# Patient Record
Sex: FEMALE | Race: ASIAN | Employment: OTHER | URBAN - METROPOLITAN AREA
[De-identification: names, ages, dates, MRNs, and addresses within clinical notes are randomized per-mention and may not be internally consistent; named-entity substitution may affect disease eponyms.]

---

## 2017-02-17 ENCOUNTER — OFFICE VISIT (OUTPATIENT)
Dept: CARDIOLOGY | Facility: CLINIC | Age: 82
End: 2017-02-17
Attending: INTERNAL MEDICINE
Payer: COMMERCIAL

## 2017-02-17 ENCOUNTER — HOSPITAL ENCOUNTER (OUTPATIENT)
Dept: CARDIOLOGY | Facility: CLINIC | Age: 82
Discharge: HOME | End: 2017-02-17
Attending: INTERNAL MEDICINE | Admitting: INTERNAL MEDICINE
Payer: COMMERCIAL

## 2017-02-17 VITALS
HEIGHT: 60 IN | HEART RATE: 60 BPM | BODY MASS INDEX: 22.19 KG/M2 | WEIGHT: 113 LBS | DIASTOLIC BLOOD PRESSURE: 74 MMHG | SYSTOLIC BLOOD PRESSURE: 128 MMHG

## 2017-02-17 DIAGNOSIS — I25.110 CORONARY ARTERY DISEASE INVOLVING NATIVE CORONARY ARTERY OF NATIVE HEART WITH UNSTABLE ANGINA PECTORIS (H): ICD-10-CM

## 2017-02-17 DIAGNOSIS — I49.9 CARDIAC ARRHYTHMIA, UNSPECIFIED CARDIAC ARRHYTHMIA TYPE: ICD-10-CM

## 2017-02-17 DIAGNOSIS — I34.0 MITRAL VALVE INSUFFICIENCY, UNSPECIFIED ETIOLOGY: ICD-10-CM

## 2017-02-17 DIAGNOSIS — I24.9 ACS (ACUTE CORONARY SYNDROME) (H): ICD-10-CM

## 2017-02-17 PROCEDURE — 99214 OFFICE O/P EST MOD 30 MIN: CPT | Mod: 25 | Performed by: INTERNAL MEDICINE

## 2017-02-17 PROCEDURE — 93306 TTE W/DOPPLER COMPLETE: CPT | Mod: 26 | Performed by: INTERNAL MEDICINE

## 2017-02-17 PROCEDURE — 93306 TTE W/DOPPLER COMPLETE: CPT

## 2017-02-17 RX ORDER — CALCIUM CARBONATE 500(1250)
500 TABLET ORAL DAILY
COMMUNITY
End: 2023-01-01

## 2017-02-17 NOTE — PROGRESS NOTES
HPI and Plan:   See dictation    Orders Placed This Encounter   Procedures     Follow-Up with Cardiologist       Orders Placed This Encounter   Medications     calcium carbonate (OS-CLAIRE 500 MG [place]. CA) 500 MG tablet     Sig: Take 500 mg by mouth 2 times daily       Encounter Diagnoses   Name Primary?     ACS (acute coronary syndrome) (H)      Mitral valve insufficiency, unspecified etiology      Coronary artery disease involving native coronary artery of native heart with unstable angina pectoris (H)      Cardiac arrhythmia, unspecified cardiac arrhythmia type        CURRENT MEDICATIONS:  Current Outpatient Prescriptions   Medication Sig Dispense Refill     calcium carbonate (OS-CLAIRE 500 MG [place]. CA) 500 MG tablet Take 500 mg by mouth 2 times daily       metoprolol (LOPRESSOR) 25 MG tablet Take 0.5 tablets (12.5 mg) by mouth 2 times daily 90 tablet 3     loratadine (CLARITIN) 10 MG tablet Take 1 tablet (10 mg) by mouth daily 30 tablet 0     omeprazole (PRILOSEC) 40 MG capsule Take 1 capsule (40 mg) by mouth daily 90 capsule 3     ticagrelor (BRILINTA) 90 MG tablet Take 1 tablet (90 mg) by mouth every 12 hours 180 tablet 3     aspirin EC 81 MG EC tablet Take 1 tablet (81 mg) by mouth daily 30 tablet 1     nitroglycerin (NITROSTAT) 0.4 MG SL tablet Place 1 tablet (0.4 mg) under the tongue every 5 minutes as needed for chest pain if you are still having symptoms after 3 doses (15 minutes) call [phone]. 25 tablet 1       ALLERGIES     Allergies   Allergen Reactions     Azithromycin      Kanamycin      Metronidazole Rash     Penicillins Rash     Streptomycin      rash     Atorvastatin Rash     Lisinopril Rash       PAST MEDICAL HISTORY:  Past Medical History   Diagnosis Date     Benign essential HTN      Coronary artery disease involving native coronary artery of native heart with unstable angina pectoris (H) 4/2016 4/2016 Cath - 80% hazy stenosis at site of ruptured plaque - HARLEEN placed     Cough      Eczema      Lung  nodule      Malignant neoplasm of bladder, part unspecified 1999 Dr Everett     Surgery done in [place]     Mitral regurgitation      4/2016 mod-mod severe MR with multiple jets per Echo     Mixed hyperlipidemia      NSTEMI (non-ST elevated myocardial infarction) (H)      Osteoporosis      Polyarticular arthritis      Rash      Sciatica        PAST SURGICAL HISTORY:  Past Surgical History   Procedure Laterality Date     Bladder tumor-associated       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     C appendectomy       1950     C removal gallbladder       due to stone 1990     Cataract iol, rt/lt  5-08     Heart cath stent cor w/wo ptca  4/2016 4/2016 Cath - 80% hazy stenosis at site of ruptured plaque - HARLEEN placed       FAMILY HISTORY:  Family History   Problem Relation Age of Onset     DIABETES Brother      Hypertension Son      CEREBROVASCULAR DISEASE Mother      76     Family History Negative Daughter      Family History Negative Brother      Family History Negative Brother        SOCIAL HISTORY:  Social History     Social History     Marital status: Single     Spouse name: N/A     Number of children: N/A     Years of education: N/A     Social History Main Topics     Smoking status: Never Smoker     Smokeless tobacco: None     Alcohol use No     Drug use: No     Sexual activity: Not Currently     Other Topics Concern     Caffeine Concern No     Sleep Concern No     Stress Concern No     Weight Concern No     Special Diet No     Exercise Yes     walking 20 min daily     Seat Belt Yes     Parent/Sibling W/ Cabg, Mi Or Angioplasty Before 65f 55m? No     Social History Narrative    Vamsi AZEVEDO    From [place]    In the  2001           Review of Systems:  Skin:  Positive for itching   Eyes:  Positive for glasses  ENT:  Negative    Respiratory:  Positive for dyspnea on exertion  Cardiovascular:    Positive for;palpitations;chest pain;lightheadedness  Gastroenterology: Positive for  heartburn;constipation  Genitourinary:  Negative    Musculoskeletal:  Negative    Neurologic:  Negative    Psychiatric:  Negative    Heme/Lymph/Imm:  Negative    Endocrine:  Negative      Physical Exam:  Vitals: /74  Pulse 60  Ht 1.524 m (5')  Wt 51.3 kg (113 lb)  BMI 22.07 kg/m2    Constitutional:           Skin:           Head:           Eyes:           ENT:           Neck:           Chest:           Cardiac:                    Abdomen:           Vascular:                                        Extremities and Back:           Neurological:             Recent Lab Results:  LIPID RESULTS:  Lab Results   Component Value Date    CHOL 170 04/10/2016    HDL 55 04/10/2016    LDL 88 04/10/2016    TRIG 133 04/10/2016    CHOLHDLRATIO 3.8 04/05/2010       LIVER ENZYME RESULTS:  Lab Results   Component Value Date    AST 30 04/09/2016    ALT 24 04/09/2016       CBC RESULTS:  Lab Results   Component Value Date    WBC 10.3 09/16/2016    RBC 3.79 (L) 09/16/2016    HGB 11.3 (L) 09/16/2016    HCT 34.9 (L) 09/16/2016    MCV 92 09/16/2016    MCH 29.8 09/16/2016    MCHC 32.4 09/16/2016    RDW 13.1 09/16/2016     09/16/2016       BMP RESULTS:  Lab Results   Component Value Date     09/15/2016    POTASSIUM 3.7 09/15/2016    CHLORIDE 103 09/15/2016    CO2 25 09/15/2016    ANIONGAP 7 09/15/2016     (H) 09/15/2016    BUN 17 09/15/2016    CR 0.78 09/15/2016    GFRESTIMATED 70 09/15/2016    GFRESTBLACK 85 09/15/2016    CLAIRE 9.3 09/15/2016        A1C RESULTS:  No results found for: A1C    INR RESULTS:  No results found for: INR        CC  Lopez Perrin MD   [facility]  [address]  [place] [id number]

## 2017-02-17 NOTE — MR AVS SNAPSHOT
After Visit Summary   2/17/2017    Mary Polanco    MRN: [id number]           Patient Information     Date Of Birth          4/24/1930        Visit Information        Provider Department      2/17/2017 3:00 PM Marychuy, Lopez Lang MD; MULTILINGUAL WORD [facility]        Today's Diagnoses     ACS (acute coronary syndrome) (H)        Mitral valve insufficiency, unspecified etiology        Coronary artery disease involving native coronary artery of native heart with unstable angina pectoris (H)           Follow-ups after your visit        Who to contact     If you have questions or need follow up information about today's clinic visit or your schedule please contact [facility] directly at [phone].  Normal or non-critical lab and imaging results will be communicated to you by MyChart, letter or phone within 4 business days after the clinic has received the results. If you do not hear from us within 7 days, please contact the clinic through MyChart or phone. If you have a critical or abnormal lab result, we will notify you by phone as soon as possible.  Submit refill requests through [employer] or call your pharmacy and they will forward the refill request to us. Please allow 3 business days for your refill to be completed.          Additional Information About Your Visit        Care EveryWhere ID     This is your Care EveryWhere ID. This could be used by other organizations to access your [place] medical records  [id number]        Your Vitals Were     Pulse Height BMI (Body Mass Index)             60 1.524 m (5') 22.07 kg/m2          Blood Pressure from Last 3 Encounters:   02/17/17 128/74   10/05/16 120/70   09/28/16 110/58    Weight from Last 3 Encounters:   02/17/17 51.3 kg (113 lb)   10/05/16 51.7 kg (114 lb)   09/28/16 51.7 kg (114 lb)              We Performed the Following     Follow-Up with Cardiologist         Primary Care Provider Office Phone # Fax #    Serge Combs [phone]       [facility] [address]  [facility] [place] [address]        Thank you!     Thank you for choosing [facility] [place]  for your care. Our goal is always to provide you with excellent care. Hearing back from our patients is one way we can continue to improve our services. Please take a few minutes to complete the written survey that you may receive in the mail after your visit with us. Thank you!             Your Updated Medication List - Protect others around you: Learn how to safely use, store and throw away your medicines at www.disposemymeds.org.          This list is accurate as of: 2/17/17  3:42 PM.  Always use your most recent med list.                   Brand Name Dispense Instructions for use    aspirin 81 MG EC tablet     30 tablet    Take 1 tablet (81 mg) by mouth daily       calcium carbonate 500 MG tablet    OS-CLAIRE 500 mg [place]. [place]     Take 500 mg by mouth 2 times daily       loratadine 10 MG tablet    CLARITIN    30 tablet    Take 1 tablet (10 mg) by mouth daily       metoprolol 25 MG tablet    LOPRESSOR    90 tablet    Take 0.5 tablets (12.5 mg) by mouth 2 times daily       nitroglycerin 0.4 MG sublingual tablet    NITROSTAT    25 tablet    Place 1 tablet (0.4 mg) under the tongue every 5 minutes as needed for chest pain if you are still having symptoms after 3 doses (15 minutes) call 911.       omeprazole 40 MG capsule    priLOSEC    90 capsule    Take 1 capsule (40 mg) by mouth daily       ticagrelor 90 MG tablet    BRILINTA    180 tablet    Take 1 tablet (90 mg) by mouth every 12 hours

## 2017-02-17 NOTE — LETTER
2/17/2017    Serge Combs MD  [facility]   [address]  [place] [address]    RE: Mary Collin       Dear Colleague,    I had the pleasure of seeing Mary Polanco in the [facility].    Ms. Polanco returns for followup of coronary artery disease.  She had a non-Q-wave myocardial infarction with preserved left ventricular systolic function in 04/2016.  A drug-eluting stent was placed into the circumflex artery with an excellent angiographic result.  She is still on dual antiplatelet therapy.  She is intolerant of statins.  She also has dyspepsia for which omeprazole has been prescribed.  If offers some relief.  She returns today telling me that she has occasional palpitations but no dizzy spells or syncopal episodes.  Previous CardioNet monitoring has not revealed any significant arrhythmias.       PHYSICAL EXAMINATION:  Her cardiac examination is unremarkable.  Echocardiography was done today for followup of her mild degenerative valvular heart disease.  The regurgitation remains mild.      Outpatient Encounter Prescriptions as of 2/17/2017   Medication Sig Dispense Refill     calcium carbonate (OS-CLAIRE 500 MG [place]. [place]) 500 MG tablet Take 500 mg by mouth 2 times daily       metoprolol (LOPRESSOR) 25 MG tablet Take 0.5 tablets (12.5 mg) by mouth 2 times daily 90 tablet 3     loratadine (CLARITIN) 10 MG tablet Take 1 tablet (10 mg) by mouth daily 30 tablet 0     omeprazole (PRILOSEC) 40 MG capsule Take 1 capsule (40 mg) by mouth daily 90 capsule 3     ticagrelor (BRILINTA) 90 MG tablet Take 1 tablet (90 mg) by mouth every 12 hours 180 tablet 3     aspirin EC 81 MG EC tablet Take 1 tablet (81 mg) by mouth daily 30 tablet 1     nitroglycerin (NITROSTAT) 0.4 MG SL tablet Place 1 tablet (0.4 mg) under the tongue every 5 minutes as needed for chest pain if you are still having symptoms after 3 doses (15 minutes) call 911. 25 tablet 1     No facility-administered encounter medications on  file as of 2/17/2017.      IMPRESSION:   1.  Stable coronary artery disease.   2.  Mild degenerative valvular heart disease.  Mild aortic regurgitation and mild mitral regurgitation and mild tricuspid regurgitation with mild pulmonary hypertension.  Essentially unchanged.   3.  Statin intolerance.      I will see her again in a year's time for further followup.  I asked her to stop the Brilinta and gave her written instructions in April of this year.  After which I think she can have her dental work done.     Again, thank you for allowing me to participate in the care of your patient.      Sincerely,    DR MAURA PÉREZ MD     [facility]

## 2017-02-18 NOTE — PROGRESS NOTES
HISTORY OF PRESENT ILLNESS:  Ms. Schafer returns for followup of coronary artery disease.  She had a non-Q-wave myocardial infarction with preserved left ventricular systolic function in 2016.  A drug-eluting stent was placed into the circumflex artery with an excellent angiographic result.  She is still on dual antiplatelet therapy.  She is intolerant of statins.  She also has dyspepsia for which omeprazole has been prescribed.  If offers some relief.  She returns today telling me that she has occasional palpitations but no dizzy spells or syncopal episodes.  Previous CardioNet monitoring has not revealed any significant arrhythmias.       PHYSICAL EXAMINATION:  Her cardiac examination is unremarkable.  Echocardiography was done today for followup of her mild degenerative valvular heart disease.  The regurgitation remains mild.      IMPRESSION:   1.  Stable coronary artery disease.   2.  Mild degenerative valvular heart disease.  Mild aortic regurgitation and mild mitral regurgitation and mild tricuspid regurgitation with mild pulmonary hypertension.  Essentially unchanged.   3.  Statin intolerance.      I will see her again in a year's time for further followup.  I asked her to stop the Brilinta and gave her written instructions in April of this year.  After which I think she can have her dental work done.         MAURA PÉREZ MD, [facility]             D: 2017 15:56   T: 2017 01:18   MT: DALIA      Name:     NERISSA SCHAFER   MRN:      [id number]        Account:      [id number]   :      1930           Service Date: 2017      Document: [id number]

## 2017-03-02 ENCOUNTER — TELEPHONE (OUTPATIENT)
Dept: CARDIOLOGY | Facility: CLINIC | Age: 82
End: 2017-03-02

## 2017-05-22 ENCOUNTER — TELEPHONE (OUTPATIENT)
Dept: CARDIOLOGY | Facility: CLINIC | Age: 82
End: 2017-05-22

## 2017-05-22 NOTE — TELEPHONE ENCOUNTER
Voice message received from Ada, patient interpretor. Message stated that patient is questioning if cardiac medications need to be continued.   Does not specify which medications patient is questioning.  Per last OV with Dr. Perrin on 2/2017, notes that patient was to stop the Birlinta in April 2017. Return OV with Dr. Perrin recommended in February 2018.   Voice message left with Ada with information that the Birlinta can be stopped. The Metoprolol is to be continued.   Call back phone number was left for Ada to call us back if there are further questions.

## 2017-07-13 LAB
ERYTHROCYTE [DISTWIDTH] IN BLOOD BY AUTOMATED COUNT: NORMAL %
HCT VFR BLD AUTO: NORMAL %
HEMOGLOBIN: 11.9 G/DL
MCH RBC QN AUTO: NORMAL PG
MCHC RBC AUTO-ENTMCNC: NORMAL G/DL
MCV RBC AUTO: NORMAL FL
PLATELET # BLD AUTO: 275 10^9/L
RBC # BLD AUTO: NORMAL 10^12/L
WBC # BLD AUTO: 7.5 10^9/L

## 2017-07-14 ENCOUNTER — TRANSFERRED RECORDS (OUTPATIENT)
Dept: HEALTH INFORMATION MANAGEMENT | Facility: CLINIC | Age: 82
End: 2017-07-14

## 2017-07-14 LAB
ALBUMIN SERPL-MCNC: 4 G/DL
ALP SERPL-CCNC: 70 U/L
ALT SERPL-CCNC: 14 U/L
ANION GAP SERPL CALCULATED.3IONS-SCNC: NORMAL MMOL/L
AST SERPL-CCNC: 17 U/L
BILIRUB SERPL-MCNC: 0.3 MG/DL
BUN SERPL-MCNC: 14 MG/DL
CALCIUM SERPL-MCNC: 9.4 MG/DL
CHLORIDE SERPLBLD-SCNC: 103 MMOL/L
CHOLEST SERPL-MCNC: 220 MG/DL
CO2 SERPL-SCNC: 23 MMOL/L
CREAT SERPL-MCNC: 0.71 MG/DL
GFR SERPL CREATININE-BSD FRML MDRD: 77 ML/MIN/1.73M2
GLUCOSE SERPL-MCNC: 106 MG/DL (ref 70–99)
HDLC SERPL-MCNC: 53 MG/DL
LDLC SERPL CALC-MCNC: 101 MG/DL
NONHDLC SERPL-MCNC: NORMAL MG/DL
POTASSIUM SERPL-SCNC: 4.8 MMOL/L
PROT SERPL-MCNC: 6.9 G/DL
SODIUM SERPL-SCNC: 141 MMOL/L
TRIGL SERPL-MCNC: 330 MG/DL
VLDL - CALC: 66 CALC

## 2017-08-18 DIAGNOSIS — I24.9 ACS (ACUTE CORONARY SYNDROME) (H): ICD-10-CM

## 2017-08-18 RX ORDER — OMEPRAZOLE 40 MG/1
40 CAPSULE, DELAYED RELEASE ORAL DAILY
Qty: 90 CAPSULE | Refills: 1 | Status: SHIPPED | OUTPATIENT
Start: 2017-08-18 | End: 2018-03-02

## 2017-09-03 ENCOUNTER — HEALTH MAINTENANCE LETTER (OUTPATIENT)
Age: 82
End: 2017-09-03

## 2017-10-05 ENCOUNTER — OFFICE VISIT (OUTPATIENT)
Dept: CARDIOLOGY | Facility: CLINIC | Age: 82
End: 2017-10-05
Payer: COMMERCIAL

## 2017-10-05 VITALS — HEIGHT: 60 IN | DIASTOLIC BLOOD PRESSURE: 62 MMHG | SYSTOLIC BLOOD PRESSURE: 128 MMHG | HEART RATE: 56 BPM

## 2017-10-05 DIAGNOSIS — I10 BENIGN ESSENTIAL HTN: ICD-10-CM

## 2017-10-05 DIAGNOSIS — R00.2 PALPITATIONS: Primary | ICD-10-CM

## 2017-10-05 DIAGNOSIS — E78.2 MIXED HYPERLIPIDEMIA: ICD-10-CM

## 2017-10-05 PROCEDURE — 99213 OFFICE O/P EST LOW 20 MIN: CPT | Performed by: INTERNAL MEDICINE

## 2017-10-05 PROCEDURE — T1013 SIGN LANG/ORAL INTERPRETER: HCPCS | Mod: U3 | Performed by: INTERNAL MEDICINE

## 2017-10-05 RX ORDER — DOCUSATE SODIUM 100 MG/1
100 CAPSULE, LIQUID FILLED ORAL DAILY
COMMUNITY
End: 2023-01-01

## 2017-10-05 NOTE — MR AVS SNAPSHOT
"              After Visit Summary   10/5/2017    Mary Polanco    MRN: [id number]           Patient Information     Date Of Birth          1930        Visit Information        Provider Department      10/5/2017 12:00 PM Nena Dan Brian Hin Chiu, MD [facility] HE[facility] AT [place]        Today's Diagnoses     Palpitations    -  1    Mixed hyperlipidemia        Benign essential HTN           Follow-ups after your visit        Who to contact     If you have questions or need follow up information about today's clinic visit or your schedule please contact [facility] [place] directly at [phone].  Normal or non-critical lab and imaging results will be communicated to you by MyChart, letter or phone within 4 business days after the clinic has received the results. If you do not hear from us within 7 days, please contact the clinic through [employer] or phone. If you have a critical or abnormal lab result, we will notify you by phone as soon as possible.  Submit refill requests through [employer] or call your pharmacy and they will forward the refill request to us. Please allow 3 business days for your refill to be completed.          Additional Information About Your Visit        MyChart Information     [employer] lets you send messages to your doctor, view your test results, renew your prescriptions, schedule appointments and more. To sign up, go to [email or web address] . Click on \"Log in\" on the left side of the screen, which will take you to the Welcome page. Then click on \"Sign up Now\" on the right side of the page.     You will be asked to enter the access code listed below, as well as some personal information. Please follow the directions to create your username and password.     Your access code is: [email or web address]  Expires: 1/3/2018 12:40 PM     Your access code will  in 90 days. If you need help or a new code, please call your [place] clinic or " [phone].        Care EveryWhere ID     This is your Care EveryWhere ID. This could be used by other organizations to access your [place] medical records  [id number]        Your Vitals Were     Pulse Height                56 1.524 m (5')           Blood Pressure from Last 3 Encounters:   10/05/17 128/62   02/17/17 128/74   10/05/16 120/70    Weight from Last 3 Encounters:   02/17/17 51.3 kg (113 lb)   10/05/16 51.7 kg (114 lb)   09/28/16 51.7 kg (114 lb)              Today, you had the following     No orders found for display       Primary Care Provider Office Phone # Fax #    Serge Combs [phone]       [facility] [address] [facility]   [place] [address]        Equal Access to Services     [facility] : Hadii aad ku hadasho Soomaali, waaxda luqadaha, qaybta kaalmada adeegyada, waxay heydiin hayaan jovani mujica . So [facility] [phone].    ATENCIÓN: Si habla español, tiene a tierney disposición servicios gratuitos de asistencia lingüística. Llame al [phone].    We comply with applicable federal civil rights laws and [place] laws. We do not discriminate on the basis of race, color, national origin, age, disability, sex, sexual orientation, or gender identity.            Thank you!     Thank you for choosing [facility] [place]  for your care. Our goal is always to provide you with excellent care. Hearing back from our patients is one way we can continue to improve our services. Please take a few minutes to complete the written survey that you may receive in the mail after your visit with us. Thank you!             Your Updated Medication List - Protect others around you: Learn how to safely use, store and throw away your medicines at www.disposemymeds.org.          This list is accurate as of: 10/5/17 12:40 PM.  Always use your most recent med list.                   Brand Name Dispense Instructions for use Diagnosis    aspirin 81  MG EC tablet     30 tablet    Take 1 tablet (81 mg) by mouth daily    ACS (acute coronary syndrome) (H), Coronary artery disease involving native coronary artery of native heart with unstable angina pectoris (H)       calcium carbonate 1250 MG tablet    OS-CLAIRE 500 mg [place]. [place]     Take 500 mg by mouth 2 times daily        cholecalciferol 400 UNIT Tabs tablet    vitamin D     Take 400 Units by mouth daily        docusate sodium 100 MG capsule    COLACE     Take by mouth 2 times daily        metoprolol 25 MG tablet    LOPRESSOR    90 tablet    Take 0.5 tablets (12.5 mg) by mouth 2 times daily    Cardiac arrhythmia, unspecified cardiac arrhythmia type       nitroGLYcerin 0.4 MG sublingual tablet    NITROSTAT    25 tablet    Place 1 tablet (0.4 mg) under the tongue every 5 minutes as needed for chest pain if you are still having symptoms after 3 doses (15 minutes) call 911.    Coronary artery disease involving native coronary artery of native heart with unstable angina pectoris (H)       omeprazole 40 MG capsule    priLOSEC    90 capsule    Take 1 capsule (40 mg) by mouth daily    ACS (acute coronary syndrome) (H)       ticagrelor 90 MG tablet    BRILINTA    180 tablet    Take 1 tablet (90 mg) by mouth every 12 hours    Coronary artery disease involving native coronary artery of native heart with unstable angina pectoris (H)

## 2017-10-05 NOTE — PROGRESS NOTES
HPI and Plan:   See dictation    No orders of the defined types were placed in this encounter.      Orders Placed This Encounter   Medications     docusate sodium (COLACE) 100 MG capsule     Sig: Take by mouth 2 times daily     cholecalciferol (VITAMIN D) 400 UNIT TABS tablet     Sig: Take 400 Units by mouth daily       Encounter Diagnoses   Name Primary?     Palpitations Yes     Mixed hyperlipidemia      Benign essential HTN        CURRENT MEDICATIONS:  Current Outpatient Prescriptions   Medication Sig Dispense Refill     docusate sodium (COLACE) 100 MG capsule Take by mouth 2 times daily       cholecalciferol (VITAMIN D) 400 UNIT TABS tablet Take 400 Units by mouth daily       omeprazole (PRILOSEC) 40 MG capsule Take 1 capsule (40 mg) by mouth daily 90 capsule 1     calcium carbonate (OS-CLAIRE 500 MG [place]. CA) 500 MG tablet Take 500 mg by mouth 2 times daily       metoprolol (LOPRESSOR) 25 MG tablet Take 0.5 tablets (12.5 mg) by mouth 2 times daily 90 tablet 3     ticagrelor (BRILINTA) 90 MG tablet Take 1 tablet (90 mg) by mouth every 12 hours 180 tablet 3     aspirin EC 81 MG EC tablet Take 1 tablet (81 mg) by mouth daily 30 tablet 1     nitroglycerin (NITROSTAT) 0.4 MG SL tablet Place 1 tablet (0.4 mg) under the tongue every 5 minutes as needed for chest pain if you are still having symptoms after 3 doses (15 minutes) call [phone]. 25 tablet 1       ALLERGIES     Allergies   Allergen Reactions     Azithromycin      Gabapentin Itching     Kanamycin      Metronidazole Rash     Penicillins Rash     Streptomycin      rash     Atorvastatin Rash     Lisinopril Rash       PAST MEDICAL HISTORY:  Past Medical History:   Diagnosis Date     Benign essential HTN      Coronary artery disease involving native coronary artery of native heart with unstable angina pectoris (H) 4/2016 4/2016 Cath - 80% hazy stenosis at site of ruptured plaque - HARLEEN placed     Cough      Eczema      Lung nodule      Malignant neoplasm of bladder,  part unspecified 1999 Dr Everett    [place] done in [place]     Mitral regurgitation     4/2016 mod-mod severe MR with multiple jets per Echo     Mixed hyperlipidemia      NSTEMI (non-ST elevated myocardial infarction) (H)      Osteoporosis      Polyarticular arthritis      Rash      Sciatica        PAST SURGICAL HISTORY:  Past Surgical History:   Procedure Laterality Date     BLADDER TUMOR-ASSOCIATED      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     C APPENDECTOMY      1950     CATARACT IOL, RT/LT  5-08     HC REMOVAL GALLBLADDER      due to stone 1990     HEART CATH STENT COR W/WO PTCA  4/2016 4/2016 Cath - 80% hazy stenosis at site of ruptured plaque - HARLEEN placed       FAMILY HISTORY:  Family History   Problem Relation Age of Onset     DIABETES Brother      Hypertension Son      CEREBROVASCULAR DISEASE Mother      76     Family History Negative Daughter      Family History Negative Brother      Family History Negative Brother        SOCIAL HISTORY:  Social History     Social History     Marital status: Single     Spouse name: N/A     Number of children: N/A     Years of education: N/A     Social History Main Topics     Smoking status: Never Smoker     Smokeless tobacco: Never Used     Alcohol use No     Drug use: No     Sexual activity: Not Currently     Other Topics Concern     Caffeine Concern No     Sleep Concern No     Stress Concern No     Weight Concern No     Special Diet No     Exercise Yes     walking 20 min daily     Seat Belt Yes     Parent/Sibling W/ Cabg, Mi Or Angioplasty Before 65f 55m? No     Social History Narrative    Vamsi AZEVEDO    From [place]    In the  2001           Review of Systems:  Skin:  Negative     Eyes:  Positive for glasses  ENT:  Negative    Respiratory:  Negative    Cardiovascular:    Positive for;palpitations;lightheadedness;fatigue  Gastroenterology: Positive for heartburn;constipation  Genitourinary:  not assessed    Musculoskeletal:  Positive for back  pain;arthritis  Neurologic:  Negative    Psychiatric:  Positive for sleep disturbances  Heme/Lymph/Imm:  Positive for allergies  Endocrine:  Negative      Physical Exam:  Vitals: /62  Pulse 56  Ht 1.524 m (5')    Constitutional:  cooperative;alert and oriented;in no acute distress thin      Skin:  warm and dry to the touch   resolution of rash on body. Dry scaly skin under hair at nape of neck, red excema type coloration on hands    Head:  normocephalic        Eyes:  pupils equal and round        ENT:  no pallor or cyanosis   missing some teeth    Neck:  JVP normal;no stiffness        Chest:  clear to auscultation        Cardiac: regular rhythm;normal S1 and S2       systolic murmur;grade 1;apical          Abdomen:  abdomen soft        Vascular: pulses full and equal                                      Extremities and Back:  no edema        Neurological:  affect appropriate, oriented to time, person and place          Recent Lab Results:  LIPID RESULTS:  Lab Results   Component Value Date    CHOL 170 04/10/2016    HDL 55 04/10/2016    LDL 88 04/10/2016    TRIG 133 04/10/2016    CHOLHDLRATIO 3.8 04/05/2010       LIVER ENZYME RESULTS:  Lab Results   Component Value Date    AST 30 04/09/2016    ALT 24 04/09/2016       CBC RESULTS:  Lab Results   Component Value Date    WBC 10.3 09/16/2016    RBC 3.79 (L) 09/16/2016    HGB 11.3 (L) 09/16/2016    HCT 34.9 (L) 09/16/2016    MCV 92 09/16/2016    MCH 29.8 09/16/2016    MCHC 32.4 09/16/2016    RDW 13.1 09/16/2016     09/16/2016       BMP RESULTS:  Lab Results   Component Value Date     09/15/2016    POTASSIUM 3.7 09/15/2016    CHLORIDE 103 09/15/2016    CO2 25 09/15/2016    ANIONGAP 7 09/15/2016     (H) 09/15/2016    BUN 17 09/15/2016    CR 0.78 09/15/2016    GFRESTIMATED 70 09/15/2016    GFRESTBLACK 85 09/15/2016    CLAIRE 9.3 09/15/2016        A1C RESULTS:  No results found for: A1C    INR RESULTS:  No results found for: INR        JOE GIRON  MD Lauryn  [facility]  [address] [place] [address]

## 2017-10-05 NOTE — LETTER
10/5/2017    Serge Combs MD  [facility]  [address] [facility] [address]  [place] [address]    RE: Mary Polanco       Dear Colleague,    I had the pleasure of seeing Mary Polanco in the [facility].    HISTORY OF PRESENT ILLNESS:  Ms. Polanco has coronary artery disease.  However, she is here because she had a chest x-ray done in August which was reported as showing cardiomegaly.  The Urgent Care physician asked her to follow up with me.      She had a chest x-ray in August because she had flu-like symptoms from which she had completely recovered.  At this time, she is not out of breath.  She has no chest pains.  I personally reviewed her chest x-ray.  The cardiomegaly at present is at most borderline.  There is certainly no evidence of congestive heart failure, which I am happy to see that the radiologist reporting on the film agrees on.      Physical examination is unchanged from previously without evidence of CHF.     Outpatient Encounter Prescriptions as of 10/5/2017   Medication Sig Dispense Refill     docusate sodium (COLACE) 100 MG capsule Take by mouth 2 times daily       cholecalciferol (VITAMIN D) 400 UNIT TABS tablet Take 400 Units by mouth daily       omeprazole (PRILOSEC) 40 MG capsule Take 1 capsule (40 mg) by mouth daily 90 capsule 1     calcium carbonate (OS-CLAIRE 500 MG [place]. CA) 500 MG tablet Take 500 mg by mouth 2 times daily       [DISCONTINUED] metoprolol (LOPRESSOR) 25 MG tablet Take 0.5 tablets (12.5 mg) by mouth 2 times daily 90 tablet 3     ticagrelor (BRILINTA) 90 MG tablet Take 1 tablet (90 mg) by mouth every 12 hours 180 tablet 3     aspirin EC 81 MG EC tablet Take 1 tablet (81 mg) by mouth daily 30 tablet 1     nitroglycerin (NITROSTAT) 0.4 MG SL tablet Place 1 tablet (0.4 mg) under the tongue every 5 minutes as needed for chest pain if you are still having symptoms after 3 doses (15 minutes) call 911. 25 tablet 1     [DISCONTINUED] loratadine (CLARITIN) 10 MG  tablet Take 1 tablet (10 mg) by mouth daily 30 tablet 0     No facility-administered encounter medications on file as of 10/5/2017.       ASSESSMENT:  I am happy to reassure this lady that there is no significant cardiomegaly.  She asked me about her dental procedure.  Her Brilinta has already been stopped.  It would certainly be fine for her in my opinion just to continue with aspirin for secondary prophylaxis.  I do not think this medication needs  to be stopped for her dental procedures.  I will see her as previously scheduled.     Again, thank you for allowing me to participate in the care of your patient.      Sincerely,    DR MAURA PÉREZ MD     [facility]

## 2017-10-05 NOTE — PROGRESS NOTES
HISTORY OF PRESENT ILLNESS:  MsTa Schafer has coronary artery disease.  However, she is here because she had a chest x-ray done in August which was reported as showing cardiomegaly.  The Urgent Care physician asked her to follow up with me.      She had a chest x-ray in August because she had flu-like symptoms from which she had completely recovered.  At this time, she is not out of breath.  She has no chest pains.  I personally reviewed her chest x-ray.  The cardiomegaly at present is at most borderline.  There is certainly no evidence of congestive heart failure, which I am happy to see that the radiologist reporting on the film agrees on.      Physical examination is unchanged from previously without evidence of CHF.      ASSESSMENT:  I am happy to reassure this lady that there is no significant cardiomegaly.  She asked me about her dental procedure.  Her Brilinta has already been stopped.  It would certainly be fine for her in my opinion just to continue with aspirin for secondary prophylaxis.  I do not think this medication needs  to be stopped for her dental procedures.  I will see her as previously scheduled.         MAURA PÉREZ MD, [facility]             D: 10/05/2017 12:50   T: 10/05/2017 13:55   MT: BRIANNA      Name:     NERISSA SCHAFER   MRN:      [id number]        Account:      [id number]   :      1930           Service Date: 10/05/2017      Document: [id number]

## 2017-11-13 DIAGNOSIS — I49.9 CARDIAC ARRHYTHMIA, UNSPECIFIED CARDIAC ARRHYTHMIA TYPE: ICD-10-CM

## 2017-11-13 RX ORDER — METOPROLOL TARTRATE 25 MG/1
12.5 TABLET, FILM COATED ORAL 2 TIMES DAILY
Qty: 90 TABLET | Refills: 3 | Status: SHIPPED | OUTPATIENT
Start: 2017-11-13 | End: 2018-11-06

## 2018-03-02 ENCOUNTER — OFFICE VISIT (OUTPATIENT)
Dept: CARDIOLOGY | Facility: CLINIC | Age: 83
End: 2018-03-02
Attending: INTERNAL MEDICINE
Payer: COMMERCIAL

## 2018-03-02 VITALS
WEIGHT: 119.8 LBS | BODY MASS INDEX: 23.52 KG/M2 | DIASTOLIC BLOOD PRESSURE: 61 MMHG | HEART RATE: 67 BPM | SYSTOLIC BLOOD PRESSURE: 118 MMHG | HEIGHT: 60 IN

## 2018-03-02 DIAGNOSIS — I34.0 MITRAL VALVE INSUFFICIENCY, UNSPECIFIED ETIOLOGY: ICD-10-CM

## 2018-03-02 DIAGNOSIS — I25.110 CORONARY ARTERY DISEASE INVOLVING NATIVE CORONARY ARTERY OF NATIVE HEART WITH UNSTABLE ANGINA PECTORIS (H): ICD-10-CM

## 2018-03-02 DIAGNOSIS — I24.9 ACS (ACUTE CORONARY SYNDROME) (H): ICD-10-CM

## 2018-03-02 DIAGNOSIS — I49.9 CARDIAC ARRHYTHMIA, UNSPECIFIED CARDIAC ARRHYTHMIA TYPE: ICD-10-CM

## 2018-03-02 PROCEDURE — 99213 OFFICE O/P EST LOW 20 MIN: CPT | Performed by: INTERNAL MEDICINE

## 2018-03-02 NOTE — PROGRESS NOTES
HPI and Plan:   See dictation    No orders of the defined types were placed in this encounter.      No orders of the defined types were placed in this encounter.      Encounter Diagnoses   Name Primary?     ACS (acute coronary syndrome) (H)      Mitral valve insufficiency, unspecified etiology      Coronary artery disease involving native coronary artery of native heart with unstable angina pectoris (H)      Cardiac arrhythmia, unspecified cardiac arrhythmia type        CURRENT MEDICATIONS:  Current Outpatient Prescriptions   Medication Sig Dispense Refill     metoprolol (LOPRESSOR) 25 MG tablet Take 0.5 tablets (12.5 mg) by mouth 2 times daily 90 tablet 3     docusate sodium (COLACE) 100 MG capsule Take by mouth 2 times daily       cholecalciferol (VITAMIN D) 400 UNIT TABS tablet Take 400 Units by mouth daily       calcium carbonate (OS-CLAIRE 500 MG [place]. CA) 500 MG tablet Take 500 mg by mouth 2 times daily       aspirin EC 81 MG EC tablet Take 1 tablet (81 mg) by mouth daily 30 tablet 1     nitroglycerin (NITROSTAT) 0.4 MG SL tablet Place 1 tablet (0.4 mg) under the tongue every 5 minutes as needed for chest pain if you are still having symptoms after 3 doses (15 minutes) call 911. 25 tablet 1       ALLERGIES     Allergies   Allergen Reactions     Azithromycin      Gabapentin Itching     Kanamycin      Metronidazole Rash     Penicillins Rash     Streptomycin      rash     Atorvastatin Rash     Lisinopril Rash       PAST MEDICAL HISTORY:  Past Medical History:   Diagnosis Date     Benign essential HTN      Coronary artery disease involving native coronary artery of native heart with unstable angina pectoris (H) 4/2016 4/2016 Cath - 80% hazy stenosis at site of ruptured plaque - HARLEEN placed     Cough      Eczema      Lung nodule      Malignant neoplasm of bladder, part unspecified 1999 Dr Everett    Surgery done in [place]     Mitral regurgitation     4/2016 mod-mod severe MR with multiple jets per Echo     Mixed  hyperlipidemia      NSTEMI (non-ST elevated myocardial infarction) (H)      Osteoporosis      Polyarticular arthritis      Rash      Sciatica        PAST SURGICAL HISTORY:  Past Surgical History:   Procedure Laterality Date     BLADDER TUMOR-ASSOCIATED      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     C APPENDECTOMY      1950     CATARACT IOL, RT/LT  5-08     HC REMOVAL GALLBLADDER      due to stone 1990     HEART CATH STENT COR W/WO PTCA  4/2016 4/2016 Cath - 80% hazy stenosis at site of ruptured plaque - HARLEEN placed       FAMILY HISTORY:  Family History   Problem Relation Age of Onset     DIABETES Brother      Hypertension Son      CEREBROVASCULAR DISEASE Mother      76     Family History Negative Daughter      Family History Negative Brother      Family History Negative Brother        SOCIAL HISTORY:  Social History     Social History     Marital status: Single     Spouse name: N/A     Number of children: N/A     Years of education: N/A     Social History Main Topics     Smoking status: Never Smoker     Smokeless tobacco: Never Used     Alcohol use No     Drug use: No     Sexual activity: Not Currently     Other Topics Concern     Caffeine Concern No     Sleep Concern No     Stress Concern No     Weight Concern No     Special Diet No     Exercise Yes     walking 20 min daily     Seat Belt Yes     Parent/Sibling W/ Cabg, Mi Or Angioplasty Before 65f 55m? No     Social History Narrative    Vamsi AZEVEDO    From [place]    In the  2001           Review of Systems:  Skin:  Negative     Eyes:  Positive for glasses  ENT:  Negative    Respiratory:  Negative    Cardiovascular:    Positive for;palpitations;edema  Gastroenterology: Positive for heartburn;constipation  Genitourinary:  not assessed    Musculoskeletal:  Positive for back pain;arthritis;joint pain  Neurologic:  Negative    Psychiatric:  Negative    Heme/Lymph/Imm:  Positive for allergies  Endocrine:  Negative      Physical Exam:  Vitals: /61  Pulse  67  Ht 1.524 m (5')  Wt 54.3 kg (119 lb 12.8 oz)  BMI 23.4 kg/m2    Constitutional:  cooperative, alert and oriented, well developed, well nourished, in no acute distress thin      Skin:  warm and dry to the touch     resolution of rash on body. Dry scaly skin under hair at nape of neck, red excema type coloration on hands    Head:  normocephalic        Eyes:  pupils equal and round        Lymph:No Cervical lymphadenopathy present     ENT:  no pallor or cyanosis   missing some teeth    Neck:  JVP normal;no stiffness        Respiratory:  normal breath sounds, clear to auscultation, normal A-P diameter, normal symmetry, normal respiratory excursion, no use of accessory muscles         Cardiac: regular rhythm;normal S1 and S2       systolic murmur;grade 1;apical        pulses full and equal                                        GI:  abdomen soft;abdomen soft, non-tender, BS normoactive, no mass, no HSM, no bruits        Extremities and Muscular Skeletal:  no edema              Neurological:  no gross motor deficits        Psych:  Alert and Oriented x 3        Recent Lab Results:  LIPID RESULTS:  Lab Results   Component Value Date    CHOL 170 04/10/2016    HDL 55 04/10/2016    LDL 88 04/10/2016    TRIG 133 04/10/2016    CHOLHDLRATIO 3.8 04/05/2010       LIVER ENZYME RESULTS:  Lab Results   Component Value Date    AST 30 04/09/2016    ALT 24 04/09/2016       CBC RESULTS:  Lab Results   Component Value Date    WBC 10.3 09/16/2016    RBC 3.79 (L) 09/16/2016    HGB 11.3 (L) 09/16/2016    HCT 34.9 (L) 09/16/2016    MCV 92 09/16/2016    MCH 29.8 09/16/2016    MCHC 32.4 09/16/2016    RDW 13.1 09/16/2016     09/16/2016       BMP RESULTS:  Lab Results   Component Value Date     09/15/2016    POTASSIUM 3.7 09/15/2016    CHLORIDE 103 09/15/2016    CO2 25 09/15/2016    ANIONGAP 7 09/15/2016     (H) 09/15/2016    BUN 17 09/15/2016    CR 0.78 09/15/2016    GFRESTIMATED 70 09/15/2016    GFRESTBLACK 85  09/15/2016    CLAIRE 9.3 09/15/2016        A1C RESULTS:  No results found for: A1C    INR RESULTS:  No results found for: INR        CC  Lopez Perrin MD  [address]  [place] [id number]

## 2018-03-02 NOTE — MR AVS SNAPSHOT
"              After Visit Summary   3/2/2018    Mary Polanco    MRN: [id number]           Patient Information     Date Of Birth          4/24/1930        Visit Information        Provider Department      3/2/2018 10:30 AM Marychuy, Lopez Lang MD; [employer] [facility]        Today's Diagnoses     ACS (acute coronary syndrome) (H)        Mitral valve insufficiency, unspecified etiology        Coronary artery disease involving native coronary artery of native heart with unstable angina pectoris (H)        Cardiac arrhythmia, unspecified cardiac arrhythmia type           Follow-ups after your visit        Who to contact     If you have questions or need follow up information about today's clinic visit or your schedule please contact [facility] directly at [phone].  Normal or non-critical lab and imaging results will be communicated to you by LeadCloudhart, letter or phone within 4 business days after the clinic has received the results. If you do not hear from us within 7 days, please contact the clinic through [employer] or phone. If you have a critical or abnormal lab result, we will notify you by phone as soon as possible.  Submit refill requests through [employer] or call your pharmacy and they will forward the refill request to us. Please allow 3 business days for your refill to be completed.          Additional Information About Your Visit        LeadCloudhart Information     P[employer] lets you send messages to your doctor, view your test results, renew your prescriptions, schedule appointments and more. To sign up, go to [email or web address] . Click on \"Log in\" on the left side of the screen, which will take you to the Welcome page. Then click on \"Sign up Now\" on the right side of the page.     You will be asked to enter the access code listed below, as well as some personal information. Please follow the directions to create your " username and password.     Your access code is: [email or web address]  Expires: 2018 11:14 AM     Your access code will  in 90 days. If you need help or a new code, please call your [facility] or [phone].        Care EveryWhere ID     This is your Care EveryWhere ID. This could be used by other organizations to access your [facility] medical records  [id number]        Your Vitals Were     Pulse Height BMI (Body Mass Index)             67 1.524 m (5') 23.4 kg/m2          Blood Pressure from Last 3 Encounters:   18 118/61   10/05/17 128/62   17 128/74    Weight from Last 3 Encounters:   18 54.3 kg (119 lb 12.8 oz)   17 51.3 kg (113 lb)   10/05/16 51.7 kg (114 lb)              We Performed the Following     Follow-Up with Cardiologist        Primary Care Provider Office Phone # Fax #    Serge Combs [phone]       [facility] [address] [facility] [address]  [facility] [address]        Equal Access to Services     [facility] : Hadii aad ku hadasho Soomaali, waaxda luqadaha, qaybta kaalmada adeegyada, waxay heydiin hayelsin jovani mujica . So [facility] [phone].    ATENCIÓN: Si habla español, tiene a tierney disposición servicios gratuitos de asistencia lingüística. Llame al [phone].    We comply with applicable federal civil rights laws and [place] laws. We do not discriminate on the basis of race, color, national origin, age, disability, sex, sexual orientation, or gender identity.            Thank you!     Thank you for choosing [facility]  for your care. Our goal is always to provide you with excellent care. Hearing back from our patients is one way we can continue to improve our services. Please take a few minutes to complete the written survey that you may receive in the mail after your visit with us. Thank you!             Your Updated Medication List - Protect others around you: Learn how to safely use, store  and throw away your medicines at www.disposemymeds.org.          This list is accurate as of 3/2/18 11:14 AM.  Always use your most recent med list.                   Brand Name Dispense Instructions for use Diagnosis    aspirin 81 MG EC tablet     30 tablet    Take 1 tablet (81 mg) by mouth daily    ACS (acute coronary syndrome) (H), Coronary artery disease involving native coronary artery of native heart with unstable angina pectoris (H)       calcium carbonate 1250 MG tablet    OS-CLAIRE 500 mg [place]. Ca     Take 500 mg by mouth 2 times daily        cholecalciferol 400 UNIT Tabs tablet    vitamin D3     Take 400 Units by mouth daily        docusate sodium 100 MG capsule    COLACE     Take by mouth 2 times daily        metoprolol tartrate 25 MG tablet    LOPRESSOR    90 tablet    Take 0.5 tablets (12.5 mg) by mouth 2 times daily    Cardiac arrhythmia, unspecified cardiac arrhythmia type       nitroGLYcerin 0.4 MG sublingual tablet    NITROSTAT    25 tablet    Place 1 tablet (0.4 mg) under the tongue every 5 minutes as needed for chest pain if you are still having symptoms after 3 doses (15 minutes) call 911.    Coronary artery disease involving native coronary artery of native heart with unstable angina pectoris (H)

## 2018-03-02 NOTE — PROGRESS NOTES
Service Date: 2018      HISTORY OF PRESENT ILLNESS:  It is a pleasant for me to see Mrs. Schafer who is here for followup of coronary artery disease.  She had a non-Q-wave myocardial infarction in 2016.  She has normal left ventricular systolic function.  The culprit artery was the circumflex and this was revascularized with a drug-eluting stent.  She completed a year or 2 of antiplatelet therapy.  Unfortunately, she is not tolerant of statins.  She also has palpitations, but rhythm monitoring has not revealed any significant arrhythmias.  She has mild degrees of aortic, mitral and tricuspid regurgitation which would not be unexpected with age.      She returns today telling me that she has been feeling well.  She denies chest pains, shortness of breath.  She has occasional palpitations described as a skipped beat.  I reassured her that this is benign and she should continue with beta blockers.      PHYSICAL EXAMINATION:  Cardiac examination reveals normal heart sounds, clear chest.  No evidence of congestive heart failure.      IMPRESSION:   1.  Stable coronary artery disease.   2.  Statin intolerance.   3.  Stable blood pressure.   4.  Mild degenerative valvular heart disease.  Not hemodynamically significant.      I do think she is stable.  I will see her again in a year's time for further followup.         MAURA PÉREZ MD, [facility]             D: 2018   T: 2018   MT: BRIANNA      Name:     NERISSA SCHAFER   MRN:      [id number]        Account:      [id number]   :      1930           Service Date: 2018      Document: [id number]

## 2018-03-02 NOTE — LETTER
3/2/2018      Serge Combs MD  [facility] [address]  [facility] [address]      RE: Mary Polanco       Dear Colleague,    I had the pleasure of seeing Mary Polanco in the [facility].    Service Date: 2018      HISTORY OF PRESENT ILLNESS:  It is a pleasant for me to see Mrs. Polanco who is here for followup of coronary artery disease.  She had a non-Q-wave myocardial infarction in 2016.  She has normal left ventricular systolic function.  The culprit artery was the circumflex and this was revascularized with a drug-eluting stent.  She completed a year or 2 of antiplatelet therapy.  Unfortunately, she is not tolerant of statins.  She also has palpitations, but rhythm monitoring has not revealed any significant arrhythmias.  She has mild degrees of aortic, mitral and tricuspid regurgitation which would not be unexpected with age.      She returns today telling me that she has been feeling well.  She denies chest pains, shortness of breath.  She has occasional palpitations described as a skipped beat.  I reassured her that this is benign and she should continue with beta blockers.      PHYSICAL EXAMINATION:  Cardiac examination reveals normal heart sounds, clear chest.  No evidence of congestive heart failure.      IMPRESSION:   1.  Stable coronary artery disease.   2.  Statin intolerance.   3.  Stable blood pressure.   4.  Mild degenerative valvular heart disease.  Not hemodynamically significant.      I do think she is stable.  I will see her again in a year's time for further followup.         MAURA PÉREZ MD, [facility]             D: 2018   T: 2018   MT: BRIANNA      Name:     MARY POLANCO   MRN:      [id number]        Account:      [id number]   :      1930           Service Date: 2018      Document: [id number]         Outpatient Encounter Prescriptions as of 3/2/2018   Medication Sig Dispense Refill     metoprolol (LOPRESSOR) 25 MG tablet Take  0.5 tablets (12.5 mg) by mouth 2 times daily 90 tablet 3     docusate sodium (COLACE) 100 MG capsule Take by mouth 2 times daily       cholecalciferol (VITAMIN D) 400 UNIT TABS tablet Take 400 Units by mouth daily       calcium carbonate (OS-CLAIRE 500 MG [place]. [place]) 500 MG tablet Take 500 mg by mouth 2 times daily       aspirin EC 81 MG EC tablet Take 1 tablet (81 mg) by mouth daily 30 tablet 1     nitroglycerin (NITROSTAT) 0.4 MG SL tablet Place 1 tablet (0.4 mg) under the tongue every 5 minutes as needed for chest pain if you are still having symptoms after 3 doses (15 minutes) call 911. 25 tablet 1     [DISCONTINUED] omeprazole (PRILOSEC) 40 MG capsule Take 1 capsule (40 mg) by mouth daily 90 capsule 1     [DISCONTINUED] ticagrelor (BRILINTA) 90 MG tablet Take 1 tablet (90 mg) by mouth every 12 hours 180 tablet 3     No facility-administered encounter medications on file as of 3/2/2018.        Again, thank you for allowing me to participate in the care of your patient.      Sincerely,    DR MAURA PÉREZ MD     [facility]

## 2018-03-02 NOTE — LETTER
3/2/2018    Serge Combs MD  [facility] [address]  [facility] [address]    RE: Mary Polanco       Dear Colleague,    I had the pleasure of seeing Mary Polanco in the [facility].    HPI and Plan:   See dictation    No orders of the defined types were placed in this encounter.      No orders of the defined types were placed in this encounter.      Encounter Diagnoses   Name Primary?     ACS (acute coronary syndrome) (H)      Mitral valve insufficiency, unspecified etiology      Coronary artery disease involving native coronary artery of native heart with unstable angina pectoris (H)      Cardiac arrhythmia, unspecified cardiac arrhythmia type        CURRENT MEDICATIONS:  Current Outpatient Prescriptions   Medication Sig Dispense Refill     metoprolol (LOPRESSOR) 25 MG tablet Take 0.5 tablets (12.5 mg) by mouth 2 times daily 90 tablet 3     docusate sodium (COLACE) 100 MG capsule Take by mouth 2 times daily       cholecalciferol (VITAMIN D) 400 UNIT TABS tablet Take 400 Units by mouth daily       calcium carbonate (OS-CLAIRE 500 MG [place]. [place]) 500 MG tablet Take 500 mg by mouth 2 times daily       aspirin EC 81 MG EC tablet Take 1 tablet (81 mg) by mouth daily 30 tablet 1     nitroglycerin (NITROSTAT) 0.4 MG SL tablet Place 1 tablet (0.4 mg) under the tongue every 5 minutes as needed for chest pain if you are still having symptoms after 3 doses (15 minutes) call 911. 25 tablet 1       ALLERGIES     Allergies   Allergen Reactions     Azithromycin      Gabapentin Itching     Kanamycin      Metronidazole Rash     Penicillins Rash     Streptomycin      rash     Atorvastatin Rash     Lisinopril Rash       PAST MEDICAL HISTORY:  Past Medical History:   Diagnosis Date     Benign essential HTN      Coronary artery disease involving native coronary artery of native heart with unstable angina pectoris (H) 4/2016 4/2016 Cath - 80% hazy stenosis at site of ruptured plaque - HARLEEN  placed     Cough      Eczema      Lung nodule      Malignant neoplasm of bladder, part unspecified 1999 Dr Everett    Surgery done in [place]     Mitral regurgitation     4/2016 mod-mod severe MR with multiple jets per Echo     Mixed hyperlipidemia      NSTEMI (non-ST elevated myocardial infarction) (H)      Osteoporosis      Polyarticular arthritis      Rash      Sciatica        PAST SURGICAL HISTORY:  Past Surgical History:   Procedure Laterality Date     BLADDER TUMOR-ASSOCIATED      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     C APPENDECTOMY      1950     CATARACT IOL, RT/LT  5-08     HC REMOVAL GALLBLADDER      due to stone 1990     HEART CATH STENT COR W/WO PTCA  4/2016 4/2016 Cath - 80% hazy stenosis at site of ruptured plaque - HARLEEN placed       FAMILY HISTORY:  Family History   Problem Relation Age of Onset     DIABETES Brother      Hypertension Son      CEREBROVASCULAR DISEASE Mother      76     Family History Negative Daughter      Family History Negative Brother      Family History Negative Brother        SOCIAL HISTORY:  Social History     Social History     Marital status: Single     Spouse name: N/A     Number of children: N/A     Years of education: N/A     Social History Main Topics     Smoking status: Never Smoker     Smokeless tobacco: Never Used     Alcohol use No     Drug use: No     Sexual activity: Not Currently     Other Topics Concern     Caffeine Concern No     Sleep Concern No     Stress Concern No     Weight Concern No     Special Diet No     Exercise Yes     walking 20 min daily     Seat Belt Yes     Parent/Sibling W/ Cabg, Mi Or Angioplasty Before 65f 55m? No     Social History Narrative    Vamsi AZEVEDO    From [place]    In the  2001           Review of Systems:  Skin:  Negative     Eyes:  Positive for glasses  ENT:  Negative    Respiratory:  Negative    Cardiovascular:    Positive for;palpitations;edema  Gastroenterology: Positive for heartburn;constipation  Genitourinary:  not  assessed    Musculoskeletal:  Positive for back pain;arthritis;joint pain  Neurologic:  Negative    Psychiatric:  Negative    Heme/Lymph/Imm:  Positive for allergies  Endocrine:  Negative      Physical Exam:  Vitals: /61  Pulse 67  Ht 1.524 m (5')  Wt 54.3 kg (119 lb 12.8 oz)  BMI 23.4 kg/m2    Constitutional:  cooperative, alert and oriented, well developed, well nourished, in no acute distress thin      Skin:  warm and dry to the touch     resolution of rash on body. Dry scaly skin under hair at nape of neck, red excema type coloration on hands    Head:  normocephalic        Eyes:  pupils equal and round        Lymph:No Cervical lymphadenopathy present     ENT:  no pallor or cyanosis   missing some teeth    Neck:  JVP normal;no stiffness        Respiratory:  normal breath sounds, clear to auscultation, normal A-P diameter, normal symmetry, normal respiratory excursion, no use of accessory muscles         Cardiac: regular rhythm;normal S1 and S2       systolic murmur;grade 1;apical        pulses full and equal                                        GI:  abdomen soft;abdomen soft, non-tender, BS normoactive, no mass, no HSM, no bruits        Extremities and Muscular Skeletal:  no edema              Neurological:  no gross motor deficits        Psych:  Alert and Oriented x 3        Recent Lab Results:  LIPID RESULTS:  Lab Results   Component Value Date    CHOL 170 04/10/2016    HDL 55 04/10/2016    LDL 88 04/10/2016    TRIG 133 04/10/2016    CHOLHDLRATIO 3.8 04/05/2010       LIVER ENZYME RESULTS:  Lab Results   Component Value Date    AST 30 04/09/2016    ALT 24 04/09/2016       CBC RESULTS:  Lab Results   Component Value Date    WBC 10.3 09/16/2016    RBC 3.79 (L) 09/16/2016    HGB 11.3 (L) 09/16/2016    HCT 34.9 (L) 09/16/2016    MCV 92 09/16/2016    MCH 29.8 09/16/2016    MCHC 32.4 09/16/2016    RDW 13.1 09/16/2016     09/16/2016       BMP RESULTS:  Lab Results   Component Value Date      09/15/2016    POTASSIUM 3.7 09/15/2016    CHLORIDE 103 09/15/2016    CO2 25 09/15/2016    ANIONGAP 7 09/15/2016     (H) 09/15/2016    BUN 17 09/15/2016    CR 0.78 09/15/2016    GFRESTIMATED 70 09/15/2016    GFRESTBLACK 85 09/15/2016    CLAIRE 9.3 09/15/2016        A1C RESULTS:  No results found for: A1C    INR RESULTS:  No results found for: INR        CC  Maura Perrin MD  [address]  [place] [address]                    Thank you for allowing me to participate in the care of your patient.      Sincerely,     DR MAURA PERRIN MD     [facility]    cc:   Maura Perrin MD  [address]  [place] [address]

## 2018-03-07 DIAGNOSIS — I34.0 MITRAL VALVE INSUFFICIENCY, UNSPECIFIED ETIOLOGY: Primary | ICD-10-CM

## 2018-03-25 ENCOUNTER — OFFICE VISIT (OUTPATIENT)
Dept: URGENT CARE | Facility: URGENT CARE | Age: 83
End: 2018-03-25
Payer: COMMERCIAL

## 2018-03-25 ENCOUNTER — RADIANT APPOINTMENT (OUTPATIENT)
Dept: GENERAL RADIOLOGY | Facility: CLINIC | Age: 83
End: 2018-03-25
Attending: FAMILY MEDICINE
Payer: COMMERCIAL

## 2018-03-25 VITALS
SYSTOLIC BLOOD PRESSURE: 106 MMHG | DIASTOLIC BLOOD PRESSURE: 70 MMHG | BODY MASS INDEX: 23.06 KG/M2 | WEIGHT: 118.1 LBS | RESPIRATION RATE: 20 BRPM | HEART RATE: 83 BPM | TEMPERATURE: 101.2 F | OXYGEN SATURATION: 97 %

## 2018-03-25 DIAGNOSIS — R05.9 COUGH: Primary | ICD-10-CM

## 2018-03-25 DIAGNOSIS — R05.9 COUGH: ICD-10-CM

## 2018-03-25 DIAGNOSIS — J18.9 PNEUMONIA OF RIGHT MIDDLE LOBE DUE TO INFECTIOUS ORGANISM: ICD-10-CM

## 2018-03-25 LAB
FLUAV+FLUBV AG SPEC QL: NEGATIVE
FLUAV+FLUBV AG SPEC QL: NEGATIVE
SPECIMEN SOURCE: NORMAL

## 2018-03-25 PROCEDURE — 99214 OFFICE O/P EST MOD 30 MIN: CPT | Performed by: FAMILY MEDICINE

## 2018-03-25 PROCEDURE — 71046 X-RAY EXAM CHEST 2 VIEWS: CPT | Mod: FY

## 2018-03-25 PROCEDURE — 87804 INFLUENZA ASSAY W/OPTIC: CPT | Mod: 59 | Performed by: FAMILY MEDICINE

## 2018-03-25 RX ORDER — LEVOFLOXACIN 500 MG/1
500 TABLET, FILM COATED ORAL DAILY
Qty: 7 TABLET | Refills: 0 | Status: ON HOLD | OUTPATIENT
Start: 2018-03-25 | End: 2019-06-06

## 2018-03-25 NOTE — MR AVS SNAPSHOT
After Visit Summary   3/25/2018    Mary Polanco    MRN: [id number]           Patient Information     Date Of Birth          4/24/1930        Visit Information        Provider Department      3/25/2018 5:55 PM Veronica Buckley MD [facility]        Today's Diagnoses     Cough    -  1    Pneumonia of right middle lobe due to infectious organism (H)          Care Instructions      Pneumonia (Adult)  Pneumonia is an infection deep within the lungs. It is in the small air sacs (alveoli). Pneumonia may be caused by a virus or bacteria. Pneumonia caused by bacteria is usually treated with an antibiotic. Severe cases may need to be treated in the hospital. Milder cases can be treated at home. Symptoms usually start to get better during the first 2 days of treatment.    Home care  Follow these guidelines when caring for yourself at home:    Rest at home for the first 2 to 3 days, or until you feel stronger. Don t let yourself get overly tired when you go back to your activities.    Stay away from cigarette smoke - yours or other people s.    You may use acetaminophen or ibuprofen to control fever or pain, unless another medicine was prescribed. If you have chronic liver or kidney disease, talk with your healthcare provider before using these medicines. Also talk with your provider if you ve had a stomach ulcer or gastrointestinal bleeding. Don t give aspirin to anyone younger than 18 years of age who is ill with a fever. It may cause severe liver damage.    Your appetite may be poor, so a light diet is fine.    Drink 6 to 8 glasses of fluids every day to make sure you are getting enough fluids. Beverages can include water, sport drinks, sodas without caffeine, juices, tea, or soup. Fluids will help loosen secretions in the lung. This will make it easier for you to cough up the phlegm (sputum). If you also have heart or kidney disease, check with your healthcare provider before  you drink extra fluids.    Take antibiotic medicine prescribed until it is all gone, even if you are feeling better after a few days.  Follow-up care  Follow up with your healthcare provider in the next 2 to 3 days, or as advised. This is to be sure the medicine is helping you get better.  If you are 65 or older, you should get a pneumococcal vaccine and a yearly flu (influenza) shot. You should also get these vaccines if you have chronic lung disease like asthma, emphysema, or COPD. Recently, a second type of pneumonia vaccine has become available for everyone over 65 years old. This is in addition to the previous vaccine. Ask your provider about this.  When to seek medical advice  Call your healthcare provider right away if any of these occur:    You don t get better within the first 48 hours of treatment    Shortness of breath gets worse    Rapid breathing (more than 25 breaths per minute)    Coughing up blood    Chest pain gets worse with breathing    Fever of 100.4 F (38 C) or higher that doesn t get better with fever medicine    Weakness, dizziness, or fainting that gets worse    Thirst or dry mouth that gets worse    Sinus pain, headache, or a stiff neck    Chest pain not caused by coughing  Date Last Reviewed: 1/1/2017 2000-2017 [employer]. [address] [place] [address]. All rights reserved. This information is not intended as a substitute for professional medical care. Always follow your healthcare professional's instructions.                Follow-ups after your visit        Who to contact     If you have questions or need follow up information about today's clinic visit or your schedule please contact [facility] directly at [phone].  Normal or non-critical lab and imaging results will be communicated to you by MyChart, letter or phone within 4 business days after the clinic has received the results. If you do not hear from us within 7 days,  "please contact the clinic through [email or web address] or phone. If you have a critical or abnormal lab result, we will notify you by phone as soon as possible.  Submit refill requests through [email or web address] or call your pharmacy and they will forward the refill request to us. Please allow 3 business days for your refill to be completed.          Additional Information About Your Visit        [employer] Information     [email or web address] lets you send messages to your doctor, view your test results, renew your prescriptions, schedule appointments and more. To sign up, go to [email or web address] . Click on \"Log in\" on the left side of the screen, which will take you to the Welcome page. Then click on \"Sign up Now\" on the right side of the page.     You will be asked to enter the access code listed below, as well as some personal information. Please follow the directions to create your username and password.     Your access code is: [email or web address]  Expires: 2018 12:14 PM     Your access code will  in 90 days. If you need help or a new code, please call your [place] clinic or [phone].        Care EveryWhere ID     This is your Care EveryWhere ID. This could be used by other organizations to access your [place] medical records  [id number]        Your Vitals Were     Pulse Temperature Respirations Pulse Oximetry BMI (Body Mass Index)       83 101.2  F (38.4  C) (Tympanic) 20 97% 23.06 kg/m2        Blood Pressure from Last 3 Encounters:   18 106/70   18 118/61   10/05/17 128/62    Weight from Last 3 Encounters:   18 118 lb 1.6 oz (53.6 kg)   18 119 lb 12.8 oz (54.3 kg)   17 113 lb (51.3 kg)              We Performed the Following     Influenza A/B antigen          Today's Medication Changes          These changes are accurate as of 3/25/18  8:15 PM.  If you have any questions, ask your nurse or doctor.               Start taking these medicines.        Dose/Directions    levofloxacin 500 MG tablet "   Commonly known as:  LEVAQUIN   Used for:  Pneumonia of right middle lobe due to infectious organism (H)   Started by:  Veronica Buckley MD        Dose:  500 mg   Take 1 tablet (500 mg) by mouth daily   Quantity:  7 tablet   Refills:  0            Where to get your medicines      These medications were sent to [id number] - [place], [place] - [address] AT [facility] [address], [facility] [address]     Phone:  [phone]     levofloxacin 500 MG tablet                Primary Care Provider Office Phone # Fax #    Serge Combs [phone]       [facility] [address] [facility] [address]  [facility] [address]        Equal Access to Services     MACEY BUCHANAN : Hadii jaron ku hadasho Soomaali, waaxda luqadaha, qaybta kaalmada adeegyada, waxay heydiin hayramona mujica . So [facility] [phone].    ATENCIÓN: Si habla español, tiene a tierney disposición servicios gratuitos de asistencia lingüística. Llame al [phone].    We comply with applicable federal civil rights laws and [place] laws. We do not discriminate on the basis of race, color, national origin, age, disability, sex, sexual orientation, or gender identity.            Thank you!     Thank you for choosing [facility]  for your care. Our goal is always to provide you with excellent care. Hearing back from our patients is one way we can continue to improve our services. Please take a few minutes to complete the written survey that you may receive in the mail after your visit with us. Thank you!             Your Updated Medication List - Protect others around you: Learn how to safely use, store and throw away your medicines at www.disposemymeds.org.          This list is accurate as of 3/25/18  8:15 PM.  Always use your most recent med list.                   Brand Name Dispense Instructions for use Diagnosis    aspirin 81 MG EC tablet     30 tablet    Take 1  tablet (81 mg) by mouth daily    ACS (acute coronary syndrome) (H), Coronary artery disease involving native coronary artery of native heart with unstable angina pectoris (H)       calcium carbonate 1250 MG tablet    OS-CLAIRE 500 mg [place]. [place]     Take 500 mg by mouth 2 times daily        cholecalciferol 400 UNIT Tabs tablet    vitamin D3     Take 400 Units by mouth daily        docusate sodium 100 MG capsule    COLACE     Take by mouth 2 times daily        levofloxacin 500 MG tablet    LEVAQUIN    7 tablet    Take 1 tablet (500 mg) by mouth daily    Pneumonia of right middle lobe due to infectious organism (H)       metoprolol tartrate 25 MG tablet    LOPRESSOR    90 tablet    Take 0.5 tablets (12.5 mg) by mouth 2 times daily    Cardiac arrhythmia, unspecified cardiac arrhythmia type       nitroGLYcerin 0.4 MG sublingual tablet    NITROSTAT    25 tablet    Place 1 tablet (0.4 mg) under the tongue every 5 minutes as needed for chest pain if you are still having symptoms after 3 doses (15 minutes) call 911.    Coronary artery disease involving native coronary artery of native heart with unstable angina pectoris (H)

## 2018-03-25 NOTE — NURSING NOTE
Chief Complaint   Patient presents with     URI     coughing and wheezing x 4 days       Initial /70 (BP Location: Left arm, Patient Position: Sitting, Cuff Size: Adult Regular)  Pulse 83  Temp 101.2  F (38.4  C) (Tympanic)  Resp 20  Wt 118 lb 1.6 oz (53.6 kg)  SpO2 97%  BMI 23.06 kg/m2 Estimated body mass index is 23.06 kg/(m^2) as calculated from the following:    Height as of 3/2/18: 5' (1.524 m).    Weight as of this encounter: 118 lb 1.6 oz (53.6 kg).  Medication Reconciliation: complete     Princess ENRIQUE Mack, CMA

## 2018-03-26 NOTE — PATIENT INSTRUCTIONS

## 2018-03-26 NOTE — PROGRESS NOTES
SUBJECTIVE:  Mary Polanco is a 87 year old female who presents to the clinic today with a chief complaint of cough , shortness of breath. and wheezing. for 4 day(s).  Her cough is described as persistent and productive clear.    The patient's symptoms are mild and not changing over the course of time.  Associated symptoms include congestion, fever, nasal congestion and rhinorrhea. The patient's symptoms are exacerbated by no particular triggers  Patient has been using nothing  to improve symptoms.    87 yr old female here for cough and shortness of breath. Cough has been ongoing for a couple of days and does not seem to be improving. Patient reports some congestion. Has had fevers on and off. Has a history of mitral valve insufficiency and this is under goo control Was seen recently by cardiology.     Past Medical History:   Diagnosis Date     Benign essential HTN      Coronary artery disease involving native coronary artery of native heart with unstable angina pectoris (H) 4/2016 4/2016 Cath - 80% hazy stenosis at site of ruptured plaque - HARLEEN placed     Cough      Eczema      Lung nodule      Malignant neoplasm of bladder, part unspecified 1999 Dr Everett    Surgery done in [place]     Mitral regurgitation     4/2016 mod-mod severe MR with multiple jets per Echo     Mixed hyperlipidemia      NSTEMI (non-ST elevated myocardial infarction) (H)      Osteoporosis      Polyarticular arthritis      Rash      Sciatica        Current Outpatient Prescriptions   Medication Sig Dispense Refill     levofloxacin (LEVAQUIN) 500 MG tablet Take 1 tablet (500 mg) by mouth daily 7 tablet 0     metoprolol (LOPRESSOR) 25 MG tablet Take 0.5 tablets (12.5 mg) by mouth 2 times daily 90 tablet 3     docusate sodium (COLACE) 100 MG capsule Take by mouth 2 times daily       cholecalciferol (VITAMIN D) 400 UNIT TABS tablet Take 400 Units by mouth daily       calcium carbonate (OS-CLAIRE 500 MG [place]. [place]) 500 MG tablet Take 500 mg by mouth 2  times daily       aspirin EC 81 MG EC tablet Take 1 tablet (81 mg) by mouth daily 30 tablet 1     nitroglycerin (NITROSTAT) 0.4 MG SL tablet Place 1 tablet (0.4 mg) under the tongue every 5 minutes as needed for chest pain if you are still having symptoms after 3 doses (15 minutes) call 911. 55 tablet 1       Social History   Substance Use Topics     Smoking status: Never Smoker     Smokeless tobacco: Never Used     Alcohol use No       ROS  CONSTITUTIONAL:POSITIVE  for chills, fatigue and fever   INTEGUMENTARY/SKIN: NEGATIVE for worrisome rashes, moles or lesions  EYES: NEGATIVE for vision changes or irritation  ENT/MOUTH: NEGATIVE for ear, mouth and throat problems  RESP:POSITIVE for cough-productive  CV: NEGATIVE for chest pain, palpitations or peripheral edema  GI: NEGATIVE for nausea, abdominal pain, heartburn, or change in bowel habits  MUSCULOSKELETAL: NEGATIVE for significant arthralgias or myalgia  NEURO: NEGATIVE for weakness, dizziness or paresthesias    OBJECTIVE:  /70 (BP Location: Left arm, Patient Position: Sitting, Cuff Size: Adult Regular)  Pulse 83  Temp 101.2  F (38.4  C) (Tympanic)  Resp 20  Wt 118 lb 1.6 oz (53.6 kg)  SpO2 97%  BMI 23.06 kg/m2  GENERAL APPEARANCE: healthy, alert and no distress  EYES: EOMI,  PERRL, conjunctiva clear  HENT: ear canals and TM's normal.  Nose and mouth without ulcers, erythema or lesions  NECK: supple, nontender, no lymphadenopathy  RESP: rales R lower anterior and R lower posterior  CV: regular rates and rhythm, normal S1 S2, no murmur noted  ABDOMEN:  soft, nontender, no HSM or masses and bowel sounds normal  NEURO: Normal strength and tone, sensory exam grossly normal,  normal speech and mentation  SKIN: no suspicious lesions or rashes    ASSESSMENT:  Pneumonia    PLAN:  See orders in Epic  Symptomatic measures encouraged, humidified air, plenty of fluids.  Patient and son reassured, antibiotics faxed . Reviewed the process of pneumonia with patient.  Used language line for interpretation.

## 2018-05-17 ENCOUNTER — APPOINTMENT (OUTPATIENT)
Dept: GENERAL RADIOLOGY | Facility: CLINIC | Age: 83
End: 2018-05-17
Attending: NURSE PRACTITIONER
Payer: COMMERCIAL

## 2018-05-17 ENCOUNTER — HOSPITAL ENCOUNTER (EMERGENCY)
Facility: CLINIC | Age: 83
Discharge: HOME | End: 2018-05-17
Attending: NURSE PRACTITIONER | Admitting: NURSE PRACTITIONER
Payer: COMMERCIAL

## 2018-05-17 VITALS
RESPIRATION RATE: 16 BRPM | WEIGHT: 116 LBS | HEART RATE: 60 BPM | DIASTOLIC BLOOD PRESSURE: 77 MMHG | OXYGEN SATURATION: 97 % | TEMPERATURE: 98.5 F | HEIGHT: 60 IN | SYSTOLIC BLOOD PRESSURE: 155 MMHG | BODY MASS INDEX: 22.78 KG/M2

## 2018-05-17 DIAGNOSIS — R07.0 THROAT PAIN: ICD-10-CM

## 2018-05-17 PROCEDURE — 99283 EMERGENCY DEPT VISIT LOW MDM: CPT | Mod: 25

## 2018-05-17 PROCEDURE — 31575 DIAGNOSTIC LARYNGOSCOPY: CPT

## 2018-05-17 PROCEDURE — 71046 X-RAY EXAM CHEST 2 VIEWS: CPT

## 2018-05-17 ASSESSMENT — ENCOUNTER SYMPTOMS
COUGH: 1
ABDOMINAL PAIN: 0
SORE THROAT: 1

## 2018-05-17 NOTE — ED PROVIDER NOTES
Emergency Department Attending Supervision Note  5/17/2018  12:00 PM    I evaluated this patient in conjunction with Kajal Kumar NP.  Briefly, the patient presented with throat pain. The patient had eaten pineapple earlier this morning and felt a potential retained foreign body in her throat.    On my exam,   General: Resting comfortably on the gurney  Head:  The scalp, face, and head appear normal  Eyes:  The pupils are normal    Conjunctivae and sclera appear normal  ENT:    The nose is normal    Ears/pinnae are normal    Mcous membranes moist. No abrasions on posterior orophaynx. No intraoral lesions.  Neck:  Normal range of motion  MS:  No extremity deformities.  Skin:  No rash or lesions noted.  Neuro: Speech is normal and fluent  Psych: Awake. Alert.  Normal affect.      Appropriate interactions    PROCEDURE NOTE:  Nasolaryngoscopy  Physician: Kevon Styles MD  Anesthesia: 4% lidocaine nebulizer and Afrin nasal spray, 2 sprays each nostril   Indication: Throat pain  Description of procedure:  Informed verbal consent obtained. The nares were both prepped in the usual manner with anesthesia as noted above.  The flexible fiberoptic nasal endoscopy scope was introduced into the right nare and advanced past the nasal cavity. No abnormality was noted in the nasal cavity or upper nasopharynx. The larynx and aryepiglottic folds and vocal cords were visualized in their entirety.  No foreign body was identified. No edema or lacerations were noted. The airway was patent. Procedure was terminated as the scope was withdrawn. The patient tolerated the procedure well, no complications, no bleeding.     Results:   Chest XR,  PA & LAT   Final Result   IMPRESSION: Cardiac silhouette and pulmonary vasculature are within   normal limits. No focal airspace disease, pleural effusion or   pneumothorax.      YOLANDA SCHWARTZ MD     ED course:  Nursing notes and vitals reviewed.  I performed an exam of the patient as documented above.      I performed Nasolaryngoscopy per procedure note above.    Findings and plan explained to the patient. Patient discharged home with instructions regarding supportive care, medications, and reasons to return. The importance of close follow-up was reviewed.     My impression is:    Patient is a 88-year-old female who presents with throat pain after ingesting an early this morning.  Patient is tolerating secretions well.  She has no respiratory distress.  Nasopharyngoscopy shows normal-appearing upper airway structures.  I most likely suspect an abrasion to the esophagus from eating pineapple earlier today but very low concern for retained foreign body and/or impacted food bolus in the esophagus.  Patient has normal vital signs here very low concern for sinister pathologies.  I agree with the plan and decision making by Kajal Kumar CNP.  Patient will be discharged home with plan to return for any worsening symptoms, difficult breathing, difficult swallowing or handling secretions.  All questions answered prior to discharge.  Discharged home.    Diagnosis    ICD-10-CM    1. Throat pain R07.0        I, Verona De La Torre, am serving as a scribe at 1150 on May 17, 2018  to document services personally performed by Dr. Styles, based on my observations and the provider's statements to me.             Kevon Styles MD  05/17/18 4868

## 2018-05-17 NOTE — ED PROVIDER NOTES
History     Chief Complaint:  throat pain     History limited due to language barrier and subsequently provided by .  BRAYAN Polanco is a 88 year old female with a history of CAD and cough who presents to the emergency department today for evaluation of throat pain. The patient reports she was eating pineapple about an hour ago when a piece got stuck in her throat. She coughed for a while and the coughing resolved. She is not sure if she coughed up the food or if is passed to her stomach. During this episode she denies breathing difficulty. After this, she began to have throat pain, prompting her to present to the emergency department. She has been able to tolerate liquids after this episode and is no longer coughing. Here in the emergency department, she denies having throat pain.  She has not eaten after this episode. The patient denies abdominal pain or changes to her voice.     Allergies:  Azithromycin  Gabapentin  Kanamycin  Metronidazole  Penicillins  Streptomycin  Atorvastatin  Lisinopril    Medications:    aspirin EC 81 MG EC tablet  calcium carbonate (OS-CLAIRE 500 MG [place]. [place]) 500 MG tablet  docusate sodium (COLACE) 100 MG capsule  levofloxacin (LEVAQUIN) 500 MG tablet  metoprolol (LOPRESSOR) 25 MG tablet  nitroglycerin (NITROSTAT) 0.4 MG SL tablet     Past Medical History:    Benign essential HTN   Coronary artery disease involving native coronary artery of native heart with unstable angina pectoris    Cough   Eczema   Lung nodule   Malignant neoplasm of bladder, part unspecified   Mitral regurgitation   Mixed hyperlipidemia   NSTEMI (non-ST elevated myocardial infarction)    Osteoporosis   Polyarticular arthritis   Rash   Sciatica    Past Surgical History:    No pertinent past Surgical History    Family History:    History reviewed. No pertinent family history.     Social History:  The patient was accompanied to the ED by her son.  Smoking Status: never  Smokeless Tobacco: never  Alcohol Use:  no  Marital Status:  Single      Review of Systems   HENT: Positive for sore throat.    Respiratory: Positive for cough.    Gastrointestinal: Negative for abdominal pain.   All other systems reviewed and are negative.    Physical Exam   First Vitals: /62  Pulse 65  Temp 98.5  F (36.9  C) (Oral)  Resp 16  Ht 1.524 m (5')  Wt 52.6 kg (116 lb)  SpO2 97%  BMI 22.65 kg/m2    Physical Exam  Nursing notes reviewed. Vitals reviewed.  General: Alert. Well kept.  Eyes:  Conjunctiva non-injected, non-icteric.  Ears:TM s normal.  Neck/Throat: Moist mucous membranes, oropharynx clear without erythema or exudate. No intraoral laceration or foreign body. No cervical lymphadenopathy.  Normal voice.  Cardiac: Regular rhythm. Normal heart sounds with no murmur/rubs/click.   Pulmonary: Clear and equal breath sounds bilaterally. No crackles/rales. No wheezing. No cough.  Musculoskeletal: Normal gross range of motion of all 4 extremities.    Neurological: Alert and oriented x4.   Skin: Warm and dry without rashes or petechiae. Normal appearance of visualized exposed skin.  Psych: Affect normal. Good eye contact.    Emergency Department Course     Imaging:  Radiology findings were communicated with the patient who voiced understanding of the findings.    Chest XR,  PA & LAT   Final Result   IMPRESSION: Cardiac silhouette and pulmonary vasculature are within   normal limits. No focal airspace disease, pleural effusion or   pneumothorax.      YOLANDA SCHWARTZ MD     Emergency Department Course:  PROCEDURE NOTE:  Nasolaryngoscopy  Physician: Kevon Styles MD,   Assisted: Kajal Kumar CNP  Anesthesia: 4% lidocaine nebulizer and Afrin nasal spray, 2 sprays each nostril   Indication: Throat pain  Description of procedure:  Informed verbal consent obtained. The nares were both prepped in the usual manner with anesthesia as noted above.  The flexible fiberoptic nasal endoscopy scope was introduced into the right nare and advanced past  the nasal cavity. No abnormality was noted in the nasal cavity or upper nasopharynx. The larynx and aryepiglottic folds and vocal cords were visualized in their entirety.  No foreign body was identified. No edema or lacerations were noted. The airway was patent. Procedure was terminated as the scope was withdrawn. The patient tolerated the procedure well, no complications, no bleeding.    1208 Patient rechecked and updated. Passed oral challenge without any difficulty    I personally reviewed the imaging results with the Patient and answered all related questions prior to  Discharge.  Findings and plan explained to the Patient. Patient discharged home with instructions regarding supportive care, medications, and reasons to return. The importance of close follow-up was reviewed.     Impression & Plan      Medical Decision Making:  Mary Polanco is a 88 year old female with a history of CAD and cough who presents to the emergency department today for evaluation of throat pain and possible choking episode. She noes she was able to swallow the foreign body which felt like it was stuck in her throat prior to arrival. Currently is asymptomatic but is concerned for retained foreign body. This choking on food has happened numerous times to her in the past with complete resolution. Chest XR was negative for acute findings and she is speaking with full clear sentences. After discussing the risks and benefits with the patient, we did perform a nasolaryngoscopy which showed normal larynx, aryepiglottic folds and vocal cords without foreign body or abrasions. She is able to tolerate fluids and food in the emergency department without any difficulty and is appropriate for out-patient follow up. She was instructed to maintain a soft diet for the next 2-3 days and follow up with PCP or ENT as needed.     Diagnosis:      1. Throat pain     Disposition:  discharged to home    Scribe Disclosure:  Steven BUSCH, am serving as a  scribe at 10:51 AM on 5/17/2018 to document services personally performed by Kajal Kumar CNP based on my observations and the provider's statements to me.     5/17/2018    EMERGENCY DEPARTMENT       Kajal Kumar CNP  05/17/18 5650

## 2018-05-17 NOTE — ED AVS SNAPSHOT
Emergency Department    [address] [place] [address]    Phone:  [phone]    Fax:  [phone]                                       Mary Polanco   MRN: [id number]    Department:   Emergency Department   Date of Visit:  5/17/2018           After Visit Summary Signature Page     I have received my discharge instructions, and my questions have been answered. I have discussed any challenges I see with this plan with the nurse or doctor.    ..........................................................................................................................................  Patient/Patient Representative Signature      ..........................................................................................................................................  Patient Representative Print Name and Relationship to Patient    ..................................................               ................................................  Date                                            Time    ..........................................................................................................................................  Reviewed by Signature/Title    ...................................................              ..............................................  Date                                                            Time

## 2018-05-17 NOTE — ED AVS SNAPSHOT
Emergency Department    [address] [facility] [address]    Phone:  [phone]    Fax:  [phone]                                       aMry Polanco   MRN: [id number]    Department:   Emergency Department   Date of Visit:  5/17/2018           Patient Information     Date Of Birth          4/24/1930        Your diagnoses for this visit were:     Throat pain        You were seen by Kajal Kumar, CNP.      Follow-up Information     Follow up with Clinic, [facility] In 2 days.    Why:  with ongoing symptoms or return with any worsening    Contact information:    [address]  [facility] [id number] [phone]          Follow up with  Emergency Department.    Specialty:  EMERGENCY MEDICINE    Why:  As needed, If symptoms worsen    Contact information:    [address] [facility] [address] [phone]      Discharge References/Attachments     CHOKING SPELL (ADULT) (ENGLISH)      24 Hour Appointment Hotline       To make an appointment at any [facility], call [phone] ([phone]). If you don't have a family doctor or clinic, we will help you find one. [facility] are conveniently located to serve the needs of you and your family.             Review of your medicines      Our records show that you are taking the medicines listed below. If these are incorrect, please call your family doctor or clinic.        Dose / Directions Last dose taken    aspirin 81 MG EC tablet   Dose:  81 mg   Quantity:  30 tablet        Take 1 tablet (81 mg) by mouth daily   Refills:  1        calcium carbonate 500 MG tablet   Commonly known as:  OS-CLAIRE 500 mg [place]. Ca   Dose:  500 mg        Take 500 mg by mouth 2 times daily   Refills:  0        cholecalciferol 400 UNIT Tabs tablet   Commonly known as:  vitamin D3   Dose:  400 Units        Take 400 Units by mouth daily   Refills:  0        docusate sodium 100 MG capsule   Commonly known as:  COLACE         Take by mouth 2 times daily   Refills:  0        levofloxacin 500 MG tablet   Commonly known as:  LEVAQUIN   Dose:  500 mg   Quantity:  7 tablet        Take 1 tablet (500 mg) by mouth daily   Refills:  0        metoprolol tartrate 25 MG tablet   Commonly known as:  LOPRESSOR   Dose:  12.5 mg   Quantity:  90 tablet        Take 0.5 tablets (12.5 mg) by mouth 2 times daily   Refills:  3        nitroGLYcerin 0.4 MG sublingual tablet   Commonly known as:  NITROSTAT   Dose:  0.4 mg   Quantity:  25 tablet        Place 1 tablet (0.4 mg) under the tongue every 5 minutes as needed for chest pain if you are still having symptoms after 3 doses (15 minutes) call 911.   Refills:  1                Procedures and tests performed during your visit     Chest XR,  PA & LAT      Orders Needing Specimen Collection     None      Pending Results     No orders found from 5/15/2018 to 5/18/2018.            Pending Culture Results     No orders found from 5/15/2018 to 5/18/2018.            Pending Results Instructions     If you had any lab results that were not finalized at the time of your Discharge, you can call the ED Lab Result RN at [phone]. You will be contacted by this team for any positive Lab results or changes in treatment. The nurses are available 7 days a week from 10A to 6:30P.  You can leave a message 24 hours per day and they will return your call.        Test Results From Your Hospital Stay        5/17/2018 11:27 AM      Narrative     XR CHEST 2 VW 5/17/2018 11:23 AM    COMPARISON: 3/25/2018    HISTORY: Choked on pineapple morning.        Impression     IMPRESSION: Cardiac silhouette and pulmonary vasculature are within  normal limits. No focal airspace disease, pleural effusion or  pneumothorax.    YOLANDA SCHWARTZ MD                Clinical Quality Measure: Blood Pressure Screening     Your blood pressure was checked while you were in the emergency department today. The last reading we obtained was  BP: 155/77 . Please  "read the guidelines below about what these numbers mean and what you should do about them.  If your systolic blood pressure (the top number) is less than 120 and your diastolic blood pressure (the bottom number) is less than 80, then your blood pressure is normal. There is nothing more that you need to do about it.  If your systolic blood pressure (the top number) is 120-139 or your diastolic blood pressure (the bottom number) is 80-89, your blood pressure may be higher than it should be. You should have your blood pressure rechecked within a year by a primary care provider.  If your systolic blood pressure (the top number) is 140 or greater or your diastolic blood pressure (the bottom number) is 90 or greater, you may have high blood pressure. High blood pressure is treatable, but if left untreated over time it can put you at risk for heart attack, stroke, or kidney failure. You should have your blood pressure rechecked by a primary care provider within the next 4 weeks.  If your provider in the emergency department today gave you specific instructions to follow-up with your doctor or provider even sooner than that, you should follow that instruction and not wait for up to 4 weeks for your follow-up visit.        Thank you for choosing [facility]       Thank you for choosing [facility] for your care. Our goal is always to provide you with excellent care. Hearing back from our patients is one way we can continue to improve our services. Please take a few minutes to complete the written survey that you may receive in the mail after you visit with us. Thank you!        [facility] Information     [facility] lets you send messages to your doctor, view your test results, renew your prescriptions, schedule appointments and more. To sign up, go to [email or web address] . Click on \"Log in\" on the left side of the screen, which will take you to the Welcome page. Then click on \"Sign up Now\" on the right side of the page.     You " will be asked to enter the access code listed below, as well as some personal information. Please follow the directions to create your username and password.     Your access code is: [email or web address]  Expires: 2018 12:14 PM     Your access code will  in 90 days. If you need help or a new code, please call your [facility] clinic or [phone].        Care EveryWhere ID     This is your Care EveryWhere ID. This could be used by other organizations to access your [facility] medical records  [id number]        Equal Access to Services     [facility]: [place]milton Luque, wamiguelangel ramos, stacy juárezallevi denton, elvie mujica . So [facility] [phone].    ATENCIÓN: Si habla español, tiene a tierney disposición servicios gratuitos de asistencia lingüística. Michelle al [phone].    We comply with applicable federal civil rights laws and [place] laws. We do not discriminate on the basis of race, color, national origin, age, disability, sex, sexual orientation, or gender identity.            After Visit Summary       This is your record. Keep this with you and show to your community pharmacist(s) and doctor(s) at your next visit.

## 2018-11-06 DIAGNOSIS — I49.9 CARDIAC ARRHYTHMIA, UNSPECIFIED CARDIAC ARRHYTHMIA TYPE: ICD-10-CM

## 2018-11-06 RX ORDER — METOPROLOL TARTRATE 25 MG/1
12.5 TABLET, FILM COATED ORAL 2 TIMES DAILY
Qty: 90 TABLET | Refills: 3 | Status: SHIPPED | OUTPATIENT
Start: 2018-11-06 | End: 2019-03-01

## 2019-03-01 ENCOUNTER — OFFICE VISIT (OUTPATIENT)
Dept: CARDIOLOGY | Facility: CLINIC | Age: 84
End: 2019-03-01
Payer: COMMERCIAL

## 2019-03-01 VITALS
DIASTOLIC BLOOD PRESSURE: 75 MMHG | WEIGHT: 123 LBS | BODY MASS INDEX: 24.15 KG/M2 | HEART RATE: 66 BPM | OXYGEN SATURATION: 96 % | HEIGHT: 60 IN | SYSTOLIC BLOOD PRESSURE: 135 MMHG

## 2019-03-01 DIAGNOSIS — I49.9 CARDIAC ARRHYTHMIA, UNSPECIFIED CARDIAC ARRHYTHMIA TYPE: ICD-10-CM

## 2019-03-01 DIAGNOSIS — I25.110 CORONARY ARTERY DISEASE INVOLVING NATIVE CORONARY ARTERY OF NATIVE HEART WITH UNSTABLE ANGINA PECTORIS (H): ICD-10-CM

## 2019-03-01 PROCEDURE — 99214 OFFICE O/P EST MOD 30 MIN: CPT | Performed by: NURSE PRACTITIONER

## 2019-03-01 RX ORDER — METOPROLOL TARTRATE 25 MG/1
12.5 TABLET, FILM COATED ORAL 2 TIMES DAILY
Qty: 90 TABLET | Refills: 3 | Status: SHIPPED | OUTPATIENT
Start: 2019-03-01 | End: 2019-03-08

## 2019-03-01 RX ORDER — NITROGLYCERIN 0.4 MG/1
0.4 TABLET SUBLINGUAL EVERY 5 MIN PRN
Qty: 25 TABLET | Refills: 3 | Status: ON HOLD | OUTPATIENT
Start: 2019-03-01 | End: 2020-03-05

## 2019-03-01 ASSESSMENT — MIFFLIN-ST. JEOR: SCORE: 909.42

## 2019-03-01 NOTE — PROGRESS NOTES
HPI and Plan:   I had the pleasure of seeing Mary Polanco today in cardiology clinic follow up for refills, she is here with an . She is a pleasant 88 year old patient of Dr. Perrin.    Ms. He has a history of coronary artery disease with a non-Q wave MI in April 2016.  She has normal LV systolic function.  She had stenting of the circumflex.  She is intolerant to statins.    She is here today for annual follow-up, actually she really just needs her medications refilled.  She does have some concerns but she would like to discuss them with Dr. Perrin.  first while she has had some tightness across her chest, on Monday she was just sitting and she had a episode of tightness that resolved after she used nitro.  She would like to ride the exercise bike in her basement but she gets afraid because it provokes chest tightness and so she does not use it.  Her second issue is lower extremity edema.  She says is been present for years but I do not see mention of a prior.  It does look like she has 2+ lower extremity edema, this gets worse at the end of the day and is better in the morning.  She has some venous stasis changes in her skin.  She denies any shortness of breath and she is not very active.  In the last month she is only had to use one nitro.    Physical Exam  Please see Below     Assessment and Plan  1.  She has circumflex stenting in 2016.  Coronary artery disease.  She is intolerant to statins.  She continues on aspirin and beta-blocker.  She is having some anginal symptoms, this sounds stable.  I did offer her a stress nuclear test that she has had one in the past but she declined, she would like to discuss it further with Dr. Perrin, and I think that is reasonable.  2.  Lower extremity edema.  Her weight is up 5 pounds today and she has which she says is chronic lower extremity edema that does not bother her too much and is better in the morning but worse in the afternoon.  This may represent venous insufficiency  and again at this time she would just prefer conservative measures.    Thank you for allowing me to care for Mary Polanco today.  I refilled her medications and was able to get her an appointment in a couple of weeks with Dr. Perrin.    NEYDA Lebron, CNP  Cardiology    Voice recognition software was used for this note, I have reviewed this note, but errors may have been missed.    No orders of the defined types were placed in this encounter.    Orders Placed This Encounter   Medications     metoprolol tartrate (LOPRESSOR) 25 MG tablet     Sig: Take 0.5 tablets (12.5 mg) by mouth 2 times daily     Dispense:  90 tablet     Refill:  3     nitroGLYcerin (NITROSTAT) 0.4 MG sublingual tablet     Sig: Place 1 tablet (0.4 mg) under the tongue every 5 minutes as needed for chest pain if you are still having symptoms after 3 doses (15 minutes) call 911.     Dispense:  25 tablet     Refill:  3     Medications Discontinued During This Encounter   Medication Reason     metoprolol tartrate (LOPRESSOR) 25 MG tablet Reorder     nitroglycerin (NITROSTAT) 0.4 MG SL tablet Reorder         CURRENT MEDICATIONS:  Current Outpatient Medications   Medication Sig Dispense Refill     aspirin EC 81 MG EC tablet Take 1 tablet (81 mg) by mouth daily 30 tablet 1     calcium carbonate (OS-CLAIRE 500 MG [place]. [place]) 500 MG tablet Take 500 mg by mouth 2 times daily       cholecalciferol (VITAMIN D) 400 UNIT TABS tablet Take 400 Units by mouth daily       docusate sodium (COLACE) 100 MG capsule Take by mouth 2 times daily       metoprolol tartrate (LOPRESSOR) 25 MG tablet Take 0.5 tablets (12.5 mg) by mouth 2 times daily 90 tablet 3     nitroGLYcerin (NITROSTAT) 0.4 MG sublingual tablet Place 1 tablet (0.4 mg) under the tongue every 5 minutes as needed for chest pain if you are still having symptoms after 3 doses (15 minutes) call 911. 25 tablet 3     levofloxacin (LEVAQUIN) 500 MG tablet Take 1 tablet (500 mg) by mouth daily (Patient not taking:  Reported on 3/1/2019) 7 tablet 0       ALLERGIES     Allergies   Allergen Reactions     Azithromycin      Gabapentin Itching     Kanamycin      Metronidazole Rash     Penicillins Rash     Streptomycin      rash     Atorvastatin Rash     Lisinopril Rash       PAST MEDICAL HISTORY:  Past Medical History:   Diagnosis Date     Benign essential HTN      Coronary artery disease involving native coronary artery of native heart with unstable angina pectoris (H) 4/2016 4/2016 Cath - 80% hazy stenosis at site of ruptured plaque - HARLEEN placed     Cough      Eczema      Lung nodule      Malignant neoplasm of bladder, part unspecified 1999 Dr Everett    Surgery done in [place]     Mitral regurgitation     4/2016 mod-mod severe MR with multiple jets per Echo     Mixed hyperlipidemia      NSTEMI (non-ST elevated myocardial infarction) (H)      Osteoporosis      Polyarticular arthritis      Rash      Sciatica        PAST SURGICAL HISTORY:  Past Surgical History:   Procedure Laterality Date     BLADDER TUMOR-ASSOCIATED      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     C APPENDECTOMY      1950     CATARACT IOL, RT/LT  5-08     HC REMOVAL GALLBLADDER      due to stone 1990     HEART CATH STENT COR W/WO PTCA  4/2016 4/2016 Cath - 80% hazy stenosis at site of ruptured plaque - HARLEEN placed       FAMILY HISTORY:  Family History   Problem Relation Age of Onset     Diabetes Brother      Hypertension Son      Cerebrovascular Disease Mother         76     Family History Negative Daughter      Family History Negative Brother      Family History Negative Brother        SOCIAL HISTORY:  Social History     Socioeconomic History     Marital status: Single     Spouse name: None     Number of children: None     Years of education: None     Highest education level: None   Occupational History     None   Social Needs     Financial resource strain: None     Food insecurity:     Worry: None     Inability: None     Transportation needs:     Medical:  None     Non-medical: None   Tobacco Use     Smoking status: Never Smoker     Smokeless tobacco: Never Used   Substance and Sexual Activity     Alcohol use: No     Drug use: No     Sexual activity: Not Currently   Lifestyle     Physical activity:     Days per week: None     Minutes per session: None     Stress: None   Relationships     Social connections:     Talks on phone: None     Gets together: None     Attends Restorationism service: None     Active member of club or organization: None     Attends meetings of clubs or organizations: None     Relationship status: None     Intimate partner violence:     Fear of current or ex partner: None     Emotionally abused: None     Physically abused: None     Forced sexual activity: None   Other Topics Concern      Service Not Asked     Blood Transfusions Not Asked     Caffeine Concern No     Occupational Exposure Not Asked     Hobby Hazards Not Asked     Sleep Concern No     Stress Concern No     Weight Concern No     Special Diet No     Back Care Not Asked     Exercise Yes     Comment: walking 20 min daily     Bike Helmet Not Asked     Seat Belt Yes     Self-Exams Not Asked     Parent/sibling w/ CABG, MI or angioplasty before 65F 55M? No   Social History Narrative    Vamsi AZEVEDO    From [place]    In the  2001           Review of Systems:  Skin:  Negative itching     Eyes:  Positive for glasses    ENT:  Negative      Respiratory:  Positive for cough     Cardiovascular:  Negative;chest pain;palpitations;syncope or near-syncope;cyanosis;lightheadedness;dizziness;fatigue Positive for;palpitations;edema occ.  Gastroenterology: Positive for heartburn;constipation    Genitourinary:  not assessed      Musculoskeletal:  Positive for back pain;arthritis;joint pain left shoulder  Neurologic:  Negative      Psychiatric:  Negative sleep disturbances    Heme/Lymph/Imm:  Positive for allergies    Endocrine:  Negative        Physical Exam:  Vitals: /75   Pulse 66   Ht  1.524 m (5')   Wt 55.8 kg (123 lb)   SpO2 96%   BMI 24.02 kg/m      Constitutional:  cooperative        Skin:  warm and dry to the touch          Head:  normocephalic        Eyes:           Lymph:      ENT:    poor dentition missing some teeth    Neck:  JVP normal        Respiratory:  clear to auscultation;normal symmetry         Cardiac: regular rhythm;normal S1 and S2                                                         GI:  abdomen soft        Extremities and Muscular Skeletal:      bilateral LE edema;2+;pitting          Neurological:  no gross motor deficits        Psych:  affect appropriate, oriented to time, person and place    Encounter Diagnoses   Name Primary?     Cardiac arrhythmia, unspecified cardiac arrhythmia type      Coronary artery disease involving native coronary artery of native heart with unstable angina pectoris (H)        Recent Lab Results:  LIPID RESULTS:  Lab Results   Component Value Date    CHOL 220 07/14/2017    HDL 53 07/14/2017     07/14/2017    TRIG 330 07/14/2017    CHOLHDLRATIO 3.8 04/05/2010       LIVER ENZYME RESULTS:  Lab Results   Component Value Date    AST 17 07/14/2017    ALT 14 07/14/2017       CBC RESULTS:  Lab Results   Component Value Date    WBC 7.5 07/13/2017    RBC 3.79 (L) 09/16/2016    HGB 11.9 07/13/2017    HCT 34.9 (L) 09/16/2016    MCV 92 09/16/2016    MCH 29.8 09/16/2016    MCHC 32.4 09/16/2016    RDW 13.1 09/16/2016     07/13/2017       BMP RESULTS:  Lab Results   Component Value Date     07/14/2017    POTASSIUM 4.8 07/14/2017    CHLORIDE 103 07/14/2017    CO2 23 07/14/2017    ANIONGAP 7 09/15/2016     07/14/2017    BUN 14 07/14/2017    CR 0.71 07/14/2017    GFRESTIMATED 77 07/14/2017    GFRESTBLACK 89 07/14/2017    CLAIRE 9.4 07/14/2017        A1C RESULTS:  No results found for: A1C    INR RESULTS:  No results found for: INR        CC  No referring provider defined for this encounter.

## 2019-03-01 NOTE — LETTER
3/1/2019    [facility]  [address]  [facility] [address]    RE: Anjelicajuan Collin       Dear Colleague,    I had the pleasure of seeing Mary Polanco in the [facility].    HPI and Plan:   I had the pleasure of seeing Mary Polanco today in cardiology clinic follow up for refills, she is here with an . She is a pleasant 88 year old patient of Dr. Perrin.    Ms. He has a history of coronary artery disease with a non-Q wave MI in April 2016.  She has normal LV systolic function.  She had stenting of the circumflex.  She is intolerant to statins.    She is here today for annual follow-up, actually she really just needs her medications refilled.  She does have some concerns but she would like to discuss them with Dr. Perrin.  first while she has had some tightness across her chest, on Monday she was just sitting and she had a episode of tightness that resolved after she used nitro.  She would like to ride the exercise bike in her basement but she gets afraid because it provokes chest tightness and so she does not use it.  Her second issue is lower extremity edema.  She says is been present for years but I do not see mention of a prior.  It does look like she has 2+ lower extremity edema, this gets worse at the end of the day and is better in the morning.  She has some venous stasis changes in her skin.  She denies any shortness of breath and she is not very active.  In the last month she is only had to use one nitro.    Physical Exam  Please see Below     Assessment and Plan  1.  She has circumflex stenting in 2016.  Coronary artery disease.  She is intolerant to statins.  She continues on aspirin and beta-blocker.  She is having some anginal symptoms, this sounds stable.  I did offer her a stress nuclear test that she has had one in the past but she declined, she would like to discuss it further with Dr. Perrin, and I think that is reasonable.  2.  Lower extremity edema.   Her weight is up 5 pounds today and she has which she says is chronic lower extremity edema that does not bother her too much and is better in the morning but worse in the afternoon.  This may represent venous insufficiency and again at this time she would just prefer conservative measures.    Thank you for allowing me to care for Mary Polanco today.  I refilled her medications and was able to get her an appointment in a couple of weeks with Dr. Perrin.    NEYDA Lebron, CNP  Cardiology    Voice recognition software was used for this note, I have reviewed this note, but errors may have been missed.    No orders of the defined types were placed in this encounter.    Orders Placed This Encounter   Medications     metoprolol tartrate (LOPRESSOR) 25 MG tablet     Sig: Take 0.5 tablets (12.5 mg) by mouth 2 times daily     Dispense:  90 tablet     Refill:  3     nitroGLYcerin (NITROSTAT) 0.4 MG sublingual tablet     Sig: Place 1 tablet (0.4 mg) under the tongue every 5 minutes as needed for chest pain if you are still having symptoms after 3 doses (15 minutes) call 911.     Dispense:  25 tablet     Refill:  3     Medications Discontinued During This Encounter   Medication Reason     metoprolol tartrate (LOPRESSOR) 25 MG tablet Reorder     nitroglycerin (NITROSTAT) 0.4 MG SL tablet Reorder         CURRENT MEDICATIONS:  Current Outpatient Medications   Medication Sig Dispense Refill     aspirin EC 81 MG EC tablet Take 1 tablet (81 mg) by mouth daily 30 tablet 1     calcium carbonate (OS-CLAIRE 500 MG [place]. [place]) 500 MG tablet Take 500 mg by mouth 2 times daily       cholecalciferol (VITAMIN D) 400 UNIT TABS tablet Take 400 Units by mouth daily       docusate sodium (COLACE) 100 MG capsule Take by mouth 2 times daily       metoprolol tartrate (LOPRESSOR) 25 MG tablet Take 0.5 tablets (12.5 mg) by mouth 2 times daily 90 tablet 3     nitroGLYcerin (NITROSTAT) 0.4 MG sublingual tablet Place 1 tablet (0.4 mg) under the tongue every  5 minutes as needed for chest pain if you are still having symptoms after 3 doses (15 minutes) call 911. 25 tablet 3     levofloxacin (LEVAQUIN) 500 MG tablet Take 1 tablet (500 mg) by mouth daily (Patient not taking: Reported on 3/1/2019) 7 tablet 0       ALLERGIES     Allergies   Allergen Reactions     Azithromycin      Gabapentin Itching     Kanamycin      Metronidazole Rash     Penicillins Rash     Streptomycin      rash     Atorvastatin Rash     Lisinopril Rash       PAST MEDICAL HISTORY:  Past Medical History:   Diagnosis Date     Benign essential HTN      Coronary artery disease involving native coronary artery of native heart with unstable angina pectoris (H) 4/2016 4/2016 Cath - 80% hazy stenosis at site of ruptured plaque - HARLEEN placed     Cough      Eczema      Lung nodule      Malignant neoplasm of bladder, part unspecified 1999 Dr Everett    [place]gery done in [place]     Mitral regurgitation     4/2016 mod-mod severe MR with multiple jets per Echo     Mixed hyperlipidemia      NSTEMI (non-ST elevated myocardial infarction) (H)      Osteoporosis      Polyarticular arthritis      Rash      Sciatica        PAST SURGICAL HISTORY:  Past Surgical History:   Procedure Laterality Date     BLADDER TUMOR-ASSOCIATED      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     C APPENDECTOMY      1950     CATARACT IOL, RT/LT  5-08     HC REMOVAL GALLBLADDER      due to stone 1990     HEART CATH STENT COR W/WO PTCA  4/2016 4/2016 Cath - 80% hazy stenosis at site of ruptured plaque - HARLEEN placed       FAMILY HISTORY:  Family History   Problem Relation Age of Onset     Diabetes Brother      Hypertension Son      Cerebrovascular Disease Mother         76     Family History Negative Daughter      Family History Negative Brother      Family History Negative Brother        SOCIAL HISTORY:  Social History     Socioeconomic History     Marital status: Single     Spouse name: None     Number of children: None     Years of education:  None     Highest education level: None   Occupational History     None   Social Needs     Financial resource strain: None     Food insecurity:     Worry: None     Inability: None     Transportation needs:     Medical: None     Non-medical: None   Tobacco Use     Smoking status: Never Smoker     Smokeless tobacco: Never Used   Substance and Sexual Activity     Alcohol use: No     Drug use: No     Sexual activity: Not Currently   Lifestyle     Physical activity:     Days per week: None     Minutes per session: None     Stress: None   Relationships     Social connections:     Talks on phone: None     Gets together: None     Attends Restorationist service: None     Active member of club or organization: None     Attends meetings of clubs or organizations: None     Relationship status: None     Intimate partner violence:     Fear of current or ex partner: None     Emotionally abused: None     Physically abused: None     Forced sexual activity: None   Other Topics Concern      Service Not Asked     Blood Transfusions Not Asked     Caffeine Concern No     Occupational Exposure Not Asked     Hobby Hazards Not Asked     Sleep Concern No     Stress Concern No     Weight Concern No     Special Diet No     Back Care Not Asked     Exercise Yes     Comment: walking 20 min daily     Bike Helmet Not Asked     Seat Belt Yes     Self-Exams Not Asked     Parent/sibling w/ CABG, MI or angioplasty before 65F 55M? No   Social History Narrative    Vamsi AZEVEDO    From [place]    In the  2001           Review of Systems:  Skin:  Negative itching     Eyes:  Positive for glasses    ENT:  Negative      Respiratory:  Positive for cough     Cardiovascular:  Negative;chest pain;palpitations;syncope or near-syncope;cyanosis;lightheadedness;dizziness;fatigue Positive for;palpitations;edema occ.  Gastroenterology: Positive for heartburn;constipation    Genitourinary:  not assessed      Musculoskeletal:  Positive for back pain;arthritis;joint  pain left shoulder  Neurologic:  Negative      Psychiatric:  Negative sleep disturbances    Heme/Lymph/Imm:  Positive for allergies    Endocrine:  Negative        Physical Exam:  Vitals: /75   Pulse 66   Ht 1.524 m (5')   Wt 55.8 kg (123 lb)   SpO2 96%   BMI 24.02 kg/m       Constitutional:  cooperative        Skin:  warm and dry to the touch          Head:  normocephalic        Eyes:           Lymph:      ENT:    poor dentition missing some teeth    Neck:  JVP normal        Respiratory:  clear to auscultation;normal symmetry         Cardiac: regular rhythm;normal S1 and S2                                                         GI:  abdomen soft        Extremities and Muscular Skeletal:      bilateral LE edema;2+;pitting          Neurological:  no gross motor deficits        Psych:  affect appropriate, oriented to time, person and place    Encounter Diagnoses   Name Primary?     Cardiac arrhythmia, unspecified cardiac arrhythmia type      Coronary artery disease involving native coronary artery of native heart with unstable angina pectoris (H)        Recent Lab Results:  LIPID RESULTS:  Lab Results   Component Value Date    CHOL 220 07/14/2017    HDL 53 07/14/2017     07/14/2017    TRIG 330 07/14/2017    CHOLHDLRATIO 3.8 04/05/2010       LIVER ENZYME RESULTS:  Lab Results   Component Value Date    AST 17 07/14/2017    ALT 14 07/14/2017       CBC RESULTS:  Lab Results   Component Value Date    WBC 7.5 07/13/2017    RBC 3.79 (L) 09/16/2016    HGB 11.9 07/13/2017    HCT 34.9 (L) 09/16/2016    MCV 92 09/16/2016    MCH 29.8 09/16/2016    MCHC 32.4 09/16/2016    RDW 13.1 09/16/2016     07/13/2017       BMP RESULTS:  Lab Results   Component Value Date     07/14/2017    POTASSIUM 4.8 07/14/2017    CHLORIDE 103 07/14/2017    CO2 23 07/14/2017    ANIONGAP 7 09/15/2016     07/14/2017    BUN 14 07/14/2017    CR 0.71 07/14/2017    GFRESTIMATED 77 07/14/2017    GFRESTBLACK 89 07/14/2017     CLAIRE 9.4 07/14/2017        A1C RESULTS:  No results found for: A1C    INR RESULTS:  No results found for: INR        CC  No referring provider defined for this encounter.                    Thank you for allowing me to participate in the care of your patient.      Sincerely,     NEYDA Nair [facility]    cc:   No referring provider defined for this encounter.

## 2019-03-01 NOTE — LETTER
3/1/2019    [facility]  [address] [place]  [facility] [address]    RE: Anjelicajuan Collin       Dear Colleague,    I had the pleasure of seeing Mary Polanco in the [facility].    HPI and Plan:   I had the pleasure of seeing Mary Polanco today in cardiology clinic follow up for refills, she is here with an . She is a pleasant 88 year old patient of Dr. Perrin.    Ms. He has a history of coronary artery disease with a non-Q wave MI in April 2016.  She has normal LV systolic function.  She had stenting of the circumflex.  She is intolerant to statins.    She is here today for annual follow-up, actually she really just needs her medications refilled.  She does have some concerns but she would like to discuss them with Dr. Perrin.  first while she has had some tightness across her chest, on Monday she was just sitting and she had a episode of tightness that resolved after she used nitro.  She would like to ride the exercise bike in her basement but she gets afraid because it provokes chest tightness and so she does not use it.  Her second issue is lower extremity edema.  She says is been present for years but I do not see mention of a prior.  It does look like she has 2+ lower extremity edema, this gets worse at the end of the day and is better in the morning.  She has some venous stasis changes in her skin.  She denies any shortness of breath and she is not very active.  In the last month she is only had to use one nitro.    Physical Exam  Please see Below     Assessment and Plan  1.  She has circumflex stenting in 2016.  Coronary artery disease.  She is intolerant to statins.  She continues on aspirin and beta-blocker.  She is having some anginal symptoms, this sounds stable.  I did offer her a stress nuclear test that she has had one in the past but she declined, she would like to discuss it further with Dr. Perrin, and I think that is reasonable.  2.  Lower extremity edema.   Her weight is up 5 pounds today and she has which she says is chronic lower extremity edema that does not bother her too much and is better in the morning but worse in the afternoon.  This may represent venous insufficiency and again at this time she would just prefer conservative measures.    Thank you for allowing me to care for Mary Polanco today.  I refilled her medications and was able to get her an appointment in a couple of weeks with Dr. Perrin.    NEYDA Lebron, CNP  Cardiology    Voice recognition software was used for this note, I have reviewed this note, but errors may have been missed.    No orders of the defined types were placed in this encounter.    Orders Placed This Encounter   Medications     metoprolol tartrate (LOPRESSOR) 25 MG tablet     Sig: Take 0.5 tablets (12.5 mg) by mouth 2 times daily     Dispense:  90 tablet     Refill:  3     nitroGLYcerin (NITROSTAT) 0.4 MG sublingual tablet     Sig: Place 1 tablet (0.4 mg) under the tongue every 5 minutes as needed for chest pain if you are still having symptoms after 3 doses (15 minutes) call 911.     Dispense:  25 tablet     Refill:  3     Medications Discontinued During This Encounter   Medication Reason     metoprolol tartrate (LOPRESSOR) 25 MG tablet Reorder     nitroglycerin (NITROSTAT) 0.4 MG SL tablet Reorder         CURRENT MEDICATIONS:  Current Outpatient Medications   Medication Sig Dispense Refill     aspirin EC 81 MG EC tablet Take 1 tablet (81 mg) by mouth daily 30 tablet 1     calcium carbonate (OS-CLAIRE 500 MG [place]. [place]) 500 MG tablet Take 500 mg by mouth 2 times daily       cholecalciferol (VITAMIN D) 400 UNIT TABS tablet Take 400 Units by mouth daily       docusate sodium (COLACE) 100 MG capsule Take by mouth 2 times daily       metoprolol tartrate (LOPRESSOR) 25 MG tablet Take 0.5 tablets (12.5 mg) by mouth 2 times daily 90 tablet 3     nitroGLYcerin (NITROSTAT) 0.4 MG sublingual tablet Place 1 tablet (0.4 mg) under the tongue every  5 minutes as needed for chest pain if you are still having symptoms after 3 doses (15 minutes) call 911. 25 tablet 3     levofloxacin (LEVAQUIN) 500 MG tablet Take 1 tablet (500 mg) by mouth daily (Patient not taking: Reported on 3/1/2019) 7 tablet 0       ALLERGIES     Allergies   Allergen Reactions     Azithromycin      Gabapentin Itching     Kanamycin      Metronidazole Rash     Penicillins Rash     Streptomycin      rash     Atorvastatin Rash     Lisinopril Rash       PAST MEDICAL HISTORY:  Past Medical History:   Diagnosis Date     Benign essential HTN      Coronary artery disease involving native coronary artery of native heart with unstable angina pectoris (H) 4/2016 4/2016 Cath - 80% hazy stenosis at site of ruptured plaque - HARLEEN placed     Cough      Eczema      Lung nodule      Malignant neoplasm of bladder, part unspecified 1999 Dr Everett    [place] done in [place]     Mitral regurgitation     4/2016 mod-mod severe MR with multiple jets per Echo     Mixed hyperlipidemia      NSTEMI (non-ST elevated myocardial infarction) (H)      Osteoporosis      Polyarticular arthritis      Rash      Sciatica        PAST SURGICAL HISTORY:  Past Surgical History:   Procedure Laterality Date     BLADDER TUMOR-ASSOCIATED      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     C APPENDECTOMY      1950     CATARACT IOL, RT/LT  5-08     HC REMOVAL GALLBLADDER      due to stone 1990     HEART CATH STENT COR W/WO PTCA  4/2016 4/2016 Cath - 80% hazy stenosis at site of ruptured plaque - HARLEEN placed       FAMILY HISTORY:  Family History   Problem Relation Age of Onset     Diabetes Brother      Hypertension Son      Cerebrovascular Disease Mother         76     Family History Negative Daughter      Family History Negative Brother      Family History Negative Brother        SOCIAL HISTORY:  Social History     Socioeconomic History     Marital status: Single     Spouse name: None     Number of children: None     Years of education:  None     Highest education level: None   Occupational History     None   Social Needs     Financial resource strain: None     Food insecurity:     Worry: None     Inability: None     Transportation needs:     Medical: None     Non-medical: None   Tobacco Use     Smoking status: Never Smoker     Smokeless tobacco: Never Used   Substance and Sexual Activity     Alcohol use: No     Drug use: No     Sexual activity: Not Currently   Lifestyle     Physical activity:     Days per week: None     Minutes per session: None     Stress: None   Relationships     Social connections:     Talks on phone: None     Gets together: None     Attends Jain service: None     Active member of club or organization: None     Attends meetings of clubs or organizations: None     Relationship status: None     Intimate partner violence:     Fear of current or ex partner: None     Emotionally abused: None     Physically abused: None     Forced sexual activity: None   Other Topics Concern      Service Not Asked     Blood Transfusions Not Asked     Caffeine Concern No     Occupational Exposure Not Asked     Hobby Hazards Not Asked     Sleep Concern No     Stress Concern No     Weight Concern No     Special Diet No     Back Care Not Asked     Exercise Yes     Comment: walking 20 min daily     Bike Helmet Not Asked     Seat Belt Yes     Self-Exams Not Asked     Parent/sibling w/ CABG, MI or angioplasty before 65F 55M? No   Social History Narrative    Vamsi AZEVEDO    From [place]    In the  2001           Review of Systems:  Skin:  Negative itching     Eyes:  Positive for glasses    ENT:  Negative      Respiratory:  Positive for cough     Cardiovascular:  Negative;chest pain;palpitations;syncope or near-syncope;cyanosis;lightheadedness;dizziness;fatigue Positive for;palpitations;edema occ.  Gastroenterology: Positive for heartburn;constipation    Genitourinary:  not assessed      Musculoskeletal:  Positive for back pain;arthritis;joint  pain left shoulder  Neurologic:  Negative      Psychiatric:  Negative sleep disturbances    Heme/Lymph/Imm:  Positive for allergies    Endocrine:  Negative        Physical Exam:  Vitals: /75   Pulse 66   Ht 1.524 m (5')   Wt 55.8 kg (123 lb)   SpO2 96%   BMI 24.02 kg/m       Constitutional:  cooperative        Skin:  warm and dry to the touch          Head:  normocephalic        Eyes:           Lymph:      ENT:    poor dentition missing some teeth    Neck:  JVP normal        Respiratory:  clear to auscultation;normal symmetry         Cardiac: regular rhythm;normal S1 and S2                                                         GI:  abdomen soft        Extremities and Muscular Skeletal:      bilateral LE edema;2+;pitting          Neurological:  no gross motor deficits        Psych:  affect appropriate, oriented to time, person and place    Encounter Diagnoses   Name Primary?     Cardiac arrhythmia, unspecified cardiac arrhythmia type      Coronary artery disease involving native coronary artery of native heart with unstable angina pectoris (H)        Recent Lab Results:  LIPID RESULTS:  Lab Results   Component Value Date    CHOL 220 07/14/2017    HDL 53 07/14/2017     07/14/2017    TRIG 330 07/14/2017    CHOLHDLRATIO 3.8 04/05/2010       LIVER ENZYME RESULTS:  Lab Results   Component Value Date    AST 17 07/14/2017    ALT 14 07/14/2017       CBC RESULTS:  Lab Results   Component Value Date    WBC 7.5 07/13/2017    RBC 3.79 (L) 09/16/2016    HGB 11.9 07/13/2017    HCT 34.9 (L) 09/16/2016    MCV 92 09/16/2016    MCH 29.8 09/16/2016    MCHC 32.4 09/16/2016    RDW 13.1 09/16/2016     07/13/2017       BMP RESULTS:  Lab Results   Component Value Date     07/14/2017    POTASSIUM 4.8 07/14/2017    CHLORIDE 103 07/14/2017    CO2 23 07/14/2017    ANIONGAP 7 09/15/2016     07/14/2017    BUN 14 07/14/2017    CR 0.71 07/14/2017    GFRESTIMATED 77 07/14/2017    GFRESTBLACK 89 07/14/2017     CLAIRE 9.4 07/14/2017        A1C RESULTS:  No results found for: A1C    INR RESULTS:  No results found for: INR          Thank you for allowing me to participate in the care of your patient.    Sincerely,     NEYDA Nair [facility]

## 2019-03-08 DIAGNOSIS — I49.9 CARDIAC ARRHYTHMIA, UNSPECIFIED CARDIAC ARRHYTHMIA TYPE: ICD-10-CM

## 2019-03-08 RX ORDER — METOPROLOL TARTRATE 25 MG/1
12.5 TABLET, FILM COATED ORAL 2 TIMES DAILY
Qty: 90 TABLET | Refills: 3 | Status: SHIPPED | OUTPATIENT
Start: 2019-03-08 | End: 2020-01-10

## 2019-03-12 ENCOUNTER — OFFICE VISIT (OUTPATIENT)
Dept: CARDIOLOGY | Facility: CLINIC | Age: 84
End: 2019-03-12
Payer: COMMERCIAL

## 2019-03-12 VITALS
HEIGHT: 60 IN | DIASTOLIC BLOOD PRESSURE: 66 MMHG | SYSTOLIC BLOOD PRESSURE: 138 MMHG | WEIGHT: 121.7 LBS | HEART RATE: 68 BPM | BODY MASS INDEX: 23.89 KG/M2

## 2019-03-12 DIAGNOSIS — R07.89 ATYPICAL CHEST PAIN: ICD-10-CM

## 2019-03-12 DIAGNOSIS — I10 BENIGN ESSENTIAL HTN: ICD-10-CM

## 2019-03-12 DIAGNOSIS — I25.110 CORONARY ARTERY DISEASE INVOLVING NATIVE CORONARY ARTERY OF NATIVE HEART WITH UNSTABLE ANGINA PECTORIS (H): Primary | ICD-10-CM

## 2019-03-12 DIAGNOSIS — E78.2 MIXED HYPERLIPIDEMIA: ICD-10-CM

## 2019-03-12 PROCEDURE — 99214 OFFICE O/P EST MOD 30 MIN: CPT | Performed by: INTERNAL MEDICINE

## 2019-03-12 ASSESSMENT — MIFFLIN-ST. JEOR: SCORE: 903.53

## 2019-03-12 NOTE — PROGRESS NOTES
HPI and Plan:   See dictation    Orders Placed This Encounter   Procedures     NM Lexiscan stress test (nuc card)     Follow-Up with Cardiologist       No orders of the defined types were placed in this encounter.      Encounter Diagnoses   Name Primary?     Coronary artery disease involving native coronary artery of native heart with unstable angina pectoris (H) Yes     Mixed hyperlipidemia      Benign essential HTN      Atypical chest pain        CURRENT MEDICATIONS:  Current Outpatient Medications   Medication Sig Dispense Refill     aspirin EC 81 MG EC tablet Take 1 tablet (81 mg) by mouth daily 30 tablet 1     calcium carbonate (OS-CLAIRE 500 MG [place]. [place]) 500 MG tablet Take 500 mg by mouth 2 times daily       cholecalciferol (VITAMIN D) 400 UNIT TABS tablet Take 400 Units by mouth daily       docusate sodium (COLACE) 100 MG capsule Take by mouth 2 times daily       metoprolol tartrate (LOPRESSOR) 25 MG tablet Take 0.5 tablets (12.5 mg) by mouth 2 times daily 90 tablet 3     nitroGLYcerin (NITROSTAT) 0.4 MG sublingual tablet Place 1 tablet (0.4 mg) under the tongue every 5 minutes as needed for chest pain if you are still having symptoms after 3 doses (15 minutes) call 911. 25 tablet 3     levofloxacin (LEVAQUIN) 500 MG tablet Take 1 tablet (500 mg) by mouth daily (Patient not taking: Reported on 3/1/2019) 7 tablet 0       ALLERGIES     Allergies   Allergen Reactions     Azithromycin      Gabapentin Itching     Kanamycin      Metronidazole Rash     Penicillins Rash     Streptomycin      rash     Atorvastatin Rash     Lisinopril Rash       PAST MEDICAL HISTORY:  Past Medical History:   Diagnosis Date     Benign essential HTN      Coronary artery disease involving native coronary artery of native heart with unstable angina pectoris (H) 4/2016 4/2016 Cath - 80% hazy stenosis at site of ruptured plaque - HARLEEN placed     Cough      Eczema      Lung nodule      Malignant neoplasm of bladder, part unspecified 1999   [place]    Surgery done in [place]     Mitral regurgitation     4/2016 mod-mod severe MR with multiple jets per Echo     Mixed hyperlipidemia      NSTEMI (non-ST elevated myocardial infarction) (H)      Osteoporosis      Polyarticular arthritis      Rash      Sciatica        PAST SURGICAL HISTORY:  Past Surgical History:   Procedure Laterality Date     BLADDER TUMOR-ASSOCIATED      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     C APPENDECTOMY      1950     CATARACT IOL, RT/LT  5-08     HC REMOVAL GALLBLADDER      due to stone 1990     HEART CATH STENT COR W/WO PTCA  4/2016 4/2016 Cath - 80% hazy stenosis at site of ruptured plaque - HARLEEN placed       FAMILY HISTORY:  Family History   Problem Relation Age of Onset     Diabetes Brother      Hypertension Son      Cerebrovascular Disease Mother         76     Family History Negative Daughter      Family History Negative Brother      Family History Negative Brother        SOCIAL HISTORY:  Social History     Socioeconomic History     Marital status: Single     Spouse name: None     Number of children: None     Years of education: None     Highest education level: None   Occupational History     None   Social Needs     Financial resource strain: None     Food insecurity:     Worry: None     Inability: None     Transportation needs:     Medical: None     Non-medical: None   Tobacco Use     Smoking status: Never Smoker     Smokeless tobacco: Never Used   Substance and Sexual Activity     Alcohol use: No     Drug use: No     Sexual activity: Not Currently   Lifestyle     Physical activity:     Days per week: None     Minutes per session: None     Stress: None   Relationships     Social connections:     Talks on phone: None     Gets together: None     Attends Spiritism service: None     Active member of club or organization: None     Attends meetings of clubs or organizations: None     Relationship status: None     Intimate partner violence:     Fear of current or ex partner:  None     Emotionally abused: None     Physically abused: None     Forced sexual activity: None   Other Topics Concern      Service Not Asked     Blood Transfusions Not Asked     Caffeine Concern No     Occupational Exposure Not Asked     Hobby Hazards Not Asked     Sleep Concern No     Stress Concern No     Weight Concern No     Special Diet No     Back Care Not Asked     Exercise Yes     Comment: walking 20 min daily     Bike Helmet Not Asked     Seat Belt Yes     Self-Exams Not Asked     Parent/sibling w/ CABG, MI or angioplasty before 65F 55M? No   Social History Narrative    Vamsi AZEVEDO    From [place]    In the  2001           Review of Systems:  Skin:  Negative     Eyes:  Positive for glasses  ENT:  Negative    Respiratory:  Negative    Cardiovascular:    chest pain;Positive for;edema;fatigue  Gastroenterology: Positive for heartburn;constipation  Genitourinary:  Negative    Musculoskeletal:  Positive for arthritis  Neurologic:  Negative    Psychiatric:  Negative    Heme/Lymph/Imm:  Negative    Endocrine:  Negative      Physical Exam:  Vitals: /66   Pulse 68   Ht 1.524 m (5')   Wt 55.2 kg (121 lb 11.2 oz)   BMI 23.77 kg/m      Constitutional:  cooperative thin      Skin:  warm and dry to the touch surgical scars well-healed   resolution of rash on body. Dry scaly skin under hair at nape of neck, red excema type coloration on hands    Head:  normocephalic        Eyes:  pupils equal and round        Lymph:No Cervical lymphadenopathy present     ENT:  no pallor or cyanosis poor dentition missing some teeth    Neck:  carotid pulses are full and equal bilaterally, JVP normal, no carotid bruit        Respiratory:  normal breath sounds, clear to auscultation, normal A-P diameter, normal symmetry, normal respiratory excursion, no use of accessory muscles         Cardiac: regular rhythm, normal S1/S2, no S3 or S4, apical impulse not displaced, no murmurs, gallops or rubs                pulses full  and equal                                        GI:  abdomen soft, non-tender, BS normoactive, no mass, no HSM, no bruits        Extremities and Muscular Skeletal:  no edema   bilateral LE edema;2+;pitting          Neurological:  no gross motor deficits        Psych:  affect appropriate, oriented to time, person and place        Recent Lab Results:  LIPID RESULTS:  Lab Results   Component Value Date    CHOL 220 07/14/2017    HDL 53 07/14/2017     07/14/2017    TRIG 330 07/14/2017    CHOLHDLRATIO 3.8 04/05/2010       LIVER ENZYME RESULTS:  Lab Results   Component Value Date    AST 17 07/14/2017    ALT 14 07/14/2017       CBC RESULTS:  Lab Results   Component Value Date    WBC 7.5 07/13/2017    RBC 3.79 (L) 09/16/2016    HGB 11.9 07/13/2017    HCT 34.9 (L) 09/16/2016    MCV 92 09/16/2016    MCH 29.8 09/16/2016    MCHC 32.4 09/16/2016    RDW 13.1 09/16/2016     07/13/2017       BMP RESULTS:  Lab Results   Component Value Date     07/14/2017    POTASSIUM 4.8 07/14/2017    CHLORIDE 103 07/14/2017    CO2 23 07/14/2017    ANIONGAP 7 09/15/2016     07/14/2017    BUN 14 07/14/2017    CR 0.71 07/14/2017    GFRESTIMATED 77 07/14/2017    GFRESTBLACK 89 07/14/2017    CLAIRE 9.4 07/14/2017        A1C RESULTS:  No results found for: A1C    INR RESULTS:  No results found for: INR        CC  [facility]  [address] [place] [address]

## 2019-03-12 NOTE — LETTER
3/12/2019      Kevon Sawyer MD  [facility] [address] [place] [address]      RE: Mary Polanco       Dear Colleague,    I had the pleasure of seeing Mary Polanco in the [facility].    Service Date: 03/12/2019      HISTORY OF PRESENT ILLNESS:  It is a pleasure for me to follow up with Ms. Polanco, who has coronary artery disease.  She had a non-Q-wave myocardial infarction in 04/2016.  Left ventricular systolic function was preserved.  The culprit artery was the circumflex, and this was percutaneously revascularized with a drug-eluting stent.  She did complete a full year of dual antiplatelet therapy.  She is not tolerant of statins and is not interested in taking any lipid-lowering medications.  She also gave a history of palpitations, but rhythm monitoring did not show any significant arrhythmias.  She has mild degrees of aortic, mitral and tricuspid regurgitation likely due to degenerative valvular heart disease.      She was seen by my colleague Lou Walter recently.  She had an episode of chest discomfort.  On further questioning, she tells me that she has had this kind of chest discomfort for several years.  She may get 4 episodes per year.  She does not get it on exertion.  She gets it when she wakes up in the morning.  It is localized substernally.  It usually lasts for about 20 minutes.  She occasionally takes 1 sublingual nitroglycerin, which resolves the pain.  She has no exertional chest discomfort or unusual shortness of breath.  She also complains of bilateral ankle swelling.  This has been developing over the past year or so.  It resolves overnight and progressively gets worse during the day.  She does have varicose veins bilaterally.  She has no PND or orthopnea.  She has not had any significant weight gain.      Cardiac examination is essentially unremarkable.      IMPRESSION:   1.  Atypical chest discomfort, possibly due to gastroesophageal reflux  disease.   2.  Postural edema with varicose veins.   3.  Probably stable coronary artery disease.   4.  Statin intolerance.      I explained what her ankle swelling could be due to.  I recommended elastic stockings and regular exercise.  She understands.  As for the chest discomfort, I think it is more than likely due to gastroesophageal reflux disease.  However, I will request a stress nuclear study in view of her underlying history of coronary artery disease.  If this turns out unremarkable, I will see her again in a year's time for further followup.         MAURA PÉREZ MD, [facility]             D: 2019   T: 2019   MT: gt      Name:     NERISSA SCHAFER   MRN:      [id number]        Account:      [id number]   :      1930           Service Date: 2019      Document: [id number]         Outpatient Encounter Medications as of 3/12/2019   Medication Sig Dispense Refill     aspirin EC 81 MG EC tablet Take 1 tablet (81 mg) by mouth daily 30 tablet 1     calcium carbonate (OS-CLAIRE 500 MG [place]. [place]) 500 MG tablet Take 500 mg by mouth 2 times daily       cholecalciferol (VITAMIN D) 400 UNIT TABS tablet Take 400 Units by mouth daily       docusate sodium (COLACE) 100 MG capsule Take by mouth 2 times daily       metoprolol tartrate (LOPRESSOR) 25 MG tablet Take 0.5 tablets (12.5 mg) by mouth 2 times daily 90 tablet 3     nitroGLYcerin (NITROSTAT) 0.4 MG sublingual tablet Place 1 tablet (0.4 mg) under the tongue every 5 minutes as needed for chest pain if you are still having symptoms after 3 doses (15 minutes) call 911. 25 tablet 3     levofloxacin (LEVAQUIN) 500 MG tablet Take 1 tablet (500 mg) by mouth daily (Patient not taking: Reported on 3/1/2019) 7 tablet 0     No facility-administered encounter medications on file as of 3/12/2019.            Again, thank you for allowing me to participate in the care of your patient.      Sincerely,    DR MAURA PÉREZ MD     [facility]

## 2019-03-12 NOTE — LETTER
3/12/2019    Kevon Sawyer MD  [facility] [address] [facility] [address]    RE: Mary Polanco       Dear Colleague,    I had the pleasure of seeing Mary Polanco in the [facility].    HPI and Plan:   See dictation    Orders Placed This Encounter   Procedures     NM Lexiscan stress test (nuc card)     Follow-Up with Cardiologist       No orders of the defined types were placed in this encounter.      Encounter Diagnoses   Name Primary?     Coronary artery disease involving native coronary artery of native heart with unstable angina pectoris (H) Yes     Mixed hyperlipidemia      Benign essential HTN      Atypical chest pain        CURRENT MEDICATIONS:  Current Outpatient Medications   Medication Sig Dispense Refill     aspirin EC 81 MG EC tablet Take 1 tablet (81 mg) by mouth daily 30 tablet 1     calcium carbonate (OS-CLAIRE 500 MG [place]. CA) 500 MG tablet Take 500 mg by mouth 2 times daily       cholecalciferol (VITAMIN D) 400 UNIT TABS tablet Take 400 Units by mouth daily       docusate sodium (COLACE) 100 MG capsule Take by mouth 2 times daily       metoprolol tartrate (LOPRESSOR) 25 MG tablet Take 0.5 tablets (12.5 mg) by mouth 2 times daily 90 tablet 3     nitroGLYcerin (NITROSTAT) 0.4 MG sublingual tablet Place 1 tablet (0.4 mg) under the tongue every 5 minutes as needed for chest pain if you are still having symptoms after 3 doses (15 minutes) call 911. 25 tablet 3     levofloxacin (LEVAQUIN) 500 MG tablet Take 1 tablet (500 mg) by mouth daily (Patient not taking: Reported on 3/1/2019) 7 tablet 0       ALLERGIES     Allergies   Allergen Reactions     Azithromycin      Gabapentin Itching     Kanamycin      Metronidazole Rash     Penicillins Rash     Streptomycin      rash     Atorvastatin Rash     Lisinopril Rash       PAST MEDICAL HISTORY:  Past Medical History:   Diagnosis Date     Benign essential HTN      Coronary artery disease involving native coronary artery of  native heart with unstable angina pectoris (H) 4/2016 4/2016 Cath - 80% hazy stenosis at site of ruptured plaque - HARLEEN placed     Cough      Eczema      Lung nodule      Malignant neoplasm of bladder, part unspecified 1999 Dr Everett    Surgery done in [place]     Mitral regurgitation     4/2016 mod-mod severe MR with multiple jets per Echo     Mixed hyperlipidemia      NSTEMI (non-ST elevated myocardial infarction) (H)      Osteoporosis      Polyarticular arthritis      Rash      Sciatica        PAST SURGICAL HISTORY:  Past Surgical History:   Procedure Laterality Date     BLADDER TUMOR-ASSOCIATED      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     C APPENDECTOMY      1950     CATARACT IOL, RT/LT  5-08     HC REMOVAL GALLBLADDER      due to stone 1990     HEART CATH STENT COR W/WO PTCA  4/2016 4/2016 Cath - 80% hazy stenosis at site of ruptured plaque - HARLEEN placed       FAMILY HISTORY:  Family History   Problem Relation Age of Onset     Diabetes Brother      Hypertension Son      Cerebrovascular Disease Mother         76     Family History Negative Daughter      Family History Negative Brother      Family History Negative Brother        SOCIAL HISTORY:  Social History     Socioeconomic History     Marital status: Single     Spouse name: None     Number of children: None     Years of education: None     Highest education level: None   Occupational History     None   Social Needs     Financial resource strain: None     Food insecurity:     Worry: None     Inability: None     Transportation needs:     Medical: None     Non-medical: None   Tobacco Use     Smoking status: Never Smoker     Smokeless tobacco: Never Used   Substance and Sexual Activity     Alcohol use: No     Drug use: No     Sexual activity: Not Currently   Lifestyle     Physical activity:     Days per week: None     Minutes per session: None     Stress: None   Relationships     Social connections:     Talks on phone: None     Gets together: None      Attends Rastafari service: None     Active member of club or organization: None     Attends meetings of clubs or organizations: None     Relationship status: None     Intimate partner violence:     Fear of current or ex partner: None     Emotionally abused: None     Physically abused: None     Forced sexual activity: None   Other Topics Concern      Service Not Asked     Blood Transfusions Not Asked     Caffeine Concern No     Occupational Exposure Not Asked     Hobby Hazards Not Asked     Sleep Concern No     Stress Concern No     Weight Concern No     Special Diet No     Back Care Not Asked     Exercise Yes     Comment: walking 20 min daily     Bike Helmet Not Asked     Seat Belt Yes     Self-Exams Not Asked     Parent/sibling w/ CABG, MI or angioplasty before 65F 55M? No   Social History Narrative    Vamsi AZEVEDO    From [place]    In the  2001           Review of Systems:  Skin:  Negative     Eyes:  Positive for glasses  ENT:  Negative    Respiratory:  Negative    Cardiovascular:    chest pain;Positive for;edema;fatigue  Gastroenterology: Positive for heartburn;constipation  Genitourinary:  Negative    Musculoskeletal:  Positive for arthritis  Neurologic:  Negative    Psychiatric:  Negative    Heme/Lymph/Imm:  Negative    Endocrine:  Negative      Physical Exam:  Vitals: /66   Pulse 68   Ht 1.524 m (5')   Wt 55.2 kg (121 lb 11.2 oz)   BMI 23.77 kg/m       Constitutional:  cooperative thin      Skin:  warm and dry to the touch surgical scars well-healed   resolution of rash on body. Dry scaly skin under hair at nape of neck, red excema type coloration on hands    Head:  normocephalic        Eyes:  pupils equal and round        Lymph:No Cervical lymphadenopathy present     ENT:  no pallor or cyanosis poor dentition missing some teeth    Neck:  carotid pulses are full and equal bilaterally, JVP normal, no carotid bruit        Respiratory:  normal breath sounds, clear to auscultation, normal A-P  diameter, normal symmetry, normal respiratory excursion, no use of accessory muscles         Cardiac: regular rhythm, normal S1/S2, no S3 or S4, apical impulse not displaced, no murmurs, gallops or rubs                pulses full and equal                                        GI:  abdomen soft, non-tender, BS normoactive, no mass, no HSM, no bruits        Extremities and Muscular Skeletal:  no edema   bilateral LE edema;2+;pitting          Neurological:  no gross motor deficits        Psych:  affect appropriate, oriented to time, person and place        Recent Lab Results:  LIPID RESULTS:  Lab Results   Component Value Date    CHOL 220 07/14/2017    HDL 53 07/14/2017     07/14/2017    TRIG 330 07/14/2017    CHOLHDLRATIO 3.8 04/05/2010       LIVER ENZYME RESULTS:  Lab Results   Component Value Date    AST 17 07/14/2017    ALT 14 07/14/2017       CBC RESULTS:  Lab Results   Component Value Date    WBC 7.5 07/13/2017    RBC 3.79 (L) 09/16/2016    HGB 11.9 07/13/2017    HCT 34.9 (L) 09/16/2016    MCV 92 09/16/2016    MCH 29.8 09/16/2016    MCHC 32.4 09/16/2016    RDW 13.1 09/16/2016     07/13/2017       BMP RESULTS:  Lab Results   Component Value Date     07/14/2017    POTASSIUM 4.8 07/14/2017    CHLORIDE 103 07/14/2017    CO2 23 07/14/2017    ANIONGAP 7 09/15/2016     07/14/2017    BUN 14 07/14/2017    CR 0.71 07/14/2017    GFRESTIMATED 77 07/14/2017    GFRESTBLACK 89 07/14/2017    CLAIRE 9.4 07/14/2017        A1C RESULTS:  No results found for: A1C    INR RESULTS:  No results found for: INR        CC  [facility]  [address] [place] [address]                  Thank you for allowing me to participate in the care of your patient.      Sincerely,     DR MAURA PÉREZ MD     [facility]    cc:   [facility]  [address]  [place] [address]

## 2019-03-12 NOTE — PROGRESS NOTES
Service Date: 03/12/2019      HISTORY OF PRESENT ILLNESS:  It is a pleasure for me to follow up with Ms. Polanco, who has coronary artery disease.  She had a non-Q-wave myocardial infarction in 04/2016.  Left ventricular systolic function was preserved.  The culprit artery was the circumflex, and this was percutaneously revascularized with a drug-eluting stent.  She did complete a full year of dual antiplatelet therapy.  She is not tolerant of statins and is not interested in taking any lipid-lowering medications.  She also gave a history of palpitations, but rhythm monitoring did not show any significant arrhythmias.  She has mild degrees of aortic, mitral and tricuspid regurgitation likely due to degenerative valvular heart disease.      She was seen by my colleague Lou Walter recently.  She had an episode of chest discomfort.  On further questioning, she tells me that she has had this kind of chest discomfort for several years.  She may get 4 episodes per year.  She does not get it on exertion.  She gets it when she wakes up in the morning.  It is localized substernally.  It usually lasts for about 20 minutes.  She occasionally takes 1 sublingual nitroglycerin, which resolves the pain.  She has no exertional chest discomfort or unusual shortness of breath.  She also complains of bilateral ankle swelling.  This has been developing over the past year or so.  It resolves overnight and progressively gets worse during the day.  She does have varicose veins bilaterally.  She has no PND or orthopnea.  She has not had any significant weight gain.      Cardiac examination is essentially unremarkable.      IMPRESSION:   1.  Atypical chest discomfort, possibly due to gastroesophageal reflux disease.   2.  Postural edema with varicose veins.   3.  Probably stable coronary artery disease.   4.  Statin intolerance.      I explained what her ankle swelling could be due to.  I recommended elastic stockings and regular  exercise.  She understands.  As for the chest discomfort, I think it is more than likely due to gastroesophageal reflux disease.  However, I will request a stress nuclear study in view of her underlying history of coronary artery disease.  If this turns out unremarkable, I will see her again in a year's time for further followup.         MAURA PÉREZ MD, [facility]             D: 2019   T: 2019   MT: gt      Name:     NERISSA SCHAFER   MRN:      [id number]        Account:      [id number]   :      1930           Service Date: 2019      Document: [id number]

## 2019-03-19 ENCOUNTER — HOSPITAL ENCOUNTER (OUTPATIENT)
Dept: CARDIOLOGY | Facility: CLINIC | Age: 84
Discharge: HOME | End: 2019-03-19
Attending: INTERNAL MEDICINE | Admitting: INTERNAL MEDICINE
Payer: COMMERCIAL

## 2019-03-19 VITALS — SYSTOLIC BLOOD PRESSURE: 110 MMHG | DIASTOLIC BLOOD PRESSURE: 70 MMHG | HEART RATE: 76 BPM

## 2019-03-19 DIAGNOSIS — I25.110 CORONARY ARTERY DISEASE INVOLVING NATIVE CORONARY ARTERY OF NATIVE HEART WITH UNSTABLE ANGINA PECTORIS (H): ICD-10-CM

## 2019-03-19 DIAGNOSIS — I10 BENIGN ESSENTIAL HTN: ICD-10-CM

## 2019-03-19 DIAGNOSIS — R07.89 ATYPICAL CHEST PAIN: ICD-10-CM

## 2019-03-19 DIAGNOSIS — E78.2 MIXED HYPERLIPIDEMIA: ICD-10-CM

## 2019-03-19 PROCEDURE — A9502 TC99M TETROFOSMIN: HCPCS | Performed by: INTERNAL MEDICINE

## 2019-03-19 PROCEDURE — 93016 CV STRESS TEST SUPVJ ONLY: CPT | Performed by: INTERNAL MEDICINE

## 2019-03-19 PROCEDURE — 78452 HT MUSCLE IMAGE SPECT MULT: CPT | Mod: 26 | Performed by: INTERNAL MEDICINE

## 2019-03-19 PROCEDURE — 93017 CV STRESS TEST TRACING ONLY: CPT

## 2019-03-19 PROCEDURE — 25000128 H RX IP 250 OP 636: Performed by: INTERNAL MEDICINE

## 2019-03-19 PROCEDURE — 34300033 ZZH RX 343: Performed by: INTERNAL MEDICINE

## 2019-03-19 PROCEDURE — 93018 CV STRESS TEST I&R ONLY: CPT | Performed by: INTERNAL MEDICINE

## 2019-03-19 RX ORDER — ACYCLOVIR 200 MG/1
0-1 CAPSULE ORAL
Status: DISCONTINUED | OUTPATIENT
Start: 2019-03-19 | End: 2019-03-20 | Stop reason: HOSPADM

## 2019-03-19 RX ORDER — CAFFEINE 200 MG
200 TABLET ORAL
Status: DISCONTINUED | OUTPATIENT
Start: 2019-03-19 | End: 2019-03-20 | Stop reason: HOSPADM

## 2019-03-19 RX ORDER — AMINOPHYLLINE 25 MG/ML
50-100 INJECTION, SOLUTION INTRAVENOUS
Status: DISCONTINUED | OUTPATIENT
Start: 2019-03-19 | End: 2019-03-20 | Stop reason: HOSPADM

## 2019-03-19 RX ORDER — ALBUTEROL SULFATE 90 UG/1
2 AEROSOL, METERED RESPIRATORY (INHALATION) EVERY 5 MIN PRN
Status: DISCONTINUED | OUTPATIENT
Start: 2019-03-19 | End: 2019-03-20 | Stop reason: HOSPADM

## 2019-03-19 RX ORDER — CAFFEINE CITRATE 20 MG/ML
60 SOLUTION INTRAVENOUS
Status: DISCONTINUED | OUTPATIENT
Start: 2019-03-19 | End: 2019-03-20 | Stop reason: HOSPADM

## 2019-03-19 RX ORDER — REGADENOSON 0.08 MG/ML
0.4 INJECTION, SOLUTION INTRAVENOUS ONCE
Status: COMPLETED | OUTPATIENT
Start: 2019-03-19 | End: 2019-03-19

## 2019-03-19 RX ADMIN — TETROFOSMIN 3.25 MCI.: 1.38 INJECTION, POWDER, LYOPHILIZED, FOR SOLUTION INTRAVENOUS at 09:38

## 2019-03-19 RX ADMIN — TETROFOSMIN 10.5 MCI.: 1.38 INJECTION, POWDER, LYOPHILIZED, FOR SOLUTION INTRAVENOUS at 11:36

## 2019-03-19 RX ADMIN — REGADENOSON 0.4 MG: 0.08 INJECTION, SOLUTION INTRAVENOUS at 11:35

## 2019-06-05 ENCOUNTER — HOSPITAL ENCOUNTER (OUTPATIENT)
Facility: CLINIC | Age: 84
Setting detail: OBSERVATION
Discharge: NURSING, REHAB OR LONG-TERM CARE | End: 2019-06-07
Attending: EMERGENCY MEDICINE | Admitting: INTERNAL MEDICINE
Payer: COMMERCIAL

## 2019-06-05 DIAGNOSIS — M10.00 ACUTE IDIOPATHIC GOUT, UNSPECIFIED SITE: Primary | ICD-10-CM

## 2019-06-05 DIAGNOSIS — L03.116 CELLULITIS OF LEFT FOOT: ICD-10-CM

## 2019-06-05 PROCEDURE — 99285 EMERGENCY DEPT VISIT HI MDM: CPT | Mod: 25

## 2019-06-05 NOTE — ED AVS SNAPSHOT
Emergency Department  [address] [place] [address]  Phone:  [phone]  Fax:  [phone]                                    Mary Polanco   MRN: [id number]    Department:   Emergency Department   Date of Visit:  6/5/2019           After Visit Summary Signature Page    I have received my discharge instructions, and my questions have been answered. I have discussed any challenges I see with this plan with the nurse or doctor.    ..........................................................................................................................................  Patient/Patient Representative Signature      ..........................................................................................................................................  Patient Representative Print Name and Relationship to Patient    ..................................................               ................................................  Date                                   Time    ..........................................................................................................................................  Reviewed by Signature/Title    ...................................................              ..............................................  Date                                               Time          22EPIC Rev 08/18

## 2019-06-06 ENCOUNTER — APPOINTMENT (OUTPATIENT)
Dept: ULTRASOUND IMAGING | Facility: CLINIC | Age: 84
End: 2019-06-06
Attending: EMERGENCY MEDICINE
Payer: COMMERCIAL

## 2019-06-06 ENCOUNTER — APPOINTMENT (OUTPATIENT)
Dept: PHYSICAL THERAPY | Facility: CLINIC | Age: 84
End: 2019-06-06
Attending: INTERNAL MEDICINE
Payer: COMMERCIAL

## 2019-06-06 ENCOUNTER — APPOINTMENT (OUTPATIENT)
Dept: GENERAL RADIOLOGY | Facility: CLINIC | Age: 84
End: 2019-06-06
Attending: EMERGENCY MEDICINE
Payer: COMMERCIAL

## 2019-06-06 PROBLEM — L03.90 CELLULITIS: Status: ACTIVE | Noted: 2019-06-06

## 2019-06-06 PROBLEM — M79.672 FOOT PAIN, LEFT: Status: ACTIVE | Noted: 2019-06-06

## 2019-06-06 PROBLEM — M79.672 LEFT FOOT PAIN: Status: ACTIVE | Noted: 2019-06-06

## 2019-06-06 PROBLEM — L03.90 CELLULITIS: Status: RESOLVED | Noted: 2019-06-06 | Resolved: 2019-06-06

## 2019-06-06 LAB
ALBUMIN SERPL-MCNC: 4 G/DL (ref 3.4–5)
ALP SERPL-CCNC: 91 U/L (ref 40–150)
ALT SERPL W P-5'-P-CCNC: 21 U/L (ref 0–50)
ANION GAP SERPL CALCULATED.3IONS-SCNC: 4 MMOL/L (ref 3–14)
AST SERPL W P-5'-P-CCNC: 35 U/L (ref 0–45)
BASOPHILS # BLD AUTO: 0.1 10E9/L (ref 0–0.2)
BASOPHILS NFR BLD AUTO: 0.8 %
BILIRUB SERPL-MCNC: 0.4 MG/DL (ref 0.2–1.3)
BUN SERPL-MCNC: 18 MG/DL (ref 7–30)
CA-I SERPL ISE-MCNC: 4.8 MG/DL (ref 4.4–5.2)
CALCIUM SERPL-MCNC: 10.4 MG/DL (ref 8.5–10.1)
CHLORIDE SERPL-SCNC: 107 MMOL/L (ref 94–109)
CO2 SERPL-SCNC: 27 MMOL/L (ref 20–32)
CREAT SERPL-MCNC: 0.77 MG/DL (ref 0.52–1.04)
DIFFERENTIAL METHOD BLD: NORMAL
EOSINOPHIL # BLD AUTO: 0.3 10E9/L (ref 0–0.7)
EOSINOPHIL NFR BLD AUTO: 3.1 %
ERYTHROCYTE [DISTWIDTH] IN BLOOD BY AUTOMATED COUNT: 12.2 % (ref 10–15)
GFR SERPL CREATININE-BSD FRML MDRD: 69 ML/MIN/{1.73_M2}
GLUCOSE SERPL-MCNC: 117 MG/DL (ref 70–99)
HCT VFR BLD AUTO: 41.9 % (ref 35–47)
HGB BLD-MCNC: 13.5 G/DL (ref 11.7–15.7)
IMM GRANULOCYTES # BLD: 0 10E9/L (ref 0–0.4)
IMM GRANULOCYTES NFR BLD: 0.2 %
LYMPHOCYTES # BLD AUTO: 3.2 10E9/L (ref 0.8–5.3)
LYMPHOCYTES NFR BLD AUTO: 30.3 %
MCH RBC QN AUTO: 29.5 PG (ref 26.5–33)
MCHC RBC AUTO-ENTMCNC: 32.2 G/DL (ref 31.5–36.5)
MCV RBC AUTO: 92 FL (ref 78–100)
MONOCYTES # BLD AUTO: 1 10E9/L (ref 0–1.3)
MONOCYTES NFR BLD AUTO: 9.5 %
NEUTROPHILS # BLD AUTO: 6 10E9/L (ref 1.6–8.3)
NEUTROPHILS NFR BLD AUTO: 56.1 %
PLATELET # BLD AUTO: 259 10E9/L (ref 150–450)
POTASSIUM SERPL-SCNC: 4.4 MMOL/L (ref 3.4–5.3)
PROT SERPL-MCNC: 8.9 G/DL (ref 6.8–8.8)
PTH-INTACT SERPL-MCNC: 52 PG/ML (ref 12–64)
RBC # BLD AUTO: 4.58 10E12/L (ref 3.8–5.2)
SODIUM SERPL-SCNC: 138 MMOL/L (ref 133–144)
URATE SERPL-MCNC: 7.7 MG/DL (ref 2.6–6)
WBC # BLD AUTO: 10.6 10E9/L (ref 4–11)

## 2019-06-06 PROCEDURE — 99207 ZZC CDG-CODE CATEGORY CHANGED: CPT | Performed by: INTERNAL MEDICINE

## 2019-06-06 PROCEDURE — 25000132 ZZH RX MED GY IP 250 OP 250 PS 637: Performed by: EMERGENCY MEDICINE

## 2019-06-06 PROCEDURE — 97530 THERAPEUTIC ACTIVITIES: CPT | Mod: GP | Performed by: PHYSICAL THERAPIST

## 2019-06-06 PROCEDURE — 99207 ZZC APP CREDIT; MD BILLING SHARED VISIT: CPT | Performed by: INTERNAL MEDICINE

## 2019-06-06 PROCEDURE — 97116 GAIT TRAINING THERAPY: CPT | Mod: GP | Performed by: PHYSICAL THERAPIST

## 2019-06-06 PROCEDURE — 25000132 ZZH RX MED GY IP 250 OP 250 PS 637: Performed by: INTERNAL MEDICINE

## 2019-06-06 PROCEDURE — 84550 ASSAY OF BLOOD/URIC ACID: CPT | Performed by: EMERGENCY MEDICINE

## 2019-06-06 PROCEDURE — 80053 COMPREHEN METABOLIC PANEL: CPT | Performed by: EMERGENCY MEDICINE

## 2019-06-06 PROCEDURE — 93971 EXTREMITY STUDY: CPT | Mod: LT

## 2019-06-06 PROCEDURE — 85025 COMPLETE CBC W/AUTO DIFF WBC: CPT | Performed by: EMERGENCY MEDICINE

## 2019-06-06 PROCEDURE — 25000128 H RX IP 250 OP 636: Performed by: INTERNAL MEDICINE

## 2019-06-06 PROCEDURE — 73630 X-RAY EXAM OF FOOT: CPT | Mod: LT

## 2019-06-06 PROCEDURE — 97161 PT EVAL LOW COMPLEX 20 MIN: CPT | Mod: GP | Performed by: PHYSICAL THERAPIST

## 2019-06-06 PROCEDURE — G0378 HOSPITAL OBSERVATION PER HR: HCPCS

## 2019-06-06 PROCEDURE — 96365 THER/PROPH/DIAG IV INF INIT: CPT

## 2019-06-06 PROCEDURE — 82330 ASSAY OF CALCIUM: CPT | Performed by: INTERNAL MEDICINE

## 2019-06-06 PROCEDURE — 99220 ZZC INITIAL OBSERVATION CARE,LEVL III: CPT | Performed by: INTERNAL MEDICINE

## 2019-06-06 PROCEDURE — 25000128 H RX IP 250 OP 636: Performed by: EMERGENCY MEDICINE

## 2019-06-06 PROCEDURE — 25800030 ZZH RX IP 258 OP 636: Performed by: INTERNAL MEDICINE

## 2019-06-06 PROCEDURE — 83970 ASSAY OF PARATHORMONE: CPT | Performed by: EMERGENCY MEDICINE

## 2019-06-06 PROCEDURE — 36415 COLL VENOUS BLD VENIPUNCTURE: CPT | Performed by: INTERNAL MEDICINE

## 2019-06-06 PROCEDURE — 99207 ZZC CDG-MDM COMPONENT: MEETS MODERATE - UP CODED: CPT | Performed by: INTERNAL MEDICINE

## 2019-06-06 RX ORDER — ONDANSETRON 2 MG/ML
4 INJECTION INTRAMUSCULAR; INTRAVENOUS EVERY 6 HOURS PRN
Status: DISCONTINUED | OUTPATIENT
Start: 2019-06-06 | End: 2019-06-07 | Stop reason: HOSPADM

## 2019-06-06 RX ORDER — CEPHALEXIN 500 MG/1
500 CAPSULE ORAL 2 TIMES DAILY
Status: ON HOLD | COMMUNITY
Start: 2019-06-06 | End: 2019-06-07

## 2019-06-06 RX ORDER — CALCIUM CARBONATE 500(1250)
500 TABLET ORAL 2 TIMES DAILY
Status: DISCONTINUED | OUTPATIENT
Start: 2019-06-06 | End: 2019-06-07 | Stop reason: HOSPADM

## 2019-06-06 RX ORDER — COLCHICINE 0.6 MG/1
0.6 TABLET ORAL 2 TIMES DAILY
Status: DISCONTINUED | OUTPATIENT
Start: 2019-06-06 | End: 2019-06-07

## 2019-06-06 RX ORDER — ACETAMINOPHEN 325 MG/1
650 TABLET ORAL ONCE
Status: COMPLETED | OUTPATIENT
Start: 2019-06-06 | End: 2019-06-06

## 2019-06-06 RX ORDER — SODIUM CHLORIDE 9 MG/ML
INJECTION, SOLUTION INTRAVENOUS CONTINUOUS
Status: DISCONTINUED | OUTPATIENT
Start: 2019-06-06 | End: 2019-06-06

## 2019-06-06 RX ORDER — DOCUSATE SODIUM 100 MG/1
100 CAPSULE, LIQUID FILLED ORAL 2 TIMES DAILY
Status: DISCONTINUED | OUTPATIENT
Start: 2019-06-06 | End: 2019-06-07 | Stop reason: HOSPADM

## 2019-06-06 RX ORDER — TRAMADOL HYDROCHLORIDE 50 MG/1
50 TABLET ORAL EVERY 6 HOURS PRN
Status: ON HOLD | COMMUNITY
End: 2019-06-07

## 2019-06-06 RX ORDER — NITROGLYCERIN 0.4 MG/1
0.4 TABLET SUBLINGUAL EVERY 5 MIN PRN
Status: DISCONTINUED | OUTPATIENT
Start: 2019-06-06 | End: 2019-06-07 | Stop reason: HOSPADM

## 2019-06-06 RX ORDER — ASPIRIN 81 MG/1
81 TABLET ORAL DAILY
Status: DISCONTINUED | OUTPATIENT
Start: 2019-06-06 | End: 2019-06-07 | Stop reason: HOSPADM

## 2019-06-06 RX ORDER — TRAMADOL HYDROCHLORIDE 50 MG/1
50 TABLET ORAL EVERY 6 HOURS PRN
Qty: 10 TABLET | Refills: 0 | Status: SHIPPED | OUTPATIENT
Start: 2019-06-06 | End: 2019-06-07

## 2019-06-06 RX ORDER — CEFAZOLIN SODIUM 1 G/3ML
1 INJECTION, POWDER, FOR SOLUTION INTRAMUSCULAR; INTRAVENOUS ONCE
Status: COMPLETED | OUTPATIENT
Start: 2019-06-06 | End: 2019-06-06

## 2019-06-06 RX ORDER — ACETAMINOPHEN 325 MG/1
650 TABLET ORAL EVERY 4 HOURS PRN
Status: DISCONTINUED | OUTPATIENT
Start: 2019-06-06 | End: 2019-06-07 | Stop reason: HOSPADM

## 2019-06-06 RX ORDER — CEFAZOLIN SODIUM 1 G/3ML
1 INJECTION, POWDER, FOR SOLUTION INTRAMUSCULAR; INTRAVENOUS EVERY 8 HOURS
Status: DISCONTINUED | OUTPATIENT
Start: 2019-06-06 | End: 2019-06-06

## 2019-06-06 RX ORDER — ONDANSETRON 4 MG/1
4 TABLET, ORALLY DISINTEGRATING ORAL EVERY 6 HOURS PRN
Status: DISCONTINUED | OUTPATIENT
Start: 2019-06-06 | End: 2019-06-07 | Stop reason: HOSPADM

## 2019-06-06 RX ORDER — NALOXONE HYDROCHLORIDE 0.4 MG/ML
.1-.4 INJECTION, SOLUTION INTRAMUSCULAR; INTRAVENOUS; SUBCUTANEOUS
Status: DISCONTINUED | OUTPATIENT
Start: 2019-06-06 | End: 2019-06-07 | Stop reason: HOSPADM

## 2019-06-06 RX ORDER — CEPHALEXIN 500 MG/1
500 CAPSULE ORAL 3 TIMES DAILY
Qty: 28 CAPSULE | Refills: 0 | Status: SHIPPED | OUTPATIENT
Start: 2019-06-06 | End: 2019-06-07

## 2019-06-06 RX ADMIN — DOCUSATE SODIUM 100 MG: 100 CAPSULE, LIQUID FILLED ORAL at 20:55

## 2019-06-06 RX ADMIN — COLCHICINE 0.6 MG: 0.6 TABLET, FILM COATED ORAL at 20:55

## 2019-06-06 RX ADMIN — CEFAZOLIN 1 G: 1 INJECTION, POWDER, FOR SOLUTION INTRAMUSCULAR; INTRAVENOUS at 10:32

## 2019-06-06 RX ADMIN — DOCUSATE SODIUM 100 MG: 100 CAPSULE, LIQUID FILLED ORAL at 08:40

## 2019-06-06 RX ADMIN — SODIUM CHLORIDE: 9 INJECTION, SOLUTION INTRAVENOUS at 07:22

## 2019-06-06 RX ADMIN — ACETAMINOPHEN 650 MG: 325 TABLET, FILM COATED ORAL at 03:51

## 2019-06-06 RX ADMIN — CALCIUM 500 MG: 500 TABLET ORAL at 20:56

## 2019-06-06 RX ADMIN — CALCIUM 500 MG: 500 TABLET ORAL at 10:32

## 2019-06-06 RX ADMIN — COLCHICINE 0.6 MG: 0.6 TABLET, FILM COATED ORAL at 10:32

## 2019-06-06 RX ADMIN — CHOLECALCIFEROL TAB 10 MCG (400 UNIT) 400 UNITS: 10 TAB at 10:31

## 2019-06-06 RX ADMIN — CEFAZOLIN 1 G: 1 INJECTION, POWDER, FOR SOLUTION INTRAMUSCULAR; INTRAVENOUS at 00:52

## 2019-06-06 RX ADMIN — METOPROLOL TARTRATE 12.5 MG: 25 TABLET, FILM COATED ORAL at 20:57

## 2019-06-06 RX ADMIN — ASPIRIN 81 MG: 81 TABLET, COATED ORAL at 08:40

## 2019-06-06 RX ADMIN — METOPROLOL TARTRATE 12.5 MG: 25 TABLET, FILM COATED ORAL at 08:40

## 2019-06-06 ASSESSMENT — ACTIVITIES OF DAILY LIVING (ADL)
TOILETING: 0-->INDEPENDENT
DRESS: 0-->INDEPENDENT
COGNITION: 0 - NO COGNITION ISSUES REPORTED
FALL_HISTORY_WITHIN_LAST_SIX_MONTHS: NO
SWALLOWING: 0-->SWALLOWS FOODS/LIQUIDS WITHOUT DIFFICULTY
RETIRED_COMMUNICATION: 0-->UNDERSTANDS/COMMUNICATES WITHOUT DIFFICULTY
TRANSFERRING: 0-->INDEPENDENT
ADLS_ACUITY_SCORE: 11
AMBULATION: 0-->INDEPENDENT
RETIRED_EATING: 0-->INDEPENDENT
WHICH_OF_THE_ABOVE_FUNCTIONAL_RISKS_HAD_A_RECENT_ONSET_OR_CHANGE?: AMBULATION;TRANSFERRING
BATHING: 0-->INDEPENDENT
ADLS_ACUITY_SCORE: 13

## 2019-06-06 ASSESSMENT — ENCOUNTER SYMPTOMS: COLOR CHANGE: 1

## 2019-06-06 NOTE — PHARMACY-ADMISSION MEDICATION HISTORY
Admission medication history interview status for the 6/5/2019  admission is complete. See EPIC admission navigator for prior to admission medications     Medication history source reliability:Good    Actions taken by pharmacist (provider contacted, etc):None     Additional medication history information not noted on PTA med list :pt family translates for her    Medication reconciliation/reorder completed by provider prior to medication history? Yes    Time spent in this activity: 15 minutes    Prior to Admission medications    Medication Sig Last Dose Taking? Auth Provider   aspirin EC 81 MG EC tablet Take 1 tablet (81 mg) by mouth daily 6/5/2019 at Unknown time Yes Edis Robledo MD   calcium carbonate (OS-CLAIRE 500 MG [place]. CA) 500 MG tablet Take 500 mg by mouth 2 times daily 6/5/2019 at Unknown time Yes Reported, Patient   cephALEXin (KEFLEX) 500 MG capsule Take 1 capsule (500 mg) by mouth 3 times daily for 7 days  Yes Denver Yu MD   cephALEXin (KEFLEX) 500 MG capsule Take 500 mg by mouth 2 times daily 6/5/2019 at Unknown time Yes Unknown, Entered By History   cholecalciferol (VITAMIN D) 400 UNIT TABS tablet Take 400 Units by mouth daily 6/5/2019 at Unknown time Yes Reported, Patient   docusate sodium (COLACE) 100 MG capsule Take by mouth 2 times daily 6/5/2019 at Unknown time Yes Reported, Patient   metoprolol tartrate (LOPRESSOR) 25 MG tablet Take 0.5 tablets (12.5 mg) by mouth 2 times daily 6/5/2019 at Unknown time Yes Marychuy, Lopez Lang MD   traMADol (ULTRAM) 50 MG tablet Take 1 tablet (50 mg) by mouth every 6 hours as needed for severe pain  Yes Denver Yu MD   traMADol (ULTRAM) 50 MG tablet Take 50 mg by mouth every 6 hours as needed for severe pain Past Week at Unknown time Yes Unknown, Entered By History   nitroGLYcerin (NITROSTAT) 0.4 MG sublingual tablet Place 1 tablet (0.4 mg) under the tongue every 5 minutes as needed for chest pain if you are still having symptoms after 3 doses  (15 minutes) call 911. More than a month at Unknown time  Lou Walter APRN CNP

## 2019-06-06 NOTE — PROGRESS NOTES
"   06/06/19 4888   Quick Adds   Type of Visit Initial PT Evaluation   Living Environment   Lives With alone   Living Arrangements apartment   Home Accessibility no concerns  (Elevator access)   Transportation Anticipated family or friend will provide   Living Environment Comment Patient reports she has a PCA but unable to determine how many hours. Reported just when she needs help for meals. \"I can't cook\". She does her own dressing and bathing. Amb without AD. She goes to  3 days a week and bus picks her up.    Self-Care   Usual Activity Tolerance moderate   Current Activity Tolerance poor   Activity/Exercise/Self-Care Comment Reports she doesn't go out of the apartment alone but does go out with family.    Functional Level Prior   Ambulation 0-->independent   Transferring 0-->independent   Toileting 0-->independent   Bathing 0-->independent   Communication 0-->understands/communicates without difficulty   Swallowing 0-->swallows foods/liquids without difficulty   Cognition 0 - no cognition issues reported   Fall history within last six months no   General Information   Onset of Illness/Injury or Date of Surgery - Date 06/05/19   Referring Physician Zelalem Allen   Patient/Family Goals Statement To go home.   Pertinent History of Current Problem (include personal factors and/or comorbidities that impact the POC)  He presented to the [facility] lastnight due to pain in her left foot and progressing erythema. This was sudden onset without any trauma or injury.    Precautions/Limitations fall precautions   Weight-Bearing Status - LUE full weight-bearing   Weight-Bearing Status - RUE full weight-bearing   Weight-Bearing Status - LLE weight-bearing as tolerated   Weight-Bearing Status - RLE full weight-bearing   Cognitive Status Examination   Orientation orientation to person, place and time   Level of Consciousness alert   Follows Commands and Answers Questions 100% of the time   Personal Safety and Judgment " "impaired   Cognitive Comment Needed cues to use the walker correctly and to keep it with her.    Pain Assessment   Patient Currently in Pain Yes, see Vital Sign flowsheet  (7/10)   Integumentary/Edema   Integumentary/Edema Comments Area of redness and swelling marked at base of left great toe.    Posture    Posture Comments Needed cues for posture.    Range of Motion (ROM)   ROM Comment No deficits noted except left great toe limited by pain.    Strength   Strength Comments Strength functional for amb   Bed Mobility   Bed Mobility Comments Sup to sit independently but with a lot of difficulty and needed extra time.    Transfer Skills   Transfer Comments Sit to stand with CGA. Very hesitant to put much weight on left LE.    Gait   Gait Comments Gait with ww 5 feet in room.    Balance   Balance Comments Good sitting balance. Able to maintain standing balance without support of ww but puts most of her weight through right LE. During amb, needs support of ww for balance due to pain in left LE.    General Therapy Interventions   Planned Therapy Interventions gait training;transfer training   Clinical Impression   Criteria for Skilled Therapeutic Intervention yes, treatment indicated   PT Diagnosis Imparied functional independence especially with gait.    Influenced by the following impairments pain   Functional limitations due to impairments Needs asisst of walker for amb. Difficulty with sup to sit.    Clinical Presentation Stable/Uncomplicated   Clinical Decision Making (Complexity) Low complexity   Therapy Frequency` daily   Predicted Duration of Therapy Intervention (days/wks) 3 days   Anticipated Equipment Needs at Discharge front wheeled walker   Anticipated Discharge Disposition Home with Home Therapy   Risk & Benefits of therapy have been explained Yes   Patient, Family & other staff in agreement with plan of care Yes   [facility] AM-PAC TM \"6 Clicks\"   2016, Trustees of [facility], under license to " "[employer].  All rights reserved.   6 Clicks Short Forms Basic Mobility Inpatient Short Form   [facility] AM-PAC  \"6 Clicks\" V.2 Basic Mobility Inpatient Short Form   1. Turning from your back to your side while in a flat bed without using bedrails? 4 - None   2. Moving from lying on your back to sitting on the side of a flat bed without using bedrails? 3 - A Little   3. Moving to and from a bed to a chair (including a wheelchair)? 3 - A Little   4. Standing up from a chair using your arms (e.g., wheelchair, or bedside chair)? 3 - A Little   5. To walk in hospital room? 3 - A Little   6. Climbing 3-5 steps with a railing? 3 - A Little   Basic Mobility Raw Score (Score out of 24.Lower scores equate to lower levels of function) 19   Total Evaluation Time   Total Evaluation Time (Minutes) 12     "

## 2019-06-06 NOTE — PROGRESS NOTES
RECEIVING UNIT ED HANDOFF REVIEW    ED Nurse Handoff Report was reviewed by: Harry Roper on June 6, 2019 at 5:53 AM

## 2019-06-06 NOTE — PLAN OF CARE
Discharge Planner PT   Patient plan for discharge: Home-reports she doesn't need any assist.   Current status: Patient seen for initial eval and treatment provided. Patient lives alone in an apartment. She goes to a day program 3 days a week and has assist about 6 hours a week from a PCA for cooking and cleaning. Family provides transportation but she reports she doesn't go out of the house much. Currently she is able to transfer sup to sit without assist but with some difficulty. Sit to stand with CGA. Needed support of a ww for amb due to pain in left great toe. Tolerated amb 50 feet total and distance limited by pain. Needed cues for posture and walker proximity. Returned to supine with supervision. Patient is independent with dressing and showering at home. Anticipate these, especially showering will be more difficulty now with walker and pain so recommend OT consult. Discussed with nurse.   Barriers to return to prior living situation: Lives alone, now needing assistive device  Recommendations for discharge: Home with HHPT  Rationale for recommendations: Patient currently below baseline with independence. Would benefit from HHPT to progress independence away from AD once pain decreases. Will need AD to leave home.        Entered by: Kristy Wu 06/06/2019 6:25 PM

## 2019-06-06 NOTE — PROGRESS NOTES
[facility]    Hospitalist Progress Note    Assessment & Plan   Mary Polanco is a 89 year old female who was admitted on 6/5/2019 with 1 day of left foot pain over 1st MTP joint, admitted for possible cellulitis:    Impression:   Principal Problem:    Left foot pain -- tender over 1st MTP joint, clinically consistent with gout   -- uric acid 7.7   -- will start Colchicine 0.6 mg bid, and stop Ancef    -- if doing well start Allopurinol in 3-5 days (could have Kevon Sawyer start on follow-up)    Active Problems:    CAD -- S/P Stent 2016      Benign essential HTN   -- on Metoprolol 12.5 mg bid (unfortunately her son also gave her a dose this AM after the nurse gave the morning dose -- BP stable at 135/66 2 hours after the extra dose)      Plan:  Discussed with patient and son, possible discharge tomorrow if better.  Discussed with RYLAND Nam with ortho (no plans to aspirate joint at this time).      DVT Prophylaxis: Pneumatic Compression Devices  Code Status: Full Code    Disposition: Expected discharge in 1 day.     Wes Pacheco MD  Pager [phone]  Cell Phone [phone]  Text Page (7am to 6pm)    Interval History   Sudden onset left foot pain yesterday, no injury, no hx of gout.   Her son has gout.      Physical Exam   Temp: 98.4  F (36.9  C) Temp src: Oral BP: 116/56 Pulse: 61 Heart Rate: 63 Resp: 16 SpO2: 98 % O2 Device: None (Room air)    Vitals:    06/06/19 0618   Weight: 52.9 kg (116 lb 10 oz)     Vital Signs with Ranges  Temp:  [98.2  F (36.8  C)-98.4  F (36.9  C)] 98.4  F (36.9  C)  Pulse:  [61] 61  Heart Rate:  [63-76] 63  Resp:  [16-20] 16  BP: (115-169)/(54-68) 116/56  SpO2:  [95 %-98 %] 98 %  I/O last 3 completed shifts:  In: -   Out: 250 [Urine:250]    # Pain Assessment:  Current Pain Score 6/6/2019   Patient currently in pain? yes   Pain score (0-10) 5   Pain location -   - Mary is experiencing pain due to gout. Pain management was discussed and the plan was  created in a collaborative fashion.  Mary's response to the current recommendations:   - see orders      Constitutional: Awake, alert, cooperative, no apparent distress (seen with )  Respiratory: Clear to auscultation bilaterally, no crackles or wheezing  Cardiovascular: Regular rate and rhythm, normal S1 and S2, and no murmur noted  GI: Normal bowel sounds, soft, non-distended, non-tender  Extrem: No calf tenderness, no ankle edema  Neuro: Ox3, no focal motor or sensory deficits    Medications     sodium chloride 100 mL/hr at 06/06/19 0722       aspirin  81 mg Oral Daily     calcium carbonate  500 mg Oral BID     cholecalciferol  400 Units Oral Daily     colchicine  0.6 mg Oral BID     docusate sodium  100 mg Oral BID     metoprolol tartrate  12.5 mg Oral BID       Data   Recent Labs   Lab 06/06/19  0019   WBC 10.6   HGB 13.5   MCV 92         POTASSIUM 4.4   CHLORIDE 107   CO2 27   BUN 18   CR 0.77   ANIONGAP 4   CLAIRE 10.4*   *   ALBUMIN 4.0   PROTTOTAL 8.9*   BILITOTAL 0.4   ALKPHOS 91   ALT 21   AST 35       Imaging:   Recent Results (from the past 24 hour(s))   Foot XR, G/E 3 views, left    Narrative    XR FOOT LT G/E 3 VW  6/6/2019 1:34 AM     INDICATION: Pain, swelling at first MTP.    COMPARISON: None.      Impression    IMPRESSION: Mild bunion deformity with hallux valgus and slight  degenerative changes at the first MTP joint. No fracture or  destructive bone lesion. Small posterior calcaneal spur.    PATRICK DISLA MD   US Lower Extremity Venous Duplex Left    Narrative    US LOWER EXTREMITY VENOUS DUPLEX LEFT  6/6/2019 1:49 AM     HISTORY: Left calf and foot pain and swelling.    TECHNIQUE: Venous Doppler ultrasound of the lower extremity. Color  flow and spectral Doppler with waveform analysis performed.    COMPARISON: None.    FINDINGS: Ultrasound of the left leg demonstrates no deep vein  thrombus from the common femoral through popliteal veins or in the  visualized  segments of posterior tibial or peroneal veins in the calf.      Impression    IMPRESSION: No DVT identified left leg.    PATRICK DISLA MD

## 2019-06-06 NOTE — CONSULTS
[facility]    Orthopedic Consultation    Mary Polanco MRN# [id number]   Age: 89 year old YOB: 1930     Date of Admission:  6/5/2019    Reason for consult: Possible gout       Requesting physician: Dr. Zelalem Allen       Level of consult: Consult, follow and place orders           Assessment and Plan:   Assessment:   Likely 1st MTP gout flare  Hallux Valgus (Bilateral) with mild DJD      Plan:   Will treat for gout with colchicine  Consider discontinuing the Ancef  Ortho will continue to monitor and observe  WBAT           Chief Complaint:   Left 1st MTP gout flare         History of Present Illness:   This patient is a 89 year old female who presents with the following condition requiring a hospital admission:    Mrs. He presented to the [facility] ED lastnight due to pain in her left foot and progressing erythema. This was sudden onset without any trauma or injury. She denies any cuts, scrapes, or insect bites around the area. She has never had any cellulitis or gout in the past. She denies pain elsewhere. No recent procedures/dental work. She denies any fevers, chills, malaise, night sweats, nausea, or vomiting. She is not immunocompromised. She lives independently at home with her son.          Past Medical History:     Past Medical History:   Diagnosis Date     Benign essential HTN      Coronary artery disease involving native coronary artery of native heart with unstable angina pectoris (H) 4/2016 4/2016 Cath - 80% hazy stenosis at site of ruptured plaque - HARLEEN placed     Cough      Eczema      Lung nodule      Malignant neoplasm of bladder, part unspecified 1999 Dr Everett    Surgery done in [place]     Mitral regurgitation     4/2016 mod-mod severe MR with multiple jets per Echo     Mixed hyperlipidemia      NSTEMI (non-ST elevated myocardial infarction) (H)      Osteoporosis      Polyarticular arthritis      Rash      Sciatica              Past Surgical History:     Past Surgical  History:   Procedure Laterality Date     BLADDER TUMOR-ASSOCIATED      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     C APPENDECTOMY      1950     CATARACT IOL, RT/LT  5-08     HC REMOVAL GALLBLADDER      due to stone 1990     HEART CATH STENT COR W/WO PTCA  4/2016 4/2016 Cath - 80% hazy stenosis at site of ruptured plaque - HARLEEN placed             Social History:     Social History     Tobacco Use     Smoking status: Never Smoker     Smokeless tobacco: Never Used   Substance Use Topics     Alcohol use: No             Family History:     Family History   Problem Relation Age of Onset     Diabetes Brother      Hypertension Son      Cerebrovascular Disease Mother         76     Family History Negative Daughter      Family History Negative Brother      Family History Negative Brother              Immunizations:     VACCINE/DOSE   Diptheria   DPT   DTAP   HBIG   Hepatitis A   Hepatitis B   HIB   Influenza   Measles   Meningococcal   MMR   Mumps   Pneumococcal   Polio   Rubella   Small Pox   TDAP   Varicella   Zoster             Allergies:     Allergies   Allergen Reactions     Azithromycin      Gabapentin Itching     Kanamycin      Metronidazole Rash     Penicillins Rash     Streptomycin      rash     Atorvastatin Rash     Lisinopril Rash             Medications:     Current Facility-Administered Medications   Medication     acetaminophen (TYLENOL) tablet 650 mg     aspirin EC tablet 81 mg     calcium carbonate 500 mg (elemental) (OSCAL;OYSTER SHELL CALCIUM) tablet 500 mg     cholecalciferol (VITAMIN D3) 400 unit (10 mcg) tablet 400 Units     colchicine (COLCYRS) tablet 0.6 mg     docusate sodium (COLACE) capsule 100 mg     melatonin tablet 1 mg     metoprolol tartrate (LOPRESSOR) half-tab 12.5 mg     naloxone (NARCAN) injection 0.1-0.4 mg     nitroGLYcerin (NITROSTAT) sublingual tablet 0.4 mg     ondansetron (ZOFRAN-ODT) ODT tab 4 mg    Or     ondansetron (ZOFRAN) injection 4 mg             Review of Systems:   CV:  NEGATIVE for chest pain, palpitations or peripheral edema  C: NEGATIVE for fever, chills, change in weight  E/M: NEGATIVE for ear, mouth and throat problems  R: NEGATIVE for significant cough or SOB          Physical Exam:   All vitals have been reviewed  Patient Vitals for the past 24 hrs:   BP Temp Temp src Pulse Heart Rate Resp SpO2 Weight   06/06/19 1153 135/66 98.5  F (36.9  C) Oral -- 65 16 96 % --   06/06/19 0742 116/56 98.4  F (36.9  C) -- -- 63 16 98 % --   06/06/19 0618 123/68 98.2  F (36.8  C) Oral -- 76 16 95 % 52.9 kg (116 lb 10 oz)   06/06/19 0601 115/54 -- -- 61 -- 16 98 % --   06/05/19 2248 169/64 98.4  F (36.9  C) Oral -- 65 20 97 % --       Intake/Output Summary (Last 24 hours) at 6/6/2019 1350  Last data filed at 6/6/2019 0640  Gross per 24 hour   Intake --   Output 250 ml   Net -250 ml     Patient laying comfortably in bed  Erythema over 1st MTP extending distally and to DIP, within marker boarders  Active ROM intact but painful  Brisk cap refill  +DP pulse  No wounds or abrasions noted bilateral feet  Right 1st MTP with small callus          Data:   All laboratory data reviewed  Results for orders placed or performed during the hospital encounter of 06/05/19   Foot XR, G/E 3 views, left    Narrative    XR FOOT LT G/E 3 VW  6/6/2019 1:34 AM     INDICATION: Pain, swelling at first MTP.    COMPARISON: None.      Impression    IMPRESSION: Mild bunion deformity with hallux valgus and slight  degenerative changes at the first MTP joint. No fracture or  destructive bone lesion. Small posterior calcaneal spur.    PATRICK DISLA MD   US Lower Extremity Venous Duplex Left    Narrative    US LOWER EXTREMITY VENOUS DUPLEX LEFT  6/6/2019 1:49 AM     HISTORY: Left calf and foot pain and swelling.    TECHNIQUE: Venous Doppler ultrasound of the lower extremity. Color  flow and spectral Doppler with waveform analysis performed.    COMPARISON: None.    FINDINGS: Ultrasound of the left leg demonstrates no deep  vein  thrombus from the common femoral through popliteal veins or in the  visualized segments of posterior tibial or peroneal veins in the calf.      Impression    IMPRESSION: No DVT identified left leg.    PATRICK DISLA MD   CBC with platelets + differential   Result Value Ref Range    WBC 10.6 4.0 - 11.0 10e9/L    RBC Count 4.58 3.8 - 5.2 10e12/L    Hemoglobin 13.5 11.7 - 15.7 g/dL    Hematocrit 41.9 35.0 - 47.0 %    MCV 92 78 - 100 fl    MCH 29.5 26.5 - 33.0 pg    MCHC 32.2 31.5 - 36.5 g/dL    RDW 12.2 10.0 - 15.0 %    Platelet Count 259 150 - 450 10e9/L    Diff Method Automated Method     % Neutrophils 56.1 %    % Lymphocytes 30.3 %    % Monocytes 9.5 %    % Eosinophils 3.1 %    % Basophils 0.8 %    % Immature Granulocytes 0.2 %    Absolute Neutrophil 6.0 1.6 - 8.3 10e9/L    Absolute Lymphocytes 3.2 0.8 - 5.3 10e9/L    Absolute Monocytes 1.0 0.0 - 1.3 10e9/L    Absolute Eosinophils 0.3 0.0 - 0.7 10e9/L    Absolute Basophils 0.1 0.0 - 0.2 10e9/L    Abs Immature Granulocytes 0.0 0 - 0.4 10e9/L   Comprehensive metabolic panel   Result Value Ref Range    Sodium 138 133 - 144 mmol/L    Potassium 4.4 3.4 - 5.3 mmol/L    Chloride 107 94 - 109 mmol/L    Carbon Dioxide 27 20 - 32 mmol/L    Anion Gap 4 3 - 14 mmol/L    Glucose 117 (H) 70 - 99 mg/dL    Urea Nitrogen 18 7 - 30 mg/dL    Creatinine 0.77 0.52 - 1.04 mg/dL    GFR Estimate 69 >60 mL/min/[1.73_m2]    GFR Estimate If Black 80 >60 mL/min/[1.73_m2]    Calcium 10.4 (H) 8.5 - 10.1 mg/dL    Bilirubin Total 0.4 0.2 - 1.3 mg/dL    Albumin 4.0 3.4 - 5.0 g/dL    Protein Total 8.9 (H) 6.8 - 8.8 g/dL    Alkaline Phosphatase 91 40 - 150 U/L    ALT 21 0 - 50 U/L    AST 35 0 - 45 U/L   Uric acid   Result Value Ref Range    Uric Acid 7.7 (H) 2.6 - 6.0 mg/dL   Parathyroid Hormone Intact   Result Value Ref Range    Parathyroid Hormone Intact 52 12 - 64 pg/mL   Calcium ionized   Result Value Ref Range    Calcium Ionized 4.8 4.4 - 5.2 mg/dL          Attestation:  I have reviewed  today's vital signs, notes, medications, labs and imaging with Dr. Oneil.  Amount of time performed on this consult: 20 minutes.    Stacie Streeter PA-C

## 2019-06-06 NOTE — H&P
Admitted:     06/05/2019      PRIMARY CARE PHYSICIAN:  Kevon Sawyer MD      CHIEF COMPLAINT:  Left foot redness, swelling, pain.      HISTORY OF PRESENT ILLNESS:  Mary Polanco is an 89-year-old Mandarin speaking female, who was brought in by her son and daughter to the Emergency Department due to the above complaints.  The patient woke up in the morning and noticed an area of redness on the medial side of her left foot proximal to the great toe.  The area became progressively swollen and red and more painful as the day wore on.  Started spreading up into the left calf and so the patient was brought in by her son to [facility] for further assessment.      In the emergency room, the patient was seen by Dr. Denver Yu.  The patient was afebrile, not tachycardic, normal oxygen saturations, stable blood pressure.  Blood work revealed normal electrolytes, normal kidney function.  Calcium was elevated at 10.4.  LFTs were normal, but her total protein is elevated at 8.9 and calcium is elevated at 10.4.  Glucose 117.  CBC is normal with white count 10.6, hemoglobin 13.5, platelets 259.  X-ray of the foot, 2 views, showed mild bunion deformity with hallux valgus and slight degenerative changes at the first MTP joint.  No fracture or destructive lesion noted.  The patient had ultrasound of her left lower extremity which showed no evidence of DVT.  The patient was given Ancef and is being admitted for further treatment.      PAST MEDICAL HISTORY:   1.  Sciatica.   2.  Polyarticular arthritis.   3.  NSTEMI.   4.  Osteoporosis.   5.  Hyperlipidemia.   6.  Mitral regurgitation.   7.  Neoplasm of the bladder.    8.  Eczema.   9.  Hypertension.      PAST SURGICAL HISTORY:     1.  Left heart catheterization with stent.   2.  Cholecystectomy.   3.  Appendectomy,.   4.  Bladder tumor resection.      FAMILY HISTORY:  Positive for diabetes, hypertension and stroke.      SOCIAL HISTORY:  No tobacco or alcohol.  Lives with  her son.  She is FULL CODE.      ALLERGIES:  NUMERSOUS INCLUDING LISINOPRIL, LIPITOR, STREPTOMYCIN, PENICILLIN, METRONIDAZOLE, KANAMYCIN, GABAPENTIN AND AZITHROMYCIN.      CURRENT MEDICATION LIST:   1.  Aspirin 81 mg once a day.   2.  Calcium 500 mg b.i.d.   3.  Vitamin D 400 units once a day.   4.  Colace 100 mg b.i.d.   5.  Metoprolol 12.5 mg b.i.d.    6.  Sublingual nitroglycerin as needed.      REVIEW OF SYSTEMS:  A 10-point review of systems reviewed.  Denies any fevers, chills or sweats.  Positive for left foot pain, redness.  Denies any other symptoms.      PHYSICAL EXAMINATION:   VITAL SIGNS:  Temperature 98.2, heart rate 61, respirations 16, blood pressure 123/68, sats 95% room air.   GENERAL:  The patient is a very pleasant 89-year-old Chinese female who is nonseptic-appearing, oriented x 3, in no distress.   HEENT:  Pupils equal.  Sclerae are anicteric.  Oropharynx without lesions.  Mucous membranes are moist.   NECK:  No cervical lymphadenopathy.   PULMONARY:  Lungs are clear to auscultation.   CARDIOVASCULAR:  S1, S2, regular rate and rhythm.   ABDOMEN:  Soft, nontender.  Normoactive bowel sounds.   MUSCULOSKELETAL:  The patient has deformities of both large toes.  She appears to have a possible tophi on the right MTP joint.  The left foot along the first MTP joint is swollen and erythematous.  There is an area of redness that spreads to the medial part of the foot.  It is exquisitely tender and painful.  The area around the MTP joint is swollen.  She has trouble bearing weight on her foot.      LABORATORY AND IMAGING STUDIES:  As dictated in history of present illness.      ASSESSMENT:  Mary Polanco is 89 with a 24-hour episode of left foot pain and redness around the first metatarsophalangeal joint with a normal white count, elevated calcium and total protein levels, being admitted for cellulitis, possible gout involvement as inpatient.      PLAN:   1.  Right foot cellulitis, rule out gout.  The patient  will be treated with IV Ancef.  We will obtain Orthopedic consultation for possible gout involvement.  We will check a uric acid level.   2.  Hypocalcemia.  We will give the patient IV fluids.  We will check ionized calcium level.  We will check PTH levels.  I note the patient is on calcium and vitamin D.   3.  Atherosclerotic cardiovascular disease.  The patient currently denies any chest pain.  We will continue the patient on metoprolol and nitroglycerin as needed.   4.  Deep venous thrombosis prophylaxis.  The patient will have compression boots.      CODE STATUS:  FULL.         JOEY HALEY MD             D: 2019   T: 2019   MT: GUSTAVO      Name:     NERISSA SCHAFER   MRN:      [id number]        Account:      [id number]   :      1930        Admitted:     2019                   Document: [id number]       cc: Kevon Sawyer MD

## 2019-06-06 NOTE — PLAN OF CARE
Alert and oriented x4. VSS. Pain with ambulation but ambulated to bathroom with one. CMS intact. Redness has not expanded beyond boarders. See previous note on home medications. IVF running. Plan to continue to monitor.

## 2019-06-06 NOTE — ED PROVIDER NOTES
History     Chief Complaint:  Leg swelling    HPI   Below history is obtained with assistance of patient's son who acts as a .     Mary Polanco is a 89 year old female who presents with her son and daughter to the emergency department with leg swelling. The patient's son reports that earlier this morning when the patient woke up she noticed an area of redness to the medial side of her left foot just proximal to the great toe. As the morning progressed, an area of swelling of the foot appeared around this painful red area. As the evening progressed, her swelling spread up the lower section of her left calf. With persistence of this symptom she presents here for evaluation. She states nothing like this has happened before. She has not attempted to manage her pains with any analgesics as of yet. They deny any known history of gout.       Allergies:  Lisinopril  Lipitor  Streptomycin  Penicillins  Metronidazole  Kanamycin  Gabapentin  Azithromycin     Medications:    Aspirin  Nitroglycerine  Metoprolol  Colace  Vitamin      Past Medical History:    Sciatica  Polyarticular arthritis  NSTEMI  Osteoporosis  HLD  Mitral regurgitatoin  Malignant neoplasm of bladder  Eczema  Cough  HTN  CAD    Past Surgical History:   Heart cath stent  Cholecystectomy  Appendectomy  Bladder tumor     Family History:    DM  HTN  CVD    Social History:  The patient denies tobacco or alcohol use.    Review of Systems   Cardiovascular: Positive for leg swelling.   Skin: Positive for color change.   All other systems reviewed and are negative.      Physical Exam     Patient Vitals for the past 24 hrs:   BP Temp Temp src Heart Rate Resp SpO2   06/05/19 2248 169/64 98.4  F (36.9  C) Oral 65 20 97 %         Physical Exam    Constitutional:  Appears well-developed and well-nourished. Cooperative. Looks mildly uncomfortable  HENT:   Head:    Atraumatic.   Mouth/Throat:   Oropharynx is without erythema or exudate and mucous     membranes are  moist.   Eyes:    Conjunctivae normal and EOM are normal.      Pupils are equal, round, and reactive to light.   Neck:    Normal range of motion. Neck supple.   Cardiovascular:  Normal rate, regular rhythm, normal heart sounds and radial and    dorsalis pedis pulses are 2+ and symmetric.  Cap refill is brisk. DP and posterior tibial pulses are 2+ and symmetric.   Pulmonary/Chest:  Effort normal and breath sounds normal.   Abdominal:   Soft. Bowel sounds are normal.      No splenomegaly or hepatomegaly. No tenderness. No rebound.   Musculoskeletal:  Normal range of motion. No edema and no tenderness. here is pain with active and passive ROM of the midfoot.  Neurological:  Alert. Normal strength. No cranial nerve deficit. GCS 15.  Sensation to light touch is intact.  Skin:    Skin is warm and dry. She has erythema extending from her mid shin down to toes on her left foot. There is mildly raised blister over her MTP joint, the foot is diffusely tender but most significantly over the first MTP.  Psychiatric:   Normal mood and affect.     Emergency Department Course     Imaging:  Radiographic findings were communicated with the patient and family who voiced understanding of the findings.    US Lower Extremity Venous Duplex, left:  No DVT identified left leg. as per radiology.     XR Foot 3 views, left:   Mild bunion deformity with hallux valgus and slight  degenerative changes at the first MTP joint. No fracture or  destructive bone lesion. Small posterior calcaneal spur. as per radiology.       Laboratory:  CBC: WBC: 10.6, HGB: 13.5, PLT: 259  CMP: Glucose 117, Calcium: 10.4, Protein total: 8.9, o/w WNL (Creatinine: 0.77)    Interventions:    0052 Ancef 1 g IV  0351 Tylenol 650 mg Oral      Emergency Department Course:  Nursing notes and vitals reviewed. (0031) I performed an exam of the patient as documented above.     IV inserted. Medicine administered as documented above. Blood drawn. This was sent to the lab for  further testing, results above.    The patient was sent for a Foot XR, LE US,  while in the emergency department, findings above.     (0310) I rechecked the patient and discussed the results of her workup thus far.     (4112)  I consulted with Dr. monge of the hospitalist services. They are in agreement to accept the patient for admission.    Findings and plan explained to the Patient and son who consents to admission. Discussed the patient with Dr. Monge, who will admit the patient to a medical bed for further monitoring, evaluation, and treatment.      Impression & Plan      Medical Decision Making:  Mary Polanco is a 89 year old female who presents for evaluation of skin redness.  The history, physical exam and supporting data are consistent with cellulitis.  There do not appear at this time to be any complication of cellulitis including necrotizing fascitis, osteomyelitis, sepsis, or shock.  The patient is not immunosuppressed.  There is no left leg DVT, and no bony abnormality seen on foot x-ray.  I also considered gout in the differential, but the erythema and edema spreading proximally up the leg argue against this.  I do not believe there is an abscess amenable to incision and drainage.  Based on patient's inability to walk secondary to pain in her foot, would admit to medicine for further cares and IV antibiotics.  Initially she declined pain meds, but later agreed to Tylenol.  Stable for admission.       Diagnosis:    ICD-10-CM    1. Cellulitis of left foot L03.116        Disposition:  Admitted to medical bed under care of Dr. Monge.     Scribe Disclosure:  Ambrosio BUSCH, am serving as a scribe on 6/6/2019 at 12:30 AM to personally document services performed by Denver Yu MD based on my observations and the provider's statements to me.     Ambrosio Hameed  6/5/2019    EMERGENCY DEPARTMENT       Denver Yu MD  06/06/19 8882

## 2019-06-06 NOTE — PLAN OF CARE
Writer was walking into room, pt son in room and was giving pt medication, (writer had already given morning medication). Pt had taken from son aspirin 81 mg and metoprolol 12.5 mg. MD notified, stated to watch blood pressure. Will recheck VS at 1100. Son informed to bring home medications home, and pt was informed to only take medication from nursing staff.

## 2019-06-06 NOTE — ED NOTES
"[facility]  ED Nurse Handoff Report    ED Chief complaint: Leg Swelling      ED Diagnosis:   Final diagnoses:   Cellulitis of left foot       Code Status: Comfort Care    Allergies:   Allergies   Allergen Reactions     Azithromycin      Gabapentin Itching     Kanamycin      Metronidazole Rash     Penicillins Rash     Streptomycin      rash     Atorvastatin Rash     Lisinopril Rash       Activity level - Baseline/Home:  Independent  Activity Level - Current:   Independent    Patient's Preferred language: Mandarin   Needed?: Yes    Isolation: No  Infection: Not Applicable  Bariatric?: No    Vital Signs:   Vitals:    06/05/19 2248   BP: 169/64   Resp: 20   Temp: 98.4  F (36.9  C)   TempSrc: Oral   SpO2: 97%       Cardiac Rhythm: ,        Pain level: 0-10 Pain Scale: 3    Is this patient confused?: No   Does this patient have a guardian?  No         If yes, is there guardianship documents in the Epic \"Code/ACP\" activity?  N/A         Guardian Notified?  N/A  [place] - Suicide Severity Rating Scale Completed?  Yes  If yes, what color did the patient score?  White    Patient Report: Initial Complaint: 89 year old female who presents with her son and daughter to the emergency department with leg swelling. The patient's son reports that earlier this morning when the patient woke up she noticed an area of redness to the medial side of her left foot just proximal to the great toe. As the morning progressed, an area of swelling of the foot appeared around this painful red area. As the evening progressed, her swelling spread up the lower section of her left calf. With persistence of this symptom she presents here for evaluation. She states nothing like this has happened before.       Focused Assessment: see above  Tests Performed: foot xray, US lower extremity, labs  Abnormal Results:   Abnormal Labs Reviewed   COMPREHENSIVE METABOLIC PANEL - Abnormal; Notable for the following components:       " Result Value    Glucose 117 (*)     Calcium 10.4 (*)     Protein Total 8.9 (*)     All other components within normal limits     Treatments provided: Tylenol 650 mg, cefazolin 1 g    Family Comments: son and daughter at bedside    OBS brochure/video discussed/provided to patient/family: No              Name of person given brochure if not patient: na              Relationship to patient: na    ED Medications:   Medications   ceFAZolin (ANCEF) 1 g vial to attach to  ml bag for ADULT or 50 ml bag for PEDS (0 g Intravenous Stopped 6/6/19 0122)   acetaminophen (TYLENOL) tablet 650 mg (650 mg Oral Given 6/6/19 0351)       Drips infusing?:  No    For the majority of the shift this patient was Green.   Interventions performed were none.    Severe Sepsis OR Septic Shock Diagnosis Present: No    To be done/followed up on inpatient unit:  nothing noted at this time    ED NURSE PHONE NUMBER: [phone]

## 2019-06-07 ENCOUNTER — APPOINTMENT (OUTPATIENT)
Dept: PHYSICAL THERAPY | Facility: CLINIC | Age: 84
End: 2019-06-07
Payer: COMMERCIAL

## 2019-06-07 VITALS
BODY MASS INDEX: 22.82 KG/M2 | WEIGHT: 116.84 LBS | OXYGEN SATURATION: 95 % | TEMPERATURE: 98.9 F | DIASTOLIC BLOOD PRESSURE: 65 MMHG | HEART RATE: 68 BPM | RESPIRATION RATE: 16 BRPM | SYSTOLIC BLOOD PRESSURE: 124 MMHG

## 2019-06-07 PROBLEM — M10.9 ACUTE GOUTY ARTHRITIS: Status: ACTIVE | Noted: 2019-06-07

## 2019-06-07 LAB
ANION GAP SERPL CALCULATED.3IONS-SCNC: 6 MMOL/L (ref 3–14)
BUN SERPL-MCNC: 12 MG/DL (ref 7–30)
CALCIUM SERPL-MCNC: 9.1 MG/DL (ref 8.5–10.1)
CHLORIDE SERPL-SCNC: 109 MMOL/L (ref 94–109)
CO2 SERPL-SCNC: 27 MMOL/L (ref 20–32)
CREAT SERPL-MCNC: 0.72 MG/DL (ref 0.52–1.04)
ERYTHROCYTE [DISTWIDTH] IN BLOOD BY AUTOMATED COUNT: 12.1 % (ref 10–15)
GFR SERPL CREATININE-BSD FRML MDRD: 74 ML/MIN/{1.73_M2}
GLUCOSE SERPL-MCNC: 109 MG/DL (ref 70–99)
HCT VFR BLD AUTO: 34.2 % (ref 35–47)
HGB BLD-MCNC: 11.5 G/DL (ref 11.7–15.7)
MCH RBC QN AUTO: 30.3 PG (ref 26.5–33)
MCHC RBC AUTO-ENTMCNC: 33.6 G/DL (ref 31.5–36.5)
MCV RBC AUTO: 90 FL (ref 78–100)
PLATELET # BLD AUTO: 215 10E9/L (ref 150–450)
POTASSIUM SERPL-SCNC: 3.9 MMOL/L (ref 3.4–5.3)
RBC # BLD AUTO: 3.8 10E12/L (ref 3.8–5.2)
SODIUM SERPL-SCNC: 142 MMOL/L (ref 133–144)
WBC # BLD AUTO: 11 10E9/L (ref 4–11)

## 2019-06-07 PROCEDURE — 25000132 ZZH RX MED GY IP 250 OP 250 PS 637: Performed by: INTERNAL MEDICINE

## 2019-06-07 PROCEDURE — 97116 GAIT TRAINING THERAPY: CPT | Mod: GP | Performed by: PHYSICAL THERAPIST

## 2019-06-07 PROCEDURE — 25000128 H RX IP 250 OP 636: Performed by: INTERNAL MEDICINE

## 2019-06-07 PROCEDURE — G0378 HOSPITAL OBSERVATION PER HR: HCPCS

## 2019-06-07 PROCEDURE — 96375 TX/PRO/DX INJ NEW DRUG ADDON: CPT

## 2019-06-07 PROCEDURE — 97530 THERAPEUTIC ACTIVITIES: CPT | Mod: GP | Performed by: PHYSICAL THERAPIST

## 2019-06-07 PROCEDURE — 97110 THERAPEUTIC EXERCISES: CPT | Mod: GP | Performed by: PHYSICAL THERAPIST

## 2019-06-07 PROCEDURE — 99217 ZZC OBSERVATION CARE DISCHARGE: CPT | Performed by: INTERNAL MEDICINE

## 2019-06-07 PROCEDURE — 85027 COMPLETE CBC AUTOMATED: CPT | Performed by: INTERNAL MEDICINE

## 2019-06-07 PROCEDURE — 36415 COLL VENOUS BLD VENIPUNCTURE: CPT | Performed by: INTERNAL MEDICINE

## 2019-06-07 PROCEDURE — 80048 BASIC METABOLIC PNL TOTAL CA: CPT | Performed by: INTERNAL MEDICINE

## 2019-06-07 RX ORDER — PREDNISONE 10 MG/1
TABLET ORAL
Qty: 34 TABLET | Refills: 0 | Status: SHIPPED | OUTPATIENT
Start: 2019-06-07 | End: 2019-06-18

## 2019-06-07 RX ORDER — ACETAMINOPHEN 325 MG/1
650 TABLET ORAL EVERY 6 HOURS PRN
Qty: 50 TABLET | Refills: 0 | Status: SHIPPED | OUTPATIENT
Start: 2019-06-07

## 2019-06-07 RX ORDER — METHYLPREDNISOLONE SODIUM SUCCINATE 40 MG/ML
40 INJECTION, POWDER, LYOPHILIZED, FOR SOLUTION INTRAMUSCULAR; INTRAVENOUS ONCE
Status: COMPLETED | OUTPATIENT
Start: 2019-06-07 | End: 2019-06-07

## 2019-06-07 RX ADMIN — CHOLECALCIFEROL TAB 10 MCG (400 UNIT) 400 UNITS: 10 TAB at 08:57

## 2019-06-07 RX ADMIN — CALCIUM 500 MG: 500 TABLET ORAL at 08:56

## 2019-06-07 RX ADMIN — ASPIRIN 81 MG: 81 TABLET, COATED ORAL at 08:57

## 2019-06-07 RX ADMIN — COLCHICINE 0.6 MG: 0.6 TABLET, FILM COATED ORAL at 08:57

## 2019-06-07 RX ADMIN — METOPROLOL TARTRATE 12.5 MG: 25 TABLET, FILM COATED ORAL at 08:56

## 2019-06-07 RX ADMIN — DOCUSATE SODIUM 100 MG: 100 CAPSULE, LIQUID FILLED ORAL at 08:57

## 2019-06-07 RX ADMIN — METHYLPREDNISOLONE SODIUM SUCCINATE 40 MG: 40 INJECTION, POWDER, FOR SOLUTION INTRAMUSCULAR; INTRAVENOUS at 09:47

## 2019-06-07 NOTE — PROGRESS NOTES
Patient examined - daughter present for interpretor need    Left MTP is swollen and erythematous but improved from yesterday  ROM is improved   Pain is about 50% improved per patient with weightbearing    Continue treatment for gout  Ok to discharge once medically cleared and has bed placement    Stacie MELENDEZ

## 2019-06-07 NOTE — PLAN OF CARE
Pt has met her goals, up with minimum assistance of 1.  Pt had  here in the am.  Pt's daughter helpful with cares and interpreting.  Pt will be going to [place] this afternoon with family.

## 2019-06-07 NOTE — PROGRESS NOTES
D: SW following for discharge planning.   I: Pt has been accepted at [facility] and will discharge today via transport aide at 1500. It is a private room at $36 per day. Family can move pt to a semi-private tomorrow if they do not want to pay the private room fee. SW explained this to daughter, Rivka. Family in agreement. Orders faxed and facility updated. Nursing aware.   P: Discharge today.     Verona Izaguirre, MSW, LGSW  [id number]    PAS-RR    D: Per DHS regulation, SW completed and submitted PAS-RR to [place] [employer] on [employer]ging Direct Connect via the Senior LinkAge Line.  PAS-RR confirmation # is : [id number]    I: SW spoke with dtr and they are aware a PAS-RR has been submitted.  SW reviewed with dtr that they may be contacted for a follow up appointment within 10 days of hospital discharge if their SNF stay is < 30 days.  Contact information for Senior LinkAge Line was also provided.    A: Dtr verbalized understanding.    P: Further questions may be directed to [facility] LinkAge Line at #[phone], option #4 for PAS-RR staff.

## 2019-06-07 NOTE — PROGRESS NOTES
Care Transition Initial Assessment -      Met with: Patient, son and dtr    Principal Problem:    Left foot pain  Active Problems:    CAD -- S/P Stent 2016    Benign essential HTN    Foot pain, left       DATA  Lives With: alone   Living Arrangements: apartment  Quality of Family Relationships: involved, supportive  Description of Support System: Involved, Supportive  Who is your support system?: Children  Support Assessment: Adequate family and caregiver support.   Identified issues/concerns regarding health management: PT worked with pt again today and changed recommendation to TCU. Pt and family in agreement. Pt reports she lives alone and does not feel strong enough to return home. Pt reports she is forgetful and sometimes forgets if she has taken her medications or what she has done around the house. Discussed TCU to help pt get strong and determine what services she may need at home. Pt[facility]family [facility]. Sent via [facility].      Quality of Family Relationships: involved, supportive  Transportation Anticipated: family or friend will provide    ASSESSMENT  Cognitive Status: Appears alert and oriented. Answered questions appropriately via the interpretor. Family appears involved and supportive.  Concerns to be addressed: On-going discharge planning.     PLAN  Financial costs for the patient includes: None.  Patient given options and choices for discharge: Yes.  Patient/family is agreeable to the plan? YES  Patient Goals and Preferences: TCU.  Patient anticipates discharging to: TCU.    DENIZ Mccormick, LGSW  [id number]

## 2019-06-07 NOTE — PROGRESS NOTES
[facility]  PRIMARY CARE PROVIDER AND CLINIC:  Kevon Sawyer MD, [facility] [address] / [place]*  Chief Complaint   Patient presents with     Hospital F/U     [place] Medical Record Number:  [id number]  Place of Service where encounter took place:  [facility] [[id number]]    Mary Polanco  is a 89 year old  (4/24/1930), admitted to the above facility from  [facility]. Hospital stay 6/5/19 through 6/7/19..  Admitted to this facility for  rehab, medical management and nursing care.    HPI:    HPI information obtained from: facility chart records, facility staff, patient report, [facility] chart review and family/first contact daughter report.   Brief Summary of Hospital Course: recent hospital stay due to acute pain in left foot at 1st MTP with surrounding swelling and redness. PMH includes CAD and s/p MI in 2016 and stent, arthritis, osteoporosis, hypertension. Bladder cancer. She was brought to ED by son. She was initially started on IV ancef for presumed cellulitis. Ortho was consulted. Uric acid level came back at 7.7. She was treated with colchicine then changed to prednisone. Antibiotics were stopped. She was unable to walk on left foot and since she lives alone in apartment decision made to transfer to TCU for ongoing care.   Updates on Status Since Skilled nursing Admission: today daughter is here and interprets. Patient speaks mandarin. She is hard of hearing.   She continues to have some left foot pain but it is less. She can take a few steps with walker. She does not want tramadol, only acetaminophen for pain.   She denies shortness of breath, chest pain. No abdominal pain. She lives alone in apartment and has one hour of PCA daily. She does go out to day care three days per week.     CODE STATUS/ADVANCE DIRECTIVES DISCUSSION:   CPR/Full code - refused to discuss POLST.   Patient's living condition: lives  alone  ALLERGIES: Azithromycin; Gabapentin; Kanamycin; Metronidazole; Penicillins; Streptomycin; Atorvastatin; and Lisinopril  PAST MEDICAL HISTORY:  has a past medical history of Benign essential HTN, Coronary artery disease involving native coronary artery of native heart with unstable angina pectoris (H) (4/2016), Cough, Eczema, Lung nodule, Malignant neoplasm of bladder, part unspecified (1999 Dr Everett), Mitral regurgitation, Mixed hyperlipidemia, NSTEMI (non-ST elevated myocardial infarction) (H), Osteoporosis, Polyarticular arthritis, Rash, and Sciatica.  PAST SURGICAL HISTORY:   has a past surgical history that includes bladder tumor-associated; APPENDECTOMY; REMOVAL GALLBLADDER; cataract iol, rt/lt (5-08); and Heart Cath Stent cor w/wo ptca (4/2016).  FAMILY HISTORY: family history includes Cerebrovascular Disease in her mother; Diabetes in her brother; Family History Negative in her brother, brother, and daughter; Hypertension in her son.  SOCIAL HISTORY:   reports that she has never smoked. She has never used smokeless tobacco. She reports that she does not drink alcohol or use drugs.    Post Discharge Medication Reconciliation Status: discharge medications reconciled, continue medications without change    Current Outpatient Medications   Medication Sig Dispense Refill     acetaminophen (TYLENOL) 325 MG tablet Take 2 tablets (650 mg) by mouth every 6 hours as needed for mild pain 50 tablet 0     aspirin EC 81 MG EC tablet Take 1 tablet (81 mg) by mouth daily 30 tablet 1     calcium carbonate (OS-CLAIRE 500 MG [place]. CA) 500 MG tablet Take 500 mg by mouth 2 times daily       cholecalciferol (VITAMIN D) 400 UNIT TABS tablet Take 400 Units by mouth daily       docusate sodium (COLACE) 100 MG capsule Take by mouth 2 times daily       metoprolol tartrate (LOPRESSOR) 25 MG tablet Take 0.5 tablets (12.5 mg) by mouth 2 times daily 90 tablet 3     nitroGLYcerin (NITROSTAT) 0.4 MG sublingual tablet Place 1 tablet  (0.4 mg) under the tongue every 5 minutes as needed for chest pain if you are still having symptoms after 3 doses (15 minutes) call 911. 25 tablet 3     predniSONE (DELTASONE) 10 MG tablet Take 4 tablets (40mg) for 4 days then 3 tablets (30mg) for 3 days, then 2 tablets (20mg) for 3 days then one tablet (10mg) for 3 days 34 tablet 0       ROS:  10 point ROS of systems including Constitutional, Eyes, Respiratory, Cardiovascular, Gastroenterology, Genitourinary, Integumentary, Musculoskeletal, Psychiatric were all negative except for pertinent positives noted in my HPI.    Vitals:  /74   Pulse 69   Temp 97.8  F (36.6  C)   Resp 16   Wt 54.7 kg (120 lb 9.6 oz)   SpO2 96%   BMI 23.55 kg/m    Exam:  GENERAL APPEARANCE:  Alert, in no distress  ENT:  Mouth and posterior oropharynx normal, moist mucous membranes, hearing acuity [place]  EYES:  EOM, conjunctivae, lids, pupils and irises normal  NECK:  No adenopathy,masses or thyromegaly  RESP:  respiratory effort and palpation of chest normal, no respiratory distress, Lung sounds clear  CV:  Palpation and auscultation of heart done , rate and rhythm reg, no murmur, no rub or gallop, Edema left leg slightly swollen  ABDOMEN:  normal bowel sounds, soft, nontender, no hepatosplenomegaly or other masses  M/S:   Gait and station can take a few steps left foot is quite tender, Digits and nails normal   SKIN:  Inspection/Palpation of skin and subcutaneous tissue fading erythema on top of left foot.   NEURO: 2-12 in normal limits and at patient's baseline  PSYCH:  insight and judgement, memory intact , affect and mood normal    Lab/Diagnostic data:  [facility]. Please refer to them using Memorandom/Care Everywhere. and Recent labs in EPIC reviewed by me today.     ASSESSMENT/PLAN:  (M10.9) Acute gouty arthritis  (primary encounter diagnosis)  (M79.672) Foot pain, left  Comment: recent hospitalization due to acute pain and redness with swelling of left  foot. She was treated for acute gouty arthritis, first with colchicine then with prednisone taper. Pain is less but not gone. She can only take a few steps  Plan: complete prednisone taper  Monitor redness and swelling in left foot  Start allopurinol once pred completed.     (I25.110) CAD -- S/P Stent 2016  (I10) Benign essential HTN  Comment: followed by Dr Perrin. Had MI prior to stent in 2016. Has been stable. Does not tolerate statins. Last three BPs here: 131/74, 145/68, 126/62  Plan: continue current meds    (R53.81) Physical deconditioning  Comment: lives alone in apartment. Goes to adult day care three days a week. She tells daughter she is often tired. She cannot live alone at this point due to in ability to bear wt on left foot.   Plan: physical therapy and OCCUPATIONAL THERAPY           Electronically signed by:  NEYDA Calderon CNP

## 2019-06-07 NOTE — DISCHARGE SUMMARY
[facility]    Discharge Summary  Hospitalist    Date of Admission:  6/5/2019  Date of Discharge:  6/7/2019  Discharging Provider: Jeremiah Roman MD    Discharge Diagnoses   Acute Right first MTP gout      History of Present Illness   Mary Polanco is an 89-year-old non english speaking female, who was brought in by her son and daughter to the Emergency Department due to the above complaints.  The patient woke up in the morning and noticed an area of redness on the medial side of her left foot proximal to the great toe.  The area became progressively swollen and red and more painful as the day wore on.  Started spreading up into the left calf and so the patient was brought in by her son to [facility] for further assessment.      In the emergency room, the patient was seen by Dr. Denver Yu.  The patient was afebrile, not tachycardic, normal oxygen saturations, stable blood pressure.  Blood work revealed normal electrolytes, normal kidney function.  Calcium was elevated at 10.4.  LFTs were normal, but her total protein is elevated at 8.9 and calcium is elevated at 10.4.  Glucose 117.  CBC is normal with white count 10.6, hemoglobin 13.5, platelets 259.  X-ray of the foot, 2 views, showed mild bunion deformity with hallux valgus and slight degenerative changes at the first MTP joint.  No fracture or destructive lesion noted.  The patient had ultrasound of her left lower extremity which showed no evidence of DVT.  The patient was given Ancef and is being admitted for further treatment.         Hospital Course   Mary Polanco was admitted on 6/5/2019.  The following problems were addressed during her hospitalization:    Principal Problem:    Acute gouty arthritis  Active Problems:    CAD -- S/P Stent 2016    Benign essential HTN    Left foot pain    Foot pain, left      Mary Polanco is a 89 year old female who was admitted on 6/5/2019 with 1 day of left foot pain over 1st MTP joint, admitted  for possible cellulitis:     Impression:   Principal Problem:    Left foot pain -- tender over 1st MTP joint, clinically consistent with gout              -- uric acid 7.7, XRay of foot without fracture or other acute lesion/abnormality              -- started Colchicine 0.6 mg bid, and stopped emperic Ancef started on admission with initial concern for cellulitis   -see by ortho who agreed with clinical diagnosis of acute gout. No acute aspiration or steroid injection into 1st MTP joint indicated at this time  - pt clinically improved with 50% decrease in pain by 6/7 but per PT not safe to discharge home to apartment (lives alone) given difficulty with ambulating given acute gout.   - no hx of gout  - while colchicine was tolerated given age and risk for diarrhea with this medication pt changed to prednisone taper.  Solumedrol 40mg IV X 1 given on 6/7.    - pt will discharge to TCU with [facility] follow up to be scheduled  - see PCP ~1 week after discharge as she will need to be initiated on Allopurinol with goal uric acid level of <6.  Cannot start allopurinol at this time during acute flair as may precipitate worsening flair.                Active Problems:    CAD -- S/P Stent 2016       Benign essential HTN              -- on Metoprolol 12.5 mg bid. Good control of htn. Continue outpatient      Plan:  Discussed with patient and her dtr, care coordinator and ortho and agree with plan.    DVT Prophylaxis: Pneumatic Compression Devices  Code Status: Full Code     Disposition: discharge to TCU per evaluation by PT      Jeremiah Roman MD    Significant Results and Procedures   See hospital course    Pending Results none  Unresulted Labs Ordered in the Past 30 Days of this Admission     No orders found from 4/6/2019 to 6/6/2019.          Code Status   Full Code       Primary Care Physician   Kevon Sawyer    Physical Exam   Temp: 98.9  F (37.2  C) Temp src: Oral BP: 124/65 Pulse: 68 Heart Rate: 67 Resp: 16  SpO2: 95 % O2 Device: None (Room air)    Vitals:    06/06/19 0618 06/07/19 0931   Weight: 52.9 kg (116 lb 10 oz) 53 kg (116 lb 13.5 oz)     Vital Signs with Ranges  Temp:  [98.6  F (37  C)-100  F (37.8  C)] 98.9  F (37.2  C)  Pulse:  [68-70] 68  Heart Rate:  [63-67] 67  Resp:  [16-18] 16  BP: (122-136)/(58-65) 124/65  SpO2:  [94 %-95 %] 95 %  I/O last 3 completed shifts:  In: 290 [P.O.:290]  Out: 200 [Urine:200]    Constitutional: nad, nontoxic  Eyes: nl sclera  Respiratory: CTAB  Cardiovascular: RRR no r/g/m  GI:  Soft, nt, nd  Skin: redness over right 1st MTP  Musculoskeletal: redness, swelling over 1st MTP. Acute pain with passive rom of 1st mtp. No other MSK abnormalities. Swelling and redness improved from 6/6 per pt and ortho team  Neurologic: nl speech and mentation, grossly nonfocal  Neuropsychiatric: nl affect    Discharge Disposition   Discharged to short-term care facility  Condition at discharge: Good    Consultations This Hospital Stay   PHYSICAL THERAPY ADULT IP CONSULT  ORTHOPEDIC SURGERY IP CONSULT  PHYSICAL THERAPY ADULT IP CONSULT  OCCUPATIONAL THERAPY ADULT IP CONSULT    Time Spent on this Encounter   IJeremiah, personally saw the patient today and spent greater than 30 minutes discharging this patient.    Discharge Orders      General info for SNF    Length of Stay Estimate: Short Term Care: Estimated # of Days <30  Condition at Discharge: Improving  Level of care:skilled   Rehabilitation Potential: Good  Admission H&P remains valid and up-to-date: Yes  Recent Chemotherapy: N/A  Use Nursing Home Standing Orders: Yes     Mantoux instructions    Give two-step Mantoux (PPD) Per Facility Policy Yes     Activity - Up with nursing assistance     Weight bearing status    WBAT     Follow Up and recommended labs and tests    Follow up with specialist, Foot and Ankle ([facility]), in 7-10 days if not improving for possibility of cortisone injection.  No follow up labs or test are  needed.  [phone]  Follow up with PCP 1 week after discharge for follow up of gout to start Allopurinol     Reason for your hospital stay    Left 1st MTP gout flare-ordered prednisone taper     Intake and output    Every shift     Activity - Up with nursing assistance     Physical Therapy Adult Consult    Evaluate and treat as clinically indicated.    Reason:  Right foot 1st MTP acute gout with difficulty ambulating. Assess gait     Occupational Therapy Adult Consult    Evaluate and treat as clinically indicated.    Reason:  R foot acute gout, assess ability to return home, safety evaluation     Fall precautions     Advance Diet as Tolerated    Follow this diet upon discharge: Orders Placed This Encounter      Combination Diet Regular Diet Adult     Discharge Medications   Current Discharge Medication List      START taking these medications    Details   acetaminophen (TYLENOL) 325 MG tablet Take 2 tablets (650 mg) by mouth every 6 hours as needed for mild pain  Qty: 50 tablet, Refills: 0    Comments: Future refills by PCP Dr. Kevon Sawyer with phone number [phone].  Associated Diagnoses: Acute idiopathic gout, unspecified site      predniSONE (DELTASONE) 10 MG tablet Take 4 tablets (40mg) for 4 days then 3 tablets (30mg) for 3 days, then 2 tablets (20mg) for 3 days then one tablet (10mg) for 3 days  Qty: 34 tablet, Refills: 0    Comments: Future refills by PCP Dr. Kevon Sawyer with phone number [phone].  Associated Diagnoses: Acute idiopathic gout, unspecified site         CONTINUE these medications which have NOT CHANGED    Details   aspirin EC 81 MG EC tablet Take 1 tablet (81 mg) by mouth daily  Qty: 30 tablet, Refills: 1    Associated Diagnoses: ACS (acute coronary syndrome) (H); Coronary artery disease involving native coronary artery of native heart with unstable angina pectoris (H)      calcium carbonate (OS-CLAIRE 500 MG [place]. CA) 500 MG tablet Take 500 mg by mouth 2 times daily       cholecalciferol (VITAMIN D) 400 UNIT TABS tablet Take 400 Units by mouth daily      docusate sodium (COLACE) 100 MG capsule Take by mouth 2 times daily      metoprolol tartrate (LOPRESSOR) 25 MG tablet Take 0.5 tablets (12.5 mg) by mouth 2 times daily  Qty: 90 tablet, Refills: 3    Associated Diagnoses: Cardiac arrhythmia, unspecified cardiac arrhythmia type      nitroGLYcerin (NITROSTAT) 0.4 MG sublingual tablet Place 1 tablet (0.4 mg) under the tongue every 5 minutes as needed for chest pain if you are still having symptoms after 3 doses (15 minutes) call 911.  Qty: 25 tablet, Refills: 3    Associated Diagnoses: Coronary artery disease involving native coronary artery of native heart with unstable angina pectoris (H)         STOP taking these medications       cephALEXin (KEFLEX) 500 MG capsule Comments:   Reason for Stopping:         traMADol (ULTRAM) 50 MG tablet Comments:   Reason for Stopping:             Allergies   Allergies   Allergen Reactions     Azithromycin      Gabapentin Itching     Kanamycin      Metronidazole Rash     Penicillins Rash     Streptomycin      rash     Atorvastatin Rash     Lisinopril Rash     Data   Most Recent 3 CBC's:  Recent Labs   Lab Test 06/07/19  0621 06/06/19  0019 07/13/17 09/16/16  0555   WBC 11.0 10.6 7.5 10.3   HGB 11.5* 13.5 11.9 11.3*   MCV 90 92  --  92    259 275 205      Most Recent 3 BMP's:  Recent Labs   Lab Test 06/07/19  0621 06/06/19  0019 07/14/17 09/15/16  0025    138 141 135   POTASSIUM 3.9 4.4 4.8 3.7   CHLORIDE 109 107 103 103   CO2 27 27 23 25   BUN 12 18 14 17   CR 0.72 0.77 0.71 0.78   ANIONGAP 6 4  --  7   CLAIRE 9.1 10.4* 9.4 9.3   * 117* 106 202*     Most Recent 2 LFT's:  Recent Labs   Lab Test 06/06/19  0019 07/14/17   AST 35 17   ALT 21 14   ALKPHOS 91 70   BILITOTAL 0.4 0.3     Most Recent INR's and Anticoagulation Dosing History:  Anticoagulation Dose History     There is no flowsheet data to display.        Most Recent 3  Troponin's:  Recent Labs   Lab Test 09/15/16  1206 09/15/16  0847 09/15/16  0440   TROPI <0.015  The 99th percentile for upper reference range is 0.045 ug/L.  Troponin values in   the range of 0.045 - 0.120 ug/L may be associated with risks of adverse   clinical events.   <0.015  The 99th percentile for upper reference range is 0.045 ug/L.  Troponin values in   the range of 0.045 - 0.120 ug/L may be associated with risks of adverse   clinical events.   <0.015  The 99th percentile for upper reference range is 0.045 ug/L.  Troponin values in   the range of 0.045 - 0.120 ug/L may be associated with risks of adverse   clinical events.       Most Recent Cholesterol Panel:  Recent Labs   Lab Test 07/14/17   CHOL 220      HDL 53   TRIG 330     Most Recent 6 Bacteria Isolates From Any Culture (See EPIC Reports for Culture Details):  Recent Labs   Lab Test 09/15/16  1436   CULT Canceled, Test credited >10 Squamous epithelial cells/low power field indicates   oral contamination. Please recollect.  *     Most Recent TSH, T4 and A1c Labs:  Recent Labs   Lab Test 09/15/16  0025   TSH 0.82     Results for orders placed or performed during the hospital encounter of 06/05/19   Foot XR, G/E 3 views, left    Narrative    XR FOOT LT G/E 3 VW  6/6/2019 1:34 AM     INDICATION: Pain, swelling at first MTP.    COMPARISON: None.      Impression    IMPRESSION: Mild bunion deformity with hallux valgus and slight  degenerative changes at the first MTP joint. No fracture or  destructive bone lesion. Small posterior calcaneal spur.    PATRICK DISLA MD   US Lower Extremity Venous Duplex Left    Narrative    US LOWER EXTREMITY VENOUS DUPLEX LEFT  6/6/2019 1:49 AM     HISTORY: Left calf and foot pain and swelling.    TECHNIQUE: Venous Doppler ultrasound of the lower extremity. Color  flow and spectral Doppler with waveform analysis performed.    COMPARISON: None.    FINDINGS: Ultrasound of the left leg demonstrates no deep  vein  thrombus from the common femoral through popliteal veins or in the  visualized segments of posterior tibial or peroneal veins in the calf.      Impression    IMPRESSION: No DVT identified left leg.    PATRICK DISLA MD

## 2019-06-07 NOTE — PLAN OF CARE
Patient is A&O x4. Elevated temp overnight, otherwise VSS on RA. CMS intact. C/o of 5/10 left foot pain, controlled with ice packs, pt declines any medication. Tolerating regular diet. Up with SBA. Plan on continuing to monitor and discharge to home today.

## 2019-06-08 ENCOUNTER — NURSING HOME VISIT (OUTPATIENT)
Dept: GERIATRICS | Facility: CLINIC | Age: 84
End: 2019-06-08
Payer: COMMERCIAL

## 2019-06-08 VITALS
RESPIRATION RATE: 16 BRPM | OXYGEN SATURATION: 96 % | WEIGHT: 120.6 LBS | BODY MASS INDEX: 23.55 KG/M2 | DIASTOLIC BLOOD PRESSURE: 74 MMHG | SYSTOLIC BLOOD PRESSURE: 131 MMHG | HEART RATE: 69 BPM | TEMPERATURE: 97.8 F

## 2019-06-08 DIAGNOSIS — M79.672 FOOT PAIN, LEFT: ICD-10-CM

## 2019-06-08 DIAGNOSIS — R53.81 PHYSICAL DECONDITIONING: ICD-10-CM

## 2019-06-08 DIAGNOSIS — M10.9 ACUTE GOUTY ARTHRITIS: Primary | ICD-10-CM

## 2019-06-08 DIAGNOSIS — I25.110 CORONARY ARTERY DISEASE INVOLVING NATIVE CORONARY ARTERY OF NATIVE HEART WITH UNSTABLE ANGINA PECTORIS (H): ICD-10-CM

## 2019-06-08 DIAGNOSIS — I10 BENIGN ESSENTIAL HTN: ICD-10-CM

## 2019-06-08 PROCEDURE — 99309 SBSQ NF CARE MODERATE MDM 30: CPT | Performed by: NURSE PRACTITIONER

## 2019-06-08 NOTE — LETTER
6/8/2019        RE: Mary Polanco  [address]  [facility] [id number]        [facility]  PRIMARY CARE PROVIDER AND CLINIC:  Kevon Sawyer MD, [facility] [address] / [facility]*  Chief Complaint   Patient presents with     Hospital F/U     [place] [facility] Record Number:  [id number]  Place of Service where encounter took place:  [facility] ([facility]) [[id number]]    Mary Polanco  is a 89 year old  (4/24/1930), admitted to the above facility from  [facility]. Hospital stay 6/5/19 through 6/7/19..  Admitted to this facility for  rehab, medical management and nursing care.    HPI:    HPI information obtained from: facility chart records, facility staff, patient report, [facility] chart review and family/first contact daughter report.   Brief Summary of Hospital Course: recent hospital stay due to acute pain in left foot at 1st MTP with surrounding swelling and redness. PMH includes CAD and s/p MI in 2016 and stent, arthritis, osteoporosis, hypertension. Bladder cancer. She was brought to ED by son. She was initially started on IV ancef for presumed cellulitis. Ortho was consulted. Uric acid level came back at 7.7. She was treated with colchicine then changed to prednisone. Antibiotics were stopped. She was unable to walk on left foot and since she lives alone in apartment decision made to transfer to TCU for ongoing care.   Updates on Status Since Skilled nursing Admission: today daughter is here and interprets. Patient speaks mandarin. She is hard of hearing.   She continues to have some left foot pain but it is less. She can take a few steps with walker. She does not want tramadol, only acetaminophen for pain.   She denies shortness of breath, chest pain. No abdominal pain. She lives alone in apartment and has one hour of PCA daily. She does go out to day care three days per week.     CODE STATUS/ADVANCE DIRECTIVES DISCUSSION:    CPR/Full code - refused to discuss POLST.   Patient's living condition: lives alone  ALLERGIES: Azithromycin; Gabapentin; Kanamycin; Metronidazole; Penicillins; Streptomycin; Atorvastatin; and Lisinopril  PAST MEDICAL HISTORY:  has a past medical history of Benign essential HTN, Coronary artery disease involving native coronary artery of native heart with unstable angina pectoris (H) (4/2016), Cough, Eczema, Lung nodule, Malignant neoplasm of bladder, part unspecified (1999 Dr [address]), Mitral regurgitation, Mixed hyperlipidemia, NSTEMI (non-ST elevated myocardial infarction) (H), Osteoporosis, Polyarticular arthritis, Rash, and Sciatica.  PAST SURGICAL HISTORY:   has a past surgical history that includes bladder tumor-associated; APPENDECTOMY; REMOVAL GALLBLADDER; cataract iol, rt/lt (5-08); and Heart Cath Stent cor w/wo ptca (4/2016).  FAMILY HISTORY: family history includes Cerebrovascular Disease in her mother; Diabetes in her brother; Family History Negative in her brother, brother, and daughter; Hypertension in her son.  SOCIAL HISTORY:   reports that she has never smoked. She has never used smokeless tobacco. She reports that she does not drink alcohol or use drugs.    Post Discharge Medication Reconciliation Status: discharge medications reconciled, continue medications without change    Current Outpatient Medications   Medication Sig Dispense Refill     acetaminophen (TYLENOL) 325 MG tablet Take 2 tablets (650 mg) by mouth every 6 hours as needed for mild pain 50 tablet 0     aspirin EC 81 MG EC tablet Take 1 tablet (81 mg) by mouth daily 30 tablet 1     calcium carbonate (OS-CLAIRE 500 MG [place]. [place]) 500 MG tablet Take 500 mg by mouth 2 times daily       cholecalciferol (VITAMIN D) 400 UNIT TABS tablet Take 400 Units by mouth daily       docusate sodium (COLACE) 100 MG capsule Take by mouth 2 times daily       metoprolol tartrate (LOPRESSOR) 25 MG tablet Take 0.5 tablets (12.5 mg) by mouth 2 times daily 90  tablet 3     nitroGLYcerin (NITROSTAT) 0.4 MG sublingual tablet Place 1 tablet (0.4 mg) under the tongue every 5 minutes as needed for chest pain if you are still having symptoms after 3 doses (15 minutes) call 911. 25 tablet 3     predniSONE (DELTASONE) 10 MG tablet Take 4 tablets (40mg) for 4 days then 3 tablets (30mg) for 3 days, then 2 tablets (20mg) for 3 days then one tablet (10mg) for 3 days 34 tablet 0       ROS:  10 point ROS of systems including Constitutional, Eyes, Respiratory, Cardiovascular, Gastroenterology, Genitourinary, Integumentary, Musculoskeletal, Psychiatric were all negative except for pertinent positives noted in my HPI.    Vitals:  /74   Pulse 69   Temp 97.8  F (36.6  C)   Resp 16   Wt 54.7 kg (120 lb 9.6 oz)   SpO2 96%   BMI 23.55 kg/m     Exam:  GENERAL APPEARANCE:  Alert, in no distress  ENT:  Mouth and posterior oropharynx normal, moist mucous membranes, hearing acuity [place]  EYES:  EOM, conjunctivae, lids, pupils and irises normal  NECK:  No adenopathy,masses or thyromegaly  RESP:  respiratory effort and palpation of chest normal, no respiratory distress, Lung sounds clear  CV:  Palpation and auscultation of heart done , rate and rhythm reg, no murmur, no rub or gallop, Edema left leg slightly swollen  ABDOMEN:  normal bowel sounds, soft, nontender, no hepatosplenomegaly or other masses  M/S:   Gait and station can take a few steps left foot is quite tender, Digits and nails normal   SKIN:  Inspection/Palpation of skin and subcutaneous tissue fading erythema on top of left foot.   NEURO: 2-12 in normal limits and at patient's baseline  PSYCH:  insight and judgement, memory intact , affect and mood normal    Lab/Diagnostic data:  Labs done in SNF are in [facility]. Please refer to them using [employer]/Care Everywhere. and Recent labs in EPIC reviewed by me today.     ASSESSMENT/PLAN:  (M10.9) Acute gouty arthritis  (primary encounter diagnosis)  (M88.552) Foot pain,  left  Comment: recent hospitalization due to acute pain and redness with swelling of left foot. She was treated for acute gouty arthritis, first with colchicine then with prednisone taper. Pain is less but not gone. She can only take a few steps  Plan: complete prednisone taper  Monitor redness and swelling in left foot  Start allopurinol once pred completed.     (I25.110) CAD -- S/P Stent 2016  (I10) Benign essential HTN  Comment: followed by Dr Perrin. Had MI prior to stent in 2016. Has been stable. Does not tolerate statins. Last three BPs here: 131/74, 145/68, 126/62  Plan: continue current meds    (R53.81) Physical deconditioning  Comment: lives alone in apartment. Goes to adult day care three days a week. She tells daughter she is often tired. She cannot live alone at this point due to in ability to bear wt on left foot.   Plan: physical therapy and OCCUPATIONAL THERAPY           Electronically signed by:  NEYDA Calderon CNP                         Sincerely,        NEYDA Calderon CNP

## 2019-06-14 ENCOUNTER — NURSING HOME VISIT (OUTPATIENT)
Dept: GERIATRICS | Facility: CLINIC | Age: 84
End: 2019-06-14
Payer: COMMERCIAL

## 2019-06-14 VITALS
SYSTOLIC BLOOD PRESSURE: 135 MMHG | DIASTOLIC BLOOD PRESSURE: 71 MMHG | BODY MASS INDEX: 23.29 KG/M2 | WEIGHT: 118.6 LBS | RESPIRATION RATE: 17 BRPM | HEART RATE: 70 BPM | TEMPERATURE: 97.7 F | HEIGHT: 60 IN | OXYGEN SATURATION: 100 %

## 2019-06-14 DIAGNOSIS — M79.672 FOOT PAIN, LEFT: ICD-10-CM

## 2019-06-14 DIAGNOSIS — I10 ESSENTIAL HYPERTENSION: ICD-10-CM

## 2019-06-14 DIAGNOSIS — M10.9 ACUTE GOUTY ARTHRITIS: Primary | ICD-10-CM

## 2019-06-14 DIAGNOSIS — R53.81 DEBILITY: ICD-10-CM

## 2019-06-14 PROCEDURE — 99309 SBSQ NF CARE MODERATE MDM 30: CPT | Performed by: NURSE PRACTITIONER

## 2019-06-14 ASSESSMENT — MIFFLIN-ST. JEOR: SCORE: 884.47

## 2019-06-14 NOTE — PROGRESS NOTES
[facility]  [place] [facility] Record Number:  [id number]  Place of Service where encounter took place:  [facility] ([facility] [[id number]]  Chief Complaint   Patient presents with     RECHECK       HPI:    Mary Polanco  is a 89 year old (4/24/1930), who is being seen today for an episodic care visit.  HPI information obtained from: facility chart records, facility staff, patient report and [facility] chart review.     Met with Mrs. Polanco today via phone  for follow up.  Mrs. Polanco continues to report Left foot pain in the hallux area, minimal at rest, more with walking.  Hard to quantify but she reports much less than last week.  Has been able to ambulate.  She reports she still cannot wear shoes, but has been wearing soft slippers or sandles.  Besides the mild pain, she has no other complaints.       Past Medical and Surgical History reviewed in Epic today.    MEDICATIONS:  Current Outpatient Medications   Medication Sig Dispense Refill     acetaminophen (TYLENOL) 325 MG tablet Take 2 tablets (650 mg) by mouth every 6 hours as needed for mild pain 50 tablet 0     aspirin EC 81 MG EC tablet Take 1 tablet (81 mg) by mouth daily 30 tablet 1     calcium carbonate (OS-CLAIRE 500 MG [place]. CA) 500 MG tablet Take 500 mg by mouth 2 times daily       cholecalciferol (VITAMIN D) 400 UNIT TABS tablet Take 400 Units by mouth daily       docusate sodium (COLACE) 100 MG capsule Take by mouth 2 times daily       metoprolol tartrate (LOPRESSOR) 25 MG tablet Take 0.5 tablets (12.5 mg) by mouth 2 times daily 90 tablet 3     nitroGLYcerin (NITROSTAT) 0.4 MG sublingual tablet Place 1 tablet (0.4 mg) under the tongue every 5 minutes as needed for chest pain if you are still having symptoms after 3 doses (15 minutes) call 911. 25 tablet 3     predniSONE (DELTASONE) 10 MG tablet Take 4 tablets (40mg) for 4 days then 3 tablets (30mg) for 3 days, then 2 tablets (20mg) for 3 days then one tablet (10mg) for 3  days 34 tablet 0     REVIEW OF SYSTEMS:  7 point ROS done including, light headedness/dizziness, fever/chills, pain, Resp, CV, GI, and  and is negative other than noted in HPI.      Objective:  /71   Pulse 70   Temp 97.7  F (36.5  C)   Resp 17   Ht 1.524 m (5')   Wt 53.8 kg (118 lb 9.6 oz)   SpO2 100%   BMI 23.16 kg/m       Exam:  GENERAL APPEARANCE:  Elderly thin female resting in bed, NAD, non-toxic.  ENT:  Mouth and oropharynx normal, moist mucous membranes.   RESP:  Lungs CTA.  Regular relaxed breathing effort.  No cough.   CV: 2/6 soft systolic murmur heard loudest 2 ICS RSB / S2.   Regular rhythm and rate.  No generalized edema.   ABDOMEN:   Flat, soft, non-tender with active bowel sounds.  No guarding, rigidity, or rebound tenderness.  EXTREMITIES:  No lower extremity edema, no calf tenderness.   PSYCH: Alert and orientated, pleasant and cooperative.   WOUND: Left hallex area has a bunyon or raised area, roughly 1 cm round, soft but tender, feels like fluid under but hard to tell.  Right side has Hallex area with dry scarred area present.  Left is tender but no warmth, redness, nor swelling.     Labs:   None recent.     ASSESSMENT/PLAN:  Acute gouty arthritis  Foot pain, left  This is my first time seeing her foot, thus can not compare, but she reports it's much improved.  She still endorses pain, worse with weight bearing, but there is no redness, swelling, warmth present.    -Continues on Prednisone taper, finished 30 mg's q daily today, starts 20 mg's q daily tomorrow.   --Considering Allopurinol once Prednisone is done.   --Consider Ortho f/u if does not continue to improve.     Essential hypertension  Review of VS's show VSS and within acceptable range.   No current s/s of clinical CHF.   -No changes, continue to clinically monitor.     Debility  -Continues to work with PT/OT.     Orders written by provider at facility  -Repeat BMP/Uric acid lab on 17 June.     Electronically signed  by:  NEYDA York CNP

## 2019-06-14 NOTE — LETTER
6/14/2019        RE: Mary Polanco  [address]  [facility] [address]        [facility]  [place] [facility] Record Number:  [id number]  Place of Service where encounter took place:  [facility] ([facility] [[id number]]  Chief Complaint   Patient presents with     RECHECK       HPI:    Mary Polanco  is a 89 year old (4/24/1930), who is being seen today for an episodic care visit.  HPI information obtained from: facility chart records, facility staff, patient report and [facility] chart review.     Met with Mrs. Polanco today via phone  for follow up.  Mrs. Polanco continues to report Left foot pain in the hallux area, minimal at rest, more with walking.  Hard to quantify but she reports much less than last week.  Has been able to ambulate.  She reports she still cannot wear shoes, but has been wearing soft slippers or sandles.  Besides the mild pain, she has no other complaints.       Past Medical and Surgical History reviewed in Epic today.    MEDICATIONS:  Current Outpatient Medications   Medication Sig Dispense Refill     acetaminophen (TYLENOL) 325 MG tablet Take 2 tablets (650 mg) by mouth every 6 hours as needed for mild pain 50 tablet 0     aspirin EC 81 MG EC tablet Take 1 tablet (81 mg) by mouth daily 30 tablet 1     calcium carbonate (OS-CLAIRE 500 MG [place]. CA) 500 MG tablet Take 500 mg by mouth 2 times daily       cholecalciferol (VITAMIN D) 400 UNIT TABS tablet Take 400 Units by mouth daily       docusate sodium (COLACE) 100 MG capsule Take by mouth 2 times daily       metoprolol tartrate (LOPRESSOR) 25 MG tablet Take 0.5 tablets (12.5 mg) by mouth 2 times daily 90 tablet 3     nitroGLYcerin (NITROSTAT) 0.4 MG sublingual tablet Place 1 tablet (0.4 mg) under the tongue every 5 minutes as needed for chest pain if you are still having symptoms after 3 doses (15 minutes) call 911. 25 tablet 3     predniSONE (DELTASONE) 10 MG tablet Take 4 tablets (40mg) for 4 days then  3 tablets (30mg) for 3 days, then 2 tablets (20mg) for 3 days then one tablet (10mg) for 3 days 34 tablet 0     REVIEW OF SYSTEMS:  7 point ROS done including, light headedness/dizziness, fever/chills, pain, Resp, CV, GI, and  and is negative other than noted in HPI.      Objective:  /71   Pulse 70   Temp 97.7  F (36.5  C)   Resp 17   Ht 1.524 m (5')   Wt 53.8 kg (118 lb 9.6 oz)   SpO2 100%   BMI 23.16 kg/m        Exam:  GENERAL APPEARANCE:  Elderly thin female resting in bed, NAD, non-toxic.  ENT:  Mouth and oropharynx normal, moist mucous membranes.   RESP:  Lungs CTA.  Regular relaxed breathing effort.  No cough.   CV: 2/6 soft systolic murmur heard loudest 2 ICS RSB / S2.   Regular rhythm and rate.  No generalized edema.   ABDOMEN:   Flat, soft, non-tender with active bowel sounds.  No guarding, rigidity, or rebound tenderness.  EXTREMITIES:  No lower extremity edema, no calf tenderness.   PSYCH: Alert and orientated, pleasant and cooperative.   WOUND: Left hallex area has a bunyon or raised area, roughly 1 cm round, soft but tender, feels like fluid under but hard to tell.  Right side has Hallex area with dry scarred area present.  Left is tender but no warmth, redness, nor swelling.     Labs:   None recent.     ASSESSMENT/PLAN:  Acute gouty arthritis  Foot pain, left  This is my first time seeing her foot, thus can not compare, but she reports it's much improved.  She still endorses pain, worse with weight bearing, but there is no redness, swelling, warmth present.    -Continues on Prednisone taper, finished 30 mg's q daily today, starts 20 mg's q daily tomorrow.   --Considering Allopurinol once Prednisone is done.   --Consider Ortho f/u if does not continue to improve.     Essential hypertension  Review of VS's show VSS and within acceptable range.   No current s/s of clinical CHF.   -No changes, continue to clinically monitor.     Debility  -Continues to work with PT/OT.     Orders written by  provider at facility  -Repeat BMP/Uric acid lab on 17 June.     Electronically signed by:  NEYDA York CNP           Sincerely,        NEYDA York CNP

## 2019-06-17 ENCOUNTER — HOSPITAL LABORATORY (OUTPATIENT)
Dept: OTHER | Facility: CLINIC | Age: 84
End: 2019-06-17

## 2019-06-17 ENCOUNTER — NURSING HOME VISIT (OUTPATIENT)
Dept: GERIATRICS | Facility: CLINIC | Age: 84
End: 2019-06-17
Payer: COMMERCIAL

## 2019-06-17 VITALS
TEMPERATURE: 97.5 F | WEIGHT: 118.8 LBS | SYSTOLIC BLOOD PRESSURE: 132 MMHG | BODY MASS INDEX: 23.32 KG/M2 | HEART RATE: 73 BPM | RESPIRATION RATE: 16 BRPM | HEIGHT: 60 IN | DIASTOLIC BLOOD PRESSURE: 70 MMHG | OXYGEN SATURATION: 97 %

## 2019-06-17 DIAGNOSIS — K59.01 SLOW TRANSIT CONSTIPATION: ICD-10-CM

## 2019-06-17 DIAGNOSIS — R53.81 PHYSICAL DECONDITIONING: ICD-10-CM

## 2019-06-17 DIAGNOSIS — M10.9 ACUTE GOUT OF LEFT FOOT, UNSPECIFIED CAUSE: Primary | ICD-10-CM

## 2019-06-17 DIAGNOSIS — I10 ESSENTIAL HYPERTENSION: ICD-10-CM

## 2019-06-17 DIAGNOSIS — I25.10 CORONARY ARTERY DISEASE INVOLVING NATIVE CORONARY ARTERY OF NATIVE HEART WITHOUT ANGINA PECTORIS: ICD-10-CM

## 2019-06-17 LAB
ANION GAP SERPL CALCULATED.3IONS-SCNC: 9 MMOL/L (ref 3–14)
BUN SERPL-MCNC: 27 MG/DL (ref 7–30)
CALCIUM SERPL-MCNC: 9.9 MG/DL (ref 8.5–10.1)
CHLORIDE SERPL-SCNC: 105 MMOL/L (ref 94–109)
CO2 SERPL-SCNC: 26 MMOL/L (ref 20–32)
CREAT SERPL-MCNC: 0.81 MG/DL (ref 0.52–1.04)
GFR SERPL CREATININE-BSD FRML MDRD: 64 ML/MIN/{1.73_M2}
GLUCOSE SERPL-MCNC: 140 MG/DL (ref 70–99)
POTASSIUM SERPL-SCNC: 4 MMOL/L (ref 3.4–5.3)
SODIUM SERPL-SCNC: 140 MMOL/L (ref 133–144)
URATE SERPL-MCNC: 7.1 MG/DL (ref 2.6–6)

## 2019-06-17 PROCEDURE — 99305 1ST NF CARE MODERATE MDM 35: CPT | Performed by: INTERNAL MEDICINE

## 2019-06-17 ASSESSMENT — MIFFLIN-ST. JEOR: SCORE: 885.37

## 2019-06-17 NOTE — LETTER
6/17/2019        RE: Mary Polanco  [address]  [facility] [address]        [facility]  INITIAL VISIT NOTE  June 17, 2019    PRIMARY CARE PROVIDER AND CLINIC:  Kevon Sawyer [facility] [address] / [place]*    Chief Complaint   Patient presents with     Hospital F/U       HPI:    Mary Polanco is a 89 year old  (4/24/1930) female who [facility] for an initial visit. Medical history is notable for CAD, HTN and bladder cancer. She was hospitalized at [facility] from 6/5/19 to 6/7/19 where she presented with left foot pain and erythema. Initial concern for cellulitis; however, uric acid level was elevated. Ortho consulted and felt symptoms secondary to acute gout of L first MTP. She was treated with colchicine and then a prednisone burst. She was admitted to this facility for medical management and rehab.     Today, Ms. Polanco is seen in her room. She speaks Mandarin and a phone  was utilized. Left foot pain is much improved. She is able to wear her slippers and ambulate. Redness has resolved. No pain when I palpated her foot and she had full active ROM of her toes. No chest pain or dyspnea. No abdominal pain. No diarrhea or constipation. Working with therapies. Discharge is slated for Thursday.     CODE STATUS:   CPR/Full code     ALLERGIES:     Allergies   Allergen Reactions     Azithromycin      Gabapentin Itching     Kanamycin      Metronidazole Rash     Penicillins Rash     Streptomycin      rash     Atorvastatin Rash     Lisinopril Rash       PAST MEDICAL HISTORY:   Past Medical History:   Diagnosis Date     Benign essential HTN      Coronary artery disease involving native coronary artery of native heart with unstable angina pectoris (H) 4/2016 4/2016 Cath - 80% hazy stenosis at site of ruptured plaque - HARLEEN placed     Cough      Eczema      Lung nodule      Malignant neoplasm of bladder, part  unspecified 1999 Dr Everett    Surgery done in [place]     Mitral regurgitation     4/2016 mod-mod severe MR with multiple jets per Echo     Mixed hyperlipidemia      NSTEMI (non-ST elevated myocardial infarction) (H)      Osteoporosis      Polyarticular arthritis      Rash      Sciatica        PAST SURGICAL HISTORY:   Past Surgical History:   Procedure Laterality Date     BLADDER TUMOR-ASSOCIATED      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     C APPENDECTOMY      1950     CATARACT IOL, RT/LT  5-08     HC REMOVAL GALLBLADDER      due to stone 1990     HEART CATH STENT COR W/WO PTCA  4/2016 4/2016 Cath - 80% hazy stenosis at site of ruptured plaque - HARLEEN placed       FAMILY HISTORY:   Family History   Problem Relation Age of Onset     Diabetes Brother      Hypertension Son      Cerebrovascular Disease Mother         76     Family History Negative Daughter      Family History Negative Brother      Family History Negative Brother        SOCIAL HISTORY:   Lives alone    MEDICATIONS:  Current Outpatient Medications   Medication Sig Dispense Refill     acetaminophen (TYLENOL) 325 MG tablet Take 2 tablets (650 mg) by mouth every 6 hours as needed for mild pain 50 tablet 0     aspirin EC 81 MG EC tablet Take 1 tablet (81 mg) by mouth daily 30 tablet 1     calcium carbonate (OS-CLAIRE 500 MG [place]. CA) 500 MG tablet Take 500 mg by mouth 2 times daily       cholecalciferol (VITAMIN D) 400 UNIT TABS tablet Take 400 Units by mouth daily       docusate sodium (COLACE) 100 MG capsule Take by mouth 2 times daily       metoprolol tartrate (LOPRESSOR) 25 MG tablet Take 0.5 tablets (12.5 mg) by mouth 2 times daily 90 tablet 3     nitroGLYcerin (NITROSTAT) 0.4 MG sublingual tablet Place 1 tablet (0.4 mg) under the tongue every 5 minutes as needed for chest pain if you are still having symptoms after 3 doses (15 minutes) call 911. 25 tablet 3     predniSONE (DELTASONE) 10 MG tablet Take 4 tablets (40mg) for 4 days then 3 tablets  (30mg) for 3 days, then 2 tablets (20mg) for 3 days then one tablet (10mg) for 3 days 34 tablet 0         ROS:  10 point ROS neg other than the symptoms noted above in the HPI.    PHYSICAL EXAM:  /70   Pulse 73   Temp 97.5  F (36.4  C)   Resp 16   Ht 1.524 m (5')   Wt 53.9 kg (118 lb 12.8 oz)   SpO2 97%   BMI 23.20 kg/m      Gen: sitting on the edge of the bed, alert, cooperative and in no acute distress  HEENT: normocephalic; oropharynx clear  Resp: breathing non labored  MSK: normal muscle tone, no LE edema; no tenderness to palpation of left foot, no swelling, full active ROM of left toes  Neuro: CX II-XII grossly in tact; ROM in all four extremities grossly in tact  Psych: alert and oriented x3; normal affect  Skin: no erythema or warmth over left foot     LABORATORY/IMAGING DATA:  Reviewed as per Epic    ASSESSMENT/PLAN:    Acute Left 1st MTP Gout  This is improving. No erythema or warmth. Pain is much improved, now able to ambulate and put her slippers on. She has been on a prednisone taper. Uric acid 7.1 today, from 7.7 on 6/6; however, reports much improved pain and clinical exam without erythema, warmth or swelling.   -- continues on prednisone 20 mg daily, then 10 mg starting tomorrow x3 days then discontinue  -- defer to PCP re: starting allopurinol     CAD / HTN  History of PCI in 2016. Follows with [facility] - Dr. Perrin. SBPs 120s-140s, most 120s-130s.   -- continues on ASA 81 mg daily and metoprolol 12.5 mg BID  -- follow BPs    Slow Transit Constipation  -- continues on docusate 100 mg BID  -- adjust bowel regimen as needed    Physical Deconditioning  In setting of hospitalization and underlying medical conditions  -- ongoing PT/OT        Electronically signed by:  Tish Irvin CNA                        Sincerely,        Annie Marina MD

## 2019-06-18 ENCOUNTER — DISCHARGE SUMMARY NURSING HOME (OUTPATIENT)
Dept: GERIATRICS | Facility: CLINIC | Age: 84
End: 2019-06-18
Payer: COMMERCIAL

## 2019-06-18 VITALS
RESPIRATION RATE: 16 BRPM | BODY MASS INDEX: 23.32 KG/M2 | OXYGEN SATURATION: 95 % | DIASTOLIC BLOOD PRESSURE: 63 MMHG | TEMPERATURE: 97.8 F | HEART RATE: 73 BPM | HEIGHT: 60 IN | WEIGHT: 118.8 LBS | SYSTOLIC BLOOD PRESSURE: 118 MMHG

## 2019-06-18 DIAGNOSIS — G89.29 CHRONIC PAIN OF LEFT LOWER EXTREMITY: ICD-10-CM

## 2019-06-18 DIAGNOSIS — I25.10 CORONARY ARTERY DISEASE INVOLVING NATIVE CORONARY ARTERY OF NATIVE HEART WITHOUT ANGINA PECTORIS: ICD-10-CM

## 2019-06-18 DIAGNOSIS — I10 ESSENTIAL HYPERTENSION: ICD-10-CM

## 2019-06-18 DIAGNOSIS — R53.81 DEBILITY: ICD-10-CM

## 2019-06-18 DIAGNOSIS — M79.605 CHRONIC PAIN OF LEFT LOWER EXTREMITY: ICD-10-CM

## 2019-06-18 DIAGNOSIS — M79.672 FOOT PAIN, LEFT: ICD-10-CM

## 2019-06-18 DIAGNOSIS — M10.9 ACUTE GOUT OF LEFT FOOT, UNSPECIFIED CAUSE: Primary | ICD-10-CM

## 2019-06-18 DIAGNOSIS — K21.00 GASTROESOPHAGEAL REFLUX DISEASE WITH ESOPHAGITIS: ICD-10-CM

## 2019-06-18 DIAGNOSIS — M10.00 ACUTE IDIOPATHIC GOUT, UNSPECIFIED SITE: ICD-10-CM

## 2019-06-18 PROCEDURE — 99316 NF DSCHRG MGMT 30 MIN+: CPT | Performed by: NURSE PRACTITIONER

## 2019-06-18 RX ORDER — PREDNISONE 10 MG/1
TABLET ORAL
Qty: 34 TABLET | Refills: 0 | Status: ON HOLD
Start: 2019-06-18 | End: 2020-03-04

## 2019-06-18 ASSESSMENT — MIFFLIN-ST. JEOR: SCORE: 885.37

## 2019-06-18 NOTE — PROGRESS NOTES
[facility] DISCHARGE SUMMARY  PATIENT'S NAME: Mary Polanco  YOB: 1930  MEDICAL RECORD NUMBER:  [id number]  Place of Service where encounter took place:  [facility] ([facility]) [[id number]]    PRIMARY CARE PROVIDER AND CLINIC RESPONSIBLE AFTER TRANSFER:   Kev[facility] Sawyer [facility], [facility] [address] / [place]*         Transferring providers: NEYDA York CNP, Annie Marina MD  Recent Hospitalization/ED:  [facility] stay 06/05/2019 to 06/07/2019.  Date of SNF Admission: June / 07 / 2019  Date of SNF (anticipated) Discharge: June / 07 / 2019  Discharged to: previous independent home  Cognitive Scores: SLUMS: 21/30  Physical Function: Moderate fall risk  DME: [facility]    CODE STATUS/ADVANCE DIRECTIVES DISCUSSION:  Full Code     ALLERGIES: Azithromycin; Gabapentin; Kanamycin; Metronidazole; Penicillins; Streptomycin; Atorvastatin; and Lisinopril    DISCHARGE DIAGNOSIS/NURSING FACILITY COURSE:   Mrs. Polanco is a Mandarin Chinese speaking female whom presented to ER by family due to Left foot pain and redness.  Workup showed increased Uric acid and Xray showed mild bunion deformity with hallux valgus and DJD 1st MTP.  Diagnosis was gout and she was started on a prolonged Prednisone taper.  Later was transitioned to the TCU for ongoing cares and PT/OT.     At the TCU Mrs. Polanco endorsed chronic LLE pain but acute Left foot pain.  Pain in Left foot slowly improved with the Prednisone, but at time of discharge, it has not fully resolved.  There is much less pain, and she has been able to ambulate better.  Continues with a few more days of Prednisone.  Due to improvement, she will transition back to her prior living with home care.      In meeting Mrs. Polanco today (via phone ). She reports overall she is doing well, endorses pain in Left foot better, but not resolved.  Endorses her chronic LLE pain but tolerable.  She is  reporting new GERD / reflux symptoms.  No N/V or abdominal pain, reports WNL bowel movements.  She thinks GERD is from too many pills, but the only new medication is the Prednisone.  We query if it's the food, but she does not think so, thus etiology is unclear.  Otherwise she reports she feels ready for home.     Acute gout of left foot, unspecified cause  Foot pain, left  Chronic pain of left lower extremity  Redness and swelling in Left foot have resolved, pain has improved but not fully resolved.  Last check of Uric acid was 7.1 and trending down.   Was started on Colchicine in hospital but stopped due to risk of diarrhea, switched to Prednisone.   -Finishes Prednisone on 20 June.   -PRN Acetaminophen for pain, not using much.   --Consider Allopurinol once Prednisone is completed.  --Consider return to Ortho if does not fully resolve/worsens.     Coronary artery disease involving native coronary artery of native heart without angina pectoris, with h/o stent  Essential hypertension  Review of VS's show VSS and within acceptable range.   No current s/s of clinical CHF.   -No changes, continue to clinically monitor.     Gastroesophageal reflux disease with esophagitis  Endorsing new reflux burning, reports as newer, thinks it's from pills, but Prednisone is the only new pill.  Denies any N/V or bowel issues.  Etiology unclear.  Already taking Calcium Carbonate BID.   -Started empiric Omeprazole 20 mg's q daily x 30 days.   --Further workup left to PCP discretion.     Debility  -Will DC with home care.   -See  home care order for F2F.     Past Medical History:  has a past medical history of Benign essential HTN, Coronary artery disease involving native coronary artery of native heart with unstable angina pectoris (H) (4/2016), Cough, Eczema, Lung nodule, Malignant neoplasm of bladder, part unspecified (1999 Dr Everett), Mitral regurgitation, Mixed hyperlipidemia, NSTEMI (non-ST elevated myocardial infarction)  (H), Osteoporosis, Polyarticular arthritis, Rash, and Sciatica.    Discharge Medications:  Current Outpatient Medications   Medication Sig Dispense Refill     acetaminophen (TYLENOL) 325 MG tablet Take 2 tablets (650 mg) by mouth every 6 hours as needed for mild pain 50 tablet 0     aspirin EC 81 MG EC tablet Take 1 tablet (81 mg) by mouth daily 30 tablet 1     calcium carbonate (OS-CLAIRE 500 MG [place]. CA) 500 MG tablet Take 500 mg by mouth 2 times daily       cholecalciferol (VITAMIN D) 400 UNIT TABS tablet Take 400 Units by mouth daily       docusate sodium (COLACE) 100 MG capsule Take by mouth 2 times daily       metoprolol tartrate (LOPRESSOR) 25 MG tablet Take 0.5 tablets (12.5 mg) by mouth 2 times daily 90 tablet 3     nitroGLYcerin (NITROSTAT) 0.4 MG sublingual tablet Place 1 tablet (0.4 mg) under the tongue every 5 minutes as needed for chest pain if you are still having symptoms after 3 doses (15 minutes) call 911. 25 tablet 3     omeprazole (PRILOSEC) 20 MG DR capsule Take 1 capsule (20 mg) by mouth daily       predniSONE (DELTASONE) 10 MG tablet Take 4 tablets (40mg) for 4 days then 3 tablets (30mg) for 3 days, then 2 tablets (20mg) for 3 days then one tablet (10mg) for 3 days, last dose on 20 June. 34 tablet 0     Medication Changes/Rationale:   -As noted above.     Controlled medications sent with patient:   not applicable/none     ROS:   7 point ROS done including, light headedness/dizziness, fever/chills, pain, Resp, CV, GI, and  and is negative other than noted in HPI.      Physical Exam:   Vitals: /63   Pulse 73   Temp 97.8  F (36.6  C)   Resp 16   Ht 1.524 m (5')   Wt 53.9 kg (118 lb 12.8 oz)   SpO2 95%   BMI 23.20 kg/m    BMI= Body mass index is 23.2 kg/m .     Exam:  GENERAL APPEARANCE:  Elderly thin female sitting in chair, NAD, non-toxic.  ENT:  Mouth and oropharynx normal, moist mucous membranes.   RESP:  Lungs CTA.  Regular relaxed breathing effort.  No cough.   CV: 2/6 soft  systolic murmur heard loudest 2 ICS RSB / S2.   Regular rhythm and rate.  No generalized edema.   ABDOMEN:   Flat, soft, non-tender with active bowel sounds.  No guarding, rigidity, or rebound tenderness.  EXTREMITIES:  No lower extremity edema, no calf tenderness.   PSYCH: Alert and orientated, pleasant and cooperative.   WOUND: Left hallex area has a bunyon or raised area, roughly 1 cm round, soft but tender, feels like fluid under but hard to tell.  Left is tender but no warmth, redness, nor swelling.  Right side has Hallex area with dry scarred area present.    SNF labs: Labs done in SNF are in [facility]. Please refer to them using [facility] Everywhere.    DISCHARGE PLAN:    Follow up labs: No labs orders/due, could consider trending Uric acid.     Medical Follow Up:      Follow up with primary care provider in 1-2 weeks      MTM referral needed and placed by this provider: No      Discharge Services: Home Care:  Physical Therapy, Registered Nurse and Home Health Aide    Discharge Instructions Verbalized to Patient at Discharge:     Instructed patient to make PCP follow up appointment in 1-2 weeks.       TOTAL DISCHARGE TIME:   Greater than 30 minutes  Electronically signed by:  NEYDA York [facility]     Home care Face to Face documentation done in [facility] attached to Home care orders for [facility].

## 2019-06-18 NOTE — LETTER
6/18/2019        RE: Mary Polanco  [address]  [facility] [address]        [place] [facility] DISCHARGE SUMMARY  PATIENT'S NAME: Mary Polanco  YOB: 1930  MEDICAL RECORD NUMBER:  [id number]  Place of Service where encounter took place:  [facility] - [facility] ([facility]) [[id number]]    PRIMARY CARE PROVIDER AND CLINIC RESPONSIBLE AFTER TRANSFER:   Kevon Sawyer MD, [facility] [address] / [facility]*         Transferring providers: NEYDA York CNP, Annie Marina MD  Recent Hospitalization/ED:  [facility] Hospital stay 06/05/2019 to 06/07/2019.  Date of SNF Admission: June / 07 / 2019  Date of SNF (anticipated) Discharge: June / 07 / 2019  Discharged to: previous independent home  Cognitive Scores: SLUMS: 21/30  Physical Function: Moderate fall risk  DME: Walker    CODE STATUS/ADVANCE DIRECTIVES DISCUSSION:  Full Code     ALLERGIES: Azithromycin; Gabapentin; Kanamycin; Metronidazole; Penicillins; Streptomycin; Atorvastatin; and Lisinopril    DISCHARGE DIAGNOSIS/NURSING FACILITY COURSE:   Mrs. Polanco is a Mandarin Chinese speaking female whom presented to ER by family due to Left foot pain and redness.  Workup showed increased Uric acid and Xray showed mild bunion deformity with hallux valgus and DJD 1st MTP.  Diagnosis was gout and she was started on a prolonged Prednisone taper.  Later was transitioned to the TCU for ongoing cares and PT/OT.     At the TCU Mrs. Polanco endorsed chronic LLE pain but acute Left foot pain.  Pain in Left foot slowly improved with the Prednisone, but at time of discharge, it has not fully resolved.  There is much less pain, and she has been able to ambulate better.  Continues with a few more days of Prednisone.  Due to improvement, she will transition back to her prior living with home care.      In meeting Mrs. Polanco today (via phone ). She reports overall she is doing well, endorses pain in  Left foot better, but not resolved.  Endorses her chronic LLE pain but tolerable.  She is reporting new GERD / reflux symptoms.  No N/V or abdominal pain, reports WNL bowel movements.  She thinks GERD is from too many pills, but the only new medication is the Prednisone.  We query if it's the food, but she does not think so, thus etiology is unclear.  Otherwise she reports she feels ready for home.     Acute gout of left foot, unspecified cause  Foot pain, left  Chronic pain of left lower extremity  Redness and swelling in Left foot have resolved, pain has improved but not fully resolved.  Last check of Uric acid was 7.1 and trending down.   Was started on Colchicine in hospital but stopped due to risk of diarrhea, switched to Prednisone.   -Finishes Prednisone on 20 June.   -PRN Acetaminophen for pain, not using much.   --Consider Allopurinol once Prednisone is completed.  --Consider return to Ortho if does not fully resolve/worsens.     Coronary artery disease involving native coronary artery of native heart without angina pectoris, with h/o stent  Essential hypertension  Review of VS's show VSS and within acceptable range.   No current s/s of clinical CHF.   -No changes, continue to clinically monitor.     Gastroesophageal reflux disease with esophagitis  Endorsing new reflux burning, reports as newer, thinks it's from pills, but Prednisone is the only new pill.  Denies any N/V or bowel issues.  Etiology unclear.  Already taking Calcium Carbonate BID.   -Started empiric Omeprazole 20 mg's q daily x 30 days.   --Further workup left to PCP discretion.     Debility  -Will DC with home care.   -See  home care order for F2F.     Past Medical History:  has a past medical history of Benign essential HTN, Coronary artery disease involving native coronary artery of native heart with unstable angina pectoris (H) (4/2016), Cough, Eczema, Lung nodule, Malignant neoplasm of bladder, part unspecified (1999 Dr Everett),  Mitral regurgitation, Mixed hyperlipidemia, NSTEMI (non-ST elevated myocardial infarction) (H), Osteoporosis, Polyarticular arthritis, Rash, and Sciatica.    Discharge Medications:  Current Outpatient Medications   Medication Sig Dispense Refill     acetaminophen (TYLENOL) 325 MG tablet Take 2 tablets (650 mg) by mouth every 6 hours as needed for mild pain 50 tablet 0     aspirin EC 81 MG EC tablet Take 1 tablet (81 mg) by mouth daily 30 tablet 1     calcium carbonate (OS-CLAIRE 500 MG [place]. [place]) 500 MG tablet Take 500 mg by mouth 2 times daily       cholecalciferol (VITAMIN D) 400 UNIT TABS tablet Take 400 Units by mouth daily       docusate sodium (COLACE) 100 MG capsule Take by mouth 2 times daily       metoprolol tartrate (LOPRESSOR) 25 MG tablet Take 0.5 tablets (12.5 mg) by mouth 2 times daily 90 tablet 3     nitroGLYcerin (NITROSTAT) 0.4 MG sublingual tablet Place 1 tablet (0.4 mg) under the tongue every 5 minutes as needed for chest pain if you are still having symptoms after 3 doses (15 minutes) call 911. 25 tablet 3     omeprazole (PRILOSEC) 20 MG DR capsule Take 1 capsule (20 mg) by mouth daily       predniSONE (DELTASONE) 10 MG tablet Take 4 tablets (40mg) for 4 days then 3 tablets (30mg) for 3 days, then 2 tablets (20mg) for 3 days then one tablet (10mg) for 3 days, last dose on 20 June. 34 tablet 0     Medication Changes/Rationale:   -As noted above.     Controlled medications sent with patient:   not applicable/none     ROS:   7 point ROS done including, light headedness/dizziness, fever/chills, pain, Resp, CV, GI, and  and is negative other than noted in HPI.      Physical Exam:   Vitals: /63   Pulse 73   Temp 97.8  F (36.6  C)   Resp 16   Ht 1.524 m (5')   Wt 53.9 kg (118 lb 12.8 oz)   SpO2 95%   BMI 23.20 kg/m     BMI= Body mass index is 23.2 kg/m .     Exam:  GENERAL APPEARANCE:  Elderly thin female sitting in chair, NAD, non-toxic.  ENT:  Mouth and oropharynx normal, moist mucous  membranes.   RESP:  Lungs CTA.  Regular relaxed breathing effort.  No cough.   CV: 2/6 soft systolic murmur heard loudest 2 ICS RSB / S2.   Regular rhythm and rate.  No generalized edema.   ABDOMEN:   Flat, soft, non-tender with active bowel sounds.  No guarding, rigidity, or rebound tenderness.  EXTREMITIES:  No lower extremity edema, no calf tenderness.   PSYCH: Alert and orientated, pleasant and cooperative.   WOUND: Left hallex area has a bunyon or raised area, roughly 1 cm round, soft but tender, feels like fluid under but hard to tell.  Left is tender but no warmth, redness, nor swelling.  Right side has Hallex area with dry scarred area present.    SNF labs: Labs done in SNF are in [facility]. Please refer to them using [employer]/Care Everywhere.    DISCHARGE PLAN:    Follow up labs: No labs orders/due, could consider trending Uric acid.     Medical Follow Up:      Follow up with primary care provider in 1-2 weeks      MTM referral needed and placed by this provider: No      Discharge Services: Home Care:  Physical Therapy, Registered Nurse and Home Health Aide    Discharge Instructions Verbalized to Patient at Discharge:     Instructed patient to make PCP follow up appointment in 1-2 weeks.       TOTAL DISCHARGE TIME:   Greater than 30 minutes  Electronically signed by:  NEYDA York CNP     Home care Face to Face documentation done in [facility] attached to Home care orders for [facility].                     Sincerely,        NEYDA York CNP

## 2020-01-10 DIAGNOSIS — I49.9 CARDIAC ARRHYTHMIA, UNSPECIFIED CARDIAC ARRHYTHMIA TYPE: ICD-10-CM

## 2020-01-10 RX ORDER — METOPROLOL TARTRATE 25 MG/1
12.5 TABLET, FILM COATED ORAL 2 TIMES DAILY
Qty: 90 TABLET | Refills: 1 | Status: ON HOLD | OUTPATIENT
Start: 2020-01-10 | End: 2020-03-05

## 2020-03-04 ENCOUNTER — APPOINTMENT (OUTPATIENT)
Dept: GENERAL RADIOLOGY | Facility: CLINIC | Age: 85
End: 2020-03-04
Attending: EMERGENCY MEDICINE
Payer: COMMERCIAL

## 2020-03-04 ENCOUNTER — HOSPITAL ENCOUNTER (OUTPATIENT)
Facility: CLINIC | Age: 85
Setting detail: OBSERVATION
Discharge: HOME | End: 2020-03-05
Attending: EMERGENCY MEDICINE | Admitting: INTERNAL MEDICINE
Payer: COMMERCIAL

## 2020-03-04 ENCOUNTER — APPOINTMENT (OUTPATIENT)
Dept: CARDIOLOGY | Facility: CLINIC | Age: 85
End: 2020-03-04
Attending: INTERNAL MEDICINE
Payer: COMMERCIAL

## 2020-03-04 DIAGNOSIS — I25.110 CORONARY ARTERY DISEASE INVOLVING NATIVE CORONARY ARTERY OF NATIVE HEART WITH UNSTABLE ANGINA PECTORIS (H): ICD-10-CM

## 2020-03-04 DIAGNOSIS — I21.4 NSTEMI (NON-ST ELEVATED MYOCARDIAL INFARCTION) (H): Primary | ICD-10-CM

## 2020-03-04 DIAGNOSIS — R00.2 PALPITATIONS: ICD-10-CM

## 2020-03-04 DIAGNOSIS — R07.9 CHEST PAIN, UNSPECIFIED TYPE: ICD-10-CM

## 2020-03-04 LAB
ALBUMIN SERPL-MCNC: 3.4 G/DL (ref 3.4–5)
ALP SERPL-CCNC: 69 U/L (ref 40–150)
ALT SERPL W P-5'-P-CCNC: 18 U/L (ref 0–50)
ANION GAP SERPL CALCULATED.3IONS-SCNC: 5 MMOL/L (ref 3–14)
AST SERPL W P-5'-P-CCNC: 43 U/L (ref 0–45)
BASOPHILS # BLD AUTO: 0.1 10E9/L (ref 0–0.2)
BASOPHILS NFR BLD AUTO: 1.4 %
BILIRUB SERPL-MCNC: 0.5 MG/DL (ref 0.2–1.3)
BUN SERPL-MCNC: 21 MG/DL (ref 7–30)
CALCIUM SERPL-MCNC: 10 MG/DL (ref 8.5–10.1)
CHLORIDE SERPL-SCNC: 109 MMOL/L (ref 94–109)
CO2 SERPL-SCNC: 25 MMOL/L (ref 20–32)
CREAT SERPL-MCNC: 0.6 MG/DL (ref 0.52–1.04)
DIFFERENTIAL METHOD BLD: NORMAL
EOSINOPHIL # BLD AUTO: 0.5 10E9/L (ref 0–0.7)
EOSINOPHIL NFR BLD AUTO: 6 %
ERYTHROCYTE [DISTWIDTH] IN BLOOD BY AUTOMATED COUNT: 12.3 % (ref 10–15)
ERYTHROCYTE [DISTWIDTH] IN BLOOD BY AUTOMATED COUNT: 12.3 % (ref 10–15)
GFR SERPL CREATININE-BSD FRML MDRD: 80 ML/MIN/{1.73_M2}
GLUCOSE SERPL-MCNC: 105 MG/DL (ref 70–99)
HCT VFR BLD AUTO: 37.8 % (ref 35–47)
HCT VFR BLD AUTO: 39.6 % (ref 35–47)
HGB BLD-MCNC: 12.5 G/DL (ref 11.7–15.7)
HGB BLD-MCNC: 12.9 G/DL (ref 11.7–15.7)
IMM GRANULOCYTES # BLD: 0 10E9/L (ref 0–0.4)
IMM GRANULOCYTES NFR BLD: 0.3 %
INTERPRETATION ECG - MUSE: NORMAL
LIPASE SERPL-CCNC: 215 U/L (ref 73–393)
LMWH PPP CHRO-ACNC: 0.68 IU/ML
LYMPHOCYTES # BLD AUTO: 3.4 10E9/L (ref 0.8–5.3)
LYMPHOCYTES NFR BLD AUTO: 42.4 %
MCH RBC QN AUTO: 29.7 PG (ref 26.5–33)
MCH RBC QN AUTO: 30.3 PG (ref 26.5–33)
MCHC RBC AUTO-ENTMCNC: 32.6 G/DL (ref 31.5–36.5)
MCHC RBC AUTO-ENTMCNC: 33.1 G/DL (ref 31.5–36.5)
MCV RBC AUTO: 91 FL (ref 78–100)
MCV RBC AUTO: 92 FL (ref 78–100)
MONOCYTES # BLD AUTO: 0.7 10E9/L (ref 0–1.3)
MONOCYTES NFR BLD AUTO: 8.4 %
NEUTROPHILS # BLD AUTO: 3.3 10E9/L (ref 1.6–8.3)
NEUTROPHILS NFR BLD AUTO: 41.5 %
NRBC # BLD AUTO: 0 10*3/UL
NRBC BLD AUTO-RTO: 0 /100
PLATELET # BLD AUTO: 256 10E9/L (ref 150–450)
PLATELET # BLD AUTO: 259 10E9/L (ref 150–450)
POTASSIUM SERPL-SCNC: 4.4 MMOL/L (ref 3.4–5.3)
PROT SERPL-MCNC: 7.6 G/DL (ref 6.8–8.8)
RBC # BLD AUTO: 4.13 10E12/L (ref 3.8–5.2)
RBC # BLD AUTO: 4.34 10E12/L (ref 3.8–5.2)
SODIUM SERPL-SCNC: 139 MMOL/L (ref 133–144)
TROPONIN I SERPL-MCNC: 0.02 UG/L (ref 0–0.04)
TROPONIN I SERPL-MCNC: 0.04 UG/L (ref 0–0.04)
TROPONIN I SERPL-MCNC: 0.09 UG/L (ref 0–0.04)
WBC # BLD AUTO: 7.9 10E9/L (ref 4–11)
WBC # BLD AUTO: 7.9 10E9/L (ref 4–11)

## 2020-03-04 PROCEDURE — 99285 EMERGENCY DEPT VISIT HI MDM: CPT | Mod: 25

## 2020-03-04 PROCEDURE — 25000132 ZZH RX MED GY IP 250 OP 250 PS 637: Performed by: HOSPITALIST

## 2020-03-04 PROCEDURE — 25000132 ZZH RX MED GY IP 250 OP 250 PS 637: Performed by: EMERGENCY MEDICINE

## 2020-03-04 PROCEDURE — 84484 ASSAY OF TROPONIN QUANT: CPT | Performed by: EMERGENCY MEDICINE

## 2020-03-04 PROCEDURE — 93306 TTE W/DOPPLER COMPLETE: CPT | Mod: 26 | Performed by: INTERNAL MEDICINE

## 2020-03-04 PROCEDURE — 40000264 ECHOCARDIOGRAM COMPLETE

## 2020-03-04 PROCEDURE — 85520 HEPARIN ASSAY: CPT | Performed by: INTERNAL MEDICINE

## 2020-03-04 PROCEDURE — G0378 HOSPITAL OBSERVATION PER HR: HCPCS

## 2020-03-04 PROCEDURE — 85025 COMPLETE CBC W/AUTO DIFF WBC: CPT | Performed by: EMERGENCY MEDICINE

## 2020-03-04 PROCEDURE — 93005 ELECTROCARDIOGRAM TRACING: CPT

## 2020-03-04 PROCEDURE — 25500064 ZZH RX 255 OP 636: Performed by: INTERNAL MEDICINE

## 2020-03-04 PROCEDURE — 96376 TX/PRO/DX INJ SAME DRUG ADON: CPT

## 2020-03-04 PROCEDURE — 71046 X-RAY EXAM CHEST 2 VIEWS: CPT

## 2020-03-04 PROCEDURE — 96374 THER/PROPH/DIAG INJ IV PUSH: CPT

## 2020-03-04 PROCEDURE — 80053 COMPREHEN METABOLIC PANEL: CPT | Performed by: EMERGENCY MEDICINE

## 2020-03-04 PROCEDURE — 85027 COMPLETE CBC AUTOMATED: CPT | Performed by: INTERNAL MEDICINE

## 2020-03-04 PROCEDURE — 99214 OFFICE O/P EST MOD 30 MIN: CPT | Mod: 25 | Performed by: INTERNAL MEDICINE

## 2020-03-04 PROCEDURE — 99220 ZZC INITIAL OBSERVATION CARE,LEVL III: CPT | Performed by: INTERNAL MEDICINE

## 2020-03-04 PROCEDURE — 84484 ASSAY OF TROPONIN QUANT: CPT | Performed by: INTERNAL MEDICINE

## 2020-03-04 PROCEDURE — 25000128 H RX IP 250 OP 636: Performed by: HOSPITALIST

## 2020-03-04 PROCEDURE — 25000132 ZZH RX MED GY IP 250 OP 250 PS 637: Performed by: INTERNAL MEDICINE

## 2020-03-04 PROCEDURE — 36415 COLL VENOUS BLD VENIPUNCTURE: CPT | Performed by: INTERNAL MEDICINE

## 2020-03-04 PROCEDURE — 83690 ASSAY OF LIPASE: CPT | Performed by: EMERGENCY MEDICINE

## 2020-03-04 RX ORDER — ASPIRIN 81 MG/1
81 TABLET ORAL DAILY
Status: DISCONTINUED | OUTPATIENT
Start: 2020-03-05 | End: 2020-03-05 | Stop reason: HOSPADM

## 2020-03-04 RX ORDER — HEPARIN SODIUM 10000 [USP'U]/100ML
450 INJECTION, SOLUTION INTRAVENOUS CONTINUOUS
Status: DISCONTINUED | OUTPATIENT
Start: 2020-03-04 | End: 2020-03-05

## 2020-03-04 RX ORDER — ACETAMINOPHEN 325 MG/1
650 TABLET ORAL EVERY 4 HOURS PRN
Status: DISCONTINUED | OUTPATIENT
Start: 2020-03-04 | End: 2020-03-05 | Stop reason: HOSPADM

## 2020-03-04 RX ORDER — ASPIRIN 81 MG/1
162 TABLET, CHEWABLE ORAL ONCE
Status: DISCONTINUED | OUTPATIENT
Start: 2020-03-04 | End: 2020-03-05 | Stop reason: HOSPADM

## 2020-03-04 RX ORDER — AMOXICILLIN 250 MG
1 CAPSULE ORAL AT BEDTIME
Status: DISCONTINUED | OUTPATIENT
Start: 2020-03-04 | End: 2020-03-04

## 2020-03-04 RX ORDER — NALOXONE HYDROCHLORIDE 0.4 MG/ML
.1-.4 INJECTION, SOLUTION INTRAMUSCULAR; INTRAVENOUS; SUBCUTANEOUS
Status: DISCONTINUED | OUTPATIENT
Start: 2020-03-04 | End: 2020-03-05 | Stop reason: HOSPADM

## 2020-03-04 RX ORDER — ACETAMINOPHEN 325 MG/1
650 TABLET ORAL EVERY 6 HOURS PRN
Status: DISCONTINUED | OUTPATIENT
Start: 2020-03-04 | End: 2020-03-04

## 2020-03-04 RX ORDER — NITROGLYCERIN 0.4 MG/1
0.4 TABLET SUBLINGUAL EVERY 5 MIN PRN
Status: DISCONTINUED | OUTPATIENT
Start: 2020-03-04 | End: 2020-03-05 | Stop reason: HOSPADM

## 2020-03-04 RX ORDER — ALUMINA, MAGNESIA, AND SIMETHICONE 2400; 2400; 240 MG/30ML; MG/30ML; MG/30ML
30 SUSPENSION ORAL EVERY 4 HOURS PRN
Status: DISCONTINUED | OUTPATIENT
Start: 2020-03-04 | End: 2020-03-05 | Stop reason: HOSPADM

## 2020-03-04 RX ORDER — AMLODIPINE BESYLATE 2.5 MG/1
2.5 TABLET ORAL DAILY
Status: DISCONTINUED | OUTPATIENT
Start: 2020-03-05 | End: 2020-03-05 | Stop reason: HOSPADM

## 2020-03-04 RX ORDER — CARVEDILOL 6.25 MG/1
6.25 TABLET ORAL 2 TIMES DAILY WITH MEALS
Status: DISCONTINUED | OUTPATIENT
Start: 2020-03-04 | End: 2020-03-05 | Stop reason: HOSPADM

## 2020-03-04 RX ORDER — LIDOCAINE 40 MG/G
CREAM TOPICAL
Status: DISCONTINUED | OUTPATIENT
Start: 2020-03-04 | End: 2020-03-05 | Stop reason: HOSPADM

## 2020-03-04 RX ORDER — ACETAMINOPHEN 650 MG/1
650 SUPPOSITORY RECTAL EVERY 4 HOURS PRN
Status: DISCONTINUED | OUTPATIENT
Start: 2020-03-04 | End: 2020-03-05 | Stop reason: HOSPADM

## 2020-03-04 RX ORDER — ASPIRIN 81 MG/1
81 TABLET ORAL DAILY
Status: DISCONTINUED | OUTPATIENT
Start: 2020-03-04 | End: 2020-03-04

## 2020-03-04 RX ORDER — AMOXICILLIN 250 MG
1 CAPSULE ORAL DAILY
Status: DISCONTINUED | OUTPATIENT
Start: 2020-03-04 | End: 2020-03-05 | Stop reason: HOSPADM

## 2020-03-04 RX ORDER — ASPIRIN 81 MG/1
324 TABLET, CHEWABLE ORAL ONCE
Status: COMPLETED | OUTPATIENT
Start: 2020-03-04 | End: 2020-03-04

## 2020-03-04 RX ADMIN — HEPARIN SODIUM 650 UNITS/HR: 10000 INJECTION, SOLUTION INTRAVENOUS at 10:54

## 2020-03-04 RX ADMIN — CARVEDILOL 6.25 MG: 6.25 TABLET, FILM COATED ORAL at 13:32

## 2020-03-04 RX ADMIN — SENNOSIDES AND DOCUSATE SODIUM 1 TABLET: 8.6; 5 TABLET ORAL at 13:32

## 2020-03-04 RX ADMIN — Medication 3200 UNITS: at 10:56

## 2020-03-04 RX ADMIN — ASPIRIN 81 MG 324 MG: 81 TABLET ORAL at 02:59

## 2020-03-04 RX ADMIN — HUMAN ALBUMIN MICROSPHERES AND PERFLUTREN 9 ML: 10; .22 INJECTION, SOLUTION INTRAVENOUS at 16:34

## 2020-03-04 RX ADMIN — CARVEDILOL 6.25 MG: 6.25 TABLET, FILM COATED ORAL at 18:33

## 2020-03-04 ASSESSMENT — ENCOUNTER SYMPTOMS
CHILLS: 1
PALPITATIONS: 1

## 2020-03-04 NOTE — PLAN OF CARE
AOx4, Heart Health diet, SBA, speaks mandarin, came to ER this AM with chest pain with hx of NSTEMI and stent slightly elevated troponin. Has been NPO most of shift for echo, Cardiologist put in diet orders as troponin is trending down and patient is chest pain free. Pt was started on heparin drip. Heparin Xa scheduled for 1700.   Marie Hill RN

## 2020-03-04 NOTE — CONSULTS
Cardiology Consult Note-- PLEASE SEE ASSOCIATED DICTATION    Mary Polanco MRN#: [id number]   YOB: 1930 Age: 89 year old     Date of Admission: 3/4/2020  Consult indication: elevated troponin, chest pain         HPI and Assessment and Recommendations/Plan:      Please refer to the associated dictation.     - used a licensed Mandarin  through iPad  - discussed clinical course, recommended coronary angiography +/- PCI, Pt and son declined this  - recommended repeat stress test, though this would be suboptimal since there is a chance of a false negative given the negative stress test 3/2019, they declined this  - will proceed with conservative management with a TTE, heparin gtt  - started metoprolol tartrate, holding ACE-inh and statin due to h/o rash/allergic reactions  - trend troponin  - keep NPO pending results of TTE and troponin trend    Thank you for allowing our team to participate in the care of Mary Polanco.  Please do not hesitate to page me with any questions or concerns.     Sea Ramos MD  [facility]  Cardiology  Pager:  [phone]  Text Page   March 4, 2020         Past Medical History:   I have reviewed this patient's past medical history  Past Medical History:   Diagnosis Date     Benign essential HTN      Coronary artery disease involving native coronary artery of native heart with unstable angina pectoris (H) 4/2016 4/2016 Cath - 80% hazy stenosis at site of ruptured plaque - HARLEEN placed     Cough      Eczema      Lung nodule      Malignant neoplasm of bladder, part unspecified 1999 Dr Everett    Surgery done in [place]     Mitral regurgitation     4/2016 mod-mod severe MR with multiple jets per Echo     Mixed hyperlipidemia      NSTEMI (non-ST elevated myocardial infarction) (H)      Osteoporosis      Polyarticular arthritis      Rash      Sciatica              Past Surgical History:   I have reviewed this patient's past surgical history   Past Surgical History:    Procedure Laterality Date     BLADDER TUMOR-ASSOCIATED      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     C APPENDECTOMY      1950     CATARACT IOL, RT/LT  5-08     HC REMOVAL GALLBLADDER      due to stone 1990     HEART CATH STENT COR W/WO PTCA  4/2016 4/2016 Cath - 80% hazy stenosis at site of ruptured plaque - HARLEEN placed             Social History:   I have reviewed this patient's social history  Social History     Tobacco Use     Smoking status: Never Smoker     Smokeless tobacco: Never Used   Substance Use Topics     Alcohol use: No             Family History:   I have reviewed this patient's family history  Family History   Problem Relation Age of Onset     Diabetes Brother      Hypertension Son      Cerebrovascular Disease Mother         76     Family History Negative Daughter      Family History Negative Brother      Family History Negative Brother              Allergies:   I have reviewed this patient's allergy history  Allergies   Allergen Reactions     Azithromycin      Gabapentin Itching     Kanamycin      Metronidazole Rash     Penicillins Rash     Streptomycin      rash     Atorvastatin Rash     Lisinopril Rash             Medications reviewed:   Prior to admission medications:  Prior to Admission medications    Medication Sig Start Date End Date Taking? Authorizing Provider   acetaminophen (TYLENOL) 325 MG tablet Take 2 tablets (650 mg) by mouth every 6 hours as needed for mild pain 6/7/19   Jeremiah Roman MD   aspirin EC 81 MG EC tablet Take 1 tablet (81 mg) by mouth daily 4/12/16   Edis Robledo MD   calcium carbonate (OS-CLAIRE 500 MG [place]. [place]) 500 MG tablet Take 500 mg by mouth 2 times daily    Reported, Patient   cholecalciferol (VITAMIN D) 400 UNIT TABS tablet Take 400 Units by mouth daily    Reported, Patient   docusate sodium (COLACE) 100 MG capsule Take by mouth 2 times daily    Reported, Patient   metoprolol tartrate (LOPRESSOR) 25 MG tablet Take 0.5 tablets (12.5 mg) by mouth 2  times daily 1/10/20   Ip, Lopez Lang MD   nitroGLYcerin (NITROSTAT) 0.4 MG sublingual tablet Place 1 tablet (0.4 mg) under the tongue every 5 minutes as needed for chest pain if you are still having symptoms after 3 doses (15 minutes) call 911. 3/1/19   Lou Walter APRN CNP   omeprazole (PRILOSEC) 20 MG DR capsule Take 1 capsule (20 mg) by mouth daily 6/18/19   Georges Hsieh APRN CNP   predniSONE (DELTASONE) 10 MG tablet Take 4 tablets (40mg) for 4 days then 3 tablets (30mg) for 3 days, then 2 tablets (20mg) for 3 days then one tablet (10mg) for 3 days, last dose on 20 June. 6/18/19   Georges Hsieh APRN CNP      Current medications:  Current Facility-Administered Medications Ordered in Epic   Medication Dose Route Frequency Last Rate Last Dose     acetaminophen (TYLENOL) Suppository 650 mg  650 mg Rectal Q4H PRN         acetaminophen (TYLENOL) tablet 650 mg  650 mg Oral Q4H PRN         alum & mag hydroxide-simethicone (MAALOX  ES) suspension 30 mL  30 mL Oral Q4H PRN         aspirin (ASA) chewable tablet 162 mg  162 mg Oral Once         [START ON 3/5/2020] aspirin EC tablet 81 mg  81 mg Oral Daily         lidocaine (LMX4) cream   Topical Q1H PRN         lidocaine 1 % 0.1-1 mL  0.1-1 mL Other Q1H PRN         metoprolol tartrate (LOPRESSOR) half-tab 12.5 mg  12.5 mg Oral BID         naloxone (NARCAN) injection 0.1-0.4 mg  0.1-0.4 mg Intravenous Q2 Min PRN         nitroGLYcerin (NITROSTAT) sublingual tablet 0.4 mg  0.4 mg Sublingual Q5 Min PRN         omeprazole (priLOSEC) CR capsule 20 mg  20 mg Oral QAM AC         sodium chloride (PF) 0.9% PF flush 3 mL  3 mL Intracatheter q1 min prn         sodium chloride (PF) 0.9% PF flush 3 mL  3 mL Intracatheter Q8H         No current [facility]-ordered outpatient medications on file.             Review of Systems:   A complete review of systems was performed and was negative except as mentioned in the HPI.          Physical Exam:   Vital signs were  personally reviewed:  Temperatures:  Current - Temp: 96.5  F (35.8  C); Max - Temp  Av.8  F (36  C)  Min: 96  F (35.6  C)  Max: 97.8  F (36.6  C)  Respiration range: Resp  Av.7  Min: 16  Max: 18  Pulse range: Pulse  Av  Min: 73  Max: 73  Blood pressure range: Systolic (24hrs), Av , Min:145 , Max:170   ; Diastolic (24hrs), Av, Min:53, Max:75    Pulse oximetry range: SpO2  Av.7 %  Min: 96 %  Max: 99 %  No intake or output data in the 24 hours ending 20 0940  117 lbs 15.14 oz  Body mass index is 23.03 kg/m .   Body surface area is 1.5 meters squared.    Constitutional: appears stated age, in no apparent distress, appears to be well nourished  Eyes: sclera anicteric, conjunctiva normal, no lesions on eyelids or lashes  ENT: normocephalic, without obvious abnormality, atraumatic, external ears without lesions   Pulmonary: clear to auscultation bilaterally, no wheezes, no rales, no increased work of breathing  Cardiovascular: JVP normal, regular rate, regular rhythm, normal S1 and S2, no S3, S4, no murmur appreciated, no lower extremity edema  Gastrointestinal: abdominal exam benign, non-tender, no rigidity, no guarding  Neurologic: awake, alert, face symmetrical, moves all extremities  Skin: no abnormal rashes or lesions on limited exam, nails normal without discoloration or clubbing, no jaundice  Psychiatric: affect is normal, answers questions appropriately, oriented to self and place         Laboratory tests:   Laboratory tests personally reviewed:   CMP  Recent Labs   Lab 20  0239      POTASSIUM 4.4   CHLORIDE 109   CO2 25   ANIONGAP 5   *   BUN 21   CR 0.60   GFRESTIMATED 80   GFRESTBLACK >90   CLAIRE 10.0   PROTTOTAL 7.6   ALBUMIN 3.4   BILITOTAL 0.5   ALKPHOS 69   AST 43   ALT 18     CBC  Recent Labs   Lab 20  0239   WBC 7.9   RBC 4.13   HGB 12.5   HCT 37.8   MCV 92   MCH 30.3   MCHC 33.1   RDW 12.3        INRNo lab results found in last 7 days.  Lab  Results   Component Value Date    TROPI 0.085 (H) 03/04/2020    TROPI 0.016 03/04/2020    TROPI  09/15/2016     <0.015  The 99th percentile for upper reference range is 0.045 ug/L.  Troponin values in   the range of 0.045 - 0.120 ug/L may be associated with risks of adverse   clinical events.       Recent Labs   Lab Test 07/14/17 04/10/16  0535   CHOL 220 170   HDL 53 55    88   TRIG 330 133     No results found for: A1C  TSH   Date Value Ref Range Status   09/15/2016 0.82 0.40 - 4.00 mU/L Final            Imaging and Additional Data:     Recent Results (from the past 24 hour(s))   XR Chest 2 Views    Narrative    EXAM: XR CHEST 2 VW  LOCATION: [facility]  DATE/TIME: 3/4/2020 2:47 AM    INDICATION: Chest pain.    COMPARISON: 5/17/2018.    FINDINGS: The heart size is normal. The thoracic aorta is calcified and mildly tortuous. The lungs are clear. No pneumothorax or pleural effusion. Degenerative disease in the spine.      Impression    IMPRESSION: No acute abnormality.         Lexiscan 3/2019  Impression  1.  Myocardial perfusion imaging using single isotope technique  demonstrated normal myocardial perfusion.   2. Gated images demonstrated normal wall motion.  The left ventricular  systolic function is normal with a calculated ejection fraction of  72%.  3. Compared to the prior study from 2016, no significant changes are  noted .

## 2020-03-04 NOTE — CONSULTS
Consult Date:  03/04/2020      CONSULT INDICATION:  Elevated troponin, chest pain.      HISTORY OF PRESENT ILLNESS:      I had the opportunity to see patient, Mary Polanco, today in hospital for Cardiology consultation.  This visit was aided with the use of a licensed Mandarin .  As you know, she is an 89-year-old female with a past medical history significant for prior NSTEMI 04/2016, status post HRALEEN to the left circumflex, prior statin intolerance, prior atypical chest pain, who presents for further evaluation and management of chest pain.  She follows with my colleague Dr. Perrin in cardiology clinic.     The patient states that she was in her usual state of health, living independently without any chest discomfort or chest pain until this morning when she woke up at around 0100 with palpitations, chills, chest tightness.  This chest pain was different from her prior chest pain episodes which were more similar to acid reflux type symptoms.  In the Emergency Department, she was found to be significantly hypertensive at 168/75, heart rate 73.  Initial ECG showed normal sinus rhythm with first-degree AV block, no acute ischemic changes.  Initial labs are notable for a potassium 4.4, creatinine 0.6, troponin normal at 0.016, CBC unremarkable.  Chest x-ray was done which did not show any acute abnormalities.  Subsequent troponin at 0500 was elevated at 0.085.  The patient states that she has not had any recurrence of her chest pain since admission.  She was previously evaluated for atypical chest pain last on 03/19/2019.  At that time the nuclear stress test (Lexiscan) was negative.      ASSESSMENT AND PLAN:     1.  Elevated troponin, chest pain. The patient presented with atypical chest pain, troponin increased to 0.085.  Ddx includes NSTEMI/ACS, may also represent a type 2 MI from hypertension, though it is not entirely clear.  She does have known history of coronary artery disease, status post PCI to the left  circumflex for NSTEMI in 2016.  Last ischemic evaluation was 2019 with a Lexiscan stress test that was negative.   2.  Hypertension.   3.  Hyperlipidemia, statin intolerance.   4.  Moderate to severe mitral regurgitation.      With the use of a licensed , I discussed with the patient and her son at length her clinical course and the significance of this elevated troponin.  I explained that I am not entirely sure if this is representative of an acute coronary syndrome or possibly demand from hypertension.  Due to her coronary history and her negative stress test a year ago, I recommended proceeding with a coronary angiogram, as a repeat stress test, if normal, may represent a false negative.  They declined coronary angiography, and they also declined a repeat stress test at this time.      As such, we will proceed with conservative management and obtain an echocardiogram.  They are amenable to treatment for ACS, and so I recommend a heparin drip in addition to aspirin.  I also started her on metoprolol tartrate.  She has a known allergy to ACE-inhibitors and also statins, so these were held.  I recommend trending the troponin.  Please keep her n.p.o. pending the results of the TTE and repeat troponin.      Thank you for allowing our team to participate in the care of patient Nerissa Schafer.  Please do not hesitate to page or call me any time with questions or concerns.      Sea Ramos MD  Cardiology  Pager:  [phone]  Text Page   2020        D: 2020   T: 2020   MT: ISMAEL      Name:     NERISSA SCHAFER   MRN:      [id number]        Account:       [id number]   :      1930           Consult Date:  2020      Document: [id number]

## 2020-03-04 NOTE — H&P
[facility]  History and Physical  Hospitalist       Date of Admission:  3/4/2020    Assessment & Plan   Mary Polanco is a very pleasant Chinese speaking 89 year old female with hypertension and coronary artery disease who was admitted on 3/4/2020 with central chest pain and palpitations and a detectable troponin.    Chest Pain  CAD s/p stent placement 2016  Hypertension  Awoke from sleep with central chest pain and tightness, palpitations at about 1:30 AM on 3/4/2020.  Endorsed shortness of breath as well.  Twelve-lead EKG with sinus rhythm and first-degree AV block.  Had a myocardial perfusion scan from March 2019 which shows normal wall motion and left ventricular systolic function EF 72%.  She does have history of GERD and her pain happened while she was reclined in bed so there may be an element as heartburn contributing to her pain as well.  Medications prior to mission include metoprolol, Lasix, aspirin.  -Admit to observation to rule out myocardial infarction  -Serial troponins    Addendum 7:30 AM: Second troponin 0 0.085  -Cardiology consultation requested    GERD  -Continue PTA PPI    DVT prophylaxis: Pneumatic Compression Devices and Ambulate every shift  Code Status: Full    Disposition: Expected discharge in 1-2 days.    Keesha Styles MD  Text Page    ~~~~~~~~~~~~~~~~~~~~~~~~~~~~~~~~~~~~~~~~~~~~~~~~~~~~~  Primary Care Physician   Kevon Sawyer    Chief Complaint   Chest pain, palpitations    History is obtained from the patient and medical records.    History of Present Illness   Mary Polanco is a very pleasant, Chinese-speaking only, 89 year old female with coronary artery disease who presents with family after she was awakened at around 1:30 AM with central chest pain.  She also described palpitations and cannot recall how long that lasted.  History is somewhat difficult to obtain given the need for iPad  Mandarin Chinese as well as it being 4:30 in the morning with a very  tired patient.  She has mild central chest pain at present.  No pain with deep breaths no shortness of breath.  Denies diaphoresis, headache, nausea, vomiting, limb pains, abdominal pain.  No recent travel or ill contacts.  Of note, to the emergency department physician she mentions having chills and difficulty breathing but did not report this to me.    Case reviewed with Dr. Suresh from the Emergency Department.  Labs, imaging, EKG reviewed.  Initial troponin 0 0.016.    Past Medical History    I have reviewed this patient's medical history and updated it with pertinent information if needed.   Past Medical History:   Diagnosis Date     Benign essential HTN      Coronary artery disease involving native coronary artery of native heart with unstable angina pectoris (H) 4/2016 4/2016 Cath - 80% hazy stenosis at site of ruptured plaque - HARLEEN placed     Cough      Eczema      Lung nodule      Malignant neoplasm of bladder, part unspecified 1999 Dr Everett    Surgery done in [place]     Mitral regurgitation     4/2016 mod-mod severe MR with multiple jets per Echo     Mixed hyperlipidemia      NSTEMI (non-ST elevated myocardial infarction) (H)      Osteoporosis      Polyarticular arthritis      Rash      Sciatica        Past Surgical History   I have reviewed this patient's surgical history and updated it with pertinent information if needed.  Past Surgical History:   Procedure Laterality Date     BLADDER TUMOR-ASSOCIATED      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     C APPENDECTOMY      1950     CATARACT IOL, RT/LT  5-08     HC REMOVAL GALLBLADDER      due to stone 1990     HEART CATH STENT COR W/WO PTCA  4/2016 4/2016 Cath - 80% hazy stenosis at site of ruptured plaque - HARLEEN placed       Prior to Admission Medications   Prior to Admission Medications   Prescriptions Last Dose Informant Patient Reported? Taking?   acetaminophen (TYLENOL) 325 MG tablet   No No   Sig: Take 2 tablets (650 mg) by mouth every 6  hours as needed for mild pain   aspirin EC 81 MG EC tablet   No No   Sig: Take 1 tablet (81 mg) by mouth daily   calcium carbonate (OS-CLAIRE 500 MG [place]. [place]) 500 MG tablet   Yes No   Sig: Take 500 mg by mouth 2 times daily   cholecalciferol (VITAMIN D) 400 UNIT TABS tablet   Yes No   Sig: Take 400 Units by mouth daily   docusate sodium (COLACE) 100 MG capsule   Yes No   Sig: Take by mouth 2 times daily   metoprolol tartrate (LOPRESSOR) 25 MG tablet   No No   Sig: Take 0.5 tablets (12.5 mg) by mouth 2 times daily   nitroGLYcerin (NITROSTAT) 0.4 MG sublingual tablet   No No   Sig: Place 1 tablet (0.4 mg) under the tongue every 5 minutes as needed for chest pain if you are still having symptoms after 3 doses (15 minutes) call 911.   omeprazole (PRILOSEC) 20 MG DR capsule   No No   Sig: Take 1 capsule (20 mg) by mouth daily   predniSONE (DELTASONE) 10 MG tablet   No No   Sig: Take 4 tablets (40mg) for 4 days then 3 tablets (30mg) for 3 days, then 2 tablets (20mg) for 3 days then one tablet (10mg) for 3 days, last dose on 20 June.      Facility-Administered Medications: None     Allergies   Allergies   Allergen Reactions     Azithromycin      Gabapentin Itching     Kanamycin      Metronidazole Rash     Penicillins Rash     Streptomycin      rash     Atorvastatin Rash     Lisinopril Rash       Social History   I have reviewed this patient's social history and updated it with pertinent information if needed. Mary He  reports that she has never smoked. She has never used smokeless tobacco. She reports that she does not drink alcohol or use drugs.    Family History   I have reviewed this patient's family history and updated it with pertinent information if needed.   Family History   Problem Relation Age of Onset     Diabetes Brother      Hypertension Son      Cerebrovascular Disease Mother         76     Family History Negative Daughter      Family History Negative Brother      Family History Negative Brother        Review  of Systems   The 10 point Review of Systems is negative other than noted in the HPI or here.     Physical Exam   Temp: 97.8  F (36.6  C) Temp src: Oral BP: (!) 168/75 Pulse: 73   Resp: 18 SpO2: 96 % O2 Device: None (Room air)    Vital Signs with Ranges  Temp:  [97.8  F (36.6  C)] 97.8  F (36.6  C)  Pulse:  [73] 73  Resp:  [18] 18  BP: (168)/(75) 168/75  SpO2:  [96 %] 96 %  0 lbs 0 oz    Constitutional: Alert, NAD, pleasant and interactive  Eyes: PERRL, sclerae anicteric  HEENT: mmm, atraumatic  Respiratory: Lungs CTAB, no wheezes or crackles  Cardiovascular: RRR, systolic murmur present  No chest wall tenderness on palpation  no LE edema  GI: soft, non-tender, nondistended  Skin/Integument: warm, dry, no acute rashes  Musc: SHEPARD, normal limb strength x 4  Neuro: AOx3, no focal deficits, no tremors  Psych: not anxious, not confused      Data   Data reviewed today:  I personally reviewed the EKG tracing showing Sinus rhythm with variable block.  Recent Labs   Lab 03/04/20  0239   WBC 7.9   HGB 12.5   MCV 92         POTASSIUM 4.4   CHLORIDE 109   CO2 25   BUN 21   CR 0.60   ANIONGAP 5   CLAIRE 10.0   *   ALBUMIN 3.4   PROTTOTAL 7.6   BILITOTAL 0.5   ALKPHOS 69   ALT 18   AST 43   LIPASE 215   TROPI 0.016       Imaging:  Recent Results (from the past 24 hour(s))   XR Chest 2 Views    Narrative    EXAM: XR CHEST 2 VW  LOCATION: [facility]  DATE/TIME: 3/4/2020 2:47 AM    INDICATION: Chest pain.    COMPARISON: 5/17/2018.    FINDINGS: The heart size is normal. The thoracic aorta is calcified and mildly tortuous. The lungs are clear. No pneumothorax or pleural effusion. Degenerative disease in the spine.      Impression    IMPRESSION: No acute abnormality.

## 2020-03-04 NOTE — PHARMACY-ADMISSION MEDICATION HISTORY
Pharmacy Medication History  Admission medication history interview status for the 3/4/2020  admission is complete. See EPIC admission navigator for prior to admission medications     Medication history sources: Patient and Surescripts. Interviewed patient with the help of an .   Medication history source reliability: Good  Adherence assessment: Good    Significant changes made to the medication list:  1) Patient states she no longer takes omeprazole 20mg daily  2) Patient shows that she had filled furosemide 20mg daily in January but states she only took for one week and then discontinued as the swelling in her legs had improved.         Medication reconciliation completed by provider prior to medication history? No    Time spent in this activity: 20 minutes      Prior to Admission medications    Medication Sig Last Dose Taking? Auth Provider   acetaminophen (TYLENOL) 325 MG tablet Take 2 tablets (650 mg) by mouth every 6 hours as needed for mild pain prn med Yes Jeremiah Roman MD   aspirin EC 81 MG EC tablet Take 1 tablet (81 mg) by mouth daily 3/3/2020 at am Yes Edis Robledo MD   calcium carbonate (OS-CLAIRE 500 MG [place]. [place]) 500 MG tablet Take 500 mg by mouth daily  3/3/2020 at Unknown time Yes Reported, Patient   cholecalciferol (VITAMIN D) 400 UNIT TABS tablet Take 400 Units by mouth daily 3/3/2020 at Unknown time Yes Reported, Patient   docusate sodium (COLACE) 100 MG capsule Take 100 mg by mouth daily  3/3/2020 at Unknown time Yes Reported, Patient   metoprolol tartrate (LOPRESSOR) 25 MG tablet Take 0.5 tablets (12.5 mg) by mouth 2 times daily 3/3/2020 at Unknown time Yes Ip, Lopez Lang MD   nitroGLYcerin (NITROSTAT) 0.4 MG sublingual tablet Place 1 tablet (0.4 mg) under the tongue every 5 minutes as needed for chest pain if you are still having symptoms after 3 doses (15 minutes) call 911. prn med Yes Lou Walter APRN CNP

## 2020-03-04 NOTE — ED PROVIDER NOTES
History     Chief Complaint:  Chest Pain       HPI   Mary Polanco is a 89 year old female with a history of hyperlipidemia , NSTEMI, coronary artery disease, hypertension, and cardiac stents who presents with chest pain. The patient woke up at 0140 today with palpitations, chills, difficulty breathing, and chest tightness. She states that the palpitations feels like her heart is beating fast. She states that the chest tightness woke her up from sleep and lasted an hour before improving. She denies any recent illness or travel.      Allergies:  Azithromycin  Gabapentin  Kanamycin  Metronidazole  Penicillins  Streptomycin  Atorvastatin  Lisinopril     Medications:    aspirin EC 81 MG EC tablet  metoprolol tartrate  omeprazole  Lasix     Past Medical History:    Benign essential HTN   Coronary artery disease involving native coronary artery of native heart with unstable angina pectoris (H)   Eczema   Lung nodule   Malignant neoplasm of bladder, part unspecified   Mitral regurgitation   Mixed hyperlipidemia   NSTEMI (non-ST elevated myocardial infarction) (H)   Osteoporosis   Polyarticular arthritis   Sciatica     Past Surgical History:    Transitional cell cancer removal   Appendectomy   Cholecystectomy   Heart cath stent     Family History:    Diabetes   Hypertension   Cerebrovascular disease     Social History:  The patient was accompanied to the ED by son.  Smoking Status: Never Smoker  Smokeless Tobacco: Never Used  Alcohol Use: No  Drug Use: No  PCP: Kevon Sawyer       Review of Systems   Constitutional: Positive for chills.   Respiratory:        Difficulty breathing   Cardiovascular: Positive for chest pain (tightness) and palpitations.   All other systems reviewed and are negative.      Physical Exam     Patient Vitals for the past 24 hrs:   BP Temp Temp src Pulse Resp SpO2   03/04/20 0207 (!) 168/75 97.8  F (36.6  C) Oral 73 18 96 %        Physical Exam  General: Patient is alert and normal appearing.  HEENT:  Head atraumatic    Eyes: pupils equal and reactive. Conjunctiva clear   Nares: patent   Oropharynx: no lesions, uvula midline, no palatal draping, normal voice, no trismus  Neck: Supple without lymphadenopathy, no meningismus  Chest: Heart regular rate and rhythm.   Lungs: Equal clear to auscultation with no wheeze or rales  Abdomen: Soft, non tender, nondistended, normal bowel sounds  Back: No costovertebral angle tenderness, no midline C, T or L spine tenderness  Neuro: Grossly nonfocal, normal speech, strength equal bilaterally, CN 2-12 intact  Extremities: No deformities, equal radial and DP pulses. No clubbing, cyanosis.  No edema  Skin: Warm and dry with no rash.       Emergency Department Course   ECG:  ECG taken at 0221, ECG read at 0236  Sinus rhythm with 1st degree A-V block  Nonspecific ST and T wave abnormality  Abnormal ECG  Vent. rate 68 BPM  UT interval 216 ms  QRS duration 92 ms  QT/QTc 432/459 ms  P-R-T axes 49 12 59  No significant change when compared to EKG dated 9/15/16.     Imaging:  Radiology findings were communicated with the patient and family who voiced understanding of the findings.    XR Chest 2 Views  Final Result  IMPRESSION: No acute abnormality.  Reading per radiology     Laboratory:  Laboratory findings were communicated with the patient and family who voiced understanding of the findings.    CBC:  WBC 7.9, HGB 12.5, , o/w WNL     Troponin(0239):  <0.015      CMP: Glucose 105 (H), o/w WNL (Creatinine: 0.60)     Lipase: 215      Interventions:  0259  mg oral     Emergency Department Course:  Past medical records, nursing notes, and vitals reviewed.    0226 I performed an exam of the patient as documented above.    IV was inserted and blood was drawn for laboratory testing, results above.     The patient was sent for a chest xray while in the emergency department, results above.       0400 I spoke with Dr. Styles of the Hospitalist service from [facility] regarding  patient's presentation, findings, and plan of care.      0405 Patient rechecked and updated.       Findings and plan explained to the Patient and son who consents to admission. Discussed the patient with Dr. Styles, who will admit the patient to a observation unit bed for further monitoring, evaluation, and treatment.      Impression & Plan     Medical Decision Making:    Mary Polanco is a 89 year old female who presents for evaluation of chest pain, palpitations, shortness of breath.    A broad differential was of course considered.  Certainly ischemia is in differential. I doubt pulmonary embolism, aortic dissection, pneumonia or pneumothorax.  Other etiologies of chest pain considered in this patient included chest wall source, esophageal spasm or GI source, pleuritis, referred pain, etc.      My suspicion of unstable angina at this point is very low and given risk/benefit ratio would not start lovenox or heparin. Given the nature and timing of the patient's symptoms, I will admit the patient  to the hospital for further workup and treatment  The patient agrees to be admitted and all questions were answered.      She is pain free at this time.  Patient received aspirin here in the emergency department.  She did not require nitro as she was pain-free by the time I saw the patient.      Discharge Diagnosis:    ICD-10-CM    1. Chest pain, unspecified type R07.9    2. Palpitations R00.2        Disposition:  Admitted to Dr. Styles.    Scribe Disclosure:  I, Lester Wilson, am serving as a scribe at 2:26 AM on 3/4/2020 to document services personally performed by Shazia Suresh MD based on my observations and the provider's statements to me.      3/4/2020   Shazia Suresh MD Neuner, Maria Bea, MD  03/04/20 0429

## 2020-03-04 NOTE — PROGRESS NOTES
Non-billing note    Patient was admitted and seen by my colleague Dr. Keesha Styles earlier this morning.    Mary Polanco is a very pleasant Chinese speaking 89 year old female with hypertension and coronary artery disease who was admitted on 3/4/2020 with central chest pain and palpitations and a detectable troponin.    When seen by me, her chest pain was much improved and she described it more as a chest discomfort.  She was laying in bed, stable vitals, no acute distress.   services were used during the whole encounter.    Cardiology had already seen the patient this morning.  Troponin is trending down.  Echocardiogram still awaited.  Patient and family had declined further evaluation with cardiac cath or stress testing.  Plan is to continue conservative management at this time with medications.    Continue treatment for likely ACS with heparin drip, aspirin.  Appears that metoprolol has been switched to Coreg by cardiology.  She is allergic to ACE and statin so these have been held.

## 2020-03-04 NOTE — PROVIDER NOTIFICATION
MD Notification    Person notified:hosp    Person Name:Dr. Styles    Date/Time:3/4/20 0543  Additional page at 0600    Interaction:page    Purpose of Notification:Patient has not diet order, please advise. Thank you!  0600 page: FYI serial trop result in, result 0.085.    Orders Received:Regular diet ordered.   Cardiology consult placed by MD    Comments:

## 2020-03-04 NOTE — PROGRESS NOTES
RECEIVING UNIT ED HANDOFF REVIEW    ED Nurse Handoff Report was reviewed by: Leanne Guadarrama RN on March 4, 2020 at 4:42 AM

## 2020-03-04 NOTE — ED NOTES
[facility]  ED Nurse Handoff Report    ED Chief complaint: Chest Pain      ED Diagnosis:   Final diagnoses:   Chest pain, unspecified type   Palpitations       Code Status: see admitting MD    Allergies:   Allergies   Allergen Reactions     Azithromycin      Gabapentin Itching     Kanamycin      Metronidazole Rash     Penicillins Rash     Streptomycin      rash     Atorvastatin Rash     Lisinopril Rash       Patient Story: 89 year old female presents to the ED for chest pain. Pt has a cardiac hx of chest pain and stent placement.  Focused Assessment:  See above    Treatments and/or interventions provided: 324 asa, labs, chest xray and EKG  Patient's response to treatments and/or interventions: good    To be done/followed up on inpatient unit:  nothing noted at this time    Does this patient have any cognitive concerns?: none    Activity level - Baseline/Home:  Independent  Activity Level - Current:   Independent    Patient's Preferred language: Mandarin   Needed?: Yes    Isolation: None  Infection: Not Applicable  Bariatric?: No    Vital Signs:   Vitals:    03/04/20 0207   BP: (!) 168/75   Pulse: 73   Resp: 18   Temp: 97.8  F (36.6  C)   TempSrc: Oral   SpO2: 96%       Cardiac Rhythm:     Was the PSS-3 completed:   Yes  What interventions are required if any?               Family Comments:  at bedside  OBS brochure/video discussed/provided to patient/family: No              Name of person given brochure if not patient: na              Relationship to patient: na    For the majority of the shift this patient's behavior was Green.   Behavioral interventions performed were none.    ED NURSE PHONE NUMBER: [id number]

## 2020-03-04 NOTE — PROGRESS NOTES
Observation goals PRIOR TO DISCHARGE     Comments: List all goals to be met before discharge home:   - Serial troponins and stress test complete. -not met.  - Seen and cleared by consultant if applicable -not met.  - Adequate pain control on oral analgesia -met, no pain at this time.  - Vital signs normal or at patient baseline -not met, elevated BP.  - Safe disposition plan has been identified -not met.  - Nurse to notify provider when observation goals have been met and patient is ready for discharge.        Patient arrived on unit around 0500. Alert and oriented, Mandarin speaking, son at bed side speaks small amount of english.  ordered. VSS on RA except for elevated BP. Denies chest pain now. LS clear. Up SBA. Voided in BR. Plan for cardiology consult today. Discharge plan pending.

## 2020-03-04 NOTE — ED TRIAGE NOTES
Patient comes to the ED today stating that she woke at 1:40 with chest pain, shortness of breath, a feeling that her heart was racing, and chills. Patient denied nausea and vomiting, says she feels a little dizzy and feels like heart is beating fast. Patient does not have a specific location for her chest pain, she says it is her whole chest and it does not radiate anywhere.

## 2020-03-05 VITALS
BODY MASS INDEX: 23.03 KG/M2 | RESPIRATION RATE: 16 BRPM | OXYGEN SATURATION: 95 % | HEART RATE: 68 BPM | SYSTOLIC BLOOD PRESSURE: 131 MMHG | DIASTOLIC BLOOD PRESSURE: 54 MMHG | TEMPERATURE: 97.8 F | WEIGHT: 117.95 LBS

## 2020-03-05 LAB — LMWH PPP CHRO-ACNC: 0.27 IU/ML

## 2020-03-05 PROCEDURE — 85520 HEPARIN ASSAY: CPT | Performed by: INTERNAL MEDICINE

## 2020-03-05 PROCEDURE — G0378 HOSPITAL OBSERVATION PER HR: HCPCS

## 2020-03-05 PROCEDURE — 25000132 ZZH RX MED GY IP 250 OP 250 PS 637: Performed by: INTERNAL MEDICINE

## 2020-03-05 PROCEDURE — 36415 COLL VENOUS BLD VENIPUNCTURE: CPT | Performed by: INTERNAL MEDICINE

## 2020-03-05 PROCEDURE — 99214 OFFICE O/P EST MOD 30 MIN: CPT | Performed by: INTERNAL MEDICINE

## 2020-03-05 PROCEDURE — 99207 ZZC CDG-CODE CATEGORY CHANGED: CPT | Performed by: HOSPITALIST

## 2020-03-05 PROCEDURE — 99217 ZZC OBSERVATION CARE DISCHARGE: CPT | Performed by: HOSPITALIST

## 2020-03-05 RX ORDER — HEPARIN SODIUM 10000 [USP'U]/100ML
450 INJECTION, SOLUTION INTRAVENOUS CONTINUOUS
Status: DISCONTINUED | OUTPATIENT
Start: 2020-03-05 | End: 2020-03-05

## 2020-03-05 RX ORDER — CARVEDILOL 6.25 MG/1
6.25 TABLET ORAL 2 TIMES DAILY WITH MEALS
Qty: 60 TABLET | Refills: 0 | Status: SHIPPED | OUTPATIENT
Start: 2020-03-05 | End: 2021-05-18

## 2020-03-05 RX ORDER — CLOPIDOGREL BISULFATE 75 MG/1
150 TABLET ORAL ONCE
Status: COMPLETED | OUTPATIENT
Start: 2020-03-05 | End: 2020-03-05

## 2020-03-05 RX ORDER — CLOPIDOGREL BISULFATE 75 MG/1
75 TABLET ORAL DAILY
Status: DISCONTINUED | OUTPATIENT
Start: 2020-03-06 | End: 2020-03-05 | Stop reason: HOSPADM

## 2020-03-05 RX ORDER — NITROGLYCERIN 0.4 MG/1
0.4 TABLET SUBLINGUAL EVERY 5 MIN PRN
Qty: 25 TABLET | Refills: 3 | Status: SHIPPED | OUTPATIENT
Start: 2020-03-05

## 2020-03-05 RX ORDER — AMLODIPINE BESYLATE 2.5 MG/1
2.5 TABLET ORAL DAILY
Qty: 30 TABLET | Refills: 0 | Status: ON HOLD | OUTPATIENT
Start: 2020-03-06 | End: 2020-03-13

## 2020-03-05 RX ORDER — CLOPIDOGREL BISULFATE 75 MG/1
75 TABLET ORAL DAILY
Qty: 30 TABLET | Refills: 0 | Status: SHIPPED | OUTPATIENT
Start: 2020-03-06 | End: 2021-05-18

## 2020-03-05 RX ADMIN — OMEPRAZOLE 20 MG: 20 CAPSULE, DELAYED RELEASE ORAL at 06:44

## 2020-03-05 RX ADMIN — ASPIRIN 81 MG: 81 TABLET, DELAYED RELEASE ORAL at 09:13

## 2020-03-05 RX ADMIN — CARVEDILOL 6.25 MG: 6.25 TABLET, FILM COATED ORAL at 09:13

## 2020-03-05 RX ADMIN — AMLODIPINE BESYLATE 2.5 MG: 2.5 TABLET ORAL at 09:13

## 2020-03-05 RX ADMIN — CLOPIDOGREL BISULFATE 150 MG: 75 TABLET, FILM COATED ORAL at 09:13

## 2020-03-05 NOTE — PROGRESS NOTES
[facility]  Cardiology Progress Note  Date of Service: 03/05/2020  Primary Cardiologist: Dr. Perrin    Assessment & Plan   Mary He is a 89 year old female with past medical history significant for coronary artery disease s/p PCI to the circumflex, hypertension, hyperlipidemia with prior statin intolerance, and prior atypical chest pain who was admitted on 3/4/2020 for further evaluation of chest pain and elevated troponin.     Assessment:   1. NSTEMI    Mild troponin elevation, peaked at 0.085     EKG unremarkable     Echo this admission shows normal LV function with EF of 60-65%, no RWMA, and normal RV size and systolic function.     NM lexiscan [03/2019] was negative for ischemia. No significant changes from previous study in 2016.     Patient and family declined repeat stress test and coronary angiogram this admission.     Conservative management with aspirin 81 mg, amlodipine 2.5 mg, carvedilol 6.25 mg BID [PTA aspirin and metoprolol tartrate 12.5 mg BID]    Heparin gtt     2. Hypertension    's-130's today     Amlodipine 2.5 mg and carvedilol 6.25 mg BID [metoprolol tartrate 12.5 mg BID]    3. Hyperlipidemia    Intolerant to statin therapy      [2017]      Plan:  1. Discontinue heparin gtt.  2. Plavix load today with 150 mg then 75 mg daily starting tomorrow, 3/5.  3. Okay to discharge home from a cardiology standpoint, will schedule follow-up with Sharda FAYE CNP in 7-10 days and primary cardiologist in 3 months.   4. Please discharge home on DAPT, BB, CCB, and sublingual nitroglycerin.    Imani Fox, MARY GRACE  Pager:  [phone]  (7am - 5pm, M-F)    Interval History    Remains symptom free. Hemodynamically stable. Denies any chest pain or pressure, shortness of breath, dizziness, PND or orthopnea. Son and  at bedside.       Physical Exam   Temp: 97.8  F (36.6  C) Temp src: Oral BP: 131/54 Pulse: 68 Heart Rate: 64 Resp: 16 SpO2: 95 % O2 Device: None (Room  air)    Vitals:    03/04/20 0921   Weight: 53.5 kg (117 lb 15.1 oz)       Constitutional:   NAD   Skin:   Warm and dry   Head:   Nontraumatic   Neck:   Supple, symmetrical, trachea midline, no adenopathy, thyroid symmetric, not enlarged and no tenderness, skin normal   Lungs:   normal   Cardiovascular:   Normal apical impulse, regular rate and rhythm, normal S1 and S2, no S3 or S4, and no murmur noted   Abdomen:   Benign   Extremities and Back:   Symmetric, no curvature, spinous processes are non-tender on palpation, paraspinous muscles are non-tender on palpation, no costal vertebral tenderness   Neurological:   Grossly nonfocal       Medications     HEParin         amLODIPine  2.5 mg Oral Daily     aspirin  162 mg Oral Once     aspirin  81 mg Oral Daily     carvedilol  6.25 mg Oral BID w/meals     [START ON 3/6/2020] clopidogrel  75 mg Oral Daily     omeprazole  20 mg Oral QAM AC     senna-docusate  1 tablet Oral Daily     sodium chloride (PF)  3 mL Intracatheter Q8H       Data     Most Recent 3 CBC's:  Recent Labs   Lab Test 03/04/20  0951 03/04/20  0239 06/07/19  0621   WBC 7.9 7.9 11.0   HGB 12.9 12.5 11.5*   MCV 91 92 90    256 215     Most Recent 3 BMP's:  Recent Labs   Lab Test 03/04/20  0239 06/17/19  0900 06/07/19  0621    140 142   POTASSIUM 4.4 4.0 3.9   CHLORIDE 109 105 109   CO2 25 26 27   BUN 21 27 12   CR 0.60 0.81 0.72   ANIONGAP 5 9 6   CLAIRE 10.0 9.9 9.1   * 140* 109*     Most Recent 3 Troponin's:  Recent Labs   Lab Test 03/04/20  0951 03/04/20  0523 03/04/20  0239   TROPI 0.041 0.085* 0.016     Most Recent 3 BNP's:No lab results found.  Most Recent Cholesterol Panel:  Recent Labs   Lab Test 07/14/17   CHOL 220      HDL 53   TRIG 330

## 2020-03-05 NOTE — PLAN OF CARE
Shift Note: A&O, Mandarin speaking, Jabber at bedside, up with SBA, hep gtt decreased to 450units/hr, ECHO completed today, LS clear, tele NSR, no c/o chest pain, NPO after midnight

## 2020-03-05 NOTE — PROGRESS NOTES
Patient discharged at 11:30 am to home.  IV was discontinued. Pain at time of discharge was 0. Belongings returned to patient.  Discharge instructions and medications reviewed with patient and daughter via . Patient verbalized understanding and all questions were answered.  Prescriptions sent to home pharmacy.  At time of discharge, patient condition was stable and left the unit via wheelchair.

## 2020-03-05 NOTE — PLAN OF CARE
Patient is A&Ox4, Mandarin speaking and Jabber at bedside. VSS. Denies pain and chest pain. Up independently. Heparin infusing at 4.5 ml/hr. Telemetry read NSR. NPO at midnight. Calls appropriately.

## 2020-03-05 NOTE — PLAN OF CARE
A/ox4. Mandarin speaking. [email or web address] ipad in use. Up with SBA. Low fat/low Na diet. Denies pain. ECHO completed. Hep gtt infusing at 6.5 ml/hr, pharmacist notified of hep10a level. VSS. Plan for cardiology to see tomorrow. Discharge pending workup.

## 2020-03-06 ENCOUNTER — TELEPHONE (OUTPATIENT)
Dept: CARDIOLOGY | Facility: CLINIC | Age: 85
End: 2020-03-06

## 2020-03-06 NOTE — TELEPHONE ENCOUNTER
Patient was evaluated by cardiology while inpatient for chest pain and palpitations. Called patient to discuss any post hospital d/c questions she may have, review medication changes, and confirm f/u appts. VIA [employer]  Patient denied any questions regarding new medications or changes to PTA medications. Patient denied any SOB, chest pain, or light headedness. RN confirmed with patient that she has an apt scheduled on 3- with NHAN Sharda. Patient advised to call clinic with any cardiac related questions or concerns prior to this nhan't. Patient verbalized understanding and agreed with plan. Message will be relayed by Mandarin  Mobile cell #

## 2020-03-12 ENCOUNTER — HOSPITAL ENCOUNTER (OUTPATIENT)
Facility: CLINIC | Age: 85
Setting detail: OBSERVATION
Discharge: HOME | End: 2020-03-13
Attending: EMERGENCY MEDICINE | Admitting: INTERNAL MEDICINE
Payer: COMMERCIAL

## 2020-03-12 ENCOUNTER — APPOINTMENT (OUTPATIENT)
Dept: CT IMAGING | Facility: CLINIC | Age: 85
End: 2020-03-12
Attending: EMERGENCY MEDICINE
Payer: COMMERCIAL

## 2020-03-12 DIAGNOSIS — R07.9 CHEST PAIN, UNSPECIFIED TYPE: ICD-10-CM

## 2020-03-12 DIAGNOSIS — R06.00 DYSPNEA, UNSPECIFIED TYPE: ICD-10-CM

## 2020-03-12 LAB
ALBUMIN SERPL-MCNC: 3.5 G/DL (ref 3.4–5)
ALP SERPL-CCNC: 83 U/L (ref 40–150)
ALT SERPL W P-5'-P-CCNC: 21 U/L (ref 0–50)
ANION GAP SERPL CALCULATED.3IONS-SCNC: 6 MMOL/L (ref 3–14)
AST SERPL W P-5'-P-CCNC: 31 U/L (ref 0–45)
BASOPHILS # BLD AUTO: 0.1 10E9/L (ref 0–0.2)
BASOPHILS NFR BLD AUTO: 1.3 %
BILIRUB SERPL-MCNC: 0.2 MG/DL (ref 0.2–1.3)
BUN SERPL-MCNC: 16 MG/DL (ref 7–30)
CALCIUM SERPL-MCNC: 9.1 MG/DL (ref 8.5–10.1)
CHLORIDE SERPL-SCNC: 107 MMOL/L (ref 94–109)
CO2 SERPL-SCNC: 26 MMOL/L (ref 20–32)
CREAT SERPL-MCNC: 0.74 MG/DL (ref 0.52–1.04)
D DIMER PPP FEU-MCNC: 0.6 UG/ML FEU (ref 0–0.5)
DIFFERENTIAL METHOD BLD: NORMAL
EOSINOPHIL # BLD AUTO: 0.4 10E9/L (ref 0–0.7)
EOSINOPHIL NFR BLD AUTO: 5.1 %
ERYTHROCYTE [DISTWIDTH] IN BLOOD BY AUTOMATED COUNT: 12.4 % (ref 10–15)
GFR SERPL CREATININE-BSD FRML MDRD: 71 ML/MIN/{1.73_M2}
GLUCOSE SERPL-MCNC: 158 MG/DL (ref 70–99)
HCT VFR BLD AUTO: 39.1 % (ref 35–47)
HGB BLD-MCNC: 12.6 G/DL (ref 11.7–15.7)
IMM GRANULOCYTES # BLD: 0 10E9/L (ref 0–0.4)
IMM GRANULOCYTES NFR BLD: 0.3 %
INTERPRETATION ECG - MUSE: NORMAL
LYMPHOCYTES # BLD AUTO: 2.9 10E9/L (ref 0.8–5.3)
LYMPHOCYTES NFR BLD AUTO: 41.5 %
MCH RBC QN AUTO: 29.6 PG (ref 26.5–33)
MCHC RBC AUTO-ENTMCNC: 32.2 G/DL (ref 31.5–36.5)
MCV RBC AUTO: 92 FL (ref 78–100)
MONOCYTES # BLD AUTO: 0.6 10E9/L (ref 0–1.3)
MONOCYTES NFR BLD AUTO: 9 %
NEUTROPHILS # BLD AUTO: 3 10E9/L (ref 1.6–8.3)
NEUTROPHILS NFR BLD AUTO: 42.8 %
PLATELET # BLD AUTO: 246 10E9/L (ref 150–450)
POTASSIUM SERPL-SCNC: 4.3 MMOL/L (ref 3.4–5.3)
PROT SERPL-MCNC: 7.7 G/DL (ref 6.8–8.8)
RBC # BLD AUTO: 4.26 10E12/L (ref 3.8–5.2)
SODIUM SERPL-SCNC: 139 MMOL/L (ref 133–144)
TROPONIN I SERPL-MCNC: <0.015 UG/L (ref 0–0.04)
WBC # BLD AUTO: 6.9 10E9/L (ref 4–11)

## 2020-03-12 PROCEDURE — 85025 COMPLETE CBC W/AUTO DIFF WBC: CPT | Performed by: EMERGENCY MEDICINE

## 2020-03-12 PROCEDURE — 99236 HOSP IP/OBS SAME DATE HI 85: CPT | Performed by: INTERNAL MEDICINE

## 2020-03-12 PROCEDURE — G0378 HOSPITAL OBSERVATION PER HR: HCPCS

## 2020-03-12 PROCEDURE — 85379 FIBRIN DEGRADATION QUANT: CPT | Performed by: EMERGENCY MEDICINE

## 2020-03-12 PROCEDURE — 25000125 ZZHC RX 250: Performed by: EMERGENCY MEDICINE

## 2020-03-12 PROCEDURE — 80053 COMPREHEN METABOLIC PANEL: CPT | Performed by: EMERGENCY MEDICINE

## 2020-03-12 PROCEDURE — 25000128 H RX IP 250 OP 636: Performed by: EMERGENCY MEDICINE

## 2020-03-12 PROCEDURE — 84484 ASSAY OF TROPONIN QUANT: CPT | Performed by: EMERGENCY MEDICINE

## 2020-03-12 PROCEDURE — 25000132 ZZH RX MED GY IP 250 OP 250 PS 637: Performed by: EMERGENCY MEDICINE

## 2020-03-12 PROCEDURE — 93005 ELECTROCARDIOGRAM TRACING: CPT

## 2020-03-12 PROCEDURE — 99285 EMERGENCY DEPT VISIT HI MDM: CPT | Mod: 25

## 2020-03-12 PROCEDURE — 71275 CT ANGIOGRAPHY CHEST: CPT

## 2020-03-12 RX ORDER — ASPIRIN 81 MG/1
324 TABLET, CHEWABLE ORAL ONCE
Status: COMPLETED | OUTPATIENT
Start: 2020-03-12 | End: 2020-03-12

## 2020-03-12 RX ORDER — METHYLPREDNISOLONE SODIUM SUCCINATE 125 MG/2ML
125 INJECTION, POWDER, LYOPHILIZED, FOR SOLUTION INTRAMUSCULAR; INTRAVENOUS ONCE
Status: DISCONTINUED | OUTPATIENT
Start: 2020-03-12 | End: 2020-03-13

## 2020-03-12 RX ORDER — IOPAMIDOL 755 MG/ML
56 INJECTION, SOLUTION INTRAVASCULAR ONCE
Status: COMPLETED | OUTPATIENT
Start: 2020-03-12 | End: 2020-03-12

## 2020-03-12 RX ORDER — DIPHENHYDRAMINE HYDROCHLORIDE 50 MG/ML
25 INJECTION INTRAMUSCULAR; INTRAVENOUS ONCE
Status: DISCONTINUED | OUTPATIENT
Start: 2020-03-12 | End: 2020-03-13

## 2020-03-12 RX ADMIN — ASPIRIN 81 MG 324 MG: 81 TABLET ORAL at 20:40

## 2020-03-12 RX ADMIN — IOPAMIDOL 56 ML: 755 INJECTION, SOLUTION INTRAVENOUS at 21:31

## 2020-03-12 RX ADMIN — SODIUM CHLORIDE 83 ML: 9 INJECTION, SOLUTION INTRAVENOUS at 21:31

## 2020-03-12 ASSESSMENT — ENCOUNTER SYMPTOMS
DIARRHEA: 0
VOMITING: 0
SHORTNESS OF BREATH: 1
COUGH: 0
NAUSEA: 0
FEVER: 0

## 2020-03-13 VITALS
BODY MASS INDEX: 23.36 KG/M2 | OXYGEN SATURATION: 98 % | DIASTOLIC BLOOD PRESSURE: 54 MMHG | WEIGHT: 119.6 LBS | RESPIRATION RATE: 16 BRPM | HEART RATE: 67 BPM | TEMPERATURE: 93.2 F | SYSTOLIC BLOOD PRESSURE: 94 MMHG

## 2020-03-13 LAB
TROPONIN I SERPL-MCNC: <0.015 UG/L (ref 0–0.04)
TROPONIN I SERPL-MCNC: <0.015 UG/L (ref 0–0.04)

## 2020-03-13 PROCEDURE — 36415 COLL VENOUS BLD VENIPUNCTURE: CPT | Performed by: INTERNAL MEDICINE

## 2020-03-13 PROCEDURE — 84484 ASSAY OF TROPONIN QUANT: CPT | Performed by: INTERNAL MEDICINE

## 2020-03-13 PROCEDURE — G0378 HOSPITAL OBSERVATION PER HR: HCPCS

## 2020-03-13 PROCEDURE — 99214 OFFICE O/P EST MOD 30 MIN: CPT | Performed by: INTERNAL MEDICINE

## 2020-03-13 PROCEDURE — 25000132 ZZH RX MED GY IP 250 OP 250 PS 637: Performed by: INTERNAL MEDICINE

## 2020-03-13 PROCEDURE — 25800030 ZZH RX IP 258 OP 636: Performed by: INTERNAL MEDICINE

## 2020-03-13 PROCEDURE — 25000132 ZZH RX MED GY IP 250 OP 250 PS 637: Performed by: PHYSICIAN ASSISTANT

## 2020-03-13 RX ORDER — ALUMINA, MAGNESIA, AND SIMETHICONE 2400; 2400; 240 MG/30ML; MG/30ML; MG/30ML
30 SUSPENSION ORAL EVERY 4 HOURS PRN
Status: DISCONTINUED | OUTPATIENT
Start: 2020-03-13 | End: 2020-03-13 | Stop reason: HOSPADM

## 2020-03-13 RX ORDER — ISOSORBIDE MONONITRATE 30 MG/1
30 TABLET, EXTENDED RELEASE ORAL DAILY
Qty: 100 TABLET | Refills: 1 | Status: SHIPPED | OUTPATIENT
Start: 2020-03-14 | End: 2021-05-18

## 2020-03-13 RX ORDER — SODIUM CHLORIDE 9 MG/ML
INJECTION, SOLUTION INTRAVENOUS CONTINUOUS
Status: DISCONTINUED | OUTPATIENT
Start: 2020-03-13 | End: 2020-03-13 | Stop reason: HOSPADM

## 2020-03-13 RX ORDER — AMLODIPINE BESYLATE 5 MG/1
5 TABLET ORAL DAILY
Qty: 100 TABLET | Refills: 1 | Status: SHIPPED | OUTPATIENT
Start: 2020-03-14 | End: 2021-05-18

## 2020-03-13 RX ORDER — AMLODIPINE BESYLATE 5 MG/1
5 TABLET ORAL DAILY
Status: DISCONTINUED | OUTPATIENT
Start: 2020-03-14 | End: 2020-03-13 | Stop reason: HOSPADM

## 2020-03-13 RX ORDER — ACETAMINOPHEN 325 MG/1
650 TABLET ORAL EVERY 4 HOURS PRN
Status: DISCONTINUED | OUTPATIENT
Start: 2020-03-13 | End: 2020-03-13 | Stop reason: HOSPADM

## 2020-03-13 RX ORDER — CARVEDILOL 6.25 MG/1
6.25 TABLET ORAL 2 TIMES DAILY WITH MEALS
Status: DISCONTINUED | OUTPATIENT
Start: 2020-03-13 | End: 2020-03-13 | Stop reason: HOSPADM

## 2020-03-13 RX ORDER — DOCUSATE SODIUM 100 MG/1
100 CAPSULE, LIQUID FILLED ORAL DAILY
Status: DISCONTINUED | OUTPATIENT
Start: 2020-03-13 | End: 2020-03-13 | Stop reason: HOSPADM

## 2020-03-13 RX ORDER — NITROGLYCERIN 0.4 MG/1
0.4 TABLET SUBLINGUAL EVERY 5 MIN PRN
Status: DISCONTINUED | OUTPATIENT
Start: 2020-03-13 | End: 2020-03-13 | Stop reason: HOSPADM

## 2020-03-13 RX ORDER — ASPIRIN 81 MG/1
81 TABLET ORAL DAILY
Status: DISCONTINUED | OUTPATIENT
Start: 2020-03-13 | End: 2020-03-13 | Stop reason: HOSPADM

## 2020-03-13 RX ORDER — LIDOCAINE 40 MG/G
CREAM TOPICAL
Status: DISCONTINUED | OUTPATIENT
Start: 2020-03-13 | End: 2020-03-13 | Stop reason: HOSPADM

## 2020-03-13 RX ORDER — AMLODIPINE BESYLATE 2.5 MG/1
2.5 TABLET ORAL DAILY
Status: DISCONTINUED | OUTPATIENT
Start: 2020-03-13 | End: 2020-03-13

## 2020-03-13 RX ORDER — NALOXONE HYDROCHLORIDE 0.4 MG/ML
.1-.4 INJECTION, SOLUTION INTRAMUSCULAR; INTRAVENOUS; SUBCUTANEOUS
Status: DISCONTINUED | OUTPATIENT
Start: 2020-03-13 | End: 2020-03-13 | Stop reason: HOSPADM

## 2020-03-13 RX ORDER — ISOSORBIDE MONONITRATE 30 MG/1
30 TABLET, EXTENDED RELEASE ORAL DAILY
Status: DISCONTINUED | OUTPATIENT
Start: 2020-03-13 | End: 2020-03-13 | Stop reason: HOSPADM

## 2020-03-13 RX ORDER — ACETAMINOPHEN 650 MG/1
650 SUPPOSITORY RECTAL EVERY 4 HOURS PRN
Status: DISCONTINUED | OUTPATIENT
Start: 2020-03-13 | End: 2020-03-13 | Stop reason: HOSPADM

## 2020-03-13 RX ORDER — CLOPIDOGREL BISULFATE 75 MG/1
75 TABLET ORAL DAILY
Status: DISCONTINUED | OUTPATIENT
Start: 2020-03-13 | End: 2020-03-13 | Stop reason: HOSPADM

## 2020-03-13 RX ADMIN — CLOPIDOGREL BISULFATE 75 MG: 75 TABLET, FILM COATED ORAL at 08:40

## 2020-03-13 RX ADMIN — ASPIRIN 81 MG: 81 TABLET, DELAYED RELEASE ORAL at 08:40

## 2020-03-13 RX ADMIN — AMLODIPINE BESYLATE 2.5 MG: 2.5 TABLET ORAL at 08:41

## 2020-03-13 RX ADMIN — DOCUSATE SODIUM 100 MG: 100 CAPSULE, LIQUID FILLED ORAL at 08:40

## 2020-03-13 RX ADMIN — CARVEDILOL 6.25 MG: 6.25 TABLET, FILM COATED ORAL at 08:40

## 2020-03-13 RX ADMIN — ISOSORBIDE MONONITRATE 30 MG: 30 TABLET, EXTENDED RELEASE ORAL at 13:12

## 2020-03-13 RX ADMIN — SODIUM CHLORIDE: 9 INJECTION, SOLUTION INTRAVENOUS at 02:22

## 2020-03-13 NOTE — DISCHARGE SUMMARY
[facility]    Discharge Summary  Hospitalist    Date of Admission:  3/12/2020  Date of Discharge:  3/13/2020  Discharging Provider: Nick Vaca MD  Date of Service (when I saw the patient): 03/13/20    Discharge Diagnoses   Chest pain    Hospital Course   Mary Polanco was admitted on 3/12/2020.  The following problems were addressed during her hospitalization:    Her hx is significant for HTN, dyslipidemia, CAD s/p HARLEEN to L circumflex, admitted for recurrent episodes of chest pain.     Chest pain  CTA negative for PE. EKG on admission w/o e/o ischemic changes. Had recent admission earlier this month for chest pain although refused cardiac cath at the time and was medically managed.   - Per discussion w/ Cardiology, plan for outpatient Lexiscan.  - Per Cards: increase amlodipine to 5 mg daily, add Imdur 30 mg daily.  - f/u w/ NEYDA Armstrong on 3/20.  - no statin (she has hx of intolerance)     HTN  - PTA Coreg, Norvasc     Dyslipidemia  - no statin b/c intolerance     Incidental small pulmonary nodule in L lung per chest CT  - f/u CT in 6-12 months per recommendation      Nick Vaca MD    Significant Results and Procedures   See below    Pending Results     Unresulted Labs Ordered in the Past 30 Days of this Admission     No orders found for last 31 day(s).          Code Status   Full Code       Primary Care Physician   Kevon Sawyer    General: well-appearing, NAD  HEENT: NC/AT, moist mucus membranes  Heart: RRR no m/r/g  Lungs: CTAB no crackles or wheezes  Abdomen: NABS, non-tender, non-distended; no rebound or guarding; no HSM or masses.  Extremities: warm, well-perfused.  Neuro: CN II-XII grossly intact, moving extremities spontaneously  Psych: normal mood and affect      Discharge Disposition   Discharged to home  Condition at discharge: Stable    Consultations This Hospital Stay   CARDIOLOGY IP CONSULT    Time Spent on this Encounter   I, Nick Vaca MD, personally saw the patient  today and spent greater than 30 minutes discharging this patient.    Discharge Orders      Reason for your hospital stay    Chest pain     Follow-up and recommended labs and tests     Follow up with primary care provider, Kevon Sawyer, within 7 days for hospital follow- up.  No follow up labs or test are needed.  Follow-up to get a Lexiscan on 3/17.     Activity    Your activity upon discharge: activity as tolerated     Diet    Follow this diet upon discharge: Orders Placed This Encounter      Regular Diet Adult     Discharge Medications   Current Discharge Medication List      START taking these medications    Details   isosorbide mononitrate (IMDUR) 30 MG 24 hr tablet Take 1 tablet (30 mg) by mouth daily  Qty: 100 tablet, Refills: 1    Comments: Future refills by PCP Dr. Kevon Sawyer with phone number [phone].  Associated Diagnoses: Chest pain, unspecified type         CONTINUE these medications which have CHANGED    Details   amLODIPine (NORVASC) 5 MG tablet Take 1 tablet (5 mg) by mouth daily  Qty: 100 tablet, Refills: 1    Comments: Future refills by PCP Dr. Kevon Sawyer with phone number [phone].  Associated Diagnoses: Chest pain, unspecified type         CONTINUE these medications which have NOT CHANGED    Details   acetaminophen (TYLENOL) 325 MG tablet Take 2 tablets (650 mg) by mouth every 6 hours as needed for mild pain  Qty: 50 tablet, Refills: 0    Comments: Future refills by PCP Dr. Kevon Sawyer with phone number [phone].  Associated Diagnoses: Acute idiopathic gout, unspecified site      aspirin EC 81 MG EC tablet Take 1 tablet (81 mg) by mouth daily  Qty: 30 tablet, Refills: 1    Associated Diagnoses: ACS (acute coronary syndrome) (H); Coronary artery disease involving native coronary artery of native heart with unstable angina pectoris (H)      calcium carbonate (OS-CLAIRE 500 MG [place]. [place]) 500 MG tablet Take 500 mg by mouth daily       carvedilol (COREG) 6.25 MG tablet Take 1 tablet  (6.25 mg) by mouth 2 times daily (with meals)  Qty: 60 tablet, Refills: 0    Associated Diagnoses: NSTEMI (non-ST elevated myocardial infarction) (H)      cholecalciferol (VITAMIN D) 400 UNIT TABS tablet Take 400 Units by mouth daily      clopidogrel (PLAVIX) 75 MG tablet Take 1 tablet (75 mg) by mouth daily  Qty: 30 tablet, Refills: 0    Associated Diagnoses: NSTEMI (non-ST elevated myocardial infarction) (H)      docusate sodium (COLACE) 100 MG capsule Take 100 mg by mouth daily       nitroGLYcerin (NITROSTAT) 0.4 MG sublingual tablet Place 1 tablet (0.4 mg) under the tongue every 5 minutes as needed for chest pain if you are still having symptoms after 3 doses (15 minutes) call 911.  Qty: 25 tablet, Refills: 3    Associated Diagnoses: Coronary artery disease involving native coronary artery of native heart with unstable angina pectoris (H)           Allergies   Allergies   Allergen Reactions     Azithromycin      Gabapentin Itching     Kanamycin      Metronidazole Rash     Penicillins Rash     Streptomycin      rash     Atorvastatin Rash     Lisinopril Rash     Data   Most Recent 3 CBC's:  Recent Labs   Lab Test 03/12/20 2011 03/04/20  0951 03/04/20  0239   WBC 6.9 7.9 7.9   HGB 12.6 12.9 12.5   MCV 92 91 92    259 256      Most Recent 3 BMP's:  Recent Labs   Lab Test 03/12/20 2011 03/04/20  0239 06/17/19  0900    139 140   POTASSIUM 4.3 4.4 4.0   CHLORIDE 107 109 105   CO2 26 25 26   BUN 16 21 27   CR 0.74 0.60 0.81   ANIONGAP 6 5 9   CLAIRE 9.1 10.0 9.9   * 105* 140*     Most Recent 2 LFT's:  Recent Labs   Lab Test 03/12/20 2011 03/04/20  0239   AST 31 43   ALT 21 18   ALKPHOS 83 69   BILITOTAL 0.2 0.5     Most Recent INR's and Anticoagulation Dosing History:  Anticoagulation Dose History     There is no flowsheet data to display.        Most Recent 3 Troponin's:  Recent Labs   Lab Test 03/13/20  0620 03/13/20  0301 03/12/20 2011   TROPI <0.015 <0.015 <0.015     Most Recent Cholesterol  Panel:  Recent Labs   Lab Test 07/14/17   CHOL 220      HDL 53   TRIG 330     Most Recent 6 Bacteria Isolates From Any Culture (See EPIC Reports for Culture Details):  Recent Labs   Lab Test 09/15/16  1436   CULT Canceled, Test credited >10 Squamous epithelial cells/low power field indicates   oral contamination. Please recollect.  *     Most Recent TSH, T4 and A1c Labs:  Recent Labs   Lab Test 09/15/16  0025   TSH 0.82     Results for orders placed or performed during the hospital encounter of 03/12/20   CT Chest Pulmonary Embolism w Contrast    Narrative    CT CHEST PULMONARY EMBOLISM WITH CONTRAST 3/12/2020 9:37 PM    CLINICAL HISTORY: Pulmonary embolism suspected, intermediate  probability, positive D-dimer; chest pain, elevated D-dimer.    TECHNIQUE: CT angiogram chest during arterial phase injection IV  contrast. 2D and 3D MIP reconstructions were performed by the CT  technologist. Dose reduction techniques were used.     CONTRAST: 56mL Isovue-370    COMPARISON: None.    FINDINGS:  ANGIOGRAM CHEST: Pulmonary arteries are normal caliber and negative  for pulmonary emboli. Thoracic aorta is negative for dissection. No CT  evidence of right heart strain. Moderate coronary atherosclerosis.    LUNGS AND PLEURA: 6 mm nodule left upper lobe (series 6, image 68). No  other pulmonary nodule or mass.    MEDIASTINUM/AXILLAE: No pathologically enlarged mediastinal, hilar, or  axillary lymph nodes. There is mild coronary atherosclerosis.    UPPER ABDOMEN: Unremarkable.    MUSCULOSKELETAL: Unremarkable.      Impression    IMPRESSION:  1.  No evidence of pulmonary embolism.  2.  Moderate coronary atherosclerosis.  3.  Incidental 6 mm pulmonary nodule in the left lung.    Recommendations for an incidental lung nodule = or > 6mm to 8mm:    Low risk patients: Initial follow-up CT at 6-12 months, then  consider CT at 18-24 months if no change.    High risk patients: Initial follow-up CT at 6-12 months, then CT at  18-24  months if no change.    *Low Risk: Minimal or absent history of smoking or other known risk  factors.  *Nonsolid (ground-glass) or partly solid nodules may require longer  follow-up to exclude indolent adenocarcinoma.  *Recommendations based on Guidelines for the Management of Incidental  Pulmonary Nodules Detected at CT: From the Fleischner Society 2017,  Radiology 2017.    VAUGHN JOYNER MD

## 2020-03-13 NOTE — PLAN OF CARE
A&Ox4. VSS on RA. Mandarin Chinese speaking.  present since noon. L/s clear. Denies chest pain or SOB. Up SBA. +BS. Voiding. Seen by cardiology. On regular diet.    Pt discharged to home at 1740, ride given by Pt's Son. Discharge instructions reviewed with Pt and Pt's son with professional , questions answered. Discharged with scheduled Lexiscan nuclear stress test on Tuesday, March 17th. Sent all personal belongings and medications with Pt.

## 2020-03-13 NOTE — ED TRIAGE NOTES
Patient was discharged to home last Thursday after being treated after hospitalization for NSTEMI but chest pain and chest pressure been on going. Symptoms are getting worse since yesterday with upper back pain.

## 2020-03-13 NOTE — DISCHARGE INSTRUCTIONS
Please follow-up with your primary care doctor within one week of discharge. Follow-up with Cardiology as instructed.    You should get a repeat chest CT scan in 6-12 months because your CT this hospitalization showed a small nodule.      Please seek medical care immediately if you notice new or worsening symptoms, including fevers, chest pain, shortness of breath, nausea, vomiting, dizziness, lightheadedness, palpitations.

## 2020-03-13 NOTE — PROGRESS NOTES
Care Coordination:    Appointment made for pt per her and her sons request and added Keisha scan appointment to AVS as it was not showing up on it.     You have a Follow up Appointment with  at Kevon Sawyer,Jean on Wednesday, March 18th at 11 AM.  Arrive at 10:45AM for your check-in.  An  has been scheduled as well .  Address :  [facility] [address], [facility]   If you have any questions about this appointment or need to reschedule it please call the clinic at Phone Number [phone]      Gladys Jeronimo RN, Care Coordinator  [phone]

## 2020-03-13 NOTE — PHARMACY-ADMISSION MEDICATION HISTORY
Pharmacy Medication History  Admission medication history interview status for the 3/12/2020  admission is complete. See EPIC admission navigator for prior to admission medications     Medication history sources: Patient's family/friend (family interpreted for pt)  Medication history source reliability: Good  Adherence assessment: Good    Significant changes made to the medication list:  none      Additional medication history information:   Pt was recently discharged and per pt and family no medication changes since dishcarge    Medication reconciliation completed by provider prior to medication history? No    Time spent in this activity: 15 minutes      Prior to Admission medications    Medication Sig Last Dose Taking? Auth Provider   acetaminophen (TYLENOL) 325 MG tablet Take 2 tablets (650 mg) by mouth every 6 hours as needed for mild pain PRN Yes Jeremiah Roman MD   amLODIPine (NORVASC) 2.5 MG tablet Take 1 tablet (2.5 mg) by mouth daily 3/12/2020 at AM Yes Tawny Palmer MD   aspirin EC 81 MG EC tablet Take 1 tablet (81 mg) by mouth daily 3/12/2020 at AM Yes Edis Robledo MD   calcium carbonate (OS-CLAIRE 500 MG [place]. [place]) 500 MG tablet Take 500 mg by mouth daily  3/12/2020 at AM Yes Reported, Patient   carvedilol (COREG) 6.25 MG tablet Take 1 tablet (6.25 mg) by mouth 2 times daily (with meals) 3/12/2020 at AM Yes Tawny Palmer MD   cholecalciferol (VITAMIN D) 400 UNIT TABS tablet Take 400 Units by mouth daily 3/12/2020 at AM Yes Reported, Patient   clopidogrel (PLAVIX) 75 MG tablet Take 1 tablet (75 mg) by mouth daily 3/12/2020 at AM Yes Tawny Palmer MD   docusate sodium (COLACE) 100 MG capsule Take 100 mg by mouth daily  3/12/2020 at AM Yes Reported, Patient   nitroGLYcerin (NITROSTAT) 0.4 MG sublingual tablet Place 1 tablet (0.4 mg) under the tongue every 5 minutes as needed for chest pain if you are still having symptoms after 3 doses (15 minutes) call 911. PRN  Tawny Palmer  MD Suzette

## 2020-03-13 NOTE — CONSULTS
[facility]    Cardiology Consultation    Date of Admission:  3/12/2020  Primary cardiologist: Dr. Perrin    Assessment & Plan   Mary Polanco is a 89 year old female who was admitted on 3/12/2020. I was asked to see the patient for chest pain.    Summary:   1.  Chest discomfort. Admitted with somewhat atypical chest discomfort over the last few weeks. She has been chest pain free since admission. Serial troponins have been negative. EKG without acute ischemic changes. Her recent echocardiogram showed no wall motion abnormalities and preserved LV function.   -  Already on DAPT with Asprin and plavix as well as beta blocker. She has been intolerant of statins.   2.  Hx of CAD. With prior stenting of the circumflex in 2016. She did not have significant other disease at that time.   3.  Hypertension. Hypertensive during her last stay. BPs reasonable here.   4.  Hyperlipidemia. Intolerant of statins.    Plan:  1.  She has blood pressure room so will increase amlodipine to 5 mg daily and add low dose imdur 30 mg daily.   2.  At this point would recommend a Lexiscan nuclear stress test for re-evaluation today. Further recommendations pending this. I discussed this with the son, daughter, and patient. She is agreeable to proceed.   3.  Further recommendations pending the results of the stress test. If this is negative, she should be able to be discharge home with cardiology follow up.   4.  She has an appointment with NEYDA Armstrong on 3/20/20 for follow up.      Adry Jimenez PA-C    Reason for Consult   Reason for consult: I was asked by the hospitalist service to evaluate this patient for chest pain.    Primary Care Physician   Kevon Sawyer    Chief Complaint   Chest pain    History is obtained from the patient, son, daughter by phone, and medical record    History of Present Illness   Mary Polanco is a 89 year old female who is Mandarin speaking.  An in person  was not available and we  were unable to coordinate a phone .  She was interviewed with the assistance of her daughter and son who speak English with their agreement.    She has a history of coronary artery disease with a non-ST elevation myocardial infarction in April 2016 status post drug-eluting stent to the left circumflex, prior statin intolerance, atypical chest discomfort, and hypertension.      She recently was admitted to [facility] overnight on 3/4 with complaints of atypical chest discomfort.  She was seen in consultation by cardiology.  Her initial troponin was normal but subsequent troponin was minimally elevated at 0.085.  A subsequent troponin was normal.  Was unclear if this represented acute coronary syndrome versus a type II MI from hypertension.  She had had a normal Lexiscan nuclear stress test 1 year ago.  Dr. Ramos recommended invasive coronary angiography for further evaluation.  At that time, she declined this and wanted to proceed with conservative management.  She had an echocardiogram done which showed normal LV function without regional wall motion abnormalities.  She was started on Plavix in addition to her aspirin as well as low-dose amlodipine and carvedilol.    She presented to the emergency department yesterday due to complaints of recurrent chest discomfort.  She tells me since her discharge she has been having chest pain.  It seems this has primarily been occurring when she has been walking more rapidly. Two nights ago she was unable to sleep as she was having persistent back and chest discomfort for many hours.     She has not had chest pain since her admission.  Serial troponins have been negative. She has not had associated shortness of breath. She denies nausea, vomiting, diaphoresis, cough, peripheral edema. She is not very active during the day and typically just walks in her apartment.     Past Medical History    Past Medical History:   Diagnosis Date     Benign essential HTN       Coronary artery disease involving native coronary artery of native heart with unstable angina pectoris (H) 4/2016 4/2016 Cath - 80% hazy stenosis at site of ruptured plaque - HARLEEN placed     Cough      Eczema      Lung nodule      Malignant neoplasm of bladder, part unspecified 1999 Dr Everett    Surgery done in [place]     Mitral regurgitation     4/2016 mod-mod severe MR with multiple jets per Echo     Mixed hyperlipidemia      NSTEMI (non-ST elevated myocardial infarction) (H)      Osteoporosis      Polyarticular arthritis      Rash      Sciatica        Past Surgical History   Past Surgical History:   Procedure Laterality Date     BLADDER TUMOR-ASSOCIATED      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     C APPENDECTOMY      1950     CATARACT IOL, RT/LT  5-08     HC REMOVAL GALLBLADDER      due to stone 1990     HEART CATH STENT COR W/WO PTCA  4/2016 4/2016 Cath - 80% hazy stenosis at site of ruptured plaque - HARLEEN placed       Prior to Admission Medications   Prior to Admission Medications   Prescriptions Last Dose Informant Patient Reported? Taking?   acetaminophen (TYLENOL) 325 MG tablet PRN Self No Yes   Sig: Take 2 tablets (650 mg) by mouth every 6 hours as needed for mild pain   amLODIPine (NORVASC) 2.5 MG tablet 3/12/2020 at AM  No Yes   Sig: Take 1 tablet (2.5 mg) by mouth daily   aspirin EC 81 MG EC tablet 3/12/2020 at AM Self No Yes   Sig: Take 1 tablet (81 mg) by mouth daily   calcium carbonate (OS-CLAIRE 500 MG [place]. [place]) 500 MG tablet 3/12/2020 at AM Self Yes Yes   Sig: Take 500 mg by mouth daily    carvedilol (COREG) 6.25 MG tablet 3/12/2020 at AM  No Yes   Sig: Take 1 tablet (6.25 mg) by mouth 2 times daily (with meals)   cholecalciferol (VITAMIN D) 400 UNIT TABS tablet 3/12/2020 at AM Self Yes Yes   Sig: Take 400 Units by mouth daily   clopidogrel (PLAVIX) 75 MG tablet 3/12/2020 at AM  No Yes   Sig: Take 1 tablet (75 mg) by mouth daily   docusate sodium (COLACE) 100 MG capsule 3/12/2020 at AM  Self Yes Yes   Sig: Take 100 mg by mouth daily    nitroGLYcerin (NITROSTAT) 0.4 MG sublingual tablet PRN  No No   Sig: Place 1 tablet (0.4 mg) under the tongue every 5 minutes as needed for chest pain if you are still having symptoms after 3 doses (15 minutes) call 911.      Facility-Administered Medications: None     Allergies   Allergies   Allergen Reactions     Azithromycin      Gabapentin Itching     Kanamycin      Metronidazole Rash     Penicillins Rash     Streptomycin      rash     Atorvastatin Rash     Lisinopril Rash       Social History   Lives in her own apartment. Never smoker. No alcohol.    Family History   Family History   Problem Relation Age of Onset     Diabetes Brother      Hypertension Son      Cerebrovascular Disease Mother         76     Family History Negative Daughter      Family History Negative Brother      Family History Negative Brother        Review of Systems   The 10 point Review of Systems is negative other than noted in the HPI or here.     Physical Exam   Temp: 97.9  F (36.6  C) Temp src: Oral BP: 139/63 Pulse: 67 Heart Rate: 65 Resp: 16 SpO2: 96 % O2 Device: None (Room air)    Vital Signs with Ranges  Temp:  [97.6  F (36.4  C)-98.1  F (36.7  C)] 97.9  F (36.6  C)  Pulse:  [66-76] 67  Heart Rate:  [64-75] 65  Resp:  [10-21] 16  BP: (105-139)/(51-73) 139/63  SpO2:  [94 %-100 %] 96 %  119 lbs 9.6 oz    Constitutional: Alert, no acute distress.  Eyes: sclera anicteric  Respiratory: No respiratory distress. Breath sounds are CTAB. No wheezes, rhonchi, or rales   Cardiovascular: Regular rate and rhythm. Normal S1, S2. No murmurs, rubs, or gallops.   GI: abdomen soft.  Skin: warm and dry.   Musculoskeletal: no LE edema bilaterally. [place]  Neurologic: alert and oriented x 3.  Psychiatric: affect appropriate.     Data   -Data reviewed today: All pertinent laboratory and imaging results from this encounter were reviewed. I personally reviewed the EKG tracing showing NSR.  Recent Labs   Lab  03/13/20  0620 03/13/20  0301 03/12/20 2011   WBC  --   --  6.9   HGB  --   --  12.6   MCV  --   --  92   PLT  --   --  246   NA  --   --  139   POTASSIUM  --   --  4.3   CHLORIDE  --   --  107   CO2  --   --  26   BUN  --   --  16   CR  --   --  0.74   ANIONGAP  --   --  6   CLAIRE  --   --  9.1   GLC  --   --  158*   ALBUMIN  --   --  3.5   PROTTOTAL  --   --  7.7   BILITOTAL  --   --  0.2   ALKPHOS  --   --  83   ALT  --   --  21   AST  --   --  31   TROPI <0.015 <0.015 <0.015       Imaging:  Recent Results (from the past 24 hour(s))   CT Chest Pulmonary Embolism w Contrast    Narrative    CT CHEST PULMONARY EMBOLISM WITH CONTRAST 3/12/2020 9:37 PM    CLINICAL HISTORY: Pulmonary embolism suspected, intermediate  probability, positive D-dimer; chest pain, elevated D-dimer.    TECHNIQUE: CT angiogram chest during arterial phase injection IV  contrast. 2D and 3D MIP reconstructions were performed by the CT  technologist. Dose reduction techniques were used.     CONTRAST: 56mL Isovue-370    COMPARISON: None.    FINDINGS:  ANGIOGRAM CHEST: Pulmonary arteries are normal caliber and negative  for pulmonary emboli. Thoracic aorta is negative for dissection. No CT  evidence of right heart strain. Moderate coronary atherosclerosis.    LUNGS AND PLEURA: 6 mm nodule left upper lobe (series 6, image 68). No  other pulmonary nodule or mass.    MEDIASTINUM/AXILLAE: No pathologically enlarged mediastinal, hilar, or  axillary lymph nodes. There is mild coronary atherosclerosis.    UPPER ABDOMEN: Unremarkable.    MUSCULOSKELETAL: Unremarkable.      Impression    IMPRESSION:  1.  No evidence of pulmonary embolism.  2.  Moderate coronary atherosclerosis.  3.  Incidental 6 mm pulmonary nodule in the left lung.    Recommendations for an incidental lung nodule = or > 6mm to 8mm:    Low risk patients: Initial follow-up CT at 6-12 months, then  consider CT at 18-24 months if no change.    High risk patients: Initial follow-up CT at 6-12  months, then CT at  18-24 months if no change.    *Low Risk: Minimal or absent history of smoking or other known risk  factors.  *Nonsolid (ground-glass) or partly solid nodules may require longer  follow-up to exclude indolent adenocarcinoma.  *Recommendations based on Guidelines for the Management of Incidental  Pulmonary Nodules Detected at CT: From the Fleischner Society 2017,  Radiology 2017.    VAUGHN JOYNER MD

## 2020-03-13 NOTE — PLAN OF CARE
Pt Mandarin Chinese speaking-  phone used overnight for admission. In-person  scheduled for AM. A&Ox4 w/ VSS. Denies any chest pain or SOB. Will continue plan of care.

## 2020-03-13 NOTE — ED NOTES
Pt denies feeling short of breath now. No rash seen. Pt refusing benadryl and solumedrol at this time

## 2020-03-13 NOTE — PROGRESS NOTES
Observation goals  PRIOR TO DISCHARGE      Comments: List all goals to be met before discharge home:   - Serial troponins and stress test complete: Not met. Lexiscan nuclear stress test pending.    - Seen and cleared by consultant if applicable: Not met    - Adequate pain control on oral analgesia: Met    - Vital signs normal or at patient baseline: partially met    - Safe disposition plan has been identified: Not met

## 2020-03-13 NOTE — PROGRESS NOTES
RECEIVING UNIT ED HANDOFF REVIEW    ED Nurse Handoff Report was reviewed by: Sundeep Berger RN on March 13, 2020 at 1:36 AM

## 2020-03-13 NOTE — PROGRESS NOTES
Brief cardiology note:  Patient re-evaluated this afternoon with the assistance of a professional . She has not had any further chest discomfort. Unfortunately, we were notified that we will not be able to complete a nuclear stress test today. As it is the weekend tomorrow, she has been chest pain free, we discussed potentially arranging an outpatient nuclear stress test early next week for evaluation and discharging her on amlodipine and imdur. She was agreeable with this plan.    Arranged for Lexiscan nuclear stress test on Tuesday. March 17th. She has follow up already arranged with NEYDA Lebron on 3/20 and can review these results. Advised her to see re-evaluate with any persistent or new symptoms.     Her son and daughter were not present at the time but I am happy to speak with them this afternoon.    KADE Jimenez PA-C 2:28 PM 3/13/2020

## 2020-03-13 NOTE — H&P
Admitted:     03/12/2020      PRIMARY CARE PHYSICIAN:  Dr. Sawyer.      CHIEF COMPLAINT:  Chest pain.      HISTORY OF PRESENT ILLNESS:  Had been obtained from the patient.  Her daughter and her son were present at the time of my examination and provided helpful information.  They acted as interpreters as well.      Mrs. Mary Polanco is a very pleasant 89-year-old female, Chinese-speaking, with past medical history of hypertension, dyslipidemia, and coronary artery disease, status post stent placement, who came in for evaluation of recurrent episodes of chest pain.      This is actually her second admission to [facility] in the last month for similar problems.  Initially, she presented to [facility] ER on 03/04 for evaluation of chest pain.  Her initial troponin at that time was 0.016, but peaked at 0.085.  She did have an echocardiogram, which showed a normal ejection fraction of 60-65% with no regional wall motion abnormalities.  She was seen by Cardiology.  Impression was that she might have had an NSTEMI versus type 2 MI from hypertension.  It seems that she declined to have a stress test and she also declined coronary angiography.  Cardiology recommended to continue with aspirin and she was also started on Plavix 75 mg daily with plan for 1 year.  It seems that she was also started on a low-dose amlodipine and Coreg and the plan was to follow up with Cardiology.      It seems that since she went home, she continued to have recurrent episodes of chest pain that is described as located retrosternal, lasting around 15 minutes.  The pain is associated with some shortness of breath but no nausea, no vomiting, no diaphoresis.  Apparently, she is having more frequent episodes of chest pain, the reason for which she decided to come back to the ER.  Apparently, she would be okay to proceed with cardiac catheterization at this time.      Upon further questioning, she denies any fevers, no headaches, no  recent cough.  She denies recent history of travel.  She denies abdominal pain, no diarrhea, no constipation.  She does have some mild lower extremity swelling, for which she uses compression stockings.      In ER, she was seen by Dr. Juárez.  Her initial vitals showed blood pressure of 134/61, heart rate 65, respiratory rate 16, oxygen saturation % on room air and temperature 97.6.  She did have basic blood work, which showed unremarkable BMP:  Sodium was 139, potassium 4.3, chloride 107, bicarbonate 26, BUN 16, creatinine 0.74.  Her calcium was 9.1, anion gap of 6, albumin 3.5, total protein 7.7, total bilirubin 0.8, alkaline phosphatase 83, ALT 21, AST 31.  Troponin less than 0.015.  Her glucose was 158.  Her white blood cells were 6.9, hemoglobin 12.6, hematocrit 39.1 and platelet count 146.  Her D-dimer was mildly elevated at 0.6.  She did have a CT of the chest with IV contrast, which showed no evidence of pulmonary embolism, which showed moderate coronary atherosclerosis and an incidental 6 mm pulmonary nodule in the left lung that may require further followup as outpatient.  Her EKG showed a normal sinus rhythm at the rate of 68 beats per minute with no ischemic changes.      In the ER, she was given 1 dose of aspirin 324 mg p.o. and Hospitalist Service was called regarding the admission.      PAST MEDICAL HISTORY:   1.  Hypertension.   2.  Dyslipidemia.   3.  History of coronary artery disease, status post PCI to the left circumflex in 2016.  She had a normal Lexiscan in 3/2019.  She had a recent hospitalization from 03/04-03/05/2020 for evaluation of chest pain.  Apparently, she refused a repeat stress test or coronary angiography at that time.   4.  Osteoporosis.      PAST SURGICAL HISTORY:   Past Surgical History:   Procedure Laterality Date     BLADDER TUMOR-ASSOCIATED      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     C APPENDECTOMY      1950     CATARACT IOL, RT/LT  5-08     HC REMOVAL  GALLBLADDER      due to stone 1990     HEART CATH STENT COR W/WO PTCA  4/2016 4/2016 Cath - 80% hazy stenosis at site of ruptured plaque - HALREEN placed         SOCIAL HISTORY:  She lives by herself.  She denies smoking.  She denies alcohol drinking.  She denies illicit drug abuse.      FAMILY HISTORY:    Family History     Problem (# of Occurrences) Relation (Name,Age of Onset)    Cerebrovascular Disease (1) Mother: 76    Diabetes (1) Brother    Family History Negative (3) Daughter, Brother, Brother    Hypertension (1) Son           PRIOR TO ADMISSION MEDICATIONS:    No current facility-administered medications on file prior to encounter.   acetaminophen (TYLENOL) 325 MG tablet, Take 2 tablets (650 mg) by mouth every 6 hours as needed for mild pain  aspirin EC 81 MG EC tablet, Take 1 tablet (81 mg) by mouth daily  calcium carbonate (OS-CLAIRE 500 MG [place]. [place]) 500 MG tablet, Take 500 mg by mouth daily   carvedilol (COREG) 6.25 MG tablet, Take 1 tablet (6.25 mg) by mouth 2 times daily (with meals)  cholecalciferol (VITAMIN D) 400 UNIT TABS tablet, Take 400 Units by mouth daily  clopidogrel (PLAVIX) 75 MG tablet, Take 1 tablet (75 mg) by mouth daily  docusate sodium (COLACE) 100 MG capsule, Take 100 mg by mouth daily   nitroGLYcerin (NITROSTAT) 0.4 MG sublingual tablet, Place 1 tablet (0.4 mg) under the tongue every 5 minutes as needed for chest pain if you are still having symptoms after 3 doses (15 minutes) call 911.            Allergies   Allergen Reactions     Azithromycin      Gabapentin Itching     Kanamycin      Metronidazole Rash     Penicillins Rash     Streptomycin      rash     Atorvastatin Rash     Lisinopril Rash        REVIEW OF SYSTEMS:  The 10-point review of systems was conducted and it was negative except for pertinent positives mentioned in history of present illness.      PHYSICAL EXAMINATION:   VITAL SIGNS:  Blood pressure is 129/66, heart rate 70, respiratory rate 12, oxygen saturation 96% on room  air, temperature 97.6.   GENERAL:  The patient is awake, alert, no acute distress at the time of my examination.   HEENT:  Normocephalic, atraumatic.  Pupils are equally round and reactive to light.  Oral mucosa is moist.   NECK:  Supple.  No cervical lymphadenopathy, no thyromegaly.   CHEST:  There is bilateral air entry, no wheezing, no rales, no crackles.   CARDIOVASCULAR:  There is normal S1, S2, regular rate and rhythm, no murmurs, no rubs.   ABDOMEN:  Soft, nontender, nondistended.  Bowel sounds are present.   EXTREMITIES:  There is trace edema bilateral lower extremities, no calf tenderness, 2+ peripheral pulses are palpable.   SKIN:  Intact, no rash, no cyanosis.   NEUROLOGIC:  The patient is awake, alert, oriented to self, place and time.  There are no focal neurological deficits.   PSYCHIATRIC:  Normal mood, normal affect.      LABORATORY DATA:  Reviewed and dictated above.      ASSESSMENT:  Mrs. Mary Polanco is a pleasant 89-year-old female with past medical history of hypertension, dyslipidemia, history of coronary artery disease with a history of a drug-eluting stent placed to the left circumflex in 04/2016 and recent admission to [facility] earlier this month for chest pain who presented to the ER again for evaluation of recurrent episodes of chest pain.      PLAN:   1.  Chest pain.   2.  Known history of coronary artery disease with history of NSTEMI in 04/2016, status post drug-eluting stent to left circumflex.   As mentioned above, she had a recent admission earlier this month for evaluation of recurrent episodes of chest pain.  Her troponin peaked at 0.085.  She had a normal echocardiogram at that time.  She was seen by Cardiology who offered a cardiac catheterization, but the patient refused.  Then medical management was recommended with aspirin and Plavix for 1 year.  She was also started on Coreg and low-dose Norvasc.  Apparently, she continued to have episodes of chest pain at  home that increased in frequency.  Blood work currently is unremarkable.  Troponin is negative.  Her EKG does not show any ischemic changes.  She is chest pain-free at the time of my examination.  The patient is admitted under observational status.  She will be monitored on telemetry.  We will have serial troponins checked.  We will continue with her prior to admission aspirin, Plavix, Coreg and Norvasc.  It seems that she has a history of intolerance to statins.  Cardiology consult requested.  It seems that she agrees to proceed with cardiac catheterization at this time.   3.  Hypertension:  Her blood pressure seems to be well controlled at this time.  Will continue her prior to admission Coreg 6.25 mg p.o. twice daily and Norvasc 2.5 mg p.o. daily with holding parameters.   4.  History of dyslipidemia.  She has a history of statin intolerance.   5.  Incidental small pulmonary nodule in the left lung as per her CT of the chest.  Followup CAT scan in 6-12 months recommended.   6.  CODE STATUS:  Discussed with the patient and patient is full code.   7.  Deep venous thrombosis prophylaxis:  Encourage ambulation, as I anticipate a short hospital stay.      DISPOSITION:  Admit under observational status.         ABBI TIRADO MD             D: 2020   T: 2020   MT: VINCE      Name:     NERISSA SCHAFER   MRN:      [id number]        Account:      [id number]   :      1930        Admitted:     2020                   Document: [id number]       cc: Kevon Sawyer MD

## 2020-03-13 NOTE — ED NOTES
[facility]  ED Nurse Handoff Report    ED Chief complaint: Chest Pain      ED Diagnosis:   Final diagnoses:   None       Code Status: Full Code    Allergies:   Allergies   Allergen Reactions     Azithromycin      Gabapentin Itching     Kanamycin      Metronidazole Rash     Penicillins Rash     Streptomycin      rash     Atorvastatin Rash     Lisinopril Rash       Patient Story: Patient was discharged to home last Thursday after being treated after hospitalization for NSTEMI but chest pain and chest pressure been on going. Symptoms are getting worse since yesterday with upper back pain  Focused Assessment:  Chest pain    Treatments and/or interventions provided: ASA  Patient's response to treatments and/or interventions: pt denies chest pain at this time, pt had some shortness of breath after the CT scan. Doctor ordered benadryll and solumedrol and pt refused medication. Reports she is feeling better. No rash noted    To be done/followed up on inpatient unit:      Does this patient have any cognitive concerns?: speaks Mandarin. Have been using a jabber    Activity level - Baseline/Home:  Independent  Activity Level - Current:   Stand with Assist    Patient's Preferred language: Mandarin   Needed?: Yes    Isolation: None  Infection: Not Applicable  Bariatric?: No    Vital Signs:   Vitals:    03/12/20 2009   BP: 134/61   Pulse: 67   Resp: 16   Temp: 97.6  F (36.4  C)   TempSrc: Oral   SpO2: 100%   Weight: 54 kg (119 lb)       Cardiac Rhythm:Cardiac Rhythm: Normal sinus rhythm    Was the PSS-3 completed:   Yes  What interventions are required if any?               Family Comments:   OBS brochure/video discussed/provided to patient/family: Yes              Name of person given brochure if not patient: pt daughter              Relationship to patient:     For the majority of the shift this patient's behavior was Green.   Behavioral interventions performed were .    ED NURSE PHONE NUMBER:  [phone]

## 2020-03-14 NOTE — DISCHARGE SUMMARY
Discharge Summary  Hospitalist    Date of Admission:  3/4/2020  Date of Discharge:  3/5/2020  Discharging Provider: Tawny Palmer MD  Date of Service (when I saw the patient): 03/5/20    Discharge Diagnoses   Chest Pain, possible non-STEMI  CAD s/p stent placement 2016  Hypertension    History of Present Illness   Please refer H & P for details.      Hospital Course        Mary Polanco is a very pleasant Chinese speaking 89 year old female with hypertension and coronary artery disease who was admitted on 3/4/2020 with central chest pain and palpitations and a detectable troponin.     Chest Pain, possible non-STEMI  CAD s/p stent placement 2016  Hypertension  Awoke from sleep with central chest pain and tightness, palpitations at about 1:30 AM on 3/4/2020.  Endorsed shortness of breath as well.  Twelve-lead EKG with sinus rhythm and first-degree AV block.  Had a myocardial perfusion scan from March 2019 which shows normal wall motion and left ventricular systolic function EF 72%.  She does have history of GERD and her pain happened while she was reclined in bed so there may be an element as heartburn contributing to her pain as well.  Medications prior to mission include metoprolol, Lasix, aspirin.  -Admitted to observation to rule out myocardial infarction  -Troponin peaked at 0.085.  Patient was seen by cardiology.  Cardiac catheterization versus repeat stress test was recommended.  Patient and family declined both.  TTE was obtained on 3/4/2020 which showed normal LV function and no significant change from prior.  She was started on carvedilol and low-dose amlodipine.  -Continue baby aspirin and Plavix for 1 year for medical treatment of possible non-STEMI.  Sublingual nitro as needed.  Cardiology follow-up after discharge.     GERD  -Continue PTA PPI    Tawny Palmer MD, MD      Pending Results   These results will be followed up by Hospitalist team.  Unresulted Labs Ordered in the Past 30 Days of this Admission      No orders found from 2/3/2020 to 3/5/2020.          Code Status   Full Code       Primary Care Physician   Kevon Sawyer    Follow-ups Needed After Discharge   Follow-up Appointments     Follow-up and recommended labs and tests       Follow up with Cardiology as recommended             Physical Exam                      Vitals:    03/04/20 0921   Weight: 53.5 kg (117 lb 15.1 oz)     Vital Signs with Ranges     No intake/output data recorded.    Constitutional: Alert, cooperative, no apparent distress  Respiratory: Non labored breathing, clear to auscultation bilaterally, no crackles or wheezing  Cardiovascular: Regular rate and rhythm,systolic murmur +, no edema  GI: Normal bowel sounds, soft, non-distended, non-tender  Skin: No obvious rash  Neuro: Alert, engages in appropriate conversation, fluent speech, moving all extremities, no facial asymmetry  Psych: Calm and pleasant, no obvious anxiety/ depression      Discharge Disposition   Discharged to home  Condition at discharge: Stable    Consultations This Hospital Stay   CARDIOLOGY IP CONSULT  PHARMACY TO DOSE HEPARIN  PHARMACY TO DOSE HEPARIN  PHARMACY TO DOSE HEPARIN    Time Spent on this Encounter   I, Tawny Palmer MD, personally saw the patient today and spent greater than 30 minutes discharging this patient.    Discharge Orders      Comprehensive metabolic panel     CBC with platelets differential    Last Lab Result: Hemoglobin (g/dL)       Date                     Value                 03/04/2020               12.9             ----------     Discharge Order: F/U with Cardiac  FERNANDEZ      Follow-Up with Cardiologist      Reason for your hospital stay    You were hospitalized with chest pain, medically managed for coronary artery disease.     Activity    Your activity upon discharge: activity as tolerated     When to contact your care team    Call your primary doctor if you have any of the following: worsening chest pain, shortness of breath, leg swelling.      Follow-up and recommended labs and tests     Follow up with Cardiology as recommended     Diet    Follow this diet upon discharge: Orders Placed This Encounter      Low Saturated Fat Na <2400 mg     Discharge Medications   Discharge Medication List as of 3/5/2020 11:34 AM      START taking these medications    Details   carvedilol (COREG) 6.25 MG tablet Take 1 tablet (6.25 mg) by mouth 2 times daily (with meals), Disp-60 tablet, R-0, E-Prescribe      clopidogrel (PLAVIX) 75 MG tablet Take 1 tablet (75 mg) by mouth daily, Disp-30 tablet, R-0, E-Prescribe      amLODIPine (NORVASC) 2.5 MG tablet Take 1 tablet (2.5 mg) by mouth daily, Disp-30 tablet, R-0, E-Prescribe         CONTINUE these medications which have CHANGED    Details   nitroGLYcerin (NITROSTAT) 0.4 MG sublingual tablet Place 1 tablet (0.4 mg) under the tongue every 5 minutes as needed for chest pain if you are still having symptoms after 3 doses (15 minutes) call 911., Disp-25 tablet, R-3, E-Prescribe         CONTINUE these medications which have NOT CHANGED    Details   acetaminophen (TYLENOL) 325 MG tablet Take 2 tablets (650 mg) by mouth every 6 hours as needed for mild pain, Disp-50 tablet, R-0, E-PrescribeFuture refills by PCP Dr. Kevon Sawyer with phone number [phone].      aspirin EC 81 MG EC tablet Take 1 tablet (81 mg) by mouth daily, Disp-30 tablet, R-1, E-Prescribe      calcium carbonate (OS-CLAIRE 500 MG [place]. [place]) 500 MG tablet Take 500 mg by mouth daily , Historical      cholecalciferol (VITAMIN D) 400 UNIT TABS tablet Take 400 Units by mouth daily, Historical      docusate sodium (COLACE) 100 MG capsule Take 100 mg by mouth daily , Historical         STOP taking these medications       metoprolol tartrate (LOPRESSOR) 25 MG tablet Comments:   Reason for Stopping:         omeprazole (PRILOSEC) 20 MG DR capsule Comments:   Reason for Stopping:             Allergies   Allergies   Allergen Reactions     Azithromycin      Gabapentin Itching      Kanamycin      Metronidazole Rash     Penicillins Rash     Streptomycin      rash     Atorvastatin Rash     Lisinopril Rash     Data   Most Recent 3 CBC's:  Recent Labs   Lab Test 03/12/20 2011 03/04/20  0951 03/04/20  0239   WBC 6.9 7.9 7.9   HGB 12.6 12.9 12.5   MCV 92 91 92    259 256      Most Recent 3 BMP's:  Recent Labs   Lab Test 03/12/20 2011 03/04/20  0239 06/17/19  0900    139 140   POTASSIUM 4.3 4.4 4.0   CHLORIDE 107 109 105   CO2 26 25 26   BUN 16 21 27   CR 0.74 0.60 0.81   ANIONGAP 6 5 9   CLAIRE 9.1 10.0 9.9   * 105* 140*     Most Recent 2 LFT's:  Recent Labs   Lab Test 03/12/20 2011 03/04/20  0239   AST 31 43   ALT 21 18   ALKPHOS 83 69   BILITOTAL 0.2 0.5     Most Recent INR's and Anticoagulation Dosing History:  Anticoagulation Dose History     There is no flowsheet data to display.        Most Recent 3 Troponin's:  Recent Labs   Lab Test 03/13/20  0620 03/13/20  0301 03/12/20 2011   TROPI <0.015 <0.015 <0.015     Most Recent Cholesterol Panel:  Recent Labs   Lab Test 07/14/17   CHOL 220      HDL 53   TRIG 330     Most Recent 6 Bacteria Isolates From Any Culture (See EPIC Reports for Culture Details):  Recent Labs   Lab Test 09/15/16  1436   CULT Canceled, Test credited >10 Squamous epithelial cells/low power field indicates   oral contamination. Please recollect.  *     Most Recent TSH, T4 and A1c Labs:  Recent Labs   Lab Test 09/15/16  0025   TSH 0.82       Results for orders placed or performed during the hospital encounter of 03/04/20   XR Chest 2 Views    Narrative    EXAM: XR CHEST 2 VW  LOCATION: [facility]  DATE/TIME: 3/4/2020 2:47 AM    INDICATION: Chest pain.    COMPARISON: 5/17/2018.    FINDINGS: The heart size is normal. The thoracic aorta is calcified and mildly tortuous. The lungs are clear. No pneumothorax or pleural effusion. Degenerative disease in the spine.      Impression    IMPRESSION: No acute abnormality.       Echocardiogram  Complete    Narrative    [id number]  [id number]  [id number]  [id number]^K           [facility]  Echocardiography Laboratory  [address] [place] [address]        Name: NERISSA SCHAFER  MRN: [id number]  : 1930  Study Date: 2020 04:17 PM  Age: 89 yrs  Gender: Female  Patient Location: [facility]  Reason For Study: Acute Myocardial Infarction  Ordering Physician: BJ MELO  Referring Physician: Kevon Sawyer  Performed By: Kevon Hernandez RDCS     BSA: 1.5 m2  Height: 60 in  Weight: 117 lb  HR: 66  BP: 145/53 mmHg  _____________________________________________________________________________  __        Procedure  Complete Portable Echo Adult. Optison (NDC #[id number]) given intravenously.  _____________________________________________________________________________  __        Interpretation Summary     1. Normal left ventricular size and systolic function. LVEF 60-65%.  2. No regional wall motion abnormalities.  3. Normal right ventricular size and systolic function.  4. Trileaflet aortic valve sclerosis without significant stenosis and mild  regurgitation.     No significant changes compared to previous study dated 2017.  _____________________________________________________________________________  __        Left Ventricle  The left ventricle is normal in size. There is normal left ventricular wall  thickness. Left ventricular systolic function is normal. The visual ejection  fraction is estimated at 60-65%. Grade I or early diastolic dysfunction. No  regional wall motion abnormalities noted.     Right Ventricle  The right ventricle is normal in size and function.     Atria  The left atrium is mildly dilated. Right atrial size is normal. There is no  color Doppler evidence of an atrial shunt.     Mitral Valve  The mitral valve leaflets appear normal. There is no evidence of stenosis,  fluttering, or prolapse. There is trace mitral regurgitation.        Tricuspid Valve  Normal  tricuspid valve. There is trace tricuspid regurgitation. The right  ventricular systolic pressure is approximated at 26.0 mmHg plus the right  atrial pressure.     Aortic Valve  The aortic valve is trileaflet with aortic valve sclerosis. There is mild (1+)  aortic regurgitation. No hemodynamically significant valvular aortic stenosis.     Pulmonic Valve  The pulmonic valve is not well visualized. There is trace pulmonic valvular  regurgitation.     Vessels  The aortic root is normal size. Normal size ascending aorta. The inferior vena  cava is normal.     Pericardium  There is no pericardial effusion.        Rhythm  Sinus rhythm was noted.  _____________________________________________________________________________  __  MMode/2D Measurements & Calculations  IVSd: 1.0 cm     LVIDd: 4.6 cm  LVIDs: 2.9 cm  LVPWd: 0.97 cm  FS: 37.1 %  LV mass(C)d: 155.7 grams  LV mass(C)dI: 104.8 grams/m2  Ao root diam: 3.5 cm  LA dimension: 3.1 cm  asc Aorta Diam: 3.6 cm  LA/Ao: 0.88  LA Volume (BP): 28.4 ml  LA Volume Index (BP): 19.1 ml/m2  RWT: 0.43           Doppler Measurements & Calculations  MV E max zachary: 51.8 cm/sec  MV A max zachary: 92.5 cm/sec  MV E/A: 0.56  MV dec slope: 167.2 cm/sec2  AI P1/2t: 539.6 msec  PA acc time: 0.12 sec  TR max zachary: 255.2 cm/sec  TR max P.0 mmHg  E/E' avg: 10.7  Lateral E/e': 10.3  Medial E/e': 11.1           _____________________________________________________________________________  __           Report approved by: Dr Gopal Vee 2020 04:59 PM

## 2020-03-16 ENCOUNTER — TELEPHONE (OUTPATIENT)
Dept: CARDIOLOGY | Facility: CLINIC | Age: 85
End: 2020-03-16

## 2020-03-16 NOTE — TELEPHONE ENCOUNTER
Chinese speaking patient was evaluated by cardiology while inpatient for chest pain with exertion in presence of PMH of CAD with HARLEEN placement. Pt was recently seen IP for similar complaints, but declined any cardiology work up. Agreeable to OP cMRI and cardiology f/u. Continued on PTA ASA and Plavix. PTA Norvasc dosage increased and pt started on Imdur. Writer attempted to call patient using Mandarin  via  services to discuss any post hospital d/c questions she may have, review medication changes, and confirm f/u appts, but no answer and no VM available. Will try again later. RN will confirm with patient that she has an apt scheduled on 3/20/20 with FERNANDEZ Lou Walter. cMRI was scheduled on 3/17/20, but has since been cancelled. LAMAR Claros RN.

## 2020-03-17 ENCOUNTER — CARE COORDINATION (OUTPATIENT)
Dept: CARDIOLOGY | Facility: CLINIC | Age: 85
End: 2020-03-17

## 2020-03-17 NOTE — PROGRESS NOTES
Called patient's  to discuss the need for visit on Friday due to policy of limiting visitors within appointments. Patient is being seen by PCP today and will let us know if additional titration of medications is needed. Gave  our direct line to call us to give us more information.     Ada: [phone]     MARIA ISABEL Roman March 17, 2020 11:05 AM

## 2021-04-02 ENCOUNTER — TELEPHONE (OUTPATIENT)
Dept: CARDIOLOGY | Facility: CLINIC | Age: 86
End: 2021-04-02

## 2021-04-02 NOTE — TELEPHONE ENCOUNTER
Received call from Mandarin  to have refill of Plavix last filled a year ago by Dr. Palmer,  Last seen by Marychuy Fink 3-12-19 recommend follow up visit understand visit delayed due to Covid-19 pandemic,  Scheduling number given to  for follow up with Dr. Perrin.

## 2021-05-18 ENCOUNTER — OFFICE VISIT (OUTPATIENT)
Dept: CARDIOLOGY | Facility: CLINIC | Age: 86
End: 2021-05-18
Payer: COMMERCIAL

## 2021-05-18 VITALS
WEIGHT: 111.8 LBS | BODY MASS INDEX: 21.95 KG/M2 | DIASTOLIC BLOOD PRESSURE: 61 MMHG | HEART RATE: 66 BPM | HEIGHT: 60 IN | SYSTOLIC BLOOD PRESSURE: 115 MMHG

## 2021-05-18 DIAGNOSIS — I21.4 NSTEMI (NON-ST ELEVATED MYOCARDIAL INFARCTION) (H): ICD-10-CM

## 2021-05-18 DIAGNOSIS — R07.9 CHEST PAIN, UNSPECIFIED TYPE: Primary | ICD-10-CM

## 2021-05-18 PROCEDURE — 99214 OFFICE O/P EST MOD 30 MIN: CPT | Performed by: INTERNAL MEDICINE

## 2021-05-18 RX ORDER — CARVEDILOL 6.25 MG/1
6.25 TABLET ORAL 2 TIMES DAILY WITH MEALS
Qty: 180 TABLET | Refills: 0 | Status: SHIPPED | OUTPATIENT
Start: 2021-05-18 | End: 2021-08-16

## 2021-05-18 RX ORDER — AMLODIPINE BESYLATE 5 MG/1
5 TABLET ORAL DAILY
Qty: 90 TABLET | Refills: 3 | Status: SHIPPED | OUTPATIENT
Start: 2021-05-18 | End: 2022-05-16

## 2021-05-18 RX ORDER — ISOSORBIDE MONONITRATE 30 MG/1
30 TABLET, EXTENDED RELEASE ORAL DAILY
Qty: 90 TABLET | Refills: 3 | Status: SHIPPED | OUTPATIENT
Start: 2021-05-18 | End: 2022-06-21

## 2021-05-18 ASSESSMENT — MIFFLIN-ST. JEOR: SCORE: 843.62

## 2021-05-18 NOTE — PROGRESS NOTES
HPI and Plan:   See dictation    Orders Placed This Encounter   Procedures     Follow-Up with Cardiologist       Orders Placed This Encounter   Medications     amLODIPine (NORVASC) 5 MG tablet     Sig: Take 1 tablet (5 mg) by mouth daily     Dispense:  90 tablet     Refill:  3     carvedilol (COREG) 6.25 MG tablet     Sig: Take 1 tablet (6.25 mg) by mouth 2 times daily (with meals)     Dispense:  180 tablet     Refill:  0     isosorbide mononitrate (IMDUR) 30 MG 24 hr tablet     Sig: Take 1 tablet (30 mg) by mouth daily     Dispense:  90 tablet     Refill:  3       Encounter Diagnoses   Name Primary?     Chest pain, unspecified type Yes     NSTEMI (non-ST elevated myocardial infarction) (H)        CURRENT MEDICATIONS:  Current Outpatient Medications   Medication Sig Dispense Refill     acetaminophen (TYLENOL) 325 MG tablet Take 2 tablets (650 mg) by mouth every 6 hours as needed for mild pain 50 tablet 0     amLODIPine (NORVASC) 5 MG tablet Take 1 tablet (5 mg) by mouth daily 90 tablet 3     aspirin EC 81 MG EC tablet Take 1 tablet (81 mg) by mouth daily 30 tablet 1     calcium carbonate (OS-CLAIRE 500 MG [place]. [place]) 500 MG tablet Take 500 mg by mouth daily        carvedilol (COREG) 6.25 MG tablet Take 1 tablet (6.25 mg) by mouth 2 times daily (with meals) 180 tablet 0     cholecalciferol (VITAMIN D) 400 UNIT TABS tablet Take 400 Units by mouth daily       docusate sodium (COLACE) 100 MG capsule Take 100 mg by mouth daily        isosorbide mononitrate (IMDUR) 30 MG 24 hr tablet Take 1 tablet (30 mg) by mouth daily 90 tablet 3     nitroGLYcerin (NITROSTAT) 0.4 MG sublingual tablet Place 1 tablet (0.4 mg) under the tongue every 5 minutes as needed for chest pain if you are still having symptoms after 3 doses (15 minutes) call 911. 25 tablet 3       ALLERGIES     Allergies   Allergen Reactions     Azithromycin      Gabapentin Itching     Kanamycin      Metronidazole Rash     Penicillins Rash     Streptomycin      rash      Atorvastatin Rash     Lisinopril Rash       PAST MEDICAL HISTORY:  Past Medical History:   Diagnosis Date     Benign essential HTN      Coronary artery disease involving native coronary artery of native heart with unstable angina pectoris (H) 4/2016 4/2016 Cath - 80% hazy stenosis at site of ruptured plaque - HARLEEN placed     Cough      Eczema      Lung nodule      Malignant neoplasm of bladder, part unspecified 1999 Dr Everett    Surgery done in [place]     Mitral regurgitation     4/2016 mod-mod severe MR with multiple jets per Echo     Mixed hyperlipidemia      NSTEMI (non-ST elevated myocardial infarction) (H)      Osteoporosis      Polyarticular arthritis      Rash      Sciatica        PAST SURGICAL HISTORY:  Past Surgical History:   Procedure Laterality Date     BLADDER TUMOR-ASSOCIATED      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     C APPENDECTOMY      1950     CATARACT IOL, RT/LT  5-08     HC REMOVAL GALLBLADDER      due to stone 1990     HEART CATH STENT COR W/WO PTCA  4/2016 4/2016 Cath - 80% hazy stenosis at site of ruptured plaque - HARLEEN placed       FAMILY HISTORY:  Family History   Problem Relation Age of Onset     Diabetes Brother      Hypertension Son      Cerebrovascular Disease Mother         76     Family History Negative Daughter      Family History Negative Brother      Family History Negative Brother        SOCIAL HISTORY:  Social History     Socioeconomic History     Marital status: Single     Spouse name: None     Number of children: None     Years of education: None     Highest education level: None   Occupational History     None   Social Needs     Financial resource strain: None     Food insecurity     Worry: None     Inability: None     Transportation needs     Medical: None     Non-medical: None   Tobacco Use     Smoking status: Never Smoker     Smokeless tobacco: Never Used   Substance and Sexual Activity     Alcohol use: No     Drug use: No     Sexual activity: Not Currently    Lifestyle     Physical activity     Days per week: None     Minutes per session: None     Stress: None   Relationships     Social connections     Talks on phone: None     Gets together: None     Attends Yarsanism service: None     Active member of club or organization: None     Attends meetings of clubs or organizations: None     Relationship status: None     Intimate partner violence     Fear of current or ex partner: None     Emotionally abused: None     Physically abused: None     Forced sexual activity: None   Other Topics Concern      Service Not Asked     Blood Transfusions Not Asked     Caffeine Concern No     Occupational Exposure Not Asked     Hobby Hazards Not Asked     Sleep Concern No     Stress Concern No     Weight Concern No     Special Diet No     Back Care Not Asked     Exercise Yes     Comment: walking 20 min daily     Bike Helmet Not Asked     Seat Belt Yes     Self-Exams Not Asked     Parent/sibling w/ CABG, MI or angioplasty before 65F 55M? No   Social History Narrative    Vamsi AZEVEDO    From [place]    In the  2001    [facility]       Review of Systems:  Skin:  Negative     Eyes:  Positive for glasses  ENT:  Negative    Respiratory:  Positive for    Cardiovascular:  Negative;palpitations;chest pain;syncope or near-syncope;cyanosis;dizziness;lightheadedness;edema;fatigue Positive for  Gastroenterology: Negative    Genitourinary:  not assessed    Musculoskeletal:  Negative    Neurologic:  Negative    Psychiatric:  Negative    Heme/Lymph/Imm:  Negative    Endocrine:  Negative      Physical Exam:  Vitals: /61   Pulse 66   Ht 1.524 m (5')   Wt 50.7 kg (111 lb 12.8 oz)   BMI 21.83 kg/m      Constitutional:  cooperative, alert and oriented, well developed, well nourished, in no acute distress        Skin:  warm and dry to the touch, no apparent skin lesions or masses noted          Head:  normocephalic, no masses or lesions        Eyes:  pupils equal and round, conjunctivae and lids  unremarkable, sclera white, no xanthalasma, EOMS intact, no nystagmus        Lymph:No Cervical lymphadenopathy present     ENT:  no pallor or cyanosis, dentition good        Neck:  carotid pulses are full and equal bilaterally, JVP normal, no carotid bruit        Respiratory:  normal breath sounds, clear to auscultation, normal A-P diameter, normal symmetry, normal respiratory excursion, no use of accessory muscles         Cardiac: regular rhythm, normal S1/S2, no S3 or S4, apical impulse not displaced, no murmurs, gallops or rubs                pulses full and equal, no bruits auscultated                                        GI:  abdomen soft, non-tender, BS normoactive, no mass, no HSM, no bruits        Extremities and Muscular Skeletal:  no deformities, clubbing, cyanosis, erythema observed              Neurological:  no gross motor deficits        Psych:  Alert and Oriented x 3        Recent Lab Results:  LIPID RESULTS:  Lab Results   Component Value Date    CHOL 220 07/14/2017    HDL 53 07/14/2017     07/14/2017    TRIG 330 07/14/2017    CHOLHDLRATIO 3.8 04/05/2010       LIVER ENZYME RESULTS:  Lab Results   Component Value Date    AST 31 03/12/2020    ALT 21 03/12/2020       CBC RESULTS:  Lab Results   Component Value Date    WBC 6.9 03/12/2020    RBC 4.26 03/12/2020    HGB 12.6 03/12/2020    HCT 39.1 03/12/2020    MCV 92 03/12/2020    MCH 29.6 03/12/2020    MCHC 32.2 03/12/2020    RDW 12.4 03/12/2020     03/12/2020       BMP RESULTS:  Lab Results   Component Value Date     03/12/2020    POTASSIUM 4.3 03/12/2020    CHLORIDE 107 03/12/2020    CO2 26 03/12/2020    ANIONGAP 6 03/12/2020     (H) 03/12/2020    BUN 16 03/12/2020    CR 0.74 03/12/2020    GFRESTIMATED 71 03/12/2020    GFRESTBLACK 83 03/12/2020    CLAIRE 9.1 03/12/2020        A1C RESULTS:  No results found for: A1C    INR RESULTS:  No results found for: INR        CC  No referring provider defined for this  encounter.

## 2021-05-18 NOTE — PROGRESS NOTES
Service Date: 05/18/2021    HISTORY OF PRESENT ILLNESS:  I am seeing this lady for followup of coronary artery disease.  She had a non-Q-wave myocardial infarction in 04/2016.  The culprit was the circumflex and a drug-eluting stent was placed.  Ejection fraction is normal.  She completed a year of dual-antiplatelet therapy.  She does not tolerate statins well and to many discussions, she is convinced that she is not going to take any lipid-lowering medications.     She has a history of palpitations as well, but rhythm monitoring did not show any major significant arrhythmias.  There is mild degrees of aortic, mitral and tricuspid regurgitation due to age-related degeneration.     She also has a history of atypical chest discomfort.  I evaluated this with a stress nuclear study in 2019, which was normal.  She was briefly admitted last year with atypical chest discomfort with normal troponins.  A stress nuclear study was advised, but she did not follow this up as she did not want to have a stress test.    She still has intermittent chest discomfort, which is worse in the morning when she wakes up.  She describes it as epigastric lower chest discomfort, which radiates to her back.  It can last for hours.  It is nonexertional.  It happens sporadically.  There is no nausea or vomiting.     PHYSICAL EXAMINATION:  Cardiac examination is unremarkable.     She had stopped taking Plavix recently, which I believe was prescribed after she left hospital last year.    IMPRESSION: Her pain certainly sounds more musculoskeletal and GI in origin.  I do think a stress test could well be helpful, if only for reassurance purposes.  She will think about having this test.  In the meantime, I advised over-the-counter proton pump inhibitors and Tylenol for her symptoms.  I have provisionally put in a stress nuclear study in case she changes her mind about stress testing.  Otherwise, I plan to see her again in a year's time for further  followup.    Lopez Perrin MD, [facility]        D: 2021   T: 2021   MT: EDWARD    Name:     NERISSA SCHAFER  MRN:      [id number]        Account:      [id number]   :      1930           Service Date: 2021       Document: [id number]

## 2021-05-18 NOTE — LETTER
5/18/2021    Kevon Sawyer MD  [facility] [address]  [facility] [address]    RE: Mary Polanco       Dear Colleague,    I had the pleasure of seeing Mary Polanco in the [facility].    HPI and Plan:   See dictation    Orders Placed This Encounter   Procedures     Follow-Up with Cardiologist       Orders Placed This Encounter   Medications     amLODIPine (NORVASC) 5 MG tablet     Sig: Take 1 tablet (5 mg) by mouth daily     Dispense:  90 tablet     Refill:  3     carvedilol (COREG) 6.25 MG tablet     Sig: Take 1 tablet (6.25 mg) by mouth 2 times daily (with meals)     Dispense:  180 tablet     Refill:  0     isosorbide mononitrate (IMDUR) 30 MG 24 hr tablet     Sig: Take 1 tablet (30 mg) by mouth daily     Dispense:  90 tablet     Refill:  3       Encounter Diagnoses   Name Primary?     Chest pain, unspecified type Yes     NSTEMI (non-ST elevated myocardial infarction) (H)        CURRENT MEDICATIONS:  Current Outpatient Medications   Medication Sig Dispense Refill     acetaminophen (TYLENOL) 325 MG tablet Take 2 tablets (650 mg) by mouth every 6 hours as needed for mild pain 50 tablet 0     amLODIPine (NORVASC) 5 MG tablet Take 1 tablet (5 mg) by mouth daily 90 tablet 3     aspirin EC 81 MG EC tablet Take 1 tablet (81 mg) by mouth daily 30 tablet 1     calcium carbonate (OS-CLAIRE 500 MG [place]. CA) 500 MG tablet Take 500 mg by mouth daily        carvedilol (COREG) 6.25 MG tablet Take 1 tablet (6.25 mg) by mouth 2 times daily (with meals) 180 tablet 0     cholecalciferol (VITAMIN D) 400 UNIT TABS tablet Take 400 Units by mouth daily       docusate sodium (COLACE) 100 MG capsule Take 100 mg by mouth daily        isosorbide mononitrate (IMDUR) 30 MG 24 hr tablet Take 1 tablet (30 mg) by mouth daily 90 tablet 3     nitroGLYcerin (NITROSTAT) 0.4 MG sublingual tablet Place 1 tablet (0.4 mg) under the tongue every 5 minutes as needed for chest pain if  you are still having symptoms after 3 doses (15 minutes) call 911. 25 tablet 3       ALLERGIES     Allergies   Allergen Reactions     Azithromycin      Gabapentin Itching     Kanamycin      Metronidazole Rash     Penicillins Rash     Streptomycin      rash     Atorvastatin Rash     Lisinopril Rash       PAST MEDICAL HISTORY:  Past Medical History:   Diagnosis Date     Benign essential HTN      Coronary artery disease involving native coronary artery of native heart with unstable angina pectoris (H) 4/2016 4/2016 Cath - 80% hazy stenosis at site of ruptured plaque - HARLEEN placed     Cough      Eczema      Lung nodule      Malignant neoplasm of bladder, part unspecified 1999 Dr Everett    Surgery done in [place]     Mitral regurgitation     4/2016 mod-mod severe MR with multiple jets per Echo     Mixed hyperlipidemia      NSTEMI (non-ST elevated myocardial infarction) (H)      Osteoporosis      Polyarticular arthritis      Rash      Sciatica        PAST SURGICAL HISTORY:  Past Surgical History:   Procedure Laterality Date     BLADDER TUMOR-ASSOCIATED      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     C APPENDECTOMY      1950     CATARACT IOL, RT/LT  5-08     HC REMOVAL GALLBLADDER      due to stone 1990     HEART CATH STENT COR W/WO PTCA  4/2016 4/2016 Cath - 80% hazy stenosis at site of ruptured plaque - HARLEEN placed       FAMILY HISTORY:  Family History   Problem Relation Age of Onset     Diabetes Brother      Hypertension Son      Cerebrovascular Disease Mother         76     Family History Negative Daughter      Family History Negative Brother      Family History Negative Brother        SOCIAL HISTORY:  Social History     Socioeconomic History     Marital status: Single     Spouse name: None     Number of children: None     Years of education: None     Highest education level: None   Occupational History     None   Social Needs     Financial resource strain: None     Food insecurity     Worry: None      Inability: None     Transportation needs     Medical: None     Non-medical: None   Tobacco Use     Smoking status: Never Smoker     Smokeless tobacco: Never Used   Substance and Sexual Activity     Alcohol use: No     Drug use: No     Sexual activity: Not Currently   Lifestyle     Physical activity     Days per week: None     Minutes per session: None     Stress: None   Relationships     Social connections     Talks on phone: None     Gets together: None     Attends Sabianism service: None     Active member of club or organization: None     Attends meetings of clubs or organizations: None     Relationship status: None     Intimate partner violence     Fear of current or ex partner: None     Emotionally abused: None     Physically abused: None     Forced sexual activity: None   Other Topics Concern      Service Not Asked     Blood Transfusions Not Asked     Caffeine Concern No     Occupational Exposure Not Asked     Hobby Hazards Not Asked     Sleep Concern No     Stress Concern No     Weight Concern No     Special Diet No     Back Care Not Asked     Exercise Yes     Comment: walking 20 min daily     Bike Helmet Not Asked     Seat Belt Yes     Self-Exams Not Asked     Parent/sibling w/ CABG, MI or angioplasty before 65F 55M? No   Social History Narrative    Vamsi AZEVEDO    From [place]    In the  2001           Review of Systems:  Skin:  Negative     Eyes:  Positive for glasses  ENT:  Negative    Respiratory:  Positive for    Cardiovascular:  Negative;palpitations;chest pain;syncope or near-syncope;cyanosis;dizziness;lightheadedness;edema;fatigue Positive for  Gastroenterology: Negative    Genitourinary:  not assessed    Musculoskeletal:  Negative    Neurologic:  Negative    Psychiatric:  Negative    Heme/Lymph/Imm:  Negative    Endocrine:  Negative      Physical Exam:  Vitals: /61   Pulse 66   Ht 1.524 m (5')   Wt 50.7 kg (111 lb 12.8 oz)   BMI 21.83 kg/m      Constitutional:  cooperative, alert  and oriented, well developed, well nourished, in no acute distress        Skin:  warm and dry to the touch, no apparent skin lesions or masses noted          Head:  normocephalic, no masses or lesions        Eyes:  pupils equal and round, conjunctivae and lids unremarkable, sclera white, no xanthalasma, EOMS intact, no nystagmus        Lymph:No Cervical lymphadenopathy present     ENT:  no pallor or cyanosis, dentition good        Neck:  carotid pulses are full and equal bilaterally, JVP normal, no carotid bruit        Respiratory:  normal breath sounds, clear to auscultation, normal A-P diameter, normal symmetry, normal respiratory excursion, no use of accessory muscles         Cardiac: regular rhythm, normal S1/S2, no S3 or S4, apical impulse not displaced, no murmurs, gallops or rubs                pulses full and equal, no bruits auscultated                                        GI:  abdomen soft, non-tender, BS normoactive, no mass, no HSM, no bruits        Extremities and Muscular Skeletal:  no deformities, clubbing, cyanosis, erythema observed              Neurological:  no gross motor deficits        Psych:  Alert and Oriented x 3        Recent Lab Results:  LIPID RESULTS:  Lab Results   Component Value Date    CHOL 220 07/14/2017    HDL 53 07/14/2017     07/14/2017    TRIG 330 07/14/2017    CHOLHDLRATIO 3.8 04/05/2010       LIVER ENZYME RESULTS:  Lab Results   Component Value Date    AST 31 03/12/2020    ALT 21 03/12/2020       CBC RESULTS:  Lab Results   Component Value Date    WBC 6.9 03/12/2020    RBC 4.26 03/12/2020    HGB 12.6 03/12/2020    HCT 39.1 03/12/2020    MCV 92 03/12/2020    MCH 29.6 03/12/2020    MCHC 32.2 03/12/2020    RDW 12.4 03/12/2020     03/12/2020       BMP RESULTS:  Lab Results   Component Value Date     03/12/2020    POTASSIUM 4.3 03/12/2020    CHLORIDE 107 03/12/2020    CO2 26 03/12/2020    ANIONGAP 6 03/12/2020     (H) 03/12/2020    BUN 16 03/12/2020     CR 0.74 03/12/2020    GFRESTIMATED 71 03/12/2020    GFRESTBLACK 83 03/12/2020    CLAIRE 9.1 03/12/2020        A1C RESULTS:  No results found for: A1C    INR RESULTS:  No results found for: INR    Thank you for allowing me to participate in the care of your patient.      Sincerely,     DR MAURA PÉREZ MD     [facility]    cc:   No referring provider defined for this encounter.

## 2021-08-16 DIAGNOSIS — I21.4 NSTEMI (NON-ST ELEVATED MYOCARDIAL INFARCTION) (H): ICD-10-CM

## 2021-08-16 RX ORDER — CARVEDILOL 6.25 MG/1
6.25 TABLET ORAL 2 TIMES DAILY WITH MEALS
Qty: 180 TABLET | Refills: 2 | Status: SHIPPED | OUTPATIENT
Start: 2021-08-16 | End: 2022-05-16

## 2021-09-19 ENCOUNTER — APPOINTMENT (OUTPATIENT)
Dept: GENERAL RADIOLOGY | Facility: CLINIC | Age: 86
End: 2021-09-19
Attending: EMERGENCY MEDICINE
Payer: COMMERCIAL

## 2021-09-19 ENCOUNTER — HOSPITAL ENCOUNTER (EMERGENCY)
Facility: CLINIC | Age: 86
Discharge: HOME | End: 2021-09-19
Attending: EMERGENCY MEDICINE | Admitting: EMERGENCY MEDICINE
Payer: COMMERCIAL

## 2021-09-19 VITALS
HEIGHT: 60 IN | DIASTOLIC BLOOD PRESSURE: 72 MMHG | OXYGEN SATURATION: 96 % | WEIGHT: 128 LBS | SYSTOLIC BLOOD PRESSURE: 136 MMHG | RESPIRATION RATE: 15 BRPM | BODY MASS INDEX: 25.13 KG/M2 | HEART RATE: 72 BPM | TEMPERATURE: 97 F

## 2021-09-19 DIAGNOSIS — M10.9 ACUTE GOUTY ARTHRITIS: ICD-10-CM

## 2021-09-19 DIAGNOSIS — D64.9 ANEMIA, UNSPECIFIED TYPE: ICD-10-CM

## 2021-09-19 LAB
ANION GAP SERPL CALCULATED.3IONS-SCNC: 5 MMOL/L (ref 3–14)
BASOPHILS # BLD AUTO: 0.1 10E3/UL (ref 0–0.2)
BASOPHILS NFR BLD AUTO: 1 %
BUN SERPL-MCNC: 22 MG/DL (ref 7–30)
CALCIUM SERPL-MCNC: 9.4 MG/DL (ref 8.5–10.1)
CHLORIDE BLD-SCNC: 107 MMOL/L (ref 94–109)
CO2 SERPL-SCNC: 25 MMOL/L (ref 20–32)
CREAT SERPL-MCNC: 0.96 MG/DL (ref 0.52–1.04)
EOSINOPHIL # BLD AUTO: 0.2 10E3/UL (ref 0–0.7)
EOSINOPHIL NFR BLD AUTO: 2 %
ERYTHROCYTE [DISTWIDTH] IN BLOOD BY AUTOMATED COUNT: 12.1 % (ref 10–15)
GFR SERPL CREATININE-BSD FRML MDRD: 52 ML/MIN/1.73M2
GLUCOSE BLD-MCNC: 135 MG/DL (ref 70–99)
HCT VFR BLD AUTO: 30.7 % (ref 35–47)
HGB BLD-MCNC: 9.8 G/DL (ref 11.7–15.7)
IMM GRANULOCYTES # BLD: 0 10E3/UL
IMM GRANULOCYTES NFR BLD: 0 %
LYMPHOCYTES # BLD AUTO: 2.6 10E3/UL (ref 0.8–5.3)
LYMPHOCYTES NFR BLD AUTO: 28 %
MCH RBC QN AUTO: 29.6 PG (ref 26.5–33)
MCHC RBC AUTO-ENTMCNC: 31.9 G/DL (ref 31.5–36.5)
MCV RBC AUTO: 93 FL (ref 78–100)
MONOCYTES # BLD AUTO: 0.7 10E3/UL (ref 0–1.3)
MONOCYTES NFR BLD AUTO: 8 %
NEUTROPHILS # BLD AUTO: 5.7 10E3/UL (ref 1.6–8.3)
NEUTROPHILS NFR BLD AUTO: 61 %
NRBC # BLD AUTO: 0 10E3/UL
NRBC BLD AUTO-RTO: 0 /100
PLATELET # BLD AUTO: 186 10E3/UL (ref 150–450)
POTASSIUM BLD-SCNC: 4.6 MMOL/L (ref 3.4–5.3)
RBC # BLD AUTO: 3.31 10E6/UL (ref 3.8–5.2)
SODIUM SERPL-SCNC: 137 MMOL/L (ref 133–144)
WBC # BLD AUTO: 9.2 10E3/UL (ref 4–11)

## 2021-09-19 PROCEDURE — 82947 ASSAY GLUCOSE BLOOD QUANT: CPT | Performed by: EMERGENCY MEDICINE

## 2021-09-19 PROCEDURE — 85025 COMPLETE CBC W/AUTO DIFF WBC: CPT | Performed by: EMERGENCY MEDICINE

## 2021-09-19 PROCEDURE — 73630 X-RAY EXAM OF FOOT: CPT | Mod: RT

## 2021-09-19 PROCEDURE — 36415 COLL VENOUS BLD VENIPUNCTURE: CPT | Performed by: EMERGENCY MEDICINE

## 2021-09-19 PROCEDURE — 250N000013 HC RX MED GY IP 250 OP 250 PS 637: Performed by: EMERGENCY MEDICINE

## 2021-09-19 PROCEDURE — 250N000012 HC RX MED GY IP 250 OP 636 PS 637: Performed by: EMERGENCY MEDICINE

## 2021-09-19 PROCEDURE — 99284 EMERGENCY DEPT VISIT MOD MDM: CPT

## 2021-09-19 RX ORDER — PREDNISONE 20 MG/1
40 TABLET ORAL ONCE
Status: COMPLETED | OUTPATIENT
Start: 2021-09-19 | End: 2021-09-19

## 2021-09-19 RX ORDER — PREDNISONE 10 MG/1
TABLET ORAL
Qty: 32 TABLET | Refills: 0 | Status: CANCELLED | OUTPATIENT
Start: 2021-09-19 | End: 2021-09-29

## 2021-09-19 RX ORDER — ACETAMINOPHEN 325 MG/1
650 TABLET ORAL ONCE
Status: COMPLETED | OUTPATIENT
Start: 2021-09-19 | End: 2021-09-19

## 2021-09-19 RX ORDER — PREDNISONE 10 MG/1
TABLET ORAL
Qty: 32 TABLET | Refills: 0 | Status: SHIPPED | OUTPATIENT
Start: 2021-09-19 | End: 2021-09-29

## 2021-09-19 RX ADMIN — ACETAMINOPHEN 650 MG: 325 TABLET, FILM COATED ORAL at 22:58

## 2021-09-19 RX ADMIN — PREDNISONE 40 MG: 20 TABLET ORAL at 22:52

## 2021-09-19 ASSESSMENT — MIFFLIN-ST. JEOR: SCORE: 917.1

## 2021-09-19 ASSESSMENT — ENCOUNTER SYMPTOMS
JOINT SWELLING: 1
ARTHRALGIAS: 1

## 2021-09-20 NOTE — DISCHARGE INSTRUCTIONS
*You may resume diet and activities.  *Take medications as prescribed.  Tylenol as directed as needed for pain. Prednisone as directed starting tomorrow. Continue your current medications.  *Followup with your doctor regarding your gout and your anemia.  *Return if you develop black stools, fever, faint or feel like you will faint or become worse in any way.

## 2021-09-20 NOTE — ED NOTES
Pt ready for discharge. Pt is alert and oriented, able to ambulate without difficulty. No other complaints at this time. Reviewed discharge instructions with patient and son with . all questions answered. Pt agreeable with plan.

## 2021-09-20 NOTE — ED PROVIDER NOTES
History   Chief Complaint:  Right Foot Pain     A  was used.      Mary Polanco is a 89 year old female with history of polyarticular arthritis, osteoporosis, NSTEMI, and gout who presents with right foot pain and swelling. The patient says that she noticed the symptoms this morning. She says that it is difficult to walk. She says that she has not taken any pain medications.  No fevers, nausea, vomiting, diarrhea.  No trauma.  She and her son report that she was admitted with something very similar on the left side in 2019.  Review of past medical record indicates that she was admitted for possible cellulitis but it was felt to be gout and she was treated for this.  She is not currently on any gout medication like allopurinol.    Review of Systems   Musculoskeletal: Positive for arthralgias and joint swelling.   All other systems reviewed and are negative.    Allergies:  Azithromycin  Gabapentin  Kanamycin  Metronidazole  Pcn [Penicillins]  Streptomycin  Atorvastatin  Lisinopril    Medications:  Nitrostat  Imdur  Colace  Coreg  Aspirin  Norvasc     Past Medical History:    Sciatica  Polyarticular arthritis  Osteoporosis   NSTEMI  Hyperlipidemia   Mitral regurgitation  Malignant neoplasm of bladder  Lung nodule  Eczema   CAD  Hypertension    Gout  GERD  community-acquired pneumonia   ACS     Past Surgical History:    Heart catheterization stent COR W/WO PTCA  Cholecystectomy  Cataract IOL  Appendectomy  Bladder tumor removed       Family History:    Diabetes  Hypertension   Cerebrovascular disease     Social History:  Patient presents to the ED with family.    Physical Exam     Patient Vitals for the past 24 hrs:   BP Temp Temp src Pulse Resp SpO2 Height Weight   09/19/21 2300 136/72 -- -- 72 -- 96 % -- --   09/19/21 2015 -- -- -- -- -- -- 1.524 m (5') 58.1 kg (128 lb)   09/19/21 2010 115/56 97  F (36.1  C) Temporal 74 15 97 % -- --       Physical Exam  General: Well-nourished, appears to be resting  comfortably when I enter the room  Eyes: PERRL, conjunctivae pink no scleral icterus or conjunctival injection  ENT:  Moist mucus membranes, posterior oropharynx clear without erythema or exudates  Respiratory:  Lungs clear to auscultation bilaterally, no crackles/rubs/wheezes.  Good air movement  CV: Normal rate and rhythm, no murmurs/rubs/gallops  GI:  Abdomen soft and non-distended.  Normoactive BS.  No tenderness, guarding or rebound  Skin: Warm, dry.  No rashes or petechiae.  Mild erythema over the base of the great toe.  No streaking.  No signs of cellulitis.  No below.  Musculoskeletal: No peripheral edema or calf tenderness.  Bunion over right foot.  Mild erythema over the base of the great toe and tenderness over the same area.  No apparent effusion.  No significant warmth.  Neuro: Alert and oriented to person/place/time  Psychiatric: Normal affect    Emergency Department Course     Imaging:  Foot  XR, G/E 3 views, right  Osteopenia. No cortical erosion to suggest osteomyelitis. Bunion deformity of the great toe. Mild degenerative changes in the first MTP joint.  Reading per radiology     Laboratory:    CBC: WBC: 9.2, HGB: 9.8 (L), PLT: 186  BMP: Glucose 135 (H), GFR: 52 (L), o/w WNL (Creatinine: 0.96)     Procedures    Emergency Department Course:    Reviewed:  I reviewed nursing notes, vitals, past medical history and care everywhere       Assessments:  2058 I performed an exam of the patient as documented above.   2245 Patient rechecked and updated.      Interventions:  2252 Deltasone 40 mg Oral  2258 Tylenol 650 mg Oral       Disposition:  The patient was discharged to home.       Impression & Plan     Medical Decision Making:  Mary Polanco is a 89 year old female who presents for evaluation of right foot pain. There is swelling.  X-ray reveals no occult fracture or other signs of osteomyelitis.  Laboratory studies were obtained just to evaluate kidney function and white count and these were reassuring  with the exception of some anemia.  Discussed with her and she denies any GI bleeding or other signs of bleeding.  Discussed that she would need to follow-up with her own primary care doctor regarding this.  The presentation is consistent based on history and workup to be acute gouty attack.  Based on their history, elected to start prednisone and Tylenol for this acute gouty flare.  I doubt at this point that this is a septic arthritis, fracture, pseudogout, cellulitis, or other worrisome etiology.  Supportive outpatient management indicated.  Should see primary in next 1-2 days for recheck. Gout information given for home.      Diagnosis:    ICD-10-CM    1. Acute gouty arthritis  M10.9    2. Anemia, unspecified type  D64.9        Discharge Medications:  New Prescriptions    PREDNISONE (DELTASONE) 10 MG TABLET    Take 4 tablets daily for 5 days,  take 2 tablets daily for 3 days, take 1 tablet daily for 3 days, take half a tablet for 3 days.       Scribe Disclosure:  I, Farhat Moore, am serving as a scribe at 8:24 PM on 9/19/2021 to document services personally performed by Yolande Ramirez MD based on my observations and the provider's statements to me.          Yolande Ramirez MD  09/20/21 0102

## 2022-01-01 ENCOUNTER — OFFICE VISIT (OUTPATIENT)
Dept: CARDIOLOGY | Facility: CLINIC | Age: 87
End: 2022-01-01
Attending: INTERNAL MEDICINE
Payer: COMMERCIAL

## 2022-01-01 VITALS
SYSTOLIC BLOOD PRESSURE: 148 MMHG | WEIGHT: 103.8 LBS | DIASTOLIC BLOOD PRESSURE: 78 MMHG | HEIGHT: 60 IN | HEART RATE: 60 BPM | BODY MASS INDEX: 20.38 KG/M2

## 2022-01-01 DIAGNOSIS — R07.9 CHEST PAIN, UNSPECIFIED TYPE: ICD-10-CM

## 2022-01-01 DIAGNOSIS — I21.4 NSTEMI (NON-ST ELEVATED MYOCARDIAL INFARCTION) (H): ICD-10-CM

## 2022-01-01 PROCEDURE — 99213 OFFICE O/P EST LOW 20 MIN: CPT | Performed by: INTERNAL MEDICINE

## 2022-01-01 RX ORDER — CARVEDILOL 6.25 MG/1
6.25 TABLET ORAL 2 TIMES DAILY WITH MEALS
Qty: 180 TABLET | Refills: 3 | Status: SHIPPED | OUTPATIENT
Start: 2022-01-01 | End: 2023-01-01

## 2022-01-01 RX ORDER — ISOSORBIDE MONONITRATE 30 MG/1
30 TABLET, EXTENDED RELEASE ORAL DAILY
Qty: 90 TABLET | Refills: 3 | Status: SHIPPED | OUTPATIENT
Start: 2022-01-01 | End: 2023-01-01

## 2022-01-01 RX ORDER — AMLODIPINE BESYLATE 5 MG/1
5 TABLET ORAL DAILY
Qty: 90 TABLET | Refills: 3 | Status: SHIPPED | OUTPATIENT
Start: 2022-01-01

## 2022-05-16 DIAGNOSIS — I21.4 NSTEMI (NON-ST ELEVATED MYOCARDIAL INFARCTION) (H): ICD-10-CM

## 2022-05-16 DIAGNOSIS — R07.9 CHEST PAIN, UNSPECIFIED TYPE: ICD-10-CM

## 2022-05-16 RX ORDER — CARVEDILOL 6.25 MG/1
6.25 TABLET ORAL 2 TIMES DAILY WITH MEALS
Qty: 180 TABLET | Refills: 0 | Status: SHIPPED | OUTPATIENT
Start: 2022-05-16 | End: 2022-08-16

## 2022-05-16 RX ORDER — AMLODIPINE BESYLATE 5 MG/1
5 TABLET ORAL DAILY
Qty: 90 TABLET | Refills: 0 | Status: SHIPPED | OUTPATIENT
Start: 2022-05-16 | End: 2022-08-16

## 2022-05-16 NOTE — TELEPHONE ENCOUNTER
[facility] Card[facility] Refill Guideline reviewed.  Medication meets criteria for refill. Amlodipine 5mg, carvedilol 6.25mg sent to [facility] in [place].

## 2022-06-21 DIAGNOSIS — R07.9 CHEST PAIN, UNSPECIFIED TYPE: ICD-10-CM

## 2022-06-21 RX ORDER — ISOSORBIDE MONONITRATE 30 MG/1
30 TABLET, EXTENDED RELEASE ORAL DAILY
Qty: 90 TABLET | Refills: 0 | Status: SHIPPED | OUTPATIENT
Start: 2022-06-21 | End: 2022-09-21

## 2022-08-15 DIAGNOSIS — R07.9 CHEST PAIN, UNSPECIFIED TYPE: Primary | ICD-10-CM

## 2022-08-15 DIAGNOSIS — R07.9 CHEST PAIN, UNSPECIFIED TYPE: ICD-10-CM

## 2022-08-15 DIAGNOSIS — I21.4 NSTEMI (NON-ST ELEVATED MYOCARDIAL INFARCTION) (H): ICD-10-CM

## 2022-08-15 NOTE — TELEPHONE ENCOUNTER
[facility] Cardiology Refill Guideline reviewed.  Medication does not meet criteria for refill due to overdue for follow up.  Messaged to providers team for further review.

## 2022-08-15 NOTE — TELEPHONE ENCOUNTER
Writer called and spoke with daughter, Rivka. She states that usually her mom is really on top of keeping track of all appointments, but this one must have got forgotten.    Daughter states that she is the transportation to these appointments, and she will call her mom to check schedules.  Then daughter will call our central scheduling and make an appointment for Mary to see Dr. Perrin.  Once that is set, then writer will refill these prescriptions.

## 2022-08-15 NOTE — TELEPHONE ENCOUNTER
[facility] Cardiology Refill Guideline reviewed.  Medication does not meet criteria for refill due to overdue for follow up.  Messaged to providers team for further review.

## 2022-08-16 RX ORDER — AMLODIPINE BESYLATE 5 MG/1
5 TABLET ORAL DAILY
Qty: 90 TABLET | Refills: 0 | Status: SHIPPED | OUTPATIENT
Start: 2022-08-16 | End: 2022-09-21

## 2022-08-16 RX ORDER — CARVEDILOL 6.25 MG/1
6.25 TABLET ORAL 2 TIMES DAILY WITH MEALS
Qty: 180 TABLET | Refills: 0 | Status: SHIPPED | OUTPATIENT
Start: 2022-08-16 | End: 2022-09-21

## 2022-09-21 DIAGNOSIS — R07.9 CHEST PAIN, UNSPECIFIED TYPE: ICD-10-CM

## 2022-09-21 DIAGNOSIS — I21.4 NSTEMI (NON-ST ELEVATED MYOCARDIAL INFARCTION) (H): ICD-10-CM

## 2022-09-21 RX ORDER — CARVEDILOL 6.25 MG/1
6.25 TABLET ORAL 2 TIMES DAILY WITH MEALS
Qty: 180 TABLET | Refills: 0 | Status: SHIPPED | OUTPATIENT
Start: 2022-09-21 | End: 2022-01-01

## 2022-09-21 RX ORDER — AMLODIPINE BESYLATE 5 MG/1
5 TABLET ORAL DAILY
Qty: 90 TABLET | Refills: 0 | Status: SHIPPED | OUTPATIENT
Start: 2022-09-21 | End: 2022-01-01

## 2022-09-21 RX ORDER — ISOSORBIDE MONONITRATE 30 MG/1
30 TABLET, EXTENDED RELEASE ORAL DAILY
Qty: 90 TABLET | Refills: 0 | Status: SHIPPED | OUTPATIENT
Start: 2022-09-21 | End: 2022-01-01

## 2022-09-21 NOTE — TELEPHONE ENCOUNTER
Received refill request from [facility] pharmacy for Isosorbide Mononitrate 30mg, Carvedilol, and Amlodipine.    [facility] Cardiology Refill Guideline reviewed.  Meets criteria, refilled for 90 day supply    Last OV   5\18\21  Last labwork   9\19\21  She has upcoming apmnt with Dr. Perrin in November

## 2022-09-22 ENCOUNTER — TELEPHONE (OUTPATIENT)
Dept: CARDIOLOGY | Facility: CLINIC | Age: 87
End: 2022-09-22

## 2022-11-21 NOTE — PATIENT INSTRUCTIONS
Your blood pressure was elevated at your appointment today.  Elevated blood pressure can increase your risk of a heart attack, stroke and heart failure.  For this reason, we feel it is important to monitor your blood pressure closely.  If you have access to a home blood pressure monitor or are able to check your blood pressure at a local pharmacy in the next week we would like you to do so and call our clinic with those readings. Please call [phone] (Kayla) and leave a message with your name, date of birth, blood pressure reading, date and location it was completed. If your blood pressure remains elevated your care team will be notified.  We appreciate being a part of your healthcare team and look forward to hearing from you soon.

## 2022-11-21 NOTE — PROGRESS NOTES
HPI and Plan:     This 89-year-old Chinese lady returns for follow-up of coronary artery disease.  She is accompanied by her son and the Chinese  is also present as I do not speak Mandarin very well.    She had a non-Q-wave myocardial infarction in 04/2016.  The culprit was the circumflex and a drug-eluting stent was placed.  Ejection fraction is normal.  She completed a year of dual-antiplatelet therapy.  She does not tolerate statins well and to many discussions, she is convinced that she is not going to take any lipid-lowering medications.      She has a history of palpitations as well, but rhythm monitoring did not show any major significant arrhythmias.  There is mild degrees of aortic, mitral and tricuspid regurgitation due to age-related degeneration.      She also has a history of atypical chest discomfort.  I evaluated this with a stress nuclear study in 2019, which was normal.  She was briefly admitted in 2020 with atypical chest discomfort with normal troponins.  A stress nuclear study was advised, but she did not follow this up as she did not want to have a stress test.    She has not had any further chest discomfort.  She is a little unsteady on her feet but has not fallen.  She still lives by herself but attends .  Her son lives in [place].    Cardiac examination is unremarkable.    All medications are well-tolerated.      We will see again in 1 years time for continued follow-up.    Orders Placed This Encounter   Procedures     Follow-Up with Cardiology       No orders of the defined types were placed in this encounter.      Encounter Diagnoses   Name Primary?     NSTEMI (non-ST elevated myocardial infarction) (H)      Chest pain, unspecified type        CURRENT MEDICATIONS:  Current Outpatient Medications   Medication Sig Dispense Refill     acetaminophen (TYLENOL) 325 MG tablet Take 2 tablets (650 mg) by mouth every 6 hours as needed for mild pain 50 tablet 0     amLODIPine  (NORVASC) 5 MG tablet Take 1 tablet (5 mg) by mouth daily Needs appointment 90 tablet 0     aspirin EC 81 MG EC tablet Take 1 tablet (81 mg) by mouth daily 30 tablet 1     calcium carbonate (OS-CLAIRE 500 MG [place]. [place]) 500 MG tablet Take 500 mg by mouth daily        carvedilol (COREG) 6.25 MG tablet Take 1 tablet (6.25 mg) by mouth 2 times daily (with meals) Needs appointment 180 tablet 0     cholecalciferol (VITAMIN D) 400 UNIT TABS tablet Take 400 Units by mouth daily       docusate sodium (COLACE) 100 MG capsule Take 100 mg by mouth daily        isosorbide mononitrate (IMDUR) 30 MG 24 hr tablet Take 1 tablet (30 mg) by mouth daily Appointment needed for refills. 90 tablet 0     nitroGLYcerin (NITROSTAT) 0.4 MG sublingual tablet Place 1 tablet (0.4 mg) under the tongue every 5 minutes as needed for chest pain if you are still having symptoms after 3 doses (15 minutes) call 911. 25 tablet 3       ALLERGIES     Allergies   Allergen Reactions     Azithromycin      Gabapentin Itching     Kanamycin      Metronidazole Rash     Pcn [Penicillins] Rash     Streptomycin      rash     Atorvastatin Rash     Lisinopril Rash       PAST MEDICAL HISTORY:  Past Medical History:   Diagnosis Date     Benign essential HTN      Coronary artery disease involving native coronary artery of native heart with unstable angina pectoris (H) 4/2016 4/2016 Cath - 80% hazy stenosis at site of ruptured plaque - HARLEEN placed     Cough      Eczema      Lung nodule      Malignant neoplasm of bladder, part unspecified 1999 Dr Everett    [place] done in [place]     Mitral regurgitation     4/2016 mod-mod severe MR with multiple jets per Echo     Mixed hyperlipidemia      NSTEMI (non-ST elevated myocardial infarction) (H)      Osteoporosis      Polyarticular arthritis      Rash      Sciatica        PAST SURGICAL HISTORY:  Past Surgical History:   Procedure Laterality Date     BLADDER TUMOR-ASSOCIATED      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      CATARACT IOL, RT/LT  5-08     HC REMOVAL GALLBLADDER      due to stone 1990     HEART CATH STENT COR W/WO PTCA  4/2016 4/2016 Cath - 80% hazy stenosis at site of ruptured plaque - HARLEEN placed     ZZC APPENDECTOMY      1950       FAMILY HISTORY:  Family History   Problem Relation Age of Onset     Diabetes Brother      Hypertension Son      Cerebrovascular Disease Mother         76     Family History Negative Daughter      Family History Negative Brother      Family History Negative Brother        SOCIAL HISTORY:  Social History     Socioeconomic History     Marital status: Single     Spouse name: None     Number of children: None     Years of education: None     Highest education level: None   Tobacco Use     Smoking status: Never     Smokeless tobacco: Never   Substance and Sexual Activity     Alcohol use: No     Drug use: No     Sexual activity: Not Currently   Other Topics Concern     Caffeine Concern No     Sleep Concern No     Stress Concern No     Weight Concern No     Special Diet No     Exercise Yes     Comment: walking 20 min daily     Seat Belt Yes     Parent/sibling w/ CABG, MI or angioplasty before 65F 55M? No   Social History Narrative    Vamsi AZEVEDO    From [place]    In the  2001           Review of Systems:  Skin:  Negative     Eyes:  Positive for glasses  ENT:  Negative    Respiratory:  Negative    Cardiovascular:  Negative    Gastroenterology: Negative    Genitourinary:  Negative    Musculoskeletal:  Positive for arthritis  Neurologic:  Negative    Psychiatric:  Negative    Heme/Lymph/Imm:  Positive for allergies  Endocrine:  Negative      Physical Exam:  Vitals: BP (!) 148/78 (BP Location: Left arm, Cuff Size: Adult Small)   Pulse 60   Ht 1.524 m (5')   Wt 47.1 kg (103 lb 12.8 oz)   BMI 20.27 kg/m      Constitutional:  cooperative, alert and oriented, well developed, well nourished, in no acute distress thin      Skin:  warm and dry to the touch, no apparent skin lesions or masses noted  surgical scars well-healed   resolution of rash on body. Dry scaly skin under hair at nape of neck, red excema type coloration on hands    Head:  normocephalic, no masses or lesions        Eyes:  pupils equal and round, conjunctivae and lids unremarkable, sclera white, no xanthalasma, EOMS intact, no nystagmus        Lymph:No Cervical lymphadenopathy present     ENT:  no pallor or cyanosis, dentition good poor dentition missing some teeth    Neck:  carotid pulses are full and equal bilaterally, JVP normal, no carotid bruit        Respiratory:  normal breath sounds, clear to auscultation, normal A-P diameter, normal symmetry, normal respiratory excursion, no use of accessory muscles         Cardiac: regular rhythm, normal S1/S2, no S3 or S4, apical impulse not displaced, no murmurs, gallops or rubs       systolic murmur;grade 1;apical        pulses full and equal, no bruits auscultated                                        GI:  abdomen soft, non-tender, BS normoactive, no mass, no HSM, no bruits        Extremities and Muscular Skeletal:  no deformities, clubbing, cyanosis, erythema observed   bilateral LE edema;pitting;1+          Neurological:  no gross motor deficits        Psych:  Alert and Oriented x 3        Recent Lab Results:  LIPID RESULTS:  Lab Results   Component Value Date    CHOL 220 07/14/2017    HDL 53 07/14/2017     07/14/2017    TRIG 330 07/14/2017    CHOLHDLRATIO 3.8 04/05/2010       LIVER ENZYME RESULTS:  Lab Results   Component Value Date    AST 31 03/12/2020    ALT 21 03/12/2020       CBC RESULTS:  Lab Results   Component Value Date    WBC 9.2 09/19/2021    WBC 6.9 03/12/2020    RBC 3.31 (L) 09/19/2021    RBC 4.26 03/12/2020    HGB 9.8 (L) 09/19/2021    HGB 12.6 03/12/2020    HCT 30.7 (L) 09/19/2021    HCT 39.1 03/12/2020    MCV 93 09/19/2021    MCV 92 03/12/2020    MCH 29.6 09/19/2021    MCH 29.6 03/12/2020    MCHC 31.9 09/19/2021    MCHC 32.2 03/12/2020    RDW 12.1 09/19/2021    RDW 12.4  03/12/2020     09/19/2021     03/12/2020       BMP RESULTS:  Lab Results   Component Value Date     09/19/2021     03/12/2020    POTASSIUM 4.6 09/19/2021    POTASSIUM 4.3 03/12/2020    CHLORIDE 107 09/19/2021    CHLORIDE 107 03/12/2020    CO2 25 09/19/2021    CO2 26 03/12/2020    ANIONGAP 5 09/19/2021    ANIONGAP 6 03/12/2020     (H) 09/19/2021     (H) 03/12/2020    BUN 22 09/19/2021    BUN 16 03/12/2020    CR 0.96 09/19/2021    CR 0.74 03/12/2020    GFRESTIMATED 52 (L) 09/19/2021    GFRESTIMATED 71 03/12/2020    GFRESTBLACK 83 03/12/2020    CLAIRE 9.4 09/19/2021    CLAIRE 9.1 03/12/2020        A1C RESULTS:  No results found for: A1C    INR RESULTS:  No results found for: INR        CC  Lopez Perrin MD  [address]  [place] [id number]

## 2022-11-21 NOTE — LETTER
11/21/2022    Kevon Sawyer MD  [facility] [address] [place] [address]    RE: Mary Polanco       Dear Colleague,     I had the pleasure of seeing Mary Polanco in the [facility].  HPI and Plan:     This 89-year-old Chinese lady returns for follow-up of coronary artery disease.  She is accompanied by her son and the Chinese  is also present as I do not speak Mandarin very well.    She had a non-Q-wave myocardial infarction in 04/2016.  The culprit was the circumflex and a drug-eluting stent was placed.  Ejection fraction is normal.  She completed a year of dual-antiplatelet therapy.  She does not tolerate statins well and to many discussions, she is convinced that she is not going to take any lipid-lowering medications.      She has a history of palpitations as well, but rhythm monitoring did not show any major significant arrhythmias.  There is mild degrees of aortic, mitral and tricuspid regurgitation due to age-related degeneration.      She also has a history of atypical chest discomfort.  I evaluated this with a stress nuclear study in 2019, which was normal.  She was briefly admitted in 2020 with atypical chest discomfort with normal troponins.  A stress nuclear study was advised, but she did not follow this up as she did not want to have a stress test.    She has not had any further chest discomfort.  She is a little unsteady on her feet but has not fallen.  She still lives by herself but attends .  Her son lives in [place].    Cardiac examination is unremarkable.    All medications are well-tolerated.      We will see again in 1 years time for continued follow-up.    Orders Placed This Encounter   Procedures     Follow-Up with Cardiology       No orders of the defined types were placed in this encounter.      Encounter Diagnoses   Name Primary?     NSTEMI (non-ST elevated myocardial infarction) (H)      Chest pain, unspecified type         CURRENT MEDICATIONS:  Current Outpatient Medications   Medication Sig Dispense Refill     acetaminophen (TYLENOL) 325 MG tablet Take 2 tablets (650 mg) by mouth every 6 hours as needed for mild pain 50 tablet 0     amLODIPine (NORVASC) 5 MG tablet Take 1 tablet (5 mg) by mouth daily Needs appointment 90 tablet 0     aspirin EC 81 MG EC tablet Take 1 tablet (81 mg) by mouth daily 30 tablet 1     calcium carbonate (OS-CLAIRE 500 MG [place]. [place]) 500 MG tablet Take 500 mg by mouth daily        carvedilol (COREG) 6.25 MG tablet Take 1 tablet (6.25 mg) by mouth 2 times daily (with meals) Needs appointment 180 tablet 0     cholecalciferol (VITAMIN D) 400 UNIT TABS tablet Take 400 Units by mouth daily       docusate sodium (COLACE) 100 MG capsule Take 100 mg by mouth daily        isosorbide mononitrate (IMDUR) 30 MG 24 hr tablet Take 1 tablet (30 mg) by mouth daily Appointment needed for refills. 90 tablet 0     nitroGLYcerin (NITROSTAT) 0.4 MG sublingual tablet Place 1 tablet (0.4 mg) under the tongue every 5 minutes as needed for chest pain if you are still having symptoms after 3 doses (15 minutes) call 911. 25 tablet 3       ALLERGIES     Allergies   Allergen Reactions     Azithromycin      Gabapentin Itching     Kanamycin      Metronidazole Rash     Pcn [Penicillins] Rash     Streptomycin      rash     Atorvastatin Rash     Lisinopril Rash       PAST MEDICAL HISTORY:  Past Medical History:   Diagnosis Date     Benign essential HTN      Coronary artery disease involving native coronary artery of native heart with unstable angina pectoris (H) 4/2016 4/2016 Cath - 80% hazy stenosis at site of ruptured plaque - HARLEEN placed     Cough      Eczema      Lung nodule      Malignant neoplasm of bladder, part unspecified 1999 Dr Everett    [place]gery done in [place]     Mitral regurgitation     4/2016 mod-mod severe MR with multiple jets per Echo     Mixed hyperlipidemia      NSTEMI (non-ST elevated myocardial infarction) (H)       Osteoporosis      Polyarticular arthritis      Rash      Sciatica        PAST SURGICAL HISTORY:  Past Surgical History:   Procedure Laterality Date     BLADDER TUMOR-ASSOCIATED      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     CATARACT IOL, RT/LT  5-08     HC REMOVAL GALLBLADDER      due to stone 1990     HEART CATH STENT COR W/WO PTCA  4/2016 4/2016 Cath - 80% hazy stenosis at site of ruptured plaque - HARLEEN placed     ZZC APPENDECTOMY      1950       FAMILY HISTORY:  Family History   Problem Relation Age of Onset     Diabetes Brother      Hypertension Son      Cerebrovascular Disease Mother         76     Family History Negative Daughter      Family History Negative Brother      Family History Negative Brother        SOCIAL HISTORY:  Social History     Socioeconomic History     Marital status: Single     Spouse name: None     Number of children: None     Years of education: None     Highest education level: None   Tobacco Use     Smoking status: Never     Smokeless tobacco: Never   Substance and Sexual Activity     Alcohol use: No     Drug use: No     Sexual activity: Not Currently   Other Topics Concern     Caffeine Concern No     Sleep Concern No     Stress Concern No     Weight Concern No     Special Diet No     Exercise Yes     Comment: walking 20 min daily     Seat Belt Yes     Parent/sibling w/ CABG, MI or angioplasty before 65F 55M? No   Social History Narrative    Vamsi AZEVEDO    From [place]    In the  2001           Review of Systems:  Skin:  Negative     Eyes:  Positive for glasses  ENT:  Negative    Respiratory:  Negative    Cardiovascular:  Negative    Gastroenterology: Negative    Genitourinary:  Negative    Musculoskeletal:  Positive for arthritis  Neurologic:  Negative    Psychiatric:  Negative    Heme/Lymph/Imm:  Positive for allergies  Endocrine:  Negative      Physical Exam:  Vitals: BP (!) 148/78 (BP Location: Left arm, Cuff Size: Adult Small)   Pulse 60   Ht 1.524 m (5')   Wt 47.1 kg  (103 lb 12.8 oz)   BMI 20.27 kg/m      Constitutional:  cooperative, alert and oriented, well developed, well nourished, in no acute distress thin      Skin:  warm and dry to the touch, no apparent skin lesions or masses noted surgical scars well-healed   resolution of rash on body. Dry scaly skin under hair at nape of neck, red excema type coloration on hands    Head:  normocephalic, no masses or lesions        Eyes:  pupils equal and round, conjunctivae and lids unremarkable, sclera white, no xanthalasma, EOMS intact, no nystagmus        Lymph:No Cervical lymphadenopathy present     ENT:  no pallor or cyanosis, dentition good poor dentition missing some teeth    Neck:  carotid pulses are full and equal bilaterally, JVP normal, no carotid bruit        Respiratory:  normal breath sounds, clear to auscultation, normal A-P diameter, normal symmetry, normal respiratory excursion, no use of accessory muscles         Cardiac: regular rhythm, normal S1/S2, no S3 or S4, apical impulse not displaced, no murmurs, gallops or rubs       systolic murmur;grade 1;apical        pulses full and equal, no bruits auscultated                                        GI:  abdomen soft, non-tender, BS normoactive, no mass, no HSM, no bruits        Extremities and Muscular Skeletal:  no deformities, clubbing, cyanosis, erythema observed   bilateral LE edema;pitting;1+          Neurological:  no gross motor deficits        Psych:  Alert and Oriented x 3        Recent Lab Results:  LIPID RESULTS:  Lab Results   Component Value Date    CHOL 220 07/14/2017    HDL 53 07/14/2017     07/14/2017    TRIG 330 07/14/2017    CHOLHDLRATIO 3.8 04/05/2010       LIVER ENZYME RESULTS:  Lab Results   Component Value Date    AST 31 03/12/2020    ALT 21 03/12/2020       CBC RESULTS:  Lab Results   Component Value Date    WBC 9.2 09/19/2021    WBC 6.9 03/12/2020    RBC 3.31 (L) 09/19/2021    RBC 4.26 03/12/2020    HGB 9.8 (L) 09/19/2021    HGB 12.6  03/12/2020    HCT 30.7 (L) 09/19/2021    HCT 39.1 03/12/2020    MCV 93 09/19/2021    MCV 92 03/12/2020    MCH 29.6 09/19/2021    MCH 29.6 03/12/2020    MCHC 31.9 09/19/2021    MCHC 32.2 03/12/2020    RDW 12.1 09/19/2021    RDW 12.4 03/12/2020     09/19/2021     03/12/2020       BMP RESULTS:  Lab Results   Component Value Date     09/19/2021     03/12/2020    POTASSIUM 4.6 09/19/2021    POTASSIUM 4.3 03/12/2020    CHLORIDE 107 09/19/2021    CHLORIDE 107 03/12/2020    CO2 25 09/19/2021    CO2 26 03/12/2020    ANIONGAP 5 09/19/2021    ANIONGAP 6 03/12/2020     (H) 09/19/2021     (H) 03/12/2020    BUN 22 09/19/2021    BUN 16 03/12/2020    CR 0.96 09/19/2021    CR 0.74 03/12/2020    GFRESTIMATED 52 (L) 09/19/2021    GFRESTIMATED 71 03/12/2020    GFRESTBLACK 83 03/12/2020    CLAIRE 9.4 09/19/2021    CLAIRE 9.1 03/12/2020        A1C RESULTS:  No results found for: A1C    INR RESULTS:  No results found for: INR        CC  Maura Perrin MD  [address]  [place] [address]    Thank you for allowing me to participate in the care of your patient.      Sincerely,     DR MAURA PERRIN MD     [facility]

## 2022-12-22 NOTE — TELEPHONE ENCOUNTER
Received refill request for:  Amlodipine, Carvedilol, Imdur    [facility] Refill Guideline reviewed.  Medication meets criteria for refill.    Torri Kebede RN, BSN  12/22/22 at 1:41 PM

## 2023-01-01 ENCOUNTER — APPOINTMENT (OUTPATIENT)
Dept: GENERAL RADIOLOGY | Facility: CLINIC | Age: 88
DRG: 871 | End: 2023-01-01
Attending: NURSE PRACTITIONER
Payer: COMMERCIAL

## 2023-01-01 ENCOUNTER — APPOINTMENT (OUTPATIENT)
Dept: OCCUPATIONAL THERAPY | Facility: CLINIC | Age: 88
DRG: 871 | End: 2023-01-01
Payer: COMMERCIAL

## 2023-01-01 ENCOUNTER — APPOINTMENT (OUTPATIENT)
Dept: CT IMAGING | Facility: CLINIC | Age: 88
DRG: 871 | End: 2023-01-01
Attending: STUDENT IN AN ORGANIZED HEALTH CARE EDUCATION/TRAINING PROGRAM
Payer: COMMERCIAL

## 2023-01-01 ENCOUNTER — HOSPITAL ENCOUNTER (EMERGENCY)
Facility: CLINIC | Age: 88
Discharge: HOME | End: 2023-08-06
Attending: EMERGENCY MEDICINE | Admitting: EMERGENCY MEDICINE
Payer: COMMERCIAL

## 2023-01-01 ENCOUNTER — APPOINTMENT (OUTPATIENT)
Dept: CT IMAGING | Facility: CLINIC | Age: 88
DRG: 871 | End: 2023-01-01
Attending: INTERNAL MEDICINE
Payer: COMMERCIAL

## 2023-01-01 ENCOUNTER — APPOINTMENT (OUTPATIENT)
Dept: PHYSICAL THERAPY | Facility: CLINIC | Age: 88
DRG: 871 | End: 2023-01-01
Payer: COMMERCIAL

## 2023-01-01 ENCOUNTER — APPOINTMENT (OUTPATIENT)
Dept: SPEECH THERAPY | Facility: CLINIC | Age: 88
DRG: 871 | End: 2023-01-01
Attending: INTERNAL MEDICINE
Payer: COMMERCIAL

## 2023-01-01 ENCOUNTER — APPOINTMENT (OUTPATIENT)
Dept: PHYSICAL THERAPY | Facility: CLINIC | Age: 88
DRG: 871 | End: 2023-01-01
Attending: STUDENT IN AN ORGANIZED HEALTH CARE EDUCATION/TRAINING PROGRAM
Payer: COMMERCIAL

## 2023-01-01 ENCOUNTER — APPOINTMENT (OUTPATIENT)
Dept: CARDIOLOGY | Facility: CLINIC | Age: 88
DRG: 871 | End: 2023-01-01
Attending: STUDENT IN AN ORGANIZED HEALTH CARE EDUCATION/TRAINING PROGRAM
Payer: COMMERCIAL

## 2023-01-01 ENCOUNTER — APPOINTMENT (OUTPATIENT)
Dept: GENERAL RADIOLOGY | Facility: CLINIC | Age: 88
DRG: 871 | End: 2023-01-01
Attending: INTERNAL MEDICINE
Payer: COMMERCIAL

## 2023-01-01 ENCOUNTER — APPOINTMENT (OUTPATIENT)
Dept: GENERAL RADIOLOGY | Facility: CLINIC | Age: 88
DRG: 871 | End: 2023-01-01
Payer: COMMERCIAL

## 2023-01-01 ENCOUNTER — HOSPITAL ENCOUNTER (INPATIENT)
Facility: CLINIC | Age: 88
LOS: 20 days | DRG: 871 | End: 2023-11-07
Attending: STUDENT IN AN ORGANIZED HEALTH CARE EDUCATION/TRAINING PROGRAM | Admitting: STUDENT IN AN ORGANIZED HEALTH CARE EDUCATION/TRAINING PROGRAM
Payer: COMMERCIAL

## 2023-01-01 ENCOUNTER — APPOINTMENT (OUTPATIENT)
Dept: OCCUPATIONAL THERAPY | Facility: CLINIC | Age: 88
DRG: 871 | End: 2023-01-01
Attending: STUDENT IN AN ORGANIZED HEALTH CARE EDUCATION/TRAINING PROGRAM
Payer: COMMERCIAL

## 2023-01-01 ENCOUNTER — OFFICE VISIT (OUTPATIENT)
Dept: CARDIOLOGY | Facility: CLINIC | Age: 88
End: 2023-01-01
Attending: INTERNAL MEDICINE
Payer: COMMERCIAL

## 2023-01-01 VITALS
HEART RATE: 101 BPM | WEIGHT: 101.63 LBS | RESPIRATION RATE: 22 BRPM | BODY MASS INDEX: 19.95 KG/M2 | SYSTOLIC BLOOD PRESSURE: 128 MMHG | HEIGHT: 60 IN | OXYGEN SATURATION: 100 % | TEMPERATURE: 97.7 F | DIASTOLIC BLOOD PRESSURE: 95 MMHG

## 2023-01-01 VITALS
WEIGHT: 103 LBS | RESPIRATION RATE: 18 BRPM | HEART RATE: 69 BPM | SYSTOLIC BLOOD PRESSURE: 102 MMHG | HEIGHT: 60 IN | BODY MASS INDEX: 20.22 KG/M2 | DIASTOLIC BLOOD PRESSURE: 72 MMHG | TEMPERATURE: 98 F | OXYGEN SATURATION: 95 %

## 2023-01-01 VITALS
SYSTOLIC BLOOD PRESSURE: 119 MMHG | HEIGHT: 60 IN | HEART RATE: 60 BPM | WEIGHT: 106.2 LBS | DIASTOLIC BLOOD PRESSURE: 65 MMHG | BODY MASS INDEX: 20.85 KG/M2

## 2023-01-01 DIAGNOSIS — R07.9 CHEST PAIN, UNSPECIFIED TYPE: ICD-10-CM

## 2023-01-01 DIAGNOSIS — M10.9 PODAGRA: ICD-10-CM

## 2023-01-01 DIAGNOSIS — I21.4 NSTEMI (NON-ST ELEVATED MYOCARDIAL INFARCTION) (H): ICD-10-CM

## 2023-01-01 DIAGNOSIS — J18.9 MULTIFOCAL PNEUMONIA: ICD-10-CM

## 2023-01-01 LAB
1,3 BETA GLUCAN SER-MCNC: <31 PG/ML
A FLAVUS AB SER QL ID: NORMAL
A FUMIGATUS1 AB SER QL ID: NORMAL
A FUMIGATUS1 AB SER QL ID: NORMAL
A FUMIGATUS2 AB SER QL ID: NORMAL
A FUMIGATUS3 AB SER QL ID: NORMAL
A FUMIGATUS6 AB SER QL ID: NORMAL
A FUMIGATUS6 AB SER QL ID: NORMAL
A PULLULANS AB SER QL ID: NORMAL
A PULLULANS AB SER QL ID: NORMAL
ALBUMIN SERPL BCG-MCNC: 2.4 G/DL (ref 3.5–5.2)
ALBUMIN SERPL BCG-MCNC: 2.4 G/DL (ref 3.5–5.2)
ALBUMIN SERPL BCG-MCNC: 2.6 G/DL (ref 3.5–5.2)
ALBUMIN SERPL BCG-MCNC: 2.9 G/DL (ref 3.5–5.2)
ALBUMIN SERPL BCG-MCNC: 3 G/DL (ref 3.5–5.2)
ALBUMIN SERPL BCG-MCNC: 3.8 G/DL (ref 3.5–5.2)
ALLEN'S TEST: YES
ALP SERPL-CCNC: 57 U/L (ref 35–104)
ALP SERPL-CCNC: 77 U/L (ref 35–104)
ALT SERPL W P-5'-P-CCNC: 11 U/L (ref 0–50)
ALT SERPL W P-5'-P-CCNC: 8 U/L (ref 0–50)
ANA SER QL IF: NEGATIVE
ANCA AB PATTERN SER IF-IMP: NORMAL
ANION GAP SERPL CALCULATED.3IONS-SCNC: 10 MMOL/L (ref 7–15)
ANION GAP SERPL CALCULATED.3IONS-SCNC: 11 MMOL/L (ref 7–15)
ANION GAP SERPL CALCULATED.3IONS-SCNC: 12 MMOL/L (ref 7–15)
ANION GAP SERPL CALCULATED.3IONS-SCNC: 14 MMOL/L (ref 7–15)
ANION GAP SERPL CALCULATED.3IONS-SCNC: 8 MMOL/L (ref 7–15)
ANION GAP SERPL CALCULATED.3IONS-SCNC: 8 MMOL/L (ref 7–15)
ANION GAP SERPL CALCULATED.3IONS-SCNC: 9 MMOL/L (ref 7–15)
ASPERGILLUS AB SER QL ID: ABNORMAL
AST SERPL W P-5'-P-CCNC: 21 U/L (ref 0–45)
AST SERPL W P-5'-P-CCNC: 27 U/L (ref 0–45)
ATRIAL RATE - MUSE: 326 BPM
ATRIAL RATE - MUSE: 85 BPM
ATRIAL RATE - MUSE: 90 BPM
B DERMAT AB SER QL ID: NOT DETECTED
BACTERIA BLD CULT: NO GROWTH
BACTERIA BLD CULT: NO GROWTH
BACTERIA SPT CULT: NORMAL
BASE EXCESS BLDA CALC-SCNC: 2.2 MMOL/L (ref -9–1.8)
BASE EXCESS BLDV CALC-SCNC: -0.1 MMOL/L (ref -7.7–1.9)
BASO+EOS+MONOS # BLD AUTO: NORMAL 10*3/UL
BASO+EOS+MONOS NFR BLD AUTO: NORMAL %
BASOPHILS # BLD AUTO: 0 10E3/UL (ref 0–0.2)
BASOPHILS # BLD AUTO: 0.1 10E3/UL (ref 0–0.2)
BASOPHILS NFR BLD AUTO: 0 %
BASOPHILS NFR BLD AUTO: 1 %
BILIRUB SERPL-MCNC: 0.3 MG/DL
BILIRUB SERPL-MCNC: 0.5 MG/DL
BUN SERPL-MCNC: 15.1 MG/DL (ref 8–23)
BUN SERPL-MCNC: 15.3 MG/DL (ref 8–23)
BUN SERPL-MCNC: 20.6 MG/DL (ref 8–23)
BUN SERPL-MCNC: 21.6 MG/DL (ref 8–23)
BUN SERPL-MCNC: 21.7 MG/DL (ref 8–23)
BUN SERPL-MCNC: 21.9 MG/DL (ref 8–23)
BUN SERPL-MCNC: 22 MG/DL (ref 8–23)
BUN SERPL-MCNC: 22.5 MG/DL (ref 8–23)
BUN SERPL-MCNC: 25.4 MG/DL (ref 8–23)
BUN SERPL-MCNC: 26.6 MG/DL (ref 8–23)
BUN SERPL-MCNC: 26.8 MG/DL (ref 8–23)
BUN SERPL-MCNC: 28.1 MG/DL (ref 8–23)
BUN SERPL-MCNC: 28.3 MG/DL (ref 8–23)
BUN SERPL-MCNC: 35.5 MG/DL (ref 8–23)
BUN SERPL-MCNC: 38.5 MG/DL (ref 8–23)
BUN SERPL-MCNC: 41.2 MG/DL (ref 8–23)
C BURNET PH1 IGG SER QL IF: NEGATIVE
C BURNET PH2 IGG SER QL IF: NEGATIVE
C PNEUM DNA SPEC QL NAA+PROBE: NOT DETECTED
C PNEUM IGG TITR SER IF: ABNORMAL {TITER}
C PSITTACI IGG TITR SER IF: ABNORMAL {TITER}
C TRACH IGG TITR SER IF: ABNORMAL {TITER}
C-ANCA TITR SER IF: NORMAL {TITER}
CALCIUM SERPL-MCNC: 8.1 MG/DL (ref 8.2–9.6)
CALCIUM SERPL-MCNC: 8.4 MG/DL (ref 8.2–9.6)
CALCIUM SERPL-MCNC: 8.6 MG/DL (ref 8.2–9.6)
CALCIUM SERPL-MCNC: 8.6 MG/DL (ref 8.2–9.6)
CALCIUM SERPL-MCNC: 8.7 MG/DL (ref 8.2–9.6)
CALCIUM SERPL-MCNC: 8.8 MG/DL (ref 8.2–9.6)
CALCIUM SERPL-MCNC: 8.9 MG/DL (ref 8.2–9.6)
CALCIUM SERPL-MCNC: 8.9 MG/DL (ref 8.2–9.6)
CALCIUM SERPL-MCNC: 9 MG/DL (ref 8.2–9.6)
CALCIUM SERPL-MCNC: 9.1 MG/DL (ref 8.2–9.6)
CALCIUM SERPL-MCNC: 9.2 MG/DL (ref 8.2–9.6)
CALCIUM SERPL-MCNC: 9.2 MG/DL (ref 8.2–9.6)
CALCIUM SERPL-MCNC: 9.3 MG/DL (ref 8.2–9.6)
CALCIUM SERPL-MCNC: 9.5 MG/DL (ref 8.2–9.6)
CHLORIDE SERPL-SCNC: 100 MMOL/L (ref 98–107)
CHLORIDE SERPL-SCNC: 100 MMOL/L (ref 98–107)
CHLORIDE SERPL-SCNC: 101 MMOL/L (ref 98–107)
CHLORIDE SERPL-SCNC: 104 MMOL/L (ref 98–107)
CHLORIDE SERPL-SCNC: 105 MMOL/L (ref 98–107)
CHLORIDE SERPL-SCNC: 106 MMOL/L (ref 98–107)
CHLORIDE SERPL-SCNC: 109 MMOL/L (ref 98–107)
CHLORIDE SERPL-SCNC: 111 MMOL/L (ref 98–107)
CHLORIDE SERPL-SCNC: 111 MMOL/L (ref 98–107)
CHLORIDE SERPL-SCNC: 114 MMOL/L (ref 98–107)
CHLORIDE SERPL-SCNC: 114 MMOL/L (ref 98–107)
CHLORIDE SERPL-SCNC: 95 MMOL/L (ref 98–107)
CHLORIDE SERPL-SCNC: 97 MMOL/L (ref 98–107)
CHLORIDE SERPL-SCNC: 97 MMOL/L (ref 98–107)
COCCIDIOIDES AB TITR SER CF: NORMAL {TITER}
CREAT SERPL-MCNC: 0.61 MG/DL (ref 0.51–0.95)
CREAT SERPL-MCNC: 0.61 MG/DL (ref 0.51–0.95)
CREAT SERPL-MCNC: 0.63 MG/DL (ref 0.51–0.95)
CREAT SERPL-MCNC: 0.63 MG/DL (ref 0.51–0.95)
CREAT SERPL-MCNC: 0.65 MG/DL (ref 0.51–0.95)
CREAT SERPL-MCNC: 0.65 MG/DL (ref 0.51–0.95)
CREAT SERPL-MCNC: 0.66 MG/DL (ref 0.51–0.95)
CREAT SERPL-MCNC: 0.66 MG/DL (ref 0.51–0.95)
CREAT SERPL-MCNC: 0.68 MG/DL (ref 0.51–0.95)
CREAT SERPL-MCNC: 0.74 MG/DL (ref 0.51–0.95)
CREAT SERPL-MCNC: 0.76 MG/DL (ref 0.51–0.95)
CREAT SERPL-MCNC: 0.78 MG/DL (ref 0.51–0.95)
CREAT SERPL-MCNC: 0.8 MG/DL (ref 0.51–0.95)
CREAT SERPL-MCNC: 0.81 MG/DL (ref 0.51–0.95)
CREAT SERPL-MCNC: 0.82 MG/DL (ref 0.51–0.95)
CREAT SERPL-MCNC: 0.9 MG/DL (ref 0.51–0.95)
CRP SERPL-MCNC: 116.44 MG/L
CRP SERPL-MCNC: 161.36 MG/L
CRP SERPL-MCNC: 169.87 MG/L
CRP SERPL-MCNC: 19.45 MG/L
CRP SERPL-MCNC: 54.58 MG/L
CRP SERPL-MCNC: 88.89 MG/L
CRP SERPL-MCNC: 98.36 MG/L
CRYPTOC AG SPEC QL: NEGATIVE
DEPRECATED HCO3 PLAS-SCNC: 21 MMOL/L (ref 22–29)
DEPRECATED HCO3 PLAS-SCNC: 22 MMOL/L (ref 22–29)
DEPRECATED HCO3 PLAS-SCNC: 23 MMOL/L (ref 22–29)
DEPRECATED HCO3 PLAS-SCNC: 24 MMOL/L (ref 22–29)
DEPRECATED HCO3 PLAS-SCNC: 25 MMOL/L (ref 22–29)
DEPRECATED HCO3 PLAS-SCNC: 26 MMOL/L (ref 22–29)
DEPRECATED HCO3 PLAS-SCNC: 26 MMOL/L (ref 22–29)
DEPRECATED HCO3 PLAS-SCNC: 27 MMOL/L (ref 22–29)
DIASTOLIC BLOOD PRESSURE - MUSE: NORMAL MMHG
EGFRCR SERPLBLD CKD-EPI 2021: 59 ML/MIN/1.73M2
EGFRCR SERPLBLD CKD-EPI 2021: 66 ML/MIN/1.73M2
EGFRCR SERPLBLD CKD-EPI 2021: 67 ML/MIN/1.73M2
EGFRCR SERPLBLD CKD-EPI 2021: 68 ML/MIN/1.73M2
EGFRCR SERPLBLD CKD-EPI 2021: 70 ML/MIN/1.73M2
EGFRCR SERPLBLD CKD-EPI 2021: 73 ML/MIN/1.73M2
EGFRCR SERPLBLD CKD-EPI 2021: 75 ML/MIN/1.73M2
EGFRCR SERPLBLD CKD-EPI 2021: 81 ML/MIN/1.73M2
EGFRCR SERPLBLD CKD-EPI 2021: 82 ML/MIN/1.73M2
EGFRCR SERPLBLD CKD-EPI 2021: 83 ML/MIN/1.73M2
EGFRCR SERPLBLD CKD-EPI 2021: 83 ML/MIN/1.73M2
EOSINOPHIL # BLD AUTO: 0.1 10E3/UL (ref 0–0.7)
EOSINOPHIL # BLD AUTO: 0.1 10E3/UL (ref 0–0.7)
EOSINOPHIL # BLD AUTO: 0.2 10E3/UL (ref 0–0.7)
EOSINOPHIL # BLD AUTO: 0.2 10E3/UL (ref 0–0.7)
EOSINOPHIL # BLD AUTO: 0.3 10E3/UL (ref 0–0.7)
EOSINOPHIL # BLD AUTO: 0.4 10E3/UL (ref 0–0.7)
EOSINOPHIL # BLD AUTO: 0.4 10E3/UL (ref 0–0.7)
EOSINOPHIL NFR BLD AUTO: 1 %
EOSINOPHIL NFR BLD AUTO: 1 %
EOSINOPHIL NFR BLD AUTO: 2 %
EOSINOPHIL NFR BLD AUTO: 2 %
EOSINOPHIL NFR BLD AUTO: 3 %
ERYTHROCYTE [DISTWIDTH] IN BLOOD BY AUTOMATED COUNT: 12.1 % (ref 10–15)
ERYTHROCYTE [DISTWIDTH] IN BLOOD BY AUTOMATED COUNT: 12.4 % (ref 10–15)
ERYTHROCYTE [DISTWIDTH] IN BLOOD BY AUTOMATED COUNT: 12.4 % (ref 10–15)
ERYTHROCYTE [DISTWIDTH] IN BLOOD BY AUTOMATED COUNT: 12.6 % (ref 10–15)
ERYTHROCYTE [DISTWIDTH] IN BLOOD BY AUTOMATED COUNT: 12.7 % (ref 10–15)
ERYTHROCYTE [DISTWIDTH] IN BLOOD BY AUTOMATED COUNT: 12.7 % (ref 10–15)
ERYTHROCYTE [DISTWIDTH] IN BLOOD BY AUTOMATED COUNT: 12.8 % (ref 10–15)
ERYTHROCYTE [DISTWIDTH] IN BLOOD BY AUTOMATED COUNT: 12.8 % (ref 10–15)
ERYTHROCYTE [DISTWIDTH] IN BLOOD BY AUTOMATED COUNT: 12.9 % (ref 10–15)
ERYTHROCYTE [SEDIMENTATION RATE] IN BLOOD BY WESTERGREN METHOD: 44 MM/HR (ref 0–30)
ERYTHROCYTE [SEDIMENTATION RATE] IN BLOOD BY WESTERGREN METHOD: 44 MM/HR (ref 0–30)
ERYTHROCYTE [SEDIMENTATION RATE] IN BLOOD BY WESTERGREN METHOD: 46 MM/HR (ref 0–30)
FLUAV H1 2009 PAND RNA SPEC QL NAA+PROBE: NOT DETECTED
FLUAV H1 RNA SPEC QL NAA+PROBE: NOT DETECTED
FLUAV H3 RNA SPEC QL NAA+PROBE: NOT DETECTED
FLUAV RNA SPEC QL NAA+PROBE: NEGATIVE
FLUAV RNA SPEC QL NAA+PROBE: NOT DETECTED
FLUBV RNA RESP QL NAA+PROBE: NEGATIVE
FLUBV RNA SPEC QL NAA+PROBE: NOT DETECTED
GALACTOMANNAN AG SERPL QL IA: NEGATIVE
GALACTOMANNAN AG SPEC IA-ACNC: 0.12
GAMMA INTERFERON BACKGROUND BLD IA-ACNC: 0.01 IU/ML
GLUCOSE BLDC GLUCOMTR-MCNC: 117 MG/DL (ref 70–99)
GLUCOSE BLDC GLUCOMTR-MCNC: 119 MG/DL (ref 70–99)
GLUCOSE BLDC GLUCOMTR-MCNC: 122 MG/DL (ref 70–99)
GLUCOSE BLDC GLUCOMTR-MCNC: 126 MG/DL (ref 70–99)
GLUCOSE BLDC GLUCOMTR-MCNC: 127 MG/DL (ref 70–99)
GLUCOSE BLDC GLUCOMTR-MCNC: 135 MG/DL (ref 70–99)
GLUCOSE BLDC GLUCOMTR-MCNC: 148 MG/DL (ref 70–99)
GLUCOSE BLDC GLUCOMTR-MCNC: 152 MG/DL (ref 70–99)
GLUCOSE BLDC GLUCOMTR-MCNC: 155 MG/DL (ref 70–99)
GLUCOSE BLDC GLUCOMTR-MCNC: 156 MG/DL (ref 70–99)
GLUCOSE BLDC GLUCOMTR-MCNC: 159 MG/DL (ref 70–99)
GLUCOSE BLDC GLUCOMTR-MCNC: 162 MG/DL (ref 70–99)
GLUCOSE BLDC GLUCOMTR-MCNC: 167 MG/DL (ref 70–99)
GLUCOSE BLDC GLUCOMTR-MCNC: 175 MG/DL (ref 70–99)
GLUCOSE BLDC GLUCOMTR-MCNC: 182 MG/DL (ref 70–99)
GLUCOSE BLDC GLUCOMTR-MCNC: 184 MG/DL (ref 70–99)
GLUCOSE BLDC GLUCOMTR-MCNC: 188 MG/DL (ref 70–99)
GLUCOSE BLDC GLUCOMTR-MCNC: 188 MG/DL (ref 70–99)
GLUCOSE BLDC GLUCOMTR-MCNC: 189 MG/DL (ref 70–99)
GLUCOSE BLDC GLUCOMTR-MCNC: 193 MG/DL (ref 70–99)
GLUCOSE BLDC GLUCOMTR-MCNC: 199 MG/DL (ref 70–99)
GLUCOSE BLDC GLUCOMTR-MCNC: 203 MG/DL (ref 70–99)
GLUCOSE BLDC GLUCOMTR-MCNC: 206 MG/DL (ref 70–99)
GLUCOSE BLDC GLUCOMTR-MCNC: 209 MG/DL (ref 70–99)
GLUCOSE BLDC GLUCOMTR-MCNC: 211 MG/DL (ref 70–99)
GLUCOSE BLDC GLUCOMTR-MCNC: 212 MG/DL (ref 70–99)
GLUCOSE BLDC GLUCOMTR-MCNC: 215 MG/DL (ref 70–99)
GLUCOSE BLDC GLUCOMTR-MCNC: 218 MG/DL (ref 70–99)
GLUCOSE BLDC GLUCOMTR-MCNC: 220 MG/DL (ref 70–99)
GLUCOSE BLDC GLUCOMTR-MCNC: 226 MG/DL (ref 70–99)
GLUCOSE BLDC GLUCOMTR-MCNC: 229 MG/DL (ref 70–99)
GLUCOSE BLDC GLUCOMTR-MCNC: 235 MG/DL (ref 70–99)
GLUCOSE BLDC GLUCOMTR-MCNC: 244 MG/DL (ref 70–99)
GLUCOSE BLDC GLUCOMTR-MCNC: 280 MG/DL (ref 70–99)
GLUCOSE BLDC GLUCOMTR-MCNC: 320 MG/DL (ref 70–99)
GLUCOSE SERPL-MCNC: 100 MG/DL (ref 70–99)
GLUCOSE SERPL-MCNC: 102 MG/DL (ref 70–99)
GLUCOSE SERPL-MCNC: 109 MG/DL (ref 70–99)
GLUCOSE SERPL-MCNC: 112 MG/DL (ref 70–99)
GLUCOSE SERPL-MCNC: 118 MG/DL (ref 70–99)
GLUCOSE SERPL-MCNC: 122 MG/DL (ref 70–99)
GLUCOSE SERPL-MCNC: 124 MG/DL (ref 70–99)
GLUCOSE SERPL-MCNC: 131 MG/DL (ref 70–99)
GLUCOSE SERPL-MCNC: 136 MG/DL (ref 70–99)
GLUCOSE SERPL-MCNC: 138 MG/DL (ref 70–99)
GLUCOSE SERPL-MCNC: 142 MG/DL (ref 70–99)
GLUCOSE SERPL-MCNC: 145 MG/DL (ref 70–99)
GLUCOSE SERPL-MCNC: 165 MG/DL (ref 70–99)
GLUCOSE SERPL-MCNC: 221 MG/DL (ref 70–99)
GLUCOSE SERPL-MCNC: 223 MG/DL (ref 70–99)
GLUCOSE SERPL-MCNC: 227 MG/DL (ref 70–99)
GRAM STAIN RESULT: NORMAL
H CAPSUL AG UR QL IA: NOT DETECTED
H CAPSUL AG UR-MCNC: NOT DETECTED NG/ML
HADV DNA SPEC QL NAA+PROBE: NOT DETECTED
HBA1C MFR BLD: 6.5 %
HCO3 BLD-SCNC: 25 MMOL/L (ref 21–28)
HCO3 BLDV-SCNC: 25 MMOL/L (ref 21–28)
HCOV PNL SPEC NAA+PROBE: NOT DETECTED
HCT VFR BLD AUTO: 30.6 % (ref 35–47)
HCT VFR BLD AUTO: 30.8 % (ref 35–47)
HCT VFR BLD AUTO: 31.4 % (ref 35–47)
HCT VFR BLD AUTO: 31.5 % (ref 35–47)
HCT VFR BLD AUTO: 31.7 % (ref 35–47)
HCT VFR BLD AUTO: 32.1 % (ref 35–47)
HCT VFR BLD AUTO: 32.2 % (ref 35–47)
HCT VFR BLD AUTO: 32.3 % (ref 35–47)
HCT VFR BLD AUTO: 33.1 % (ref 35–47)
HCT VFR BLD AUTO: 33.4 % (ref 35–47)
HCT VFR BLD AUTO: 33.5 % (ref 35–47)
HCT VFR BLD AUTO: 34.4 % (ref 35–47)
HCT VFR BLD AUTO: 36.6 % (ref 35–47)
HGB BLD-MCNC: 10 G/DL (ref 11.7–15.7)
HGB BLD-MCNC: 10.1 G/DL (ref 11.7–15.7)
HGB BLD-MCNC: 10.2 G/DL (ref 11.7–15.7)
HGB BLD-MCNC: 10.3 G/DL (ref 11.7–15.7)
HGB BLD-MCNC: 10.4 G/DL (ref 11.7–15.7)
HGB BLD-MCNC: 10.5 G/DL (ref 11.7–15.7)
HGB BLD-MCNC: 10.6 G/DL (ref 11.7–15.7)
HGB BLD-MCNC: 10.6 G/DL (ref 11.7–15.7)
HGB BLD-MCNC: 10.7 G/DL (ref 11.7–15.7)
HGB BLD-MCNC: 11 G/DL (ref 11.7–15.7)
HGB BLD-MCNC: 11.1 G/DL (ref 11.7–15.7)
HGB BLD-MCNC: 11.1 G/DL (ref 11.7–15.7)
HGB BLD-MCNC: 12 G/DL (ref 11.7–15.7)
HMPV RNA SPEC QL NAA+PROBE: NOT DETECTED
HOLD SPECIMEN: NORMAL
HPIV1 RNA SPEC QL NAA+PROBE: NOT DETECTED
HPIV2 RNA SPEC QL NAA+PROBE: NOT DETECTED
HPIV3 RNA SPEC QL NAA+PROBE: NOT DETECTED
HPIV4 RNA SPEC QL NAA+PROBE: NOT DETECTED
IGA SERPL-MCNC: 603 MG/DL (ref 84–499)
IGG SERPL-MCNC: 977 MG/DL (ref 610–1616)
IGM SERPL-MCNC: 70 MG/DL (ref 35–242)
IMM GRANULOCYTES # BLD: 0.1 10E3/UL
IMM GRANULOCYTES # BLD: 0.1 10E3/UL
IMM GRANULOCYTES # BLD: 0.2 10E3/UL
IMM GRANULOCYTES # BLD: 0.3 10E3/UL
IMM GRANULOCYTES # BLD: 0.4 10E3/UL
IMM GRANULOCYTES NFR BLD: 1 %
IMM GRANULOCYTES NFR BLD: 1 %
IMM GRANULOCYTES NFR BLD: 2 %
IMM GRANULOCYTES NFR BLD: 3 %
INTERPRETATION ECG - MUSE: NORMAL
L PNEUMO1 AG UR QL IA: NEGATIVE
LACTATE SERPL-SCNC: 1.2 MMOL/L (ref 0.7–2)
LACTATE SERPL-SCNC: 1.5 MMOL/L (ref 0.7–2)
LACTATE SERPL-SCNC: 1.6 MMOL/L (ref 0.7–2)
LACTATE SERPL-SCNC: 1.8 MMOL/L (ref 0.7–2)
LACTATE SERPL-SCNC: 2.1 MMOL/L (ref 0.7–2)
LVEF ECHO: NORMAL
LYMPHOCYTES # BLD AUTO: 1.1 10E3/UL (ref 0.8–5.3)
LYMPHOCYTES # BLD AUTO: 1.4 10E3/UL (ref 0.8–5.3)
LYMPHOCYTES # BLD AUTO: 1.5 10E3/UL (ref 0.8–5.3)
LYMPHOCYTES # BLD AUTO: 1.7 10E3/UL (ref 0.8–5.3)
LYMPHOCYTES # BLD AUTO: 1.8 10E3/UL (ref 0.8–5.3)
LYMPHOCYTES # BLD AUTO: 2 10E3/UL (ref 0.8–5.3)
LYMPHOCYTES # BLD AUTO: 2.1 10E3/UL (ref 0.8–5.3)
LYMPHOCYTES NFR BLD AUTO: 10 %
LYMPHOCYTES NFR BLD AUTO: 12 %
LYMPHOCYTES NFR BLD AUTO: 13 %
LYMPHOCYTES NFR BLD AUTO: 14 %
LYMPHOCYTES NFR BLD AUTO: 19 %
LYMPHOCYTES NFR BLD AUTO: 20 %
LYMPHOCYTES NFR BLD AUTO: 9 %
Lab: NORMAL
M PNEUMO DNA SPEC QL NAA+PROBE: NOT DETECTED
M PNEUMO IGG SER IA-ACNC: 0.15 U/L
M PNEUMO IGM SER IA-ACNC: 0.03 U/L
M TB IFN-G BLD-IMP: NEGATIVE
M TB IFN-G CD4+ BCKGRND COR BLD-ACNC: 3.52 IU/ML
MAGNESIUM SERPL-MCNC: 2.1 MG/DL (ref 1.7–2.3)
MCH RBC QN AUTO: 28.7 PG (ref 26.5–33)
MCH RBC QN AUTO: 28.7 PG (ref 26.5–33)
MCH RBC QN AUTO: 28.8 PG (ref 26.5–33)
MCH RBC QN AUTO: 28.9 PG (ref 26.5–33)
MCH RBC QN AUTO: 29.1 PG (ref 26.5–33)
MCH RBC QN AUTO: 29.1 PG (ref 26.5–33)
MCH RBC QN AUTO: 29.2 PG (ref 26.5–33)
MCH RBC QN AUTO: 29.4 PG (ref 26.5–33)
MCH RBC QN AUTO: 29.5 PG (ref 26.5–33)
MCH RBC QN AUTO: 29.8 PG (ref 26.5–33)
MCH RBC QN AUTO: 29.9 PG (ref 26.5–33)
MCHC RBC AUTO-ENTMCNC: 31.7 G/DL (ref 31.5–36.5)
MCHC RBC AUTO-ENTMCNC: 32 G/DL (ref 31.5–36.5)
MCHC RBC AUTO-ENTMCNC: 32 G/DL (ref 31.5–36.5)
MCHC RBC AUTO-ENTMCNC: 32.2 G/DL (ref 31.5–36.5)
MCHC RBC AUTO-ENTMCNC: 32.5 G/DL (ref 31.5–36.5)
MCHC RBC AUTO-ENTMCNC: 32.5 G/DL (ref 31.5–36.5)
MCHC RBC AUTO-ENTMCNC: 32.8 G/DL (ref 31.5–36.5)
MCHC RBC AUTO-ENTMCNC: 33 G/DL (ref 31.5–36.5)
MCHC RBC AUTO-ENTMCNC: 33 G/DL (ref 31.5–36.5)
MCHC RBC AUTO-ENTMCNC: 33.1 G/DL (ref 31.5–36.5)
MCHC RBC AUTO-ENTMCNC: 33.4 G/DL (ref 31.5–36.5)
MCHC RBC AUTO-ENTMCNC: 33.5 G/DL (ref 31.5–36.5)
MCHC RBC AUTO-ENTMCNC: 33.7 G/DL (ref 31.5–36.5)
MCV RBC AUTO: 88 FL (ref 78–100)
MCV RBC AUTO: 89 FL (ref 78–100)
MCV RBC AUTO: 91 FL (ref 78–100)
MISCELLANEOUS TEST 1 (ARUP): NORMAL
MITOGEN IGNF BCKGRD COR BLD-ACNC: 0 IU/ML
MITOGEN IGNF BCKGRD COR BLD-ACNC: 0.01 IU/ML
MONOCYTES # BLD AUTO: 0.8 10E3/UL (ref 0–1.3)
MONOCYTES # BLD AUTO: 1 10E3/UL (ref 0–1.3)
MONOCYTES # BLD AUTO: 1.2 10E3/UL (ref 0–1.3)
MONOCYTES # BLD AUTO: 1.2 10E3/UL (ref 0–1.3)
MONOCYTES # BLD AUTO: 1.3 10E3/UL (ref 0–1.3)
MONOCYTES NFR BLD AUTO: 10 %
MONOCYTES NFR BLD AUTO: 11 %
MONOCYTES NFR BLD AUTO: 7 %
MONOCYTES NFR BLD AUTO: 7 %
MONOCYTES NFR BLD AUTO: 9 %
MRSA DNA SPEC QL NAA+PROBE: NEGATIVE
MYELOPEROXIDASE AB SER IA-ACNC: 0.4 U/ML
MYELOPEROXIDASE AB SER IA-ACNC: NEGATIVE
NEUTROPHILS # BLD AUTO: 10.2 10E3/UL (ref 1.6–8.3)
NEUTROPHILS # BLD AUTO: 10.2 10E3/UL (ref 1.6–8.3)
NEUTROPHILS # BLD AUTO: 7.2 10E3/UL (ref 1.6–8.3)
NEUTROPHILS # BLD AUTO: 7.3 10E3/UL (ref 1.6–8.3)
NEUTROPHILS # BLD AUTO: 9.1 10E3/UL (ref 1.6–8.3)
NEUTROPHILS # BLD AUTO: 9.6 10E3/UL (ref 1.6–8.3)
NEUTROPHILS # BLD AUTO: 9.6 10E3/UL (ref 1.6–8.3)
NEUTROPHILS NFR BLD AUTO: 67 %
NEUTROPHILS NFR BLD AUTO: 69 %
NEUTROPHILS NFR BLD AUTO: 69 %
NEUTROPHILS NFR BLD AUTO: 74 %
NEUTROPHILS NFR BLD AUTO: 76 %
NEUTROPHILS NFR BLD AUTO: 76 %
NEUTROPHILS NFR BLD AUTO: 78 %
NRBC # BLD AUTO: 0 10E3/UL
NRBC BLD AUTO-RTO: 0 /100
NT-PROBNP SERPL-MCNC: 236 PG/ML (ref 0–1800)
NT-PROBNP SERPL-MCNC: 311 PG/ML (ref 0–1800)
NT-PROBNP SERPL-MCNC: 644 PG/ML (ref 0–1800)
O2/TOTAL GAS SETTING VFR VENT: 21 %
O2/TOTAL GAS SETTING VFR VENT: 9 %
OBSERVATION IMP: NEGATIVE
OXYHGB MFR BLDV: 37 % (ref 70–75)
P AXIS - MUSE: 57 DEGREES
P AXIS - MUSE: 69 DEGREES
P AXIS - MUSE: NORMAL DEGREES
P JIROVECII DNA SPEC QL NAA+PROBE: NOT DETECTED
PCO2 BLD: 34 MM HG (ref 35–45)
PCO2 BLDV: 40 MM HG (ref 40–50)
PERFORMING LABORATORY: NORMAL
PH BLD: 7.49 [PH] (ref 7.35–7.45)
PH BLDV: 7.4 [PH] (ref 7.32–7.43)
PHOSPHATE SERPL-MCNC: 2 MG/DL (ref 2.5–4.5)
PHOSPHATE SERPL-MCNC: 2.3 MG/DL (ref 2.5–4.5)
PHOSPHATE SERPL-MCNC: 2.5 MG/DL (ref 2.5–4.5)
PHOSPHATE SERPL-MCNC: 3.6 MG/DL (ref 2.5–4.5)
PIGEON SERUM AB QL ID: NORMAL
PIGEON SERUM AB QL ID: NORMAL
PLATELET # BLD AUTO: 226 10E3/UL (ref 150–450)
PLATELET # BLD AUTO: 240 10E3/UL (ref 150–450)
PLATELET # BLD AUTO: 274 10E3/UL (ref 150–450)
PLATELET # BLD AUTO: 284 10E3/UL (ref 150–450)
PLATELET # BLD AUTO: 296 10E3/UL (ref 150–450)
PLATELET # BLD AUTO: 322 10E3/UL (ref 150–450)
PLATELET # BLD AUTO: 328 10E3/UL (ref 150–450)
PLATELET # BLD AUTO: 331 10E3/UL (ref 150–450)
PLATELET # BLD AUTO: 334 10E3/UL (ref 150–450)
PLATELET # BLD AUTO: 338 10E3/UL (ref 150–450)
PLATELET # BLD AUTO: 339 10E3/UL (ref 150–450)
PLATELET # BLD AUTO: 371 10E3/UL (ref 150–450)
PLATELET # BLD AUTO: 381 10E3/UL (ref 150–450)
PO2 BLD: 52 MM HG (ref 80–105)
PO2 BLDV: 24 MM HG (ref 25–47)
POTASSIUM SERPL-SCNC: 3.3 MMOL/L (ref 3.4–5.3)
POTASSIUM SERPL-SCNC: 3.4 MMOL/L (ref 3.4–5.3)
POTASSIUM SERPL-SCNC: 3.5 MMOL/L (ref 3.4–5.3)
POTASSIUM SERPL-SCNC: 3.5 MMOL/L (ref 3.4–5.3)
POTASSIUM SERPL-SCNC: 3.6 MMOL/L (ref 3.4–5.3)
POTASSIUM SERPL-SCNC: 3.6 MMOL/L (ref 3.4–5.3)
POTASSIUM SERPL-SCNC: 3.7 MMOL/L (ref 3.4–5.3)
POTASSIUM SERPL-SCNC: 3.8 MMOL/L (ref 3.4–5.3)
POTASSIUM SERPL-SCNC: 3.9 MMOL/L (ref 3.4–5.3)
POTASSIUM SERPL-SCNC: 4 MMOL/L (ref 3.4–5.3)
POTASSIUM SERPL-SCNC: 4 MMOL/L (ref 3.4–5.3)
POTASSIUM SERPL-SCNC: 4.1 MMOL/L (ref 3.4–5.3)
POTASSIUM SERPL-SCNC: 4.3 MMOL/L (ref 3.4–5.3)
POTASSIUM SERPL-SCNC: 4.4 MMOL/L (ref 3.4–5.3)
PR INTERVAL - MUSE: 170 MS
PR INTERVAL - MUSE: 196 MS
PR INTERVAL - MUSE: NORMAL MS
PROCALCITONIN SERPL IA-MCNC: 0.12 NG/ML
PROCALCITONIN SERPL IA-MCNC: 0.24 NG/ML
PROCALCITONIN SERPL IA-MCNC: 0.29 NG/ML
PROCALCITONIN SERPL IA-MCNC: 0.41 NG/ML
PROT SERPL-MCNC: 6.2 G/DL (ref 6.4–8.3)
PROT SERPL-MCNC: 7.4 G/DL (ref 6.4–8.3)
PROTEINASE3 AB SER IA-ACNC: <1 U/ML
PROTEINASE3 AB SER IA-ACNC: NEGATIVE
QRS DURATION - MUSE: 86 MS
QRS DURATION - MUSE: 96 MS
QRS DURATION - MUSE: 98 MS
QT - MUSE: 280 MS
QT - MUSE: 360 MS
QT - MUSE: 364 MS
QTC - MUSE: 399 MS
QTC - MUSE: 428 MS
QTC - MUSE: 445 MS
QUANTIFERON MITOGEN: 3.53 IU/ML
QUANTIFERON NIL TUBE: 0.01 IU/ML
QUANTIFERON TB1 TUBE: 0.01 IU/ML
QUANTIFERON TB2 TUBE: 0.02
R AXIS - MUSE: 46 DEGREES
R AXIS - MUSE: 47 DEGREES
R AXIS - MUSE: 49 DEGREES
RBC # BLD AUTO: 3.47 10E6/UL (ref 3.8–5.2)
RBC # BLD AUTO: 3.49 10E6/UL (ref 3.8–5.2)
RBC # BLD AUTO: 3.49 10E6/UL (ref 3.8–5.2)
RBC # BLD AUTO: 3.55 10E6/UL (ref 3.8–5.2)
RBC # BLD AUTO: 3.56 10E6/UL (ref 3.8–5.2)
RBC # BLD AUTO: 3.56 10E6/UL (ref 3.8–5.2)
RBC # BLD AUTO: 3.61 10E6/UL (ref 3.8–5.2)
RBC # BLD AUTO: 3.63 10E6/UL (ref 3.8–5.2)
RBC # BLD AUTO: 3.66 10E6/UL (ref 3.8–5.2)
RBC # BLD AUTO: 3.76 10E6/UL (ref 3.8–5.2)
RBC # BLD AUTO: 3.78 10E6/UL (ref 3.8–5.2)
RBC # BLD AUTO: 3.82 10E6/UL (ref 3.8–5.2)
RBC # BLD AUTO: 4.01 10E6/UL (ref 3.8–5.2)
RHEUMATOID FACT SER NEPH-ACNC: 10 IU/ML
RSV RNA SPEC NAA+PROBE: NEGATIVE
RSV RNA SPEC QL NAA+PROBE: NOT DETECTED
RSV RNA SPEC QL NAA+PROBE: NOT DETECTED
RV+EV RNA SPEC QL NAA+PROBE: NOT DETECTED
S PNEUM AG SPEC QL: NEGATIVE
S RECTIVIRGULA AB SER QL ID: NORMAL
S RECTIVIRGULA AB SER QL ID: NORMAL
S VIRIDIS AB SER QL ID: NORMAL
SA TARGET DNA: NEGATIVE
SARS-COV-2 RNA RESP QL NAA+PROBE: NEGATIVE
SCANNED LAB RESULT: NORMAL
SODIUM SERPL-SCNC: 132 MMOL/L (ref 135–145)
SODIUM SERPL-SCNC: 135 MMOL/L (ref 135–145)
SODIUM SERPL-SCNC: 135 MMOL/L (ref 135–145)
SODIUM SERPL-SCNC: 136 MMOL/L (ref 135–145)
SODIUM SERPL-SCNC: 137 MMOL/L (ref 135–145)
SODIUM SERPL-SCNC: 137 MMOL/L (ref 135–145)
SODIUM SERPL-SCNC: 138 MMOL/L (ref 135–145)
SODIUM SERPL-SCNC: 138 MMOL/L (ref 135–145)
SODIUM SERPL-SCNC: 139 MMOL/L (ref 135–145)
SODIUM SERPL-SCNC: 139 MMOL/L (ref 135–145)
SODIUM SERPL-SCNC: 142 MMOL/L (ref 135–145)
SODIUM SERPL-SCNC: 143 MMOL/L (ref 135–145)
SODIUM SERPL-SCNC: 145 MMOL/L (ref 135–145)
SODIUM SERPL-SCNC: 146 MMOL/L (ref 135–145)
SPECIMEN STATUS: NORMAL
SYSTOLIC BLOOD PRESSURE - MUSE: NORMAL MMHG
T AXIS - MUSE: 250 DEGREES
T AXIS - MUSE: 54 DEGREES
T AXIS - MUSE: 71 DEGREES
T CANDIDUS AB SER QL: NORMAL
TEST NAME: NORMAL
TROPONIN T SERPL HS-MCNC: 11 NG/L
TROPONIN T SERPL HS-MCNC: 12 NG/L
TROPONIN T SERPL HS-MCNC: 9 NG/L
VENTRICULAR RATE- MUSE: 122 BPM
VENTRICULAR RATE- MUSE: 85 BPM
VENTRICULAR RATE- MUSE: 90 BPM
WBC # BLD AUTO: 10.4 10E3/UL (ref 4–11)
WBC # BLD AUTO: 10.6 10E3/UL (ref 4–11)
WBC # BLD AUTO: 10.7 10E3/UL (ref 4–11)
WBC # BLD AUTO: 12.3 10E3/UL (ref 4–11)
WBC # BLD AUTO: 12.8 10E3/UL (ref 4–11)
WBC # BLD AUTO: 12.9 10E3/UL (ref 4–11)
WBC # BLD AUTO: 13.4 10E3/UL (ref 4–11)
WBC # BLD AUTO: 13.5 10E3/UL (ref 4–11)
WBC # BLD AUTO: 14.6 10E3/UL (ref 4–11)
WBC # BLD AUTO: 15.5 10E3/UL (ref 4–11)
WBC # BLD AUTO: 18.2 10E3/UL (ref 4–11)
WBC # BLD AUTO: 8.8 10E3/UL (ref 4–11)
WBC # BLD AUTO: 8.8 10E3/UL (ref 4–11)
WBC # BLD AUTO: 9.7 10E3/UL (ref 4–11)

## 2023-01-01 PROCEDURE — 250N000011 HC RX IP 250 OP 636: Mod: JZ | Performed by: INTERNAL MEDICINE

## 2023-01-01 PROCEDURE — 85652 RBC SED RATE AUTOMATED: CPT | Performed by: INTERNAL MEDICINE

## 2023-01-01 PROCEDURE — 94640 AIRWAY INHALATION TREATMENT: CPT | Mod: 76

## 2023-01-01 PROCEDURE — 87070 CULTURE OTHR SPECIMN AEROBIC: CPT | Performed by: INTERNAL MEDICINE

## 2023-01-01 PROCEDURE — 94640 AIRWAY INHALATION TREATMENT: CPT

## 2023-01-01 PROCEDURE — 999N000157 HC STATISTIC RCP TIME EA 10 MIN

## 2023-01-01 PROCEDURE — 250N000009 HC RX 250: Performed by: INTERNAL MEDICINE

## 2023-01-01 PROCEDURE — 97530 THERAPEUTIC ACTIVITIES: CPT | Mod: GO

## 2023-01-01 PROCEDURE — 84484 ASSAY OF TROPONIN QUANT: CPT | Performed by: STUDENT IN AN ORGANIZED HEALTH CARE EDUCATION/TRAINING PROGRAM

## 2023-01-01 PROCEDURE — 99207 PR NO BILLABLE SERVICE THIS VISIT: CPT | Performed by: STUDENT IN AN ORGANIZED HEALTH CARE EDUCATION/TRAINING PROGRAM

## 2023-01-01 PROCEDURE — 258N000003 HC RX IP 258 OP 636: Performed by: INTERNAL MEDICINE

## 2023-01-01 PROCEDURE — 250N000011 HC RX IP 250 OP 636: Performed by: INTERNAL MEDICINE

## 2023-01-01 PROCEDURE — 83605 ASSAY OF LACTIC ACID: CPT | Performed by: INTERNAL MEDICINE

## 2023-01-01 PROCEDURE — 94799 UNLISTED PULMONARY SVC/PX: CPT

## 2023-01-01 PROCEDURE — 250N000013 HC RX MED GY IP 250 OP 250 PS 637: Performed by: STUDENT IN AN ORGANIZED HEALTH CARE EDUCATION/TRAINING PROGRAM

## 2023-01-01 PROCEDURE — 200N000001 HC R&B ICU

## 2023-01-01 PROCEDURE — 99232 SBSQ HOSP IP/OBS MODERATE 35: CPT | Performed by: INTERNAL MEDICINE

## 2023-01-01 PROCEDURE — 250N000011 HC RX IP 250 OP 636: Performed by: STUDENT IN AN ORGANIZED HEALTH CARE EDUCATION/TRAINING PROGRAM

## 2023-01-01 PROCEDURE — C9113 INJ PANTOPRAZOLE SODIUM, VIA: HCPCS | Mod: JZ | Performed by: INTERNAL MEDICINE

## 2023-01-01 PROCEDURE — 87449 NOS EACH ORGANISM AG IA: CPT | Performed by: INTERNAL MEDICINE

## 2023-01-01 PROCEDURE — 999N000128 HC STATISTIC PERIPHERAL IV START W/O US GUIDANCE

## 2023-01-01 PROCEDURE — 82805 BLOOD GASES W/O2 SATURATION: CPT | Performed by: STUDENT IN AN ORGANIZED HEALTH CARE EDUCATION/TRAINING PROGRAM

## 2023-01-01 PROCEDURE — 97530 THERAPEUTIC ACTIVITIES: CPT | Mod: GP

## 2023-01-01 PROCEDURE — 84132 ASSAY OF SERUM POTASSIUM: CPT | Performed by: STUDENT IN AN ORGANIZED HEALTH CARE EDUCATION/TRAINING PROGRAM

## 2023-01-01 PROCEDURE — 86481 TB AG RESPONSE T-CELL SUSP: CPT | Performed by: INTERNAL MEDICINE

## 2023-01-01 PROCEDURE — 36415 COLL VENOUS BLD VENIPUNCTURE: CPT | Performed by: INTERNAL MEDICINE

## 2023-01-01 PROCEDURE — 120N000001 HC R&B MED SURG/OB

## 2023-01-01 PROCEDURE — 94668 MNPJ CHEST WALL SBSQ: CPT

## 2023-01-01 PROCEDURE — 86431 RHEUMATOID FACTOR QUANT: CPT | Performed by: INTERNAL MEDICINE

## 2023-01-01 PROCEDURE — 999N000215 HC STATISTIC HFNC ADULT NON-CPAP

## 2023-01-01 PROCEDURE — 250N000011 HC RX IP 250 OP 636: Mod: JZ | Performed by: STUDENT IN AN ORGANIZED HEALTH CARE EDUCATION/TRAINING PROGRAM

## 2023-01-01 PROCEDURE — 250N000013 HC RX MED GY IP 250 OP 250 PS 637: Performed by: INTERNAL MEDICINE

## 2023-01-01 PROCEDURE — 83036 HEMOGLOBIN GLYCOSYLATED A1C: CPT | Performed by: INTERNAL MEDICINE

## 2023-01-01 PROCEDURE — 99233 SBSQ HOSP IP/OBS HIGH 50: CPT | Performed by: INTERNAL MEDICINE

## 2023-01-01 PROCEDURE — 85025 COMPLETE CBC W/AUTO DIFF WBC: CPT | Performed by: INTERNAL MEDICINE

## 2023-01-01 PROCEDURE — 84484 ASSAY OF TROPONIN QUANT: CPT | Performed by: NURSE PRACTITIONER

## 2023-01-01 PROCEDURE — 258N000003 HC RX IP 258 OP 636: Performed by: STUDENT IN AN ORGANIZED HEALTH CARE EDUCATION/TRAINING PROGRAM

## 2023-01-01 PROCEDURE — 86738 MYCOPLASMA ANTIBODY: CPT | Performed by: INTERNAL MEDICINE

## 2023-01-01 PROCEDURE — 87070 CULTURE OTHR SPECIMN AEROBIC: CPT

## 2023-01-01 PROCEDURE — 85048 AUTOMATED LEUKOCYTE COUNT: CPT | Performed by: INTERNAL MEDICINE

## 2023-01-01 PROCEDURE — 87116 MYCOBACTERIA CULTURE: CPT | Performed by: INTERNAL MEDICINE

## 2023-01-01 PROCEDURE — 99291 CRITICAL CARE FIRST HOUR: CPT | Mod: 25

## 2023-01-01 PROCEDURE — 85027 COMPLETE CBC AUTOMATED: CPT | Performed by: INTERNAL MEDICINE

## 2023-01-01 PROCEDURE — 36569 INSJ PICC 5 YR+ W/O IMAGING: CPT

## 2023-01-01 PROCEDURE — 93010 ELECTROCARDIOGRAM REPORT: CPT | Performed by: INTERNAL MEDICINE

## 2023-01-01 PROCEDURE — 80048 BASIC METABOLIC PNL TOTAL CA: CPT | Performed by: INTERNAL MEDICINE

## 2023-01-01 PROCEDURE — 97116 GAIT TRAINING THERAPY: CPT | Mod: GP | Performed by: PHYSICAL THERAPIST

## 2023-01-01 PROCEDURE — 84100 ASSAY OF PHOSPHORUS: CPT | Performed by: INTERNAL MEDICINE

## 2023-01-01 PROCEDURE — 87385 HISTOPLASMA CAPSUL AG IA: CPT | Performed by: INTERNAL MEDICINE

## 2023-01-01 PROCEDURE — 82784 ASSAY IGA/IGD/IGG/IGM EACH: CPT | Performed by: INTERNAL MEDICINE

## 2023-01-01 PROCEDURE — 86140 C-REACTIVE PROTEIN: CPT | Performed by: INTERNAL MEDICINE

## 2023-01-01 PROCEDURE — 99223 1ST HOSP IP/OBS HIGH 75: CPT | Performed by: INTERNAL MEDICINE

## 2023-01-01 PROCEDURE — 94667 MNPJ CHEST WALL 1ST: CPT

## 2023-01-01 PROCEDURE — 71045 X-RAY EXAM CHEST 1 VIEW: CPT

## 2023-01-01 PROCEDURE — 36415 COLL VENOUS BLD VENIPUNCTURE: CPT | Performed by: STUDENT IN AN ORGANIZED HEALTH CARE EDUCATION/TRAINING PROGRAM

## 2023-01-01 PROCEDURE — 93005 ELECTROCARDIOGRAM TRACING: CPT

## 2023-01-01 PROCEDURE — 94660 CPAP INITIATION&MGMT: CPT

## 2023-01-01 PROCEDURE — 87798 DETECT AGENT NOS DNA AMP: CPT | Performed by: INTERNAL MEDICINE

## 2023-01-01 PROCEDURE — 250N000009 HC RX 250: Performed by: STUDENT IN AN ORGANIZED HEALTH CARE EDUCATION/TRAINING PROGRAM

## 2023-01-01 PROCEDURE — 97110 THERAPEUTIC EXERCISES: CPT | Mod: GO

## 2023-01-01 PROCEDURE — 87899 AGENT NOS ASSAY W/OPTIC: CPT

## 2023-01-01 PROCEDURE — 84132 ASSAY OF SERUM POTASSIUM: CPT | Performed by: INTERNAL MEDICINE

## 2023-01-01 PROCEDURE — 99285 EMERGENCY DEPT VISIT HI MDM: CPT | Mod: 25

## 2023-01-01 PROCEDURE — 99239 HOSP IP/OBS DSCHRG MGMT >30: CPT | Performed by: STUDENT IN AN ORGANIZED HEALTH CARE EDUCATION/TRAINING PROGRAM

## 2023-01-01 PROCEDURE — 93306 TTE W/DOPPLER COMPLETE: CPT | Mod: 26 | Performed by: INTERNAL MEDICINE

## 2023-01-01 PROCEDURE — 272N000433 HC KIT CATH IV 18 OR 20G CM, POWERGLIDE W MAX BARRIER

## 2023-01-01 PROCEDURE — 86635 COCCIDIOIDES ANTIBODY: CPT | Performed by: INTERNAL MEDICINE

## 2023-01-01 PROCEDURE — 87040 BLOOD CULTURE FOR BACTERIA: CPT | Performed by: STUDENT IN AN ORGANIZED HEALTH CARE EDUCATION/TRAINING PROGRAM

## 2023-01-01 PROCEDURE — 84145 PROCALCITONIN (PCT): CPT | Performed by: INTERNAL MEDICINE

## 2023-01-01 PROCEDURE — 99233 SBSQ HOSP IP/OBS HIGH 50: CPT | Mod: 25 | Performed by: INTERNAL MEDICINE

## 2023-01-01 PROCEDURE — 86036 ANCA SCREEN EACH ANTIBODY: CPT | Performed by: INTERNAL MEDICINE

## 2023-01-01 PROCEDURE — 99233 SBSQ HOSP IP/OBS HIGH 50: CPT | Mod: 25 | Performed by: NURSE PRACTITIONER

## 2023-01-01 PROCEDURE — 80069 RENAL FUNCTION PANEL: CPT | Performed by: INTERNAL MEDICINE

## 2023-01-01 PROCEDURE — 250N000009 HC RX 250: Performed by: NURSE PRACTITIONER

## 2023-01-01 PROCEDURE — 99207 PR NO BILLABLE SERVICE THIS VISIT: CPT

## 2023-01-01 PROCEDURE — 99223 1ST HOSP IP/OBS HIGH 75: CPT | Performed by: STUDENT IN AN ORGANIZED HEALTH CARE EDUCATION/TRAINING PROGRAM

## 2023-01-01 PROCEDURE — 97530 THERAPEUTIC ACTIVITIES: CPT | Mod: GP | Performed by: PHYSICAL THERAPIST

## 2023-01-01 PROCEDURE — 82803 BLOOD GASES ANY COMBINATION: CPT

## 2023-01-01 PROCEDURE — 83880 ASSAY OF NATRIURETIC PEPTIDE: CPT | Performed by: STUDENT IN AN ORGANIZED HEALTH CARE EDUCATION/TRAINING PROGRAM

## 2023-01-01 PROCEDURE — 80048 BASIC METABOLIC PNL TOTAL CA: CPT | Performed by: STUDENT IN AN ORGANIZED HEALTH CARE EDUCATION/TRAINING PROGRAM

## 2023-01-01 PROCEDURE — 84999 UNLISTED CHEMISTRY PROCEDURE: CPT | Performed by: INTERNAL MEDICINE

## 2023-01-01 PROCEDURE — 87637 SARSCOV2&INF A&B&RSV AMP PRB: CPT | Performed by: STUDENT IN AN ORGANIZED HEALTH CARE EDUCATION/TRAINING PROGRAM

## 2023-01-01 PROCEDURE — 99223 1ST HOSP IP/OBS HIGH 75: CPT | Mod: AI | Performed by: REGISTERED NURSE

## 2023-01-01 PROCEDURE — 250N000011 HC RX IP 250 OP 636

## 2023-01-01 PROCEDURE — 258N000003 HC RX IP 258 OP 636: Performed by: NURSE PRACTITIONER

## 2023-01-01 PROCEDURE — 92526 ORAL FUNCTION THERAPY: CPT | Mod: GN | Performed by: SPEECH-LANGUAGE PATHOLOGIST

## 2023-01-01 PROCEDURE — 85027 COMPLETE CBC AUTOMATED: CPT | Performed by: HOSPITALIST

## 2023-01-01 PROCEDURE — 83605 ASSAY OF LACTIC ACID: CPT | Performed by: HOSPITALIST

## 2023-01-01 PROCEDURE — 250N000012 HC RX MED GY IP 250 OP 636 PS 637: Performed by: INTERNAL MEDICINE

## 2023-01-01 PROCEDURE — 97535 SELF CARE MNGMENT TRAINING: CPT | Mod: GO | Performed by: OCCUPATIONAL THERAPIST

## 2023-01-01 PROCEDURE — 999N000040 HC STATISTIC CONSULT NO CHARGE VASC ACCESS

## 2023-01-01 PROCEDURE — 87206 SMEAR FLUORESCENT/ACID STAI: CPT | Performed by: INTERNAL MEDICINE

## 2023-01-01 PROCEDURE — 83880 ASSAY OF NATRIURETIC PEPTIDE: CPT | Performed by: INTERNAL MEDICINE

## 2023-01-01 PROCEDURE — 97535 SELF CARE MNGMENT TRAINING: CPT | Mod: GO

## 2023-01-01 PROCEDURE — 36600 WITHDRAWAL OF ARTERIAL BLOOD: CPT

## 2023-01-01 PROCEDURE — 5A09357 ASSISTANCE WITH RESPIRATORY VENTILATION, LESS THAN 24 CONSECUTIVE HOURS, CONTINUOUS POSITIVE AIRWAY PRESSURE: ICD-10-PCS

## 2023-01-01 PROCEDURE — 87205 SMEAR GRAM STAIN: CPT

## 2023-01-01 PROCEDURE — 86638 Q FEVER ANTIBODY: CPT | Performed by: INTERNAL MEDICINE

## 2023-01-01 PROCEDURE — 87899 AGENT NOS ASSAY W/OPTIC: CPT | Performed by: INTERNAL MEDICINE

## 2023-01-01 PROCEDURE — 99223 1ST HOSP IP/OBS HIGH 75: CPT | Performed by: NURSE PRACTITIONER

## 2023-01-01 PROCEDURE — 86038 ANTINUCLEAR ANTIBODIES: CPT | Performed by: INTERNAL MEDICINE

## 2023-01-01 PROCEDURE — 83735 ASSAY OF MAGNESIUM: CPT | Performed by: NURSE PRACTITIONER

## 2023-01-01 PROCEDURE — 92610 EVALUATE SWALLOWING FUNCTION: CPT | Mod: GN | Performed by: SPEECH-LANGUAGE PATHOLOGIST

## 2023-01-01 PROCEDURE — 36415 COLL VENOUS BLD VENIPUNCTURE: CPT | Performed by: HOSPITALIST

## 2023-01-01 PROCEDURE — 999N000208 ECHOCARDIOGRAM COMPLETE

## 2023-01-01 PROCEDURE — 80053 COMPREHEN METABOLIC PANEL: CPT | Performed by: STUDENT IN AN ORGANIZED HEALTH CARE EDUCATION/TRAINING PROGRAM

## 2023-01-01 PROCEDURE — 86612 BLASTOMYCES ANTIBODY: CPT | Performed by: INTERNAL MEDICINE

## 2023-01-01 PROCEDURE — 80048 BASIC METABOLIC PNL TOTAL CA: CPT | Performed by: HOSPITALIST

## 2023-01-01 PROCEDURE — 99233 SBSQ HOSP IP/OBS HIGH 50: CPT | Performed by: STUDENT IN AN ORGANIZED HEALTH CARE EDUCATION/TRAINING PROGRAM

## 2023-01-01 PROCEDURE — 87641 MR-STAPH DNA AMP PROBE: CPT | Performed by: INTERNAL MEDICINE

## 2023-01-01 PROCEDURE — 255N000002 HC RX 255 OP 636: Performed by: INTERNAL MEDICINE

## 2023-01-01 PROCEDURE — 87305 ASPERGILLUS AG IA: CPT | Performed by: INTERNAL MEDICINE

## 2023-01-01 PROCEDURE — 85027 COMPLETE CBC AUTOMATED: CPT | Performed by: STUDENT IN AN ORGANIZED HEALTH CARE EDUCATION/TRAINING PROGRAM

## 2023-01-01 PROCEDURE — 999N000190 HC STATISTIC VAT ROUNDS

## 2023-01-01 PROCEDURE — 86606 ASPERGILLUS ANTIBODY: CPT | Performed by: INTERNAL MEDICINE

## 2023-01-01 PROCEDURE — 87633 RESP VIRUS 12-25 TARGETS: CPT | Performed by: INTERNAL MEDICINE

## 2023-01-01 PROCEDURE — 82947 ASSAY GLUCOSE BLOOD QUANT: CPT | Performed by: INTERNAL MEDICINE

## 2023-01-01 PROCEDURE — 82310 ASSAY OF CALCIUM: CPT | Performed by: INTERNAL MEDICINE

## 2023-01-01 PROCEDURE — 85004 AUTOMATED DIFF WBC COUNT: CPT | Performed by: INTERNAL MEDICINE

## 2023-01-01 PROCEDURE — 86037 ANCA TITER EACH ANTIBODY: CPT | Performed by: INTERNAL MEDICINE

## 2023-01-01 PROCEDURE — 97116 GAIT TRAINING THERAPY: CPT | Mod: GP

## 2023-01-01 PROCEDURE — 97165 OT EVAL LOW COMPLEX 30 MIN: CPT | Mod: GO

## 2023-01-01 PROCEDURE — 92610 EVALUATE SWALLOWING FUNCTION: CPT | Mod: GN

## 2023-01-01 PROCEDURE — 86631 CHLAMYDIA ANTIBODY: CPT | Performed by: INTERNAL MEDICINE

## 2023-01-01 PROCEDURE — 83876 ASSAY MYELOPEROXIDASE: CPT | Performed by: INTERNAL MEDICINE

## 2023-01-01 PROCEDURE — 87486 CHLMYD PNEUM DNA AMP PROBE: CPT | Performed by: INTERNAL MEDICINE

## 2023-01-01 PROCEDURE — 99213 OFFICE O/P EST LOW 20 MIN: CPT | Performed by: INTERNAL MEDICINE

## 2023-01-01 PROCEDURE — 86713 LEGIONELLA ANTIBODY: CPT | Performed by: INTERNAL MEDICINE

## 2023-01-01 PROCEDURE — 83605 ASSAY OF LACTIC ACID: CPT | Performed by: STUDENT IN AN ORGANIZED HEALTH CARE EDUCATION/TRAINING PROGRAM

## 2023-01-01 PROCEDURE — 71250 CT THORAX DX C-: CPT

## 2023-01-01 PROCEDURE — 84145 PROCALCITONIN (PCT): CPT

## 2023-01-01 PROCEDURE — 86331 IMMUNODIFFUSION OUCHTERLONY: CPT | Performed by: INTERNAL MEDICINE

## 2023-01-01 PROCEDURE — 85025 COMPLETE CBC W/AUTO DIFF WBC: CPT | Performed by: STUDENT IN AN ORGANIZED HEALTH CARE EDUCATION/TRAINING PROGRAM

## 2023-01-01 PROCEDURE — 99232 SBSQ HOSP IP/OBS MODERATE 35: CPT | Performed by: HOSPITALIST

## 2023-01-01 PROCEDURE — 999N000156 HC STATISTIC RCP CONSULT EA 30 MIN

## 2023-01-01 PROCEDURE — 250N000011 HC RX IP 250 OP 636: Mod: JZ

## 2023-01-01 PROCEDURE — 99222 1ST HOSP IP/OBS MODERATE 55: CPT | Performed by: INTERNAL MEDICINE

## 2023-01-01 PROCEDURE — 99283 EMERGENCY DEPT VISIT LOW MDM: CPT

## 2023-01-01 PROCEDURE — 71275 CT ANGIOGRAPHY CHEST: CPT

## 2023-01-01 PROCEDURE — 87205 SMEAR GRAM STAIN: CPT | Performed by: INTERNAL MEDICINE

## 2023-01-01 PROCEDURE — 99497 ADVNCD CARE PLAN 30 MIN: CPT | Mod: 25 | Performed by: NURSE PRACTITIONER

## 2023-01-01 PROCEDURE — 99291 CRITICAL CARE FIRST HOUR: CPT | Mod: 25 | Performed by: NURSE PRACTITIONER

## 2023-01-01 PROCEDURE — 97161 PT EVAL LOW COMPLEX 20 MIN: CPT | Mod: GP

## 2023-01-01 RX ORDER — FUROSEMIDE 10 MG/ML
40 INJECTION INTRAMUSCULAR; INTRAVENOUS EVERY 12 HOURS
Status: DISCONTINUED | OUTPATIENT
Start: 2023-01-01 | End: 2023-01-01

## 2023-01-01 RX ORDER — METHYLPREDNISOLONE SODIUM SUCCINATE 125 MG/2ML
60 INJECTION, POWDER, LYOPHILIZED, FOR SOLUTION INTRAMUSCULAR; INTRAVENOUS EVERY 24 HOURS
Status: DISCONTINUED | OUTPATIENT
Start: 2023-01-01 | End: 2023-01-01

## 2023-01-01 RX ORDER — HYDROMORPHONE HCL IN WATER/PF 6 MG/30 ML
0.4 PATIENT CONTROLLED ANALGESIA SYRINGE INTRAVENOUS
Status: DISCONTINUED | OUTPATIENT
Start: 2023-01-01 | End: 2023-01-01 | Stop reason: HOSPADM

## 2023-01-01 RX ORDER — CEFEPIME HYDROCHLORIDE 2 G/1
2 INJECTION, POWDER, FOR SOLUTION INTRAVENOUS EVERY 12 HOURS
Status: DISCONTINUED | OUTPATIENT
Start: 2023-01-01 | End: 2023-01-01

## 2023-01-01 RX ORDER — ACETAMINOPHEN 325 MG/1
650 TABLET ORAL EVERY 6 HOURS PRN
Status: DISCONTINUED | OUTPATIENT
Start: 2023-01-01 | End: 2023-01-01 | Stop reason: HOSPADM

## 2023-01-01 RX ORDER — NALOXONE HYDROCHLORIDE 0.4 MG/ML
0.1 INJECTION, SOLUTION INTRAMUSCULAR; INTRAVENOUS; SUBCUTANEOUS
Status: DISCONTINUED | OUTPATIENT
Start: 2023-01-01 | End: 2023-01-01 | Stop reason: HOSPADM

## 2023-01-01 RX ORDER — CARVEDILOL 6.25 MG/1
6.25 TABLET ORAL 2 TIMES DAILY WITH MEALS
Status: DISCONTINUED | OUTPATIENT
Start: 2023-01-01 | End: 2023-01-01

## 2023-01-01 RX ORDER — FUROSEMIDE 10 MG/ML
20 INJECTION INTRAMUSCULAR; INTRAVENOUS ONCE
Status: COMPLETED | OUTPATIENT
Start: 2023-01-01 | End: 2023-01-01

## 2023-01-01 RX ORDER — ENOXAPARIN SODIUM 100 MG/ML
30 INJECTION SUBCUTANEOUS EVERY 24 HOURS
Status: DISCONTINUED | OUTPATIENT
Start: 2023-01-01 | End: 2023-01-01

## 2023-01-01 RX ORDER — GLYCOPYRROLATE 0.2 MG/ML
0.2 INJECTION, SOLUTION INTRAMUSCULAR; INTRAVENOUS EVERY 4 HOURS PRN
Status: DISCONTINUED | OUTPATIENT
Start: 2023-01-01 | End: 2023-01-01 | Stop reason: HOSPADM

## 2023-01-01 RX ORDER — ACETAMINOPHEN 650 MG/1
650 SUPPOSITORY RECTAL EVERY 6 HOURS PRN
Status: DISCONTINUED | OUTPATIENT
Start: 2023-01-01 | End: 2023-01-01 | Stop reason: HOSPADM

## 2023-01-01 RX ORDER — LORAZEPAM 2 MG/ML
0.25 INJECTION INTRAMUSCULAR
Status: DISCONTINUED | OUTPATIENT
Start: 2023-01-01 | End: 2023-01-01 | Stop reason: HOSPADM

## 2023-01-01 RX ORDER — CEFTRIAXONE 1 G/1
1 INJECTION, POWDER, FOR SOLUTION INTRAMUSCULAR; INTRAVENOUS EVERY 24 HOURS
Status: DISCONTINUED | OUTPATIENT
Start: 2023-01-01 | End: 2023-01-01

## 2023-01-01 RX ORDER — DEXTROSE MONOHYDRATE 25 G/50ML
25-50 INJECTION, SOLUTION INTRAVENOUS
Status: DISCONTINUED | OUTPATIENT
Start: 2023-01-01 | End: 2023-01-01 | Stop reason: HOSPADM

## 2023-01-01 RX ORDER — METOPROLOL TARTRATE 1 MG/ML
2.5 INJECTION, SOLUTION INTRAVENOUS EVERY 8 HOURS
Status: DISCONTINUED | OUTPATIENT
Start: 2023-01-01 | End: 2023-01-01

## 2023-01-01 RX ORDER — NALOXONE HYDROCHLORIDE 0.4 MG/ML
0.2 INJECTION, SOLUTION INTRAMUSCULAR; INTRAVENOUS; SUBCUTANEOUS
Status: DISCONTINUED | OUTPATIENT
Start: 2023-01-01 | End: 2023-01-01 | Stop reason: HOSPADM

## 2023-01-01 RX ORDER — CARVEDILOL 6.25 MG/1
6.25 TABLET ORAL 2 TIMES DAILY WITH MEALS
Qty: 180 TABLET | Refills: 3 | Status: SHIPPED | OUTPATIENT
Start: 2023-01-01

## 2023-01-01 RX ORDER — ENOXAPARIN SODIUM 100 MG/ML
40 INJECTION SUBCUTANEOUS EVERY 24 HOURS
Status: DISCONTINUED | OUTPATIENT
Start: 2023-01-01 | End: 2023-01-01

## 2023-01-01 RX ORDER — LIDOCAINE 40 MG/G
CREAM TOPICAL
Status: DISCONTINUED | OUTPATIENT
Start: 2023-01-01 | End: 2023-01-01 | Stop reason: HOSPADM

## 2023-01-01 RX ORDER — HYDROMORPHONE HCL IN WATER/PF 6 MG/30 ML
0.2 PATIENT CONTROLLED ANALGESIA SYRINGE INTRAVENOUS
Status: DISCONTINUED | OUTPATIENT
Start: 2023-01-01 | End: 2023-01-01 | Stop reason: HOSPADM

## 2023-01-01 RX ORDER — PROCHLORPERAZINE MALEATE 5 MG
5 TABLET ORAL EVERY 6 HOURS PRN
Status: DISCONTINUED | OUTPATIENT
Start: 2023-01-01 | End: 2023-01-01 | Stop reason: HOSPADM

## 2023-01-01 RX ORDER — DOXYCYCLINE 100 MG/10ML
100 INJECTION, POWDER, LYOPHILIZED, FOR SOLUTION INTRAVENOUS EVERY 12 HOURS
Status: DISCONTINUED | OUTPATIENT
Start: 2023-01-01 | End: 2023-01-01

## 2023-01-01 RX ORDER — DOXYCYCLINE 100 MG/10ML
100 INJECTION, POWDER, LYOPHILIZED, FOR SOLUTION INTRAVENOUS ONCE
Status: DISCONTINUED | OUTPATIENT
Start: 2023-01-01 | End: 2023-01-01

## 2023-01-01 RX ORDER — CEFEPIME HYDROCHLORIDE 2 G/1
2 INJECTION, POWDER, FOR SOLUTION INTRAVENOUS EVERY 24 HOURS
Status: DISCONTINUED | OUTPATIENT
Start: 2023-01-01 | End: 2023-01-01

## 2023-01-01 RX ORDER — POTASSIUM CHLORIDE 1500 MG/1
20 TABLET, EXTENDED RELEASE ORAL ONCE
Status: COMPLETED | OUTPATIENT
Start: 2023-01-01 | End: 2023-01-01

## 2023-01-01 RX ORDER — FUROSEMIDE 10 MG/ML
INJECTION INTRAMUSCULAR; INTRAVENOUS
Status: COMPLETED
Start: 2023-01-01 | End: 2023-01-01

## 2023-01-01 RX ORDER — POTASSIUM CHLORIDE 1500 MG/1
40 TABLET, EXTENDED RELEASE ORAL ONCE
Status: COMPLETED | OUTPATIENT
Start: 2023-01-01 | End: 2023-01-01

## 2023-01-01 RX ORDER — DEXTROSE MONOHYDRATE 100 MG/ML
INJECTION, SOLUTION INTRAVENOUS CONTINUOUS PRN
Status: DISCONTINUED | OUTPATIENT
Start: 2023-01-01 | End: 2023-01-01

## 2023-01-01 RX ORDER — PROCHLORPERAZINE 25 MG
12.5 SUPPOSITORY, RECTAL RECTAL EVERY 12 HOURS PRN
Status: DISCONTINUED | OUTPATIENT
Start: 2023-01-01 | End: 2023-01-01 | Stop reason: HOSPADM

## 2023-01-01 RX ORDER — IPRATROPIUM BROMIDE AND ALBUTEROL SULFATE 2.5; .5 MG/3ML; MG/3ML
3 SOLUTION RESPIRATORY (INHALATION)
Status: DISCONTINUED | OUTPATIENT
Start: 2023-01-01 | End: 2023-01-01

## 2023-01-01 RX ORDER — NITROGLYCERIN 0.4 MG/1
0.4 TABLET SUBLINGUAL EVERY 5 MIN PRN
Status: DISCONTINUED | OUTPATIENT
Start: 2023-01-01 | End: 2023-01-01 | Stop reason: HOSPADM

## 2023-01-01 RX ORDER — SENNOSIDES 8.6 MG
1 TABLET ORAL 2 TIMES DAILY
Status: DISCONTINUED | OUTPATIENT
Start: 2023-01-01 | End: 2023-01-01

## 2023-01-01 RX ORDER — ALLOPURINOL 100 MG/1
100 TABLET ORAL DAILY
Status: DISCONTINUED | OUTPATIENT
Start: 2023-01-01 | End: 2023-01-01

## 2023-01-01 RX ORDER — AMOXICILLIN 250 MG
2 CAPSULE ORAL 2 TIMES DAILY PRN
Status: DISCONTINUED | OUTPATIENT
Start: 2023-01-01 | End: 2023-01-01

## 2023-01-01 RX ORDER — SALIVA STIMULANT COMB. NO.3
2 SPRAY, NON-AEROSOL (ML) MUCOUS MEMBRANE
Status: DISCONTINUED | OUTPATIENT
Start: 2023-01-01 | End: 2023-01-01 | Stop reason: HOSPADM

## 2023-01-01 RX ORDER — CEFTRIAXONE 1 G/1
1 INJECTION, POWDER, FOR SOLUTION INTRAMUSCULAR; INTRAVENOUS ONCE
Status: COMPLETED | OUTPATIENT
Start: 2023-01-01 | End: 2023-01-01

## 2023-01-01 RX ORDER — METOPROLOL TARTRATE 1 MG/ML
INJECTION, SOLUTION INTRAVENOUS
Status: COMPLETED
Start: 2023-01-01 | End: 2023-01-01

## 2023-01-01 RX ORDER — NICOTINE POLACRILEX 4 MG
15-30 LOZENGE BUCCAL
Status: DISCONTINUED | OUTPATIENT
Start: 2023-01-01 | End: 2023-01-01

## 2023-01-01 RX ORDER — PREDNISONE 20 MG/1
40 TABLET ORAL DAILY
Status: DISCONTINUED | OUTPATIENT
Start: 2023-01-01 | End: 2023-01-01

## 2023-01-01 RX ORDER — AMLODIPINE BESYLATE 2.5 MG/1
2.5 TABLET ORAL DAILY
Status: DISCONTINUED | OUTPATIENT
Start: 2023-01-01 | End: 2023-01-01

## 2023-01-01 RX ORDER — LIDOCAINE 40 MG/G
CREAM TOPICAL
Status: DISCONTINUED | OUTPATIENT
Start: 2023-01-01 | End: 2023-01-01

## 2023-01-01 RX ORDER — FUROSEMIDE 10 MG/ML
20 INJECTION INTRAMUSCULAR; INTRAVENOUS EVERY 12 HOURS
Status: DISCONTINUED | OUTPATIENT
Start: 2023-01-01 | End: 2023-01-01

## 2023-01-01 RX ORDER — SODIUM CHLORIDE 9 MG/ML
INJECTION, SOLUTION INTRAVENOUS CONTINUOUS
Status: DISCONTINUED | OUTPATIENT
Start: 2023-01-01 | End: 2023-01-01

## 2023-01-01 RX ORDER — FUROSEMIDE 10 MG/ML
20 INJECTION INTRAMUSCULAR; INTRAVENOUS DAILY
Status: DISCONTINUED | OUTPATIENT
Start: 2023-01-01 | End: 2023-01-01

## 2023-01-01 RX ORDER — LORAZEPAM 0.5 MG/1
0.25 TABLET ORAL
Status: DISCONTINUED | OUTPATIENT
Start: 2023-01-01 | End: 2023-01-01 | Stop reason: HOSPADM

## 2023-01-01 RX ORDER — METHYLPREDNISOLONE SODIUM SUCCINATE 125 MG/2ML
50 INJECTION, POWDER, LYOPHILIZED, FOR SOLUTION INTRAMUSCULAR; INTRAVENOUS EVERY 24 HOURS
Status: DISCONTINUED | OUTPATIENT
Start: 2023-01-01 | End: 2023-01-01

## 2023-01-01 RX ORDER — ISOSORBIDE MONONITRATE 30 MG/1
30 TABLET, EXTENDED RELEASE ORAL DAILY
Qty: 90 TABLET | Refills: 3 | Status: SHIPPED | OUTPATIENT
Start: 2023-01-01

## 2023-01-01 RX ORDER — HYDROMORPHONE HYDROCHLORIDE 1 MG/ML
INJECTION, SOLUTION INTRAMUSCULAR; INTRAVENOUS; SUBCUTANEOUS
Status: COMPLETED
Start: 2023-01-01 | End: 2023-01-01

## 2023-01-01 RX ORDER — DEXTROSE MONOHYDRATE, SODIUM CHLORIDE, AND POTASSIUM CHLORIDE 50; .745; 4.5 G/1000ML; G/1000ML; G/1000ML
INJECTION, SOLUTION INTRAVENOUS CONTINUOUS
Status: DISCONTINUED | OUTPATIENT
Start: 2023-01-01 | End: 2023-01-01

## 2023-01-01 RX ORDER — CEFUROXIME AXETIL 500 MG/1
500 TABLET ORAL EVERY 12 HOURS SCHEDULED
Status: DISCONTINUED | OUTPATIENT
Start: 2023-01-01 | End: 2023-01-01

## 2023-01-01 RX ORDER — SENNOSIDES A AND B 8.6 MG/1
1 TABLET, FILM COATED ORAL 2 TIMES DAILY
COMMUNITY

## 2023-01-01 RX ORDER — ONDANSETRON 4 MG/1
4 TABLET, ORALLY DISINTEGRATING ORAL EVERY 6 HOURS PRN
Status: DISCONTINUED | OUTPATIENT
Start: 2023-01-01 | End: 2023-01-01 | Stop reason: HOSPADM

## 2023-01-01 RX ORDER — POLYETHYLENE GLYCOL 3350 17 G/17G
17 POWDER, FOR SOLUTION ORAL DAILY
Status: DISCONTINUED | OUTPATIENT
Start: 2023-01-01 | End: 2023-01-01

## 2023-01-01 RX ORDER — METOPROLOL TARTRATE 1 MG/ML
2.5 INJECTION, SOLUTION INTRAVENOUS EVERY 5 MIN PRN
Status: DISCONTINUED | OUTPATIENT
Start: 2023-01-01 | End: 2023-01-01 | Stop reason: HOSPADM

## 2023-01-01 RX ORDER — IOPAMIDOL 755 MG/ML
52 INJECTION, SOLUTION INTRAVASCULAR ONCE
Status: COMPLETED | OUTPATIENT
Start: 2023-01-01 | End: 2023-01-01

## 2023-01-01 RX ORDER — ONDANSETRON 2 MG/ML
4 INJECTION INTRAMUSCULAR; INTRAVENOUS EVERY 6 HOURS PRN
Status: DISCONTINUED | OUTPATIENT
Start: 2023-01-01 | End: 2023-01-01 | Stop reason: HOSPADM

## 2023-01-01 RX ORDER — ALBUTEROL SULFATE 0.83 MG/ML
2.5 SOLUTION RESPIRATORY (INHALATION) 4 TIMES DAILY
Status: DISCONTINUED | OUTPATIENT
Start: 2023-01-01 | End: 2023-01-01

## 2023-01-01 RX ORDER — PANTOPRAZOLE SODIUM 40 MG/1
40 TABLET, DELAYED RELEASE ORAL
Status: DISCONTINUED | OUTPATIENT
Start: 2023-01-01 | End: 2023-01-01

## 2023-01-01 RX ORDER — ASPIRIN 81 MG/1
81 TABLET ORAL DAILY
Status: DISCONTINUED | OUTPATIENT
Start: 2023-01-01 | End: 2023-01-01

## 2023-01-01 RX ORDER — ACETYLCYSTEINE 200 MG/ML
2 SOLUTION ORAL; RESPIRATORY (INHALATION) 4 TIMES DAILY
Status: DISCONTINUED | OUTPATIENT
Start: 2023-01-01 | End: 2023-01-01

## 2023-01-01 RX ORDER — VANCOMYCIN HYDROCHLORIDE 1 G/200ML
1000 INJECTION, SOLUTION INTRAVENOUS ONCE
Status: COMPLETED | OUTPATIENT
Start: 2023-01-01 | End: 2023-01-01

## 2023-01-01 RX ORDER — DEXTROSE MONOHYDRATE 25 G/50ML
25-50 INJECTION, SOLUTION INTRAVENOUS
Status: DISCONTINUED | OUTPATIENT
Start: 2023-01-01 | End: 2023-01-01

## 2023-01-01 RX ORDER — CARBOXYMETHYLCELLULOSE SODIUM 5 MG/ML
1 SOLUTION/ DROPS OPHTHALMIC
Status: DISCONTINUED | OUTPATIENT
Start: 2023-01-01 | End: 2023-01-01 | Stop reason: HOSPADM

## 2023-01-01 RX ORDER — PREDNISONE 10 MG/1
TABLET ORAL
Qty: 31 TABLET | Refills: 0 | Status: SHIPPED | OUTPATIENT
Start: 2023-01-01 | End: 2023-01-01

## 2023-01-01 RX ORDER — AMOXICILLIN 250 MG
1 CAPSULE ORAL 2 TIMES DAILY PRN
Status: DISCONTINUED | OUTPATIENT
Start: 2023-01-01 | End: 2023-01-01

## 2023-01-01 RX ORDER — FUROSEMIDE 20 MG
20 TABLET ORAL DAILY
Status: DISCONTINUED | OUTPATIENT
Start: 2023-01-01 | End: 2023-01-01

## 2023-01-01 RX ORDER — BUDESONIDE 0.5 MG/2ML
0.5 INHALANT ORAL 2 TIMES DAILY
Status: DISCONTINUED | OUTPATIENT
Start: 2023-01-01 | End: 2023-01-01

## 2023-01-01 RX ORDER — CEFTRIAXONE 2 G/1
2 INJECTION, POWDER, FOR SOLUTION INTRAMUSCULAR; INTRAVENOUS EVERY 24 HOURS
Status: DISCONTINUED | OUTPATIENT
Start: 2023-01-01 | End: 2023-01-01

## 2023-01-01 RX ORDER — NICOTINE POLACRILEX 4 MG
15-30 LOZENGE BUCCAL
Status: DISCONTINUED | OUTPATIENT
Start: 2023-01-01 | End: 2023-01-01 | Stop reason: HOSPADM

## 2023-01-01 RX ORDER — NITROGLYCERIN 0.4 MG/1
0.4 TABLET SUBLINGUAL EVERY 5 MIN PRN
Status: DISCONTINUED | OUTPATIENT
Start: 2023-01-01 | End: 2023-01-01

## 2023-01-01 RX ORDER — HYDROMORPHONE HYDROCHLORIDE 1 MG/ML
0.5 INJECTION, SOLUTION INTRAMUSCULAR; INTRAVENOUS; SUBCUTANEOUS ONCE
Status: COMPLETED | OUTPATIENT
Start: 2023-01-01 | End: 2023-01-01

## 2023-01-01 RX ORDER — ACETAMINOPHEN 325 MG/1
650 TABLET ORAL EVERY 6 HOURS PRN
Status: DISCONTINUED | OUTPATIENT
Start: 2023-01-01 | End: 2023-01-01

## 2023-01-01 RX ORDER — ALLOPURINOL 100 MG/1
100 TABLET ORAL DAILY
COMMUNITY

## 2023-01-01 RX ORDER — DOXYCYCLINE 100 MG/1
100 CAPSULE ORAL EVERY 12 HOURS SCHEDULED
Status: DISCONTINUED | OUTPATIENT
Start: 2023-01-01 | End: 2023-01-01

## 2023-01-01 RX ORDER — IOPAMIDOL 755 MG/ML
55 INJECTION, SOLUTION INTRAVASCULAR ONCE
Status: DISCONTINUED | OUTPATIENT
Start: 2023-01-01 | End: 2023-01-01

## 2023-01-01 RX ORDER — ISOSORBIDE MONONITRATE 30 MG/1
30 TABLET, EXTENDED RELEASE ORAL DAILY
Status: DISCONTINUED | OUTPATIENT
Start: 2023-01-01 | End: 2023-01-01

## 2023-01-01 RX ADMIN — ACETYLCYSTEINE 2 ML: 200 SOLUTION ORAL; RESPIRATORY (INHALATION) at 11:36

## 2023-01-01 RX ADMIN — DOXYCYCLINE HYCLATE 100 MG: 100 CAPSULE ORAL at 08:32

## 2023-01-01 RX ADMIN — CARVEDILOL 6.25 MG: 6.25 TABLET, FILM COATED ORAL at 08:39

## 2023-01-01 RX ADMIN — ALLOPURINOL 100 MG: 100 TABLET ORAL at 09:07

## 2023-01-01 RX ADMIN — FUROSEMIDE 20 MG: 20 TABLET ORAL at 10:02

## 2023-01-01 RX ADMIN — ACETYLCYSTEINE 2 ML: 200 SOLUTION ORAL; RESPIRATORY (INHALATION) at 08:21

## 2023-01-01 RX ADMIN — HYDROMORPHONE HYDROCHLORIDE 0.4 MG: 0.2 INJECTION, SOLUTION INTRAMUSCULAR; INTRAVENOUS; SUBCUTANEOUS at 12:12

## 2023-01-01 RX ADMIN — ACETYLCYSTEINE 2 ML: 200 SOLUTION ORAL; RESPIRATORY (INHALATION) at 15:20

## 2023-01-01 RX ADMIN — IPRATROPIUM BROMIDE AND ALBUTEROL SULFATE 3 ML: .5; 3 SOLUTION RESPIRATORY (INHALATION) at 12:15

## 2023-01-01 RX ADMIN — METOPROLOL TARTRATE 2.5 MG: 5 INJECTION INTRAVENOUS at 14:01

## 2023-01-01 RX ADMIN — CEFUROXIME AXETIL 500 MG: 500 TABLET ORAL at 08:29

## 2023-01-01 RX ADMIN — ALLOPURINOL 100 MG: 100 TABLET ORAL at 09:23

## 2023-01-01 RX ADMIN — CEFTRIAXONE SODIUM 1 G: 1 INJECTION, POWDER, FOR SOLUTION INTRAMUSCULAR; INTRAVENOUS at 21:58

## 2023-01-01 RX ADMIN — ALBUTEROL SULFATE 2.5 MG: 2.5 SOLUTION RESPIRATORY (INHALATION) at 07:33

## 2023-01-01 RX ADMIN — IPRATROPIUM BROMIDE AND ALBUTEROL SULFATE 3 ML: .5; 3 SOLUTION RESPIRATORY (INHALATION) at 21:39

## 2023-01-01 RX ADMIN — FUROSEMIDE 20 MG: 20 TABLET ORAL at 15:36

## 2023-01-01 RX ADMIN — ACETYLCYSTEINE 2 ML: 200 SOLUTION ORAL; RESPIRATORY (INHALATION) at 11:08

## 2023-01-01 RX ADMIN — IPRATROPIUM BROMIDE AND ALBUTEROL SULFATE 3 ML: .5; 3 SOLUTION RESPIRATORY (INHALATION) at 19:55

## 2023-01-01 RX ADMIN — IPRATROPIUM BROMIDE AND ALBUTEROL SULFATE 3 ML: .5; 3 SOLUTION RESPIRATORY (INHALATION) at 20:13

## 2023-01-01 RX ADMIN — DOXYCYCLINE HYCLATE 100 MG: 100 CAPSULE ORAL at 08:29

## 2023-01-01 RX ADMIN — ACETAMINOPHEN 650 MG: 325 TABLET, FILM COATED ORAL at 22:01

## 2023-01-01 RX ADMIN — ENOXAPARIN SODIUM 40 MG: 40 INJECTION SUBCUTANEOUS at 14:51

## 2023-01-01 RX ADMIN — ALBUTEROL SULFATE 2.5 MG: 2.5 SOLUTION RESPIRATORY (INHALATION) at 15:23

## 2023-01-01 RX ADMIN — ACETYLCYSTEINE 2 ML: 200 SOLUTION ORAL; RESPIRATORY (INHALATION) at 10:58

## 2023-01-01 RX ADMIN — ACETYLCYSTEINE 2 ML: 200 SOLUTION ORAL; RESPIRATORY (INHALATION) at 07:35

## 2023-01-01 RX ADMIN — AMLODIPINE BESYLATE 2.5 MG: 2.5 TABLET ORAL at 10:02

## 2023-01-01 RX ADMIN — FUROSEMIDE 20 MG: 10 INJECTION, SOLUTION INTRAMUSCULAR; INTRAVENOUS at 21:16

## 2023-01-01 RX ADMIN — PANTOPRAZOLE SODIUM 40 MG: 40 TABLET, DELAYED RELEASE ORAL at 08:31

## 2023-01-01 RX ADMIN — ACETYLCYSTEINE 2 ML: 200 SOLUTION ORAL; RESPIRATORY (INHALATION) at 11:11

## 2023-01-01 RX ADMIN — IPRATROPIUM BROMIDE AND ALBUTEROL SULFATE 3 ML: .5; 3 SOLUTION RESPIRATORY (INHALATION) at 10:57

## 2023-01-01 RX ADMIN — SODIUM CHLORIDE, POTASSIUM CHLORIDE, SODIUM LACTATE AND CALCIUM CHLORIDE 250 ML: 600; 310; 30; 20 INJECTION, SOLUTION INTRAVENOUS at 18:28

## 2023-01-01 RX ADMIN — FUROSEMIDE 20 MG: 20 TABLET ORAL at 09:52

## 2023-01-01 RX ADMIN — ASPIRIN 81 MG: 81 TABLET, COATED ORAL at 08:39

## 2023-01-01 RX ADMIN — CARVEDILOL 6.25 MG: 6.25 TABLET, FILM COATED ORAL at 08:31

## 2023-01-01 RX ADMIN — AMLODIPINE BESYLATE 2.5 MG: 2.5 TABLET ORAL at 08:37

## 2023-01-01 RX ADMIN — PANTOPRAZOLE SODIUM 40 MG: 40 INJECTION, POWDER, FOR SOLUTION INTRAVENOUS at 20:29

## 2023-01-01 RX ADMIN — CARVEDILOL 6.25 MG: 6.25 TABLET, FILM COATED ORAL at 18:24

## 2023-01-01 RX ADMIN — ACETYLCYSTEINE 2 ML: 200 SOLUTION ORAL; RESPIRATORY (INHALATION) at 07:54

## 2023-01-01 RX ADMIN — ACETYLCYSTEINE 2 ML: 200 SOLUTION ORAL; RESPIRATORY (INHALATION) at 12:18

## 2023-01-01 RX ADMIN — POTASSIUM CHLORIDE 20 MEQ: 1500 TABLET, EXTENDED RELEASE ORAL at 08:12

## 2023-01-01 RX ADMIN — BUDESONIDE 0.5 MG: 0.5 INHALANT RESPIRATORY (INHALATION) at 07:29

## 2023-01-01 RX ADMIN — FUROSEMIDE 40 MG: 10 INJECTION, SOLUTION INTRAMUSCULAR; INTRAVENOUS at 03:30

## 2023-01-01 RX ADMIN — CEFEPIME HYDROCHLORIDE 2 G: 2 INJECTION, POWDER, FOR SOLUTION INTRAVENOUS at 06:00

## 2023-01-01 RX ADMIN — CARVEDILOL 6.25 MG: 6.25 TABLET, FILM COATED ORAL at 18:28

## 2023-01-01 RX ADMIN — SENNOSIDES 1 TABLET: 8.6 TABLET, FILM COATED ORAL at 19:36

## 2023-01-01 RX ADMIN — HYDROMORPHONE HYDROCHLORIDE 0.4 MG: 0.2 INJECTION, SOLUTION INTRAMUSCULAR; INTRAVENOUS; SUBCUTANEOUS at 21:38

## 2023-01-01 RX ADMIN — IPRATROPIUM BROMIDE AND ALBUTEROL SULFATE 3 ML: .5; 3 SOLUTION RESPIRATORY (INHALATION) at 07:51

## 2023-01-01 RX ADMIN — HYDROMORPHONE HYDROCHLORIDE 0.4 MG: 0.2 INJECTION, SOLUTION INTRAMUSCULAR; INTRAVENOUS; SUBCUTANEOUS at 13:03

## 2023-01-01 RX ADMIN — ASPIRIN 81 MG: 81 TABLET, COATED ORAL at 08:58

## 2023-01-01 RX ADMIN — ACETYLCYSTEINE 2 ML: 200 SOLUTION ORAL; RESPIRATORY (INHALATION) at 07:46

## 2023-01-01 RX ADMIN — ACETYLCYSTEINE 2 ML: 200 SOLUTION ORAL; RESPIRATORY (INHALATION) at 19:12

## 2023-01-01 RX ADMIN — ASPIRIN 81 MG: 81 TABLET, COATED ORAL at 09:03

## 2023-01-01 RX ADMIN — IPRATROPIUM BROMIDE AND ALBUTEROL SULFATE 3 ML: .5; 3 SOLUTION RESPIRATORY (INHALATION) at 11:10

## 2023-01-01 RX ADMIN — SODIUM CHLORIDE 250 MG: 9 INJECTION, SOLUTION INTRAVENOUS at 22:22

## 2023-01-01 RX ADMIN — SODIUM CHLORIDE: 9 INJECTION, SOLUTION INTRAVENOUS at 23:35

## 2023-01-01 RX ADMIN — ASPIRIN 81 MG: 81 TABLET, COATED ORAL at 08:12

## 2023-01-01 RX ADMIN — ASPIRIN 81 MG: 81 TABLET, COATED ORAL at 09:07

## 2023-01-01 RX ADMIN — SENNOSIDES 1 TABLET: 8.6 TABLET, FILM COATED ORAL at 19:42

## 2023-01-01 RX ADMIN — DOXYCYCLINE HYCLATE 100 MG: 100 CAPSULE ORAL at 21:16

## 2023-01-01 RX ADMIN — DOXYCYCLINE 100 MG: 100 INJECTION, POWDER, LYOPHILIZED, FOR SOLUTION INTRAVENOUS at 12:27

## 2023-01-01 RX ADMIN — ACETAMINOPHEN 650 MG: 325 TABLET, FILM COATED ORAL at 19:36

## 2023-01-01 RX ADMIN — AMLODIPINE BESYLATE 2.5 MG: 2.5 TABLET ORAL at 09:56

## 2023-01-01 RX ADMIN — BUDESONIDE 0.5 MG: 0.5 INHALANT RESPIRATORY (INHALATION) at 08:36

## 2023-01-01 RX ADMIN — FUROSEMIDE 20 MG: 10 INJECTION, SOLUTION INTRAMUSCULAR; INTRAVENOUS at 21:15

## 2023-01-01 RX ADMIN — DOXYCYCLINE HYCLATE 100 MG: 100 CAPSULE ORAL at 19:55

## 2023-01-01 RX ADMIN — ACETYLCYSTEINE 2 ML: 200 SOLUTION ORAL; RESPIRATORY (INHALATION) at 19:27

## 2023-01-01 RX ADMIN — HYDROMORPHONE HYDROCHLORIDE 0.4 MG: 0.2 INJECTION, SOLUTION INTRAMUSCULAR; INTRAVENOUS; SUBCUTANEOUS at 16:22

## 2023-01-01 RX ADMIN — BUDESONIDE 0.5 MG: 0.5 INHALANT RESPIRATORY (INHALATION) at 07:51

## 2023-01-01 RX ADMIN — ALLOPURINOL 100 MG: 100 TABLET ORAL at 08:39

## 2023-01-01 RX ADMIN — BUDESONIDE 0.5 MG: 0.5 INHALANT RESPIRATORY (INHALATION) at 07:25

## 2023-01-01 RX ADMIN — ALLOPURINOL 100 MG: 100 TABLET ORAL at 08:58

## 2023-01-01 RX ADMIN — HYDROMORPHONE HYDROCHLORIDE 0.4 MG: 0.2 INJECTION, SOLUTION INTRAMUSCULAR; INTRAVENOUS; SUBCUTANEOUS at 08:26

## 2023-01-01 RX ADMIN — ALBUTEROL SULFATE 2.5 MG: 2.5 SOLUTION RESPIRATORY (INHALATION) at 07:35

## 2023-01-01 RX ADMIN — ACETYLCYSTEINE 2 ML: 200 SOLUTION ORAL; RESPIRATORY (INHALATION) at 20:09

## 2023-01-01 RX ADMIN — CEFEPIME HYDROCHLORIDE 2 G: 2 INJECTION, POWDER, FOR SOLUTION INTRAVENOUS at 15:36

## 2023-01-01 RX ADMIN — FUROSEMIDE 40 MG: 10 INJECTION, SOLUTION INTRAMUSCULAR; INTRAVENOUS at 13:42

## 2023-01-01 RX ADMIN — CARVEDILOL 6.25 MG: 6.25 TABLET, FILM COATED ORAL at 19:00

## 2023-01-01 RX ADMIN — CEFEPIME HYDROCHLORIDE 2 G: 2 INJECTION, POWDER, FOR SOLUTION INTRAVENOUS at 05:49

## 2023-01-01 RX ADMIN — ISOSORBIDE MONONITRATE 30 MG: 30 TABLET, EXTENDED RELEASE ORAL at 10:02

## 2023-01-01 RX ADMIN — PANTOPRAZOLE SODIUM 40 MG: 40 INJECTION, POWDER, FOR SOLUTION INTRAVENOUS at 21:24

## 2023-01-01 RX ADMIN — POLYETHYLENE GLYCOL 3350 17 G: 17 POWDER, FOR SOLUTION ORAL at 08:00

## 2023-01-01 RX ADMIN — ENOXAPARIN SODIUM 30 MG: 30 INJECTION SUBCUTANEOUS at 16:25

## 2023-01-01 RX ADMIN — ENOXAPARIN SODIUM 40 MG: 40 INJECTION SUBCUTANEOUS at 15:05

## 2023-01-01 RX ADMIN — SENNOSIDES 1 TABLET: 8.6 TABLET, FILM COATED ORAL at 10:02

## 2023-01-01 RX ADMIN — CARVEDILOL 6.25 MG: 6.25 TABLET, FILM COATED ORAL at 19:33

## 2023-01-01 RX ADMIN — ALLOPURINOL 100 MG: 100 TABLET ORAL at 09:52

## 2023-01-01 RX ADMIN — HUMAN ALBUMIN MICROSPHERES AND PERFLUTREN 9 ML: 10; .22 INJECTION, SOLUTION INTRAVENOUS at 10:57

## 2023-01-01 RX ADMIN — HYDROMORPHONE HYDROCHLORIDE 0.4 MG: 0.2 INJECTION, SOLUTION INTRAMUSCULAR; INTRAVENOUS; SUBCUTANEOUS at 05:54

## 2023-01-01 RX ADMIN — IPRATROPIUM BROMIDE AND ALBUTEROL SULFATE 3 ML: .5; 3 SOLUTION RESPIRATORY (INHALATION) at 15:19

## 2023-01-01 RX ADMIN — CEFEPIME HYDROCHLORIDE 2 G: 2 INJECTION, POWDER, FOR SOLUTION INTRAVENOUS at 18:50

## 2023-01-01 RX ADMIN — IPRATROPIUM BROMIDE AND ALBUTEROL SULFATE 3 ML: .5; 3 SOLUTION RESPIRATORY (INHALATION) at 14:55

## 2023-01-01 RX ADMIN — IPRATROPIUM BROMIDE AND ALBUTEROL SULFATE 3 ML: .5; 3 SOLUTION RESPIRATORY (INHALATION) at 19:24

## 2023-01-01 RX ADMIN — DOXYCYCLINE 100 MG: 100 INJECTION, POWDER, LYOPHILIZED, FOR SOLUTION INTRAVENOUS at 00:15

## 2023-01-01 RX ADMIN — ACETYLCYSTEINE 2 ML: 200 SOLUTION ORAL; RESPIRATORY (INHALATION) at 08:36

## 2023-01-01 RX ADMIN — PANTOPRAZOLE SODIUM 40 MG: 40 TABLET, DELAYED RELEASE ORAL at 09:24

## 2023-01-01 RX ADMIN — CEFEPIME HYDROCHLORIDE 2 G: 2 INJECTION, POWDER, FOR SOLUTION INTRAVENOUS at 05:34

## 2023-01-01 RX ADMIN — SENNOSIDES 1 TABLET: 8.6 TABLET, FILM COATED ORAL at 19:48

## 2023-01-01 RX ADMIN — IPRATROPIUM BROMIDE AND ALBUTEROL SULFATE 3 ML: .5; 3 SOLUTION RESPIRATORY (INHALATION) at 19:11

## 2023-01-01 RX ADMIN — METHYLPREDNISOLONE SODIUM SUCCINATE 62.5 MG: 125 INJECTION, POWDER, FOR SOLUTION INTRAMUSCULAR; INTRAVENOUS at 17:08

## 2023-01-01 RX ADMIN — POTASSIUM & SODIUM PHOSPHATES POWDER PACK 280-160-250 MG 1 PACKET: 280-160-250 PACK at 12:13

## 2023-01-01 RX ADMIN — DOXYCYCLINE HYCLATE 100 MG: 100 CAPSULE ORAL at 10:02

## 2023-01-01 RX ADMIN — DOXYCYCLINE 100 MG: 100 INJECTION, POWDER, LYOPHILIZED, FOR SOLUTION INTRAVENOUS at 11:55

## 2023-01-01 RX ADMIN — ACETYLCYSTEINE 2 ML: 200 SOLUTION ORAL; RESPIRATORY (INHALATION) at 07:33

## 2023-01-01 RX ADMIN — CARVEDILOL 6.25 MG: 6.25 TABLET, FILM COATED ORAL at 09:03

## 2023-01-01 RX ADMIN — HYDROMORPHONE HYDROCHLORIDE 0.2 MG: 0.2 INJECTION, SOLUTION INTRAMUSCULAR; INTRAVENOUS; SUBCUTANEOUS at 17:23

## 2023-01-01 RX ADMIN — PANTOPRAZOLE SODIUM 40 MG: 40 TABLET, DELAYED RELEASE ORAL at 09:03

## 2023-01-01 RX ADMIN — ALBUTEROL SULFATE 2.5 MG: 2.5 SOLUTION RESPIRATORY (INHALATION) at 19:27

## 2023-01-01 RX ADMIN — CEFEPIME HYDROCHLORIDE 2 G: 2 INJECTION, POWDER, FOR SOLUTION INTRAVENOUS at 12:41

## 2023-01-01 RX ADMIN — ISOSORBIDE MONONITRATE 30 MG: 30 TABLET, EXTENDED RELEASE ORAL at 08:39

## 2023-01-01 RX ADMIN — CARVEDILOL 6.25 MG: 6.25 TABLET, FILM COATED ORAL at 18:16

## 2023-01-01 RX ADMIN — METOPROLOL TARTRATE 2.5 MG: 5 INJECTION INTRAVENOUS at 21:23

## 2023-01-01 RX ADMIN — FUROSEMIDE 20 MG: 10 INJECTION, SOLUTION INTRAMUSCULAR; INTRAVENOUS at 18:26

## 2023-01-01 RX ADMIN — ACETYLCYSTEINE 2 ML: 200 SOLUTION ORAL; RESPIRATORY (INHALATION) at 07:29

## 2023-01-01 RX ADMIN — ACETYLCYSTEINE 2 ML: 200 SOLUTION ORAL; RESPIRATORY (INHALATION) at 16:09

## 2023-01-01 RX ADMIN — DOXYCYCLINE HYCLATE 100 MG: 100 CAPSULE ORAL at 21:11

## 2023-01-01 RX ADMIN — SENNOSIDES 1 TABLET: 8.6 TABLET, FILM COATED ORAL at 09:23

## 2023-01-01 RX ADMIN — BUDESONIDE 0.5 MG: 0.5 INHALANT RESPIRATORY (INHALATION) at 19:12

## 2023-01-01 RX ADMIN — DOXYCYCLINE HYCLATE 100 MG: 100 CAPSULE ORAL at 09:51

## 2023-01-01 RX ADMIN — CARVEDILOL 6.25 MG: 6.25 TABLET, FILM COATED ORAL at 08:37

## 2023-01-01 RX ADMIN — POTASSIUM & SODIUM PHOSPHATES POWDER PACK 280-160-250 MG 1 PACKET: 280-160-250 PACK at 17:08

## 2023-01-01 RX ADMIN — ALBUTEROL SULFATE 2.5 MG: 2.5 SOLUTION RESPIRATORY (INHALATION) at 15:46

## 2023-01-01 RX ADMIN — CARVEDILOL 6.25 MG: 6.25 TABLET, FILM COATED ORAL at 18:07

## 2023-01-01 RX ADMIN — ENOXAPARIN SODIUM 30 MG: 30 INJECTION SUBCUTANEOUS at 16:59

## 2023-01-01 RX ADMIN — ALLOPURINOL 100 MG: 100 TABLET ORAL at 08:31

## 2023-01-01 RX ADMIN — IPRATROPIUM BROMIDE AND ALBUTEROL SULFATE 3 ML: .5; 3 SOLUTION RESPIRATORY (INHALATION) at 08:20

## 2023-01-01 RX ADMIN — ASPIRIN 81 MG: 81 TABLET, COATED ORAL at 09:21

## 2023-01-01 RX ADMIN — PREDNISONE 40 MG: 20 TABLET ORAL at 12:50

## 2023-01-01 RX ADMIN — HYDROMORPHONE HYDROCHLORIDE 0.4 MG: 0.2 INJECTION, SOLUTION INTRAMUSCULAR; INTRAVENOUS; SUBCUTANEOUS at 08:39

## 2023-01-01 RX ADMIN — CARVEDILOL 6.25 MG: 6.25 TABLET, FILM COATED ORAL at 09:23

## 2023-01-01 RX ADMIN — METOPROLOL TARTRATE 2.5 MG: 5 INJECTION INTRAVENOUS at 05:30

## 2023-01-01 RX ADMIN — CARVEDILOL 6.25 MG: 6.25 TABLET, FILM COATED ORAL at 17:58

## 2023-01-01 RX ADMIN — CEFEPIME HYDROCHLORIDE 2 G: 2 INJECTION, POWDER, FOR SOLUTION INTRAVENOUS at 18:55

## 2023-01-01 RX ADMIN — PANTOPRAZOLE SODIUM 40 MG: 40 TABLET, DELAYED RELEASE ORAL at 08:57

## 2023-01-01 RX ADMIN — CARVEDILOL 6.25 MG: 6.25 TABLET, FILM COATED ORAL at 17:15

## 2023-01-01 RX ADMIN — IPRATROPIUM BROMIDE AND ALBUTEROL SULFATE 3 ML: .5; 3 SOLUTION RESPIRATORY (INHALATION) at 19:13

## 2023-01-01 RX ADMIN — DOXYCYCLINE HYCLATE 100 MG: 100 CAPSULE ORAL at 09:35

## 2023-01-01 RX ADMIN — ISOSORBIDE MONONITRATE 30 MG: 30 TABLET, EXTENDED RELEASE ORAL at 08:36

## 2023-01-01 RX ADMIN — SENNOSIDES 1 TABLET: 8.6 TABLET, FILM COATED ORAL at 08:12

## 2023-01-01 RX ADMIN — POLYETHYLENE GLYCOL 3350 17 G: 17 POWDER, FOR SOLUTION ORAL at 09:23

## 2023-01-01 RX ADMIN — DOXYCYCLINE HYCLATE 100 MG: 100 CAPSULE ORAL at 19:36

## 2023-01-01 RX ADMIN — SENNOSIDES 1 TABLET: 8.6 TABLET, FILM COATED ORAL at 08:31

## 2023-01-01 RX ADMIN — PANTOPRAZOLE SODIUM 40 MG: 40 INJECTION, POWDER, FOR SOLUTION INTRAVENOUS at 08:34

## 2023-01-01 RX ADMIN — IPRATROPIUM BROMIDE AND ALBUTEROL SULFATE 3 ML: .5; 3 SOLUTION RESPIRATORY (INHALATION) at 11:32

## 2023-01-01 RX ADMIN — ASPIRIN 81 MG: 81 TABLET, COATED ORAL at 08:37

## 2023-01-01 RX ADMIN — ACETYLCYSTEINE 2 ML: 200 SOLUTION ORAL; RESPIRATORY (INHALATION) at 07:51

## 2023-01-01 RX ADMIN — CARVEDILOL 6.25 MG: 6.25 TABLET, FILM COATED ORAL at 10:00

## 2023-01-01 RX ADMIN — ACETAMINOPHEN 650 MG: 325 TABLET, FILM COATED ORAL at 23:25

## 2023-01-01 RX ADMIN — ACETAMINOPHEN 650 MG: 325 TABLET, FILM COATED ORAL at 20:42

## 2023-01-01 RX ADMIN — LIDOCAINE HYDROCHLORIDE ANHYDROUS 1 ML: 10 INJECTION, SOLUTION INFILTRATION at 15:00

## 2023-01-01 RX ADMIN — ALBUTEROL SULFATE 2.5 MG: 2.5 SOLUTION RESPIRATORY (INHALATION) at 11:36

## 2023-01-01 RX ADMIN — CARVEDILOL 6.25 MG: 6.25 TABLET, FILM COATED ORAL at 08:58

## 2023-01-01 RX ADMIN — FUROSEMIDE 20 MG: 10 INJECTION, SOLUTION INTRAMUSCULAR; INTRAVENOUS at 16:43

## 2023-01-01 RX ADMIN — ACETYLCYSTEINE 2 ML: 200 SOLUTION ORAL; RESPIRATORY (INHALATION) at 19:13

## 2023-01-01 RX ADMIN — BUDESONIDE 0.5 MG: 0.5 INHALANT RESPIRATORY (INHALATION) at 20:03

## 2023-01-01 RX ADMIN — PANTOPRAZOLE SODIUM 40 MG: 40 TABLET, DELAYED RELEASE ORAL at 08:29

## 2023-01-01 RX ADMIN — CEFEPIME HYDROCHLORIDE 2 G: 2 INJECTION, POWDER, FOR SOLUTION INTRAVENOUS at 17:15

## 2023-01-01 RX ADMIN — DOXYCYCLINE HYCLATE 100 MG: 100 CAPSULE ORAL at 19:23

## 2023-01-01 RX ADMIN — SODIUM CHLORIDE 250 MG: 9 INJECTION, SOLUTION INTRAVENOUS at 12:46

## 2023-01-01 RX ADMIN — ISOSORBIDE MONONITRATE 30 MG: 30 TABLET, EXTENDED RELEASE ORAL at 08:12

## 2023-01-01 RX ADMIN — ASPIRIN 81 MG: 81 TABLET, COATED ORAL at 08:00

## 2023-01-01 RX ADMIN — ENOXAPARIN SODIUM 30 MG: 30 INJECTION SUBCUTANEOUS at 14:42

## 2023-01-01 RX ADMIN — ALLOPURINOL 100 MG: 100 TABLET ORAL at 08:36

## 2023-01-01 RX ADMIN — FUROSEMIDE 40 MG: 10 INJECTION, SOLUTION INTRAMUSCULAR; INTRAVENOUS at 02:20

## 2023-01-01 RX ADMIN — SODIUM CHLORIDE 250 MG: 9 INJECTION, SOLUTION INTRAVENOUS at 21:40

## 2023-01-01 RX ADMIN — ISOSORBIDE MONONITRATE 30 MG: 30 TABLET, EXTENDED RELEASE ORAL at 09:35

## 2023-01-01 RX ADMIN — ASPIRIN 81 MG: 81 TABLET, COATED ORAL at 09:52

## 2023-01-01 RX ADMIN — FUROSEMIDE 20 MG: 10 INJECTION, SOLUTION INTRAMUSCULAR; INTRAVENOUS at 12:21

## 2023-01-01 RX ADMIN — AMLODIPINE BESYLATE 2.5 MG: 2.5 TABLET ORAL at 09:35

## 2023-01-01 RX ADMIN — ALBUTEROL SULFATE 2.5 MG: 2.5 SOLUTION RESPIRATORY (INHALATION) at 11:08

## 2023-01-01 RX ADMIN — CARVEDILOL 6.25 MG: 6.25 TABLET, FILM COATED ORAL at 08:29

## 2023-01-01 RX ADMIN — POLYETHYLENE GLYCOL 3350 17 G: 17 POWDER, FOR SOLUTION ORAL at 10:02

## 2023-01-01 RX ADMIN — ACETYLCYSTEINE 2 ML: 200 SOLUTION ORAL; RESPIRATORY (INHALATION) at 19:59

## 2023-01-01 RX ADMIN — DOXYCYCLINE 100 MG: 100 INJECTION, POWDER, LYOPHILIZED, FOR SOLUTION INTRAVENOUS at 22:51

## 2023-01-01 RX ADMIN — IPRATROPIUM BROMIDE AND ALBUTEROL SULFATE 3 ML: .5; 3 SOLUTION RESPIRATORY (INHALATION) at 16:26

## 2023-01-01 RX ADMIN — SODIUM CHLORIDE 80 ML: 9 INJECTION, SOLUTION INTRAVENOUS at 21:26

## 2023-01-01 RX ADMIN — SENNOSIDES 1 TABLET: 8.6 TABLET, FILM COATED ORAL at 08:39

## 2023-01-01 RX ADMIN — DOXYCYCLINE 100 MG: 100 INJECTION, POWDER, LYOPHILIZED, FOR SOLUTION INTRAVENOUS at 23:47

## 2023-01-01 RX ADMIN — IPRATROPIUM BROMIDE AND ALBUTEROL SULFATE 3 ML: .5; 3 SOLUTION RESPIRATORY (INHALATION) at 07:54

## 2023-01-01 RX ADMIN — ENOXAPARIN SODIUM 40 MG: 40 INJECTION SUBCUTANEOUS at 13:41

## 2023-01-01 RX ADMIN — CARVEDILOL 6.25 MG: 6.25 TABLET, FILM COATED ORAL at 18:55

## 2023-01-01 RX ADMIN — CEFTRIAXONE SODIUM 1 G: 1 INJECTION, POWDER, FOR SOLUTION INTRAMUSCULAR; INTRAVENOUS at 22:38

## 2023-01-01 RX ADMIN — ACETYLCYSTEINE 2 ML: 200 SOLUTION ORAL; RESPIRATORY (INHALATION) at 19:24

## 2023-01-01 RX ADMIN — ENOXAPARIN SODIUM 30 MG: 30 INJECTION SUBCUTANEOUS at 13:19

## 2023-01-01 RX ADMIN — IPRATROPIUM BROMIDE AND ALBUTEROL SULFATE 3 ML: .5; 3 SOLUTION RESPIRATORY (INHALATION) at 15:20

## 2023-01-01 RX ADMIN — FUROSEMIDE 20 MG: 10 INJECTION, SOLUTION INTRAMUSCULAR; INTRAVENOUS at 11:48

## 2023-01-01 RX ADMIN — IPRATROPIUM BROMIDE AND ALBUTEROL SULFATE 3 ML: .5; 3 SOLUTION RESPIRATORY (INHALATION) at 16:09

## 2023-01-01 RX ADMIN — ENOXAPARIN SODIUM 30 MG: 30 INJECTION SUBCUTANEOUS at 13:35

## 2023-01-01 RX ADMIN — ALLOPURINOL 100 MG: 100 TABLET ORAL at 09:03

## 2023-01-01 RX ADMIN — DOXYCYCLINE HYCLATE 100 MG: 100 CAPSULE ORAL at 08:58

## 2023-01-01 RX ADMIN — SENNOSIDES 1 TABLET: 8.6 TABLET, FILM COATED ORAL at 19:55

## 2023-01-01 RX ADMIN — HYDROMORPHONE HYDROCHLORIDE 0.5 MG: 1 INJECTION, SOLUTION INTRAMUSCULAR; INTRAVENOUS; SUBCUTANEOUS at 11:29

## 2023-01-01 RX ADMIN — SODIUM PHOSPHATE, MONOBASIC, MONOHYDRATE AND SODIUM PHOSPHATE, DIBASIC, ANHYDROUS 9 MMOL: 142; 276 INJECTION, SOLUTION INTRAVENOUS at 10:36

## 2023-01-01 RX ADMIN — ACETYLCYSTEINE 2 ML: 200 SOLUTION ORAL; RESPIRATORY (INHALATION) at 20:21

## 2023-01-01 RX ADMIN — IPRATROPIUM BROMIDE AND ALBUTEROL SULFATE 3 ML: .5; 3 SOLUTION RESPIRATORY (INHALATION) at 20:21

## 2023-01-01 RX ADMIN — DOXYCYCLINE 100 MG: 100 INJECTION, POWDER, LYOPHILIZED, FOR SOLUTION INTRAVENOUS at 12:41

## 2023-01-01 RX ADMIN — ACETYLCYSTEINE 2 ML: 200 SOLUTION ORAL; RESPIRATORY (INHALATION) at 16:14

## 2023-01-01 RX ADMIN — BUDESONIDE 0.5 MG: 0.5 INHALANT RESPIRATORY (INHALATION) at 19:42

## 2023-01-01 RX ADMIN — ACETAMINOPHEN 650 MG: 325 TABLET, FILM COATED ORAL at 11:55

## 2023-01-01 RX ADMIN — AMLODIPINE BESYLATE 2.5 MG: 2.5 TABLET ORAL at 08:39

## 2023-01-01 RX ADMIN — POTASSIUM CHLORIDE 20 MEQ: 1500 TABLET, EXTENDED RELEASE ORAL at 19:35

## 2023-01-01 RX ADMIN — DOXYCYCLINE HYCLATE 100 MG: 100 CAPSULE ORAL at 20:15

## 2023-01-01 RX ADMIN — METOPROLOL TARTRATE 2.5 MG: 5 INJECTION INTRAVENOUS at 18:18

## 2023-01-01 RX ADMIN — ALLOPURINOL 100 MG: 100 TABLET ORAL at 09:35

## 2023-01-01 RX ADMIN — CARVEDILOL 6.25 MG: 6.25 TABLET, FILM COATED ORAL at 20:09

## 2023-01-01 RX ADMIN — ISOSORBIDE MONONITRATE 30 MG: 30 TABLET, EXTENDED RELEASE ORAL at 08:58

## 2023-01-01 RX ADMIN — AMLODIPINE BESYLATE 2.5 MG: 2.5 TABLET ORAL at 09:51

## 2023-01-01 RX ADMIN — DOXYCYCLINE 100 MG: 100 INJECTION, POWDER, LYOPHILIZED, FOR SOLUTION INTRAVENOUS at 12:02

## 2023-01-01 RX ADMIN — FUROSEMIDE 20 MG: 10 INJECTION, SOLUTION INTRAMUSCULAR; INTRAVENOUS at 12:05

## 2023-01-01 RX ADMIN — ACETYLCYSTEINE 2 ML: 200 SOLUTION ORAL; RESPIRATORY (INHALATION) at 19:55

## 2023-01-01 RX ADMIN — CEFTRIAXONE SODIUM 1 G: 1 INJECTION, POWDER, FOR SOLUTION INTRAMUSCULAR; INTRAVENOUS at 21:27

## 2023-01-01 RX ADMIN — ASPIRIN 81 MG: 81 TABLET, COATED ORAL at 08:31

## 2023-01-01 RX ADMIN — CEFTRIAXONE SODIUM 2 G: 2 INJECTION, POWDER, FOR SOLUTION INTRAMUSCULAR; INTRAVENOUS at 22:56

## 2023-01-01 RX ADMIN — BUDESONIDE 0.5 MG: 0.5 INHALANT RESPIRATORY (INHALATION) at 20:21

## 2023-01-01 RX ADMIN — IPRATROPIUM BROMIDE AND ALBUTEROL SULFATE 3 ML: .5; 3 SOLUTION RESPIRATORY (INHALATION) at 11:44

## 2023-01-01 RX ADMIN — ALLOPURINOL 100 MG: 100 TABLET ORAL at 08:12

## 2023-01-01 RX ADMIN — CARVEDILOL 6.25 MG: 6.25 TABLET, FILM COATED ORAL at 18:10

## 2023-01-01 RX ADMIN — Medication 2 SPRAY: at 20:27

## 2023-01-01 RX ADMIN — SENNOSIDES 1 TABLET: 8.6 TABLET, FILM COATED ORAL at 08:00

## 2023-01-01 RX ADMIN — ACETYLCYSTEINE 2 ML: 200 SOLUTION ORAL; RESPIRATORY (INHALATION) at 07:25

## 2023-01-01 RX ADMIN — IPRATROPIUM BROMIDE AND ALBUTEROL SULFATE 3 ML: .5; 3 SOLUTION RESPIRATORY (INHALATION) at 16:10

## 2023-01-01 RX ADMIN — DEXTROSE AND SODIUM CHLORIDE: 5; 900 INJECTION, SOLUTION INTRAVENOUS at 17:08

## 2023-01-01 RX ADMIN — BUDESONIDE 0.5 MG: 0.5 INHALANT RESPIRATORY (INHALATION) at 14:55

## 2023-01-01 RX ADMIN — METOPROLOL TARTRATE 2.5 MG: 5 INJECTION INTRAVENOUS at 05:13

## 2023-01-01 RX ADMIN — CEFEPIME HYDROCHLORIDE 2 G: 2 INJECTION, POWDER, FOR SOLUTION INTRAVENOUS at 19:22

## 2023-01-01 RX ADMIN — PANTOPRAZOLE SODIUM 40 MG: 40 INJECTION, POWDER, FOR SOLUTION INTRAVENOUS at 22:17

## 2023-01-01 RX ADMIN — SODIUM CHLORIDE 250 MG: 9 INJECTION, SOLUTION INTRAVENOUS at 08:35

## 2023-01-01 RX ADMIN — CEFUROXIME AXETIL 500 MG: 500 TABLET ORAL at 09:51

## 2023-01-01 RX ADMIN — AMLODIPINE BESYLATE 2.5 MG: 2.5 TABLET ORAL at 08:57

## 2023-01-01 RX ADMIN — CARVEDILOL 6.25 MG: 6.25 TABLET, FILM COATED ORAL at 09:52

## 2023-01-01 RX ADMIN — IPRATROPIUM BROMIDE AND ALBUTEROL SULFATE 3 ML: .5; 3 SOLUTION RESPIRATORY (INHALATION) at 20:03

## 2023-01-01 RX ADMIN — METHYLPREDNISOLONE SODIUM SUCCINATE 50 MG: 125 INJECTION, POWDER, FOR SOLUTION INTRAMUSCULAR; INTRAVENOUS at 16:56

## 2023-01-01 RX ADMIN — METOPROLOL TARTRATE 2.5 MG: 5 INJECTION INTRAVENOUS at 20:29

## 2023-01-01 RX ADMIN — POLYETHYLENE GLYCOL 3350 17 G: 17 POWDER, FOR SOLUTION ORAL at 12:41

## 2023-01-01 RX ADMIN — IPRATROPIUM BROMIDE AND ALBUTEROL SULFATE 3 ML: .5; 3 SOLUTION RESPIRATORY (INHALATION) at 10:58

## 2023-01-01 RX ADMIN — POTASSIUM CHLORIDE: 2 INJECTION, SOLUTION, CONCENTRATE INTRAVENOUS at 00:42

## 2023-01-01 RX ADMIN — CEFEPIME HYDROCHLORIDE 2 G: 2 INJECTION, POWDER, FOR SOLUTION INTRAVENOUS at 06:14

## 2023-01-01 RX ADMIN — POTASSIUM CHLORIDE 20 MEQ: 1500 TABLET, EXTENDED RELEASE ORAL at 17:58

## 2023-01-01 RX ADMIN — ACETAMINOPHEN 650 MG: 325 TABLET, FILM COATED ORAL at 23:38

## 2023-01-01 RX ADMIN — HYDROMORPHONE HYDROCHLORIDE 0.4 MG: 0.2 INJECTION, SOLUTION INTRAMUSCULAR; INTRAVENOUS; SUBCUTANEOUS at 23:49

## 2023-01-01 RX ADMIN — IPRATROPIUM BROMIDE AND ALBUTEROL SULFATE 3 ML: .5; 3 SOLUTION RESPIRATORY (INHALATION) at 07:25

## 2023-01-01 RX ADMIN — DOXYCYCLINE 100 MG: 100 INJECTION, POWDER, LYOPHILIZED, FOR SOLUTION INTRAVENOUS at 23:35

## 2023-01-01 RX ADMIN — METOPROLOL TARTRATE 2.5 MG: 5 INJECTION INTRAVENOUS at 05:22

## 2023-01-01 RX ADMIN — VANCOMYCIN HYDROCHLORIDE 1000 MG: 1 INJECTION, SOLUTION INTRAVENOUS at 20:08

## 2023-01-01 RX ADMIN — AMLODIPINE BESYLATE 2.5 MG: 2.5 TABLET ORAL at 08:29

## 2023-01-01 RX ADMIN — DOXYCYCLINE HYCLATE 100 MG: 100 CAPSULE ORAL at 08:12

## 2023-01-01 RX ADMIN — METOPROLOL TARTRATE 2.5 MG: 5 INJECTION INTRAVENOUS at 22:16

## 2023-01-01 RX ADMIN — DOXYCYCLINE HYCLATE 100 MG: 100 CAPSULE ORAL at 09:03

## 2023-01-01 RX ADMIN — ASPIRIN 81 MG: 81 TABLET, COATED ORAL at 10:02

## 2023-01-01 RX ADMIN — CEFUROXIME AXETIL 500 MG: 500 TABLET ORAL at 19:55

## 2023-01-01 RX ADMIN — IPRATROPIUM BROMIDE AND ALBUTEROL SULFATE 3 ML: .5; 3 SOLUTION RESPIRATORY (INHALATION) at 19:12

## 2023-01-01 RX ADMIN — CEFUROXIME AXETIL 500 MG: 500 TABLET ORAL at 21:49

## 2023-01-01 RX ADMIN — CEFUROXIME AXETIL 500 MG: 500 TABLET ORAL at 12:21

## 2023-01-01 RX ADMIN — ACETYLCYSTEINE 2 ML: 200 SOLUTION ORAL; RESPIRATORY (INHALATION) at 11:12

## 2023-01-01 RX ADMIN — PANTOPRAZOLE SODIUM 40 MG: 40 TABLET, DELAYED RELEASE ORAL at 08:00

## 2023-01-01 RX ADMIN — BUDESONIDE 0.5 MG: 0.5 INHALANT RESPIRATORY (INHALATION) at 07:45

## 2023-01-01 RX ADMIN — CARVEDILOL 6.25 MG: 6.25 TABLET, FILM COATED ORAL at 08:00

## 2023-01-01 RX ADMIN — SENNOSIDES 1 TABLET: 8.6 TABLET, FILM COATED ORAL at 08:38

## 2023-01-01 RX ADMIN — ALBUTEROL SULFATE 2.5 MG: 2.5 SOLUTION RESPIRATORY (INHALATION) at 19:59

## 2023-01-01 RX ADMIN — SENNOSIDES 1 TABLET: 8.6 TABLET, FILM COATED ORAL at 21:16

## 2023-01-01 RX ADMIN — SODIUM PHOSPHATE, MONOBASIC, MONOHYDRATE AND SODIUM PHOSPHATE, DIBASIC, ANHYDROUS 9 MMOL: 142; 276 INJECTION, SOLUTION INTRAVENOUS at 15:11

## 2023-01-01 RX ADMIN — ACETYLCYSTEINE 2 ML: 200 SOLUTION ORAL; RESPIRATORY (INHALATION) at 15:23

## 2023-01-01 RX ADMIN — METOPROLOL TARTRATE 2.5 MG: 5 INJECTION INTRAVENOUS at 12:38

## 2023-01-01 RX ADMIN — ALLOPURINOL 100 MG: 100 TABLET ORAL at 08:00

## 2023-01-01 RX ADMIN — ACETYLCYSTEINE 2 ML: 200 SOLUTION ORAL; RESPIRATORY (INHALATION) at 19:42

## 2023-01-01 RX ADMIN — CARVEDILOL 6.25 MG: 6.25 TABLET, FILM COATED ORAL at 09:56

## 2023-01-01 RX ADMIN — FUROSEMIDE 20 MG: 20 TABLET ORAL at 09:35

## 2023-01-01 RX ADMIN — ENOXAPARIN SODIUM 30 MG: 30 INJECTION SUBCUTANEOUS at 14:01

## 2023-01-01 RX ADMIN — BUDESONIDE 0.5 MG: 0.5 INHALANT RESPIRATORY (INHALATION) at 19:24

## 2023-01-01 RX ADMIN — IPRATROPIUM BROMIDE AND ALBUTEROL SULFATE 3 ML: .5; 3 SOLUTION RESPIRATORY (INHALATION) at 11:12

## 2023-01-01 RX ADMIN — CARVEDILOL 6.25 MG: 6.25 TABLET, FILM COATED ORAL at 17:18

## 2023-01-01 RX ADMIN — IPRATROPIUM BROMIDE AND ALBUTEROL SULFATE 3 ML: .5; 3 SOLUTION RESPIRATORY (INHALATION) at 07:45

## 2023-01-01 RX ADMIN — SENNOSIDES 1 TABLET: 8.6 TABLET, FILM COATED ORAL at 09:07

## 2023-01-01 RX ADMIN — ACETYLCYSTEINE 2 ML: 200 SOLUTION ORAL; RESPIRATORY (INHALATION) at 16:20

## 2023-01-01 RX ADMIN — ISOSORBIDE MONONITRATE 30 MG: 30 TABLET, EXTENDED RELEASE ORAL at 08:29

## 2023-01-01 RX ADMIN — PANTOPRAZOLE SODIUM 40 MG: 40 INJECTION, POWDER, FOR SOLUTION INTRAVENOUS at 09:29

## 2023-01-01 RX ADMIN — CARVEDILOL 6.25 MG: 6.25 TABLET, FILM COATED ORAL at 09:35

## 2023-01-01 RX ADMIN — SENNOSIDES 1 TABLET: 8.6 TABLET, FILM COATED ORAL at 20:48

## 2023-01-01 RX ADMIN — ASPIRIN 81 MG: 81 TABLET, COATED ORAL at 09:35

## 2023-01-01 RX ADMIN — POTASSIUM CHLORIDE: 2 INJECTION, SOLUTION, CONCENTRATE INTRAVENOUS at 01:15

## 2023-01-01 RX ADMIN — ACETYLCYSTEINE 2 ML: 200 SOLUTION ORAL; RESPIRATORY (INHALATION) at 12:15

## 2023-01-01 RX ADMIN — IPRATROPIUM BROMIDE AND ALBUTEROL SULFATE 3 ML: .5; 3 SOLUTION RESPIRATORY (INHALATION) at 08:36

## 2023-01-01 RX ADMIN — SODIUM CHLORIDE 250 MG: 9 INJECTION, SOLUTION INTRAVENOUS at 09:30

## 2023-01-01 RX ADMIN — ALLOPURINOL 100 MG: 100 TABLET ORAL at 10:02

## 2023-01-01 RX ADMIN — CARVEDILOL 6.25 MG: 6.25 TABLET, FILM COATED ORAL at 09:07

## 2023-01-01 RX ADMIN — ACETYLCYSTEINE 2 ML: 200 SOLUTION ORAL; RESPIRATORY (INHALATION) at 15:46

## 2023-01-01 RX ADMIN — SENNOSIDES 1 TABLET: 8.6 TABLET, FILM COATED ORAL at 09:56

## 2023-01-01 RX ADMIN — HYDROMORPHONE HYDROCHLORIDE 0.4 MG: 0.2 INJECTION, SOLUTION INTRAMUSCULAR; INTRAVENOUS; SUBCUTANEOUS at 03:31

## 2023-01-01 RX ADMIN — DEXTROSE AND SODIUM CHLORIDE: 5; 900 INJECTION, SOLUTION INTRAVENOUS at 20:31

## 2023-01-01 RX ADMIN — HYDROMORPHONE HYDROCHLORIDE 0.4 MG: 0.2 INJECTION, SOLUTION INTRAMUSCULAR; INTRAVENOUS; SUBCUTANEOUS at 22:18

## 2023-01-01 RX ADMIN — PANTOPRAZOLE SODIUM 40 MG: 40 INJECTION, POWDER, FOR SOLUTION INTRAVENOUS at 12:41

## 2023-01-01 RX ADMIN — CEFEPIME HYDROCHLORIDE 2 G: 2 INJECTION, POWDER, FOR SOLUTION INTRAVENOUS at 06:01

## 2023-01-01 RX ADMIN — ACETYLCYSTEINE 2 ML: 200 SOLUTION ORAL; RESPIRATORY (INHALATION) at 15:18

## 2023-01-01 RX ADMIN — IPRATROPIUM BROMIDE AND ALBUTEROL SULFATE 3 ML: .5; 3 SOLUTION RESPIRATORY (INHALATION) at 12:18

## 2023-01-01 RX ADMIN — IPRATROPIUM BROMIDE AND ALBUTEROL SULFATE 3 ML: .5; 3 SOLUTION RESPIRATORY (INHALATION) at 15:48

## 2023-01-01 RX ADMIN — PANTOPRAZOLE SODIUM 40 MG: 40 TABLET, DELAYED RELEASE ORAL at 09:06

## 2023-01-01 RX ADMIN — IPRATROPIUM BROMIDE AND ALBUTEROL SULFATE 3 ML: .5; 3 SOLUTION RESPIRATORY (INHALATION) at 19:42

## 2023-01-01 RX ADMIN — METOPROLOL TARTRATE 2.5 MG: 5 INJECTION INTRAVENOUS at 13:38

## 2023-01-01 RX ADMIN — ACETYLCYSTEINE 2 ML: 200 SOLUTION ORAL; RESPIRATORY (INHALATION) at 10:57

## 2023-01-01 RX ADMIN — HYDROMORPHONE HYDROCHLORIDE 0.4 MG: 0.2 INJECTION, SOLUTION INTRAMUSCULAR; INTRAVENOUS; SUBCUTANEOUS at 18:00

## 2023-01-01 RX ADMIN — IOPAMIDOL 52 ML: 755 INJECTION, SOLUTION INTRAVENOUS at 21:26

## 2023-01-01 RX ADMIN — SENNOSIDES 1 TABLET: 8.6 TABLET, FILM COATED ORAL at 22:54

## 2023-01-01 RX ADMIN — POTASSIUM CHLORIDE: 2 INJECTION, SOLUTION, CONCENTRATE INTRAVENOUS at 21:45

## 2023-01-01 RX ADMIN — CARVEDILOL 6.25 MG: 6.25 TABLET, FILM COATED ORAL at 08:12

## 2023-01-01 RX ADMIN — SODIUM CHLORIDE 250 MG: 9 INJECTION, SOLUTION INTRAVENOUS at 22:18

## 2023-01-01 RX ADMIN — CEFEPIME HYDROCHLORIDE 2 G: 2 INJECTION, POWDER, FOR SOLUTION INTRAVENOUS at 06:19

## 2023-01-01 RX ADMIN — SENNOSIDES 1 TABLET: 8.6 TABLET, FILM COATED ORAL at 21:11

## 2023-01-01 RX ADMIN — CARVEDILOL 6.25 MG: 6.25 TABLET, FILM COATED ORAL at 18:26

## 2023-01-01 RX ADMIN — ACETYLCYSTEINE 2 ML: 200 SOLUTION ORAL; RESPIRATORY (INHALATION) at 16:10

## 2023-01-01 RX ADMIN — IPRATROPIUM BROMIDE AND ALBUTEROL SULFATE 3 ML: .5; 3 SOLUTION RESPIRATORY (INHALATION) at 15:57

## 2023-01-01 RX ADMIN — FUROSEMIDE 20 MG: 10 INJECTION, SOLUTION INTRAMUSCULAR; INTRAVENOUS at 08:39

## 2023-01-01 RX ADMIN — ASPIRIN 81 MG: 81 TABLET, COATED ORAL at 09:56

## 2023-01-01 RX ADMIN — ACETYLCYSTEINE 2 ML: 200 SOLUTION ORAL; RESPIRATORY (INHALATION) at 15:48

## 2023-01-01 RX ADMIN — POLYETHYLENE GLYCOL 3350 17 G: 17 POWDER, FOR SOLUTION ORAL at 08:13

## 2023-01-01 RX ADMIN — ASPIRIN 81 MG: 81 TABLET, COATED ORAL at 08:29

## 2023-01-01 RX ADMIN — ACETYLCYSTEINE 2 ML: 200 SOLUTION ORAL; RESPIRATORY (INHALATION) at 14:55

## 2023-01-01 RX ADMIN — ONDANSETRON 4 MG: 2 INJECTION INTRAMUSCULAR; INTRAVENOUS at 14:20

## 2023-01-01 RX ADMIN — ALLOPURINOL 100 MG: 100 TABLET ORAL at 08:29

## 2023-01-01 RX ADMIN — ACETYLCYSTEINE 2 ML: 200 SOLUTION ORAL; RESPIRATORY (INHALATION) at 11:32

## 2023-01-01 RX ADMIN — BUDESONIDE 0.5 MG: 0.5 INHALANT RESPIRATORY (INHALATION) at 21:39

## 2023-01-01 RX ADMIN — BUDESONIDE 0.5 MG: 0.5 INHALANT RESPIRATORY (INHALATION) at 07:54

## 2023-01-01 RX ADMIN — PANTOPRAZOLE SODIUM 40 MG: 40 TABLET, DELAYED RELEASE ORAL at 08:12

## 2023-01-01 RX ADMIN — ENOXAPARIN SODIUM 40 MG: 40 INJECTION SUBCUTANEOUS at 14:17

## 2023-01-01 RX ADMIN — ACETYLCYSTEINE 2 ML: 200 SOLUTION ORAL; RESPIRATORY (INHALATION) at 20:13

## 2023-01-01 RX ADMIN — INSULIN HUMAN 1.5 UNITS/HR: 1 INJECTION, SOLUTION INTRAVENOUS at 18:26

## 2023-01-01 RX ADMIN — FUROSEMIDE 20 MG: 10 INJECTION, SOLUTION INTRAMUSCULAR; INTRAVENOUS at 10:50

## 2023-01-01 RX ADMIN — POTASSIUM CHLORIDE 40 MEQ: 1500 TABLET, EXTENDED RELEASE ORAL at 08:58

## 2023-01-01 RX ADMIN — SODIUM CHLORIDE, POTASSIUM CHLORIDE, SODIUM LACTATE AND CALCIUM CHLORIDE 250 ML: 600; 310; 30; 20 INJECTION, SOLUTION INTRAVENOUS at 18:03

## 2023-01-01 RX ADMIN — CEFEPIME HYDROCHLORIDE 2 G: 2 INJECTION, POWDER, FOR SOLUTION INTRAVENOUS at 06:08

## 2023-01-01 RX ADMIN — SENNOSIDES 1 TABLET: 8.6 TABLET, FILM COATED ORAL at 08:58

## 2023-01-01 RX ADMIN — CARVEDILOL 6.25 MG: 6.25 TABLET, FILM COATED ORAL at 18:49

## 2023-01-01 RX ADMIN — ACETAMINOPHEN 650 MG: 325 TABLET, FILM COATED ORAL at 21:09

## 2023-01-01 RX ADMIN — BUDESONIDE 0.5 MG: 0.5 INHALANT RESPIRATORY (INHALATION) at 19:11

## 2023-01-01 RX ADMIN — BUDESONIDE 0.5 MG: 0.5 INHALANT RESPIRATORY (INHALATION) at 08:21

## 2023-01-01 RX ADMIN — ALLOPURINOL 100 MG: 100 TABLET ORAL at 09:56

## 2023-01-01 RX ADMIN — AMLODIPINE BESYLATE 2.5 MG: 2.5 TABLET ORAL at 08:13

## 2023-01-01 RX ADMIN — CEFEPIME HYDROCHLORIDE 2 G: 2 INJECTION, POWDER, FOR SOLUTION INTRAVENOUS at 19:02

## 2023-01-01 RX ADMIN — FUROSEMIDE 40 MG: 10 INJECTION, SOLUTION INTRAMUSCULAR; INTRAVENOUS at 15:24

## 2023-01-01 RX ADMIN — ISOSORBIDE MONONITRATE 30 MG: 30 TABLET, EXTENDED RELEASE ORAL at 09:56

## 2023-01-01 RX ADMIN — IPRATROPIUM BROMIDE AND ALBUTEROL SULFATE 3 ML: .5; 3 SOLUTION RESPIRATORY (INHALATION) at 07:29

## 2023-01-01 RX ADMIN — ISOSORBIDE MONONITRATE 30 MG: 30 TABLET, EXTENDED RELEASE ORAL at 09:51

## 2023-01-01 RX ADMIN — ENOXAPARIN SODIUM 40 MG: 40 INJECTION SUBCUTANEOUS at 16:02

## 2023-01-01 RX ADMIN — BUDESONIDE 0.5 MG: 0.5 INHALANT RESPIRATORY (INHALATION) at 20:13

## 2023-01-01 RX ADMIN — BUDESONIDE 0.5 MG: 0.5 INHALANT RESPIRATORY (INHALATION) at 20:09

## 2023-01-01 RX ADMIN — BUDESONIDE 0.5 MG: 0.5 INHALANT RESPIRATORY (INHALATION) at 07:24

## 2023-01-01 RX ADMIN — BUDESONIDE 0.5 MG: 0.5 INHALANT RESPIRATORY (INHALATION) at 19:13

## 2023-01-01 RX ADMIN — CARVEDILOL 6.25 MG: 6.25 TABLET, FILM COATED ORAL at 18:50

## 2023-01-01 RX ADMIN — DOXYCYCLINE HYCLATE 100 MG: 100 CAPSULE ORAL at 20:09

## 2023-01-01 RX ADMIN — POTASSIUM CHLORIDE 20 MEQ: 1500 TABLET, EXTENDED RELEASE ORAL at 09:51

## 2023-01-01 RX ADMIN — POTASSIUM & SODIUM PHOSPHATES POWDER PACK 280-160-250 MG 1 PACKET: 280-160-250 PACK at 20:31

## 2023-01-01 RX ADMIN — CEFEPIME HYDROCHLORIDE 2 G: 2 INJECTION, POWDER, FOR SOLUTION INTRAVENOUS at 18:08

## 2023-01-01 RX ADMIN — ACETYLCYSTEINE 2 ML: 200 SOLUTION ORAL; RESPIRATORY (INHALATION) at 21:39

## 2023-01-01 RX ADMIN — FUROSEMIDE 20 MG: 10 INJECTION INTRAMUSCULAR; INTRAVENOUS at 11:48

## 2023-01-01 RX ADMIN — ACETAMINOPHEN 650 MG: 325 TABLET, FILM COATED ORAL at 01:01

## 2023-01-01 RX ADMIN — PREDNISONE 40 MG: 20 TABLET ORAL at 09:52

## 2023-01-01 RX ADMIN — SODIUM PHOSPHATE, MONOBASIC, MONOHYDRATE AND SODIUM PHOSPHATE, DIBASIC, ANHYDROUS 9 MMOL: 142; 276 INJECTION, SOLUTION INTRAVENOUS at 20:13

## 2023-01-01 RX ADMIN — Medication 2 SPRAY: at 21:46

## 2023-01-01 RX ADMIN — ENOXAPARIN SODIUM 30 MG: 30 INJECTION SUBCUTANEOUS at 14:40

## 2023-01-01 ASSESSMENT — ACTIVITIES OF DAILY LIVING (ADL)
ADLS_ACUITY_SCORE: 31
ADLS_ACUITY_SCORE: 35
ADLS_ACUITY_SCORE: 33
ADLS_ACUITY_SCORE: 25
ADLS_ACUITY_SCORE: 25
ADLS_ACUITY_SCORE: 35
ADLS_ACUITY_SCORE: 37
ADLS_ACUITY_SCORE: 40
ADLS_ACUITY_SCORE: 35
ADLS_ACUITY_SCORE: 35
ADLS_ACUITY_SCORE: 31
ADLS_ACUITY_SCORE: 33
ADLS_ACUITY_SCORE: 25
ADLS_ACUITY_SCORE: 41
ADLS_ACUITY_SCORE: 35
ADLS_ACUITY_SCORE: 42
ADLS_ACUITY_SCORE: 35
ADLS_ACUITY_SCORE: 42
ADLS_ACUITY_SCORE: 33
ADLS_ACUITY_SCORE: 41
ADLS_ACUITY_SCORE: 25
ADLS_ACUITY_SCORE: 35
ADLS_ACUITY_SCORE: 31
ADLS_ACUITY_SCORE: 35
ADLS_ACUITY_SCORE: 40
ADLS_ACUITY_SCORE: 35
ADLS_ACUITY_SCORE: 24
ADLS_ACUITY_SCORE: 33
ADLS_ACUITY_SCORE: 27
ADLS_ACUITY_SCORE: 37
ADLS_ACUITY_SCORE: 25
ADLS_ACUITY_SCORE: 25
ADLS_ACUITY_SCORE: 42
ADLS_ACUITY_SCORE: 37
ADLS_ACUITY_SCORE: 31
ADLS_ACUITY_SCORE: 31
ADLS_ACUITY_SCORE: 25
ADLS_ACUITY_SCORE: 35
ADLS_ACUITY_SCORE: 37
ADLS_ACUITY_SCORE: 33
ADLS_ACUITY_SCORE: 37
ADLS_ACUITY_SCORE: 25
ADLS_ACUITY_SCORE: 33
ADLS_ACUITY_SCORE: 35
ADLS_ACUITY_SCORE: 33
ADLS_ACUITY_SCORE: 24
ADLS_ACUITY_SCORE: 33
ADLS_ACUITY_SCORE: 25
ADLS_ACUITY_SCORE: 31
ADLS_ACUITY_SCORE: 25
ADLS_ACUITY_SCORE: 31
ADLS_ACUITY_SCORE: 25
ADLS_ACUITY_SCORE: 36
ADLS_ACUITY_SCORE: 24
ADLS_ACUITY_SCORE: 25
ADLS_ACUITY_SCORE: 35
ADLS_ACUITY_SCORE: 25
ADLS_ACUITY_SCORE: 36
ADLS_ACUITY_SCORE: 31
ADLS_ACUITY_SCORE: 35
ADLS_ACUITY_SCORE: 46
DEPENDENT_IADLS:: CLEANING;COOKING;LAUNDRY;MEAL PREPARATION
ADLS_ACUITY_SCORE: 33
ADLS_ACUITY_SCORE: 35
ADLS_ACUITY_SCORE: 25
ADLS_ACUITY_SCORE: 35
ADLS_ACUITY_SCORE: 40
ADLS_ACUITY_SCORE: 31
ADLS_ACUITY_SCORE: 35
ADLS_ACUITY_SCORE: 37
ADLS_ACUITY_SCORE: 35
ADLS_ACUITY_SCORE: 35
ADLS_ACUITY_SCORE: 33
ADLS_ACUITY_SCORE: 31
ADLS_ACUITY_SCORE: 33
ADLS_ACUITY_SCORE: 33
ADLS_ACUITY_SCORE: 40
ADLS_ACUITY_SCORE: 33
ADLS_ACUITY_SCORE: 35
ADLS_ACUITY_SCORE: 27
ADLS_ACUITY_SCORE: 37
ADLS_ACUITY_SCORE: 25
ADLS_ACUITY_SCORE: 40
ADLS_ACUITY_SCORE: 42
ADLS_ACUITY_SCORE: 33
ADLS_ACUITY_SCORE: 33
ADLS_ACUITY_SCORE: 37
ADLS_ACUITY_SCORE: 35
ADLS_ACUITY_SCORE: 40
ADLS_ACUITY_SCORE: 25
ADLS_ACUITY_SCORE: 25
ADLS_ACUITY_SCORE: 31
ADLS_ACUITY_SCORE: 25
ADLS_ACUITY_SCORE: 31
ADLS_ACUITY_SCORE: 36
ADLS_ACUITY_SCORE: 40
ADLS_ACUITY_SCORE: 24
ADLS_ACUITY_SCORE: 25
ADLS_ACUITY_SCORE: 35
ADLS_ACUITY_SCORE: 25
ADLS_ACUITY_SCORE: 24
ADLS_ACUITY_SCORE: 33
ADLS_ACUITY_SCORE: 37
ADLS_ACUITY_SCORE: 46
ADLS_ACUITY_SCORE: 26
ADLS_ACUITY_SCORE: 31
ADLS_ACUITY_SCORE: 40
ADLS_ACUITY_SCORE: 31
ADLS_ACUITY_SCORE: 31
ADLS_ACUITY_SCORE: 35
ADLS_ACUITY_SCORE: 31
ADLS_ACUITY_SCORE: 35
ADLS_ACUITY_SCORE: 33
ADLS_ACUITY_SCORE: 42
ADLS_ACUITY_SCORE: 25
ADLS_ACUITY_SCORE: 33
ADLS_ACUITY_SCORE: 25
ADLS_ACUITY_SCORE: 33
ADLS_ACUITY_SCORE: 25
ADLS_ACUITY_SCORE: 33
ADLS_ACUITY_SCORE: 35
ADLS_ACUITY_SCORE: 33
ADLS_ACUITY_SCORE: 25
ADLS_ACUITY_SCORE: 40
ADLS_ACUITY_SCORE: 33
ADLS_ACUITY_SCORE: 33
ADLS_ACUITY_SCORE: 40
ADLS_ACUITY_SCORE: 35
ADLS_ACUITY_SCORE: 41
ADLS_ACUITY_SCORE: 25
ADLS_ACUITY_SCORE: 35
ADLS_ACUITY_SCORE: 27
ADLS_ACUITY_SCORE: 40
ADLS_ACUITY_SCORE: 33
ADLS_ACUITY_SCORE: 36
ADLS_ACUITY_SCORE: 25
ADLS_ACUITY_SCORE: 40
ADLS_ACUITY_SCORE: 25
ADLS_ACUITY_SCORE: 31
ADLS_ACUITY_SCORE: 40
ADLS_ACUITY_SCORE: 33
ADLS_ACUITY_SCORE: 42
ADLS_ACUITY_SCORE: 33
ADLS_ACUITY_SCORE: 31
ADLS_ACUITY_SCORE: 35
ADLS_ACUITY_SCORE: 25
ADLS_ACUITY_SCORE: 33
ADLS_ACUITY_SCORE: 35
ADLS_ACUITY_SCORE: 25
ADLS_ACUITY_SCORE: 37
ADLS_ACUITY_SCORE: 35
ADLS_ACUITY_SCORE: 33
ADLS_ACUITY_SCORE: 35
ADLS_ACUITY_SCORE: 31
ADLS_ACUITY_SCORE: 35
ADLS_ACUITY_SCORE: 36
ADLS_ACUITY_SCORE: 42
ADLS_ACUITY_SCORE: 24
ADLS_ACUITY_SCORE: 25
ADLS_ACUITY_SCORE: 33
ADLS_ACUITY_SCORE: 35
ADLS_ACUITY_SCORE: 37
ADLS_ACUITY_SCORE: 37
ADLS_ACUITY_SCORE: 33
ADLS_ACUITY_SCORE: 46
ADLS_ACUITY_SCORE: 33
ADLS_ACUITY_SCORE: 35
ADLS_ACUITY_SCORE: 33
ADLS_ACUITY_SCORE: 37
ADLS_ACUITY_SCORE: 25
ADLS_ACUITY_SCORE: 35
ADLS_ACUITY_SCORE: 31
ADLS_ACUITY_SCORE: 35
ADLS_ACUITY_SCORE: 25
ADLS_ACUITY_SCORE: 26
ADLS_ACUITY_SCORE: 31
ADLS_ACUITY_SCORE: 41
ADLS_ACUITY_SCORE: 35
ADLS_ACUITY_SCORE: 35
ADLS_ACUITY_SCORE: 31
ADLS_ACUITY_SCORE: 37
ADLS_ACUITY_SCORE: 40
ADLS_ACUITY_SCORE: 33
ADLS_ACUITY_SCORE: 31
ADLS_ACUITY_SCORE: 35
ADLS_ACUITY_SCORE: 31
ADLS_ACUITY_SCORE: 24
ADLS_ACUITY_SCORE: 25
ADLS_ACUITY_SCORE: 35
ADLS_ACUITY_SCORE: 25
ADLS_ACUITY_SCORE: 42
ADLS_ACUITY_SCORE: 40
ADLS_ACUITY_SCORE: 35
ADLS_ACUITY_SCORE: 31
ADLS_ACUITY_SCORE: 37
ADLS_ACUITY_SCORE: 25
ADLS_ACUITY_SCORE: 35
ADLS_ACUITY_SCORE: 35
ADLS_ACUITY_SCORE: 31
ADLS_ACUITY_SCORE: 35
ADLS_ACUITY_SCORE: 31
ADLS_ACUITY_SCORE: 35
ADLS_ACUITY_SCORE: 31
ADLS_ACUITY_SCORE: 37
ADLS_ACUITY_SCORE: 26
ADLS_ACUITY_SCORE: 37
ADLS_ACUITY_SCORE: 25
ADLS_ACUITY_SCORE: 31
ADLS_ACUITY_SCORE: 40
ADLS_ACUITY_SCORE: 31
ADLS_ACUITY_SCORE: 24
ADLS_ACUITY_SCORE: 33
ADLS_ACUITY_SCORE: 33
ADLS_ACUITY_SCORE: 37
ADLS_ACUITY_SCORE: 33
ADLS_ACUITY_SCORE: 41
ADLS_ACUITY_SCORE: 33
ADLS_ACUITY_SCORE: 35
ADLS_ACUITY_SCORE: 25
ADLS_ACUITY_SCORE: 46
ADLS_ACUITY_SCORE: 42
ADLS_ACUITY_SCORE: 27
ADLS_ACUITY_SCORE: 37
ADLS_ACUITY_SCORE: 33
ADLS_ACUITY_SCORE: 40
ADLS_ACUITY_SCORE: 33
ADLS_ACUITY_SCORE: 35
ADLS_ACUITY_SCORE: 37
ADLS_ACUITY_SCORE: 42
ADLS_ACUITY_SCORE: 37
ADLS_ACUITY_SCORE: 42
ADLS_ACUITY_SCORE: 25
ADLS_ACUITY_SCORE: 35
ADLS_ACUITY_SCORE: 25

## 2023-01-01 ASSESSMENT — ENCOUNTER SYMPTOMS
WHEEZING: 0
COUGH: 0
CHILLS: 1
SHORTNESS OF BREATH: 1

## 2023-05-10 NOTE — PROGRESS NOTES
.  HPI and Plan:   89-year-old lady here for follow-up of coronary artery disease.  She is accompanied by her son.   is on the phone.  She is a mandrin speaker.    No new issues.  I had seen her in November and I asked her to come back in a year for follow-up.  For some reason the follow-up visit was arranged for today.  She reports no issues.  She has no chest pains.  All medications are well-tolerated.    She asked me to check her blood pressure and it is perfect at 120/70.    Otherwise cardiac examination is unremarkable.    We will see again in 1 years time for follow-up.  I asked him to cancel November's appointment.    No orders of the defined types were placed in this encounter.      Orders Placed This Encounter   Medications     carvedilol (COREG) 6.25 MG tablet     Sig: Take 1 tablet (6.25 mg) by mouth 2 times daily (with meals)     Dispense:  180 tablet     Refill:  3     isosorbide mononitrate (IMDUR) 30 MG 24 hr tablet     Sig: Take 1 tablet (30 mg) by mouth daily     Dispense:  90 tablet     Refill:  3       Encounter Diagnoses   Name Primary?     NSTEMI (non-ST elevated myocardial infarction) (H)      Chest pain, unspecified type        CURRENT MEDICATIONS:  Current Outpatient Medications   Medication Sig Dispense Refill     amLODIPine (NORVASC) 5 MG tablet Take 1 tablet (5 mg) by mouth daily 90 tablet 3     aspirin EC 81 MG EC tablet Take 1 tablet (81 mg) by mouth daily 30 tablet 1     calcium carbonate (OS-CLAIRE 500 MG [place]. [place]) 500 MG tablet Take 500 mg by mouth daily        carvedilol (COREG) 6.25 MG tablet Take 1 tablet (6.25 mg) by mouth 2 times daily (with meals) 180 tablet 3     cholecalciferol (VITAMIN D) 400 UNIT TABS tablet Take 400 Units by mouth daily       docusate sodium (COLACE) 100 MG capsule Take 100 mg by mouth daily        isosorbide mononitrate (IMDUR) 30 MG 24 hr tablet Take 1 tablet (30 mg) by mouth daily 90 tablet 3     nitroGLYcerin (NITROSTAT) 0.4 MG sublingual tablet  Place 1 tablet (0.4 mg) under the tongue every 5 minutes as needed for chest pain if you are still having symptoms after 3 doses (15 minutes) call 911. 25 tablet 3     acetaminophen (TYLENOL) 325 MG tablet Take 2 tablets (650 mg) by mouth every 6 hours as needed for mild pain (Patient not taking: Reported on 5/10/2023) 50 tablet 0       ALLERGIES     Allergies   Allergen Reactions     Azithromycin      Gabapentin Itching     Kanamycin      Metronidazole Rash     Pcn [Penicillins] Rash     Streptomycin      rash     Atorvastatin Rash     Lisinopril Rash       PAST MEDICAL HISTORY:  Past Medical History:   Diagnosis Date     Benign essential HTN      Coronary artery disease involving native coronary artery of native heart with unstable angina pectoris (H) 4/2016 4/2016 Cath - 80% hazy stenosis at site of ruptured plaque - HARLEEN placed     Cough      Eczema      Lung nodule      Malignant neoplasm of bladder, part unspecified 1999 Dr Everett    Surgery done in [place]     Mitral regurgitation     4/2016 mod-mod severe MR with multiple jets per Echo     Mixed hyperlipidemia      NSTEMI (non-ST elevated myocardial infarction) (H)      Osteoporosis      Polyarticular arthritis      Rash      Sciatica        PAST SURGICAL HISTORY:  Past Surgical History:   Procedure Laterality Date     BLADDER TUMOR-ASSOCIATED      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     CATARACT IOL, RT/LT  5-08     HC REMOVAL GALLBLADDER      due to stone 1990     HEART CATH STENT COR W/WO PTCA  4/2016 4/2016 Cath - 80% hazy stenosis at site of ruptured plaque - HARLEEN placed     ZZC APPENDECTOMY      1950       FAMILY HISTORY:  Family History   Problem Relation Age of Onset     Diabetes Brother      Hypertension Son      Cerebrovascular Disease Mother         76     Family History Negative Daughter      Family History Negative Brother      Family History Negative Brother        SOCIAL HISTORY:  Social History     Socioeconomic History     Marital  status: Single   Tobacco Use     Smoking status: Never     Smokeless tobacco: Never   Substance and Sexual Activity     Alcohol use: No     Drug use: No     Sexual activity: Not Currently   Other Topics Concern     Caffeine Concern No     Sleep Concern No     Stress Concern No     Weight Concern No     Special Diet No     Exercise Yes     Comment: walking 20 min daily     Seat Belt Yes     Parent/sibling w/ CABG, MI or angioplasty before 65F 55M? No   Social History Narrative    Vamsi AZEVEDO    From [place]    In the  2001           Review of Systems:  Skin:  Negative     Eyes:  Positive for glasses  ENT:  Negative    Respiratory:  Negative    Cardiovascular:  Negative;chest pain;syncope or near-syncope;cyanosis;dizziness palpitations;Positive for;lightheadedness;fatigue  Gastroenterology: Negative    Genitourinary:  not assessed    Musculoskeletal:  Negative    Neurologic:  Negative    Psychiatric:  Negative    Heme/Lymph/Imm:  Negative    Endocrine:  Negative      Physical Exam:  Vitals: /65 (BP Location: Left arm, Cuff Size: Adult Regular)   Pulse 60   Ht 1.524 m (5')   Wt 48.2 kg (106 lb 3.2 oz)   BMI 20.74 kg/m      Constitutional:  cooperative, alert and oriented, well developed, well nourished, in no acute distress thin      Skin:  warm and dry to the touch, no apparent skin lesions or masses noted surgical scars well-healed   resolution of rash on body. Dry scaly skin under hair at nape of neck, red excema type coloration on hands    Head:  normocephalic, no masses or lesions        Eyes:  pupils equal and round, conjunctivae and lids unremarkable, sclera white, no xanthalasma, EOMS intact, no nystagmus        Lymph:No Cervical lymphadenopathy present     ENT:  no pallor or cyanosis, dentition good poor dentition missing some teeth    Neck:  carotid pulses are full and equal bilaterally, JVP normal, no carotid bruit        Respiratory:  normal breath sounds, clear to auscultation, normal A-P  diameter, normal symmetry, normal respiratory excursion, no use of accessory muscles         Cardiac: regular rhythm, normal S1/S2, no S3 or S4, apical impulse not displaced, no murmurs, gallops or rubs       systolic murmur;grade 1;apical        pulses full and equal, no bruits auscultated                                        GI:  abdomen soft, non-tender, BS normoactive, no mass, no HSM, no bruits        Extremities and Muscular Skeletal:  no deformities, clubbing, cyanosis, erythema observed   bilateral LE edema;pitting;1+          Neurological:  no gross motor deficits        Psych:  Alert and Oriented x 3        Recent Lab Results:  LIPID RESULTS:  Lab Results   Component Value Date    CHOL 220 07/14/2017    HDL 53 07/14/2017     07/14/2017    TRIG 330 07/14/2017    CHOLHDLRATIO 3.8 04/05/2010       LIVER ENZYME RESULTS:  Lab Results   Component Value Date    AST 31 03/12/2020    ALT 21 03/12/2020       CBC RESULTS:  Lab Results   Component Value Date    WBC 9.2 09/19/2021    WBC 6.9 03/12/2020    RBC 3.31 (L) 09/19/2021    RBC 4.26 03/12/2020    HGB 9.8 (L) 09/19/2021    HGB 12.6 03/12/2020    HCT 30.7 (L) 09/19/2021    HCT 39.1 03/12/2020    MCV 93 09/19/2021    MCV 92 03/12/2020    MCH 29.6 09/19/2021    MCH 29.6 03/12/2020    MCHC 31.9 09/19/2021    MCHC 32.2 03/12/2020    RDW 12.1 09/19/2021    RDW 12.4 03/12/2020     09/19/2021     03/12/2020       BMP RESULTS:  Lab Results   Component Value Date     09/19/2021     03/12/2020    POTASSIUM 4.6 09/19/2021    POTASSIUM 4.3 03/12/2020    CHLORIDE 107 09/19/2021    CHLORIDE 107 03/12/2020    CO2 25 09/19/2021    CO2 26 03/12/2020    ANIONGAP 5 09/19/2021    ANIONGAP 6 03/12/2020     (H) 09/19/2021     (H) 03/12/2020    BUN 22 09/19/2021    BUN 16 03/12/2020    CR 0.96 09/19/2021    CR 0.74 03/12/2020    GFRESTIMATED 52 (L) 09/19/2021    GFRESTIMATED 71 03/12/2020    GFRESTBLACK 83 03/12/2020    CLAIRE 9.4  09/19/2021    CLAIRE 9.1 03/12/2020        A1C RESULTS:  No results found for: A1C    INR RESULTS:  No results found for: INR        CC  Lopez Perrin MD  [address]  [place] [id number]

## 2023-05-10 NOTE — LETTER
5/10/2023    Kevon Sawyer MD  [facility] [address]   [facility] [address]    RE: Mary Polanco       Dear Colleague,     I had the pleasure of seeing Mary Polanco in the [facility].  .  HPI and Plan:   89-year-old lady here for follow-up of coronary artery disease.  She is accompanied by her son.   is on the phone.  She is a mandrin speaker.    No new issues.  I had seen her in November and I asked her to come back in a year for follow-up.  For some reason the follow-up visit was arranged for today.  She reports no issues.  She has no chest pains.  All medications are well-tolerated.    She asked me to check her blood pressure and it is perfect at 120/70.    Otherwise cardiac examination is unremarkable.    We will see again in 1 years time for follow-up.  I asked him to cancel November's appointment.    No orders of the defined types were placed in this encounter.      Orders Placed This Encounter   Medications    carvedilol (COREG) 6.25 MG tablet     Sig: Take 1 tablet (6.25 mg) by mouth 2 times daily (with meals)     Dispense:  180 tablet     Refill:  3    isosorbide mononitrate (IMDUR) 30 MG 24 hr tablet     Sig: Take 1 tablet (30 mg) by mouth daily     Dispense:  90 tablet     Refill:  3       Encounter Diagnoses   Name Primary?    NSTEMI (non-ST elevated myocardial infarction) (H)     Chest pain, unspecified type        CURRENT MEDICATIONS:  Current Outpatient Medications   Medication Sig Dispense Refill    amLODIPine (NORVASC) 5 MG tablet Take 1 tablet (5 mg) by mouth daily 90 tablet 3    aspirin EC 81 MG EC tablet Take 1 tablet (81 mg) by mouth daily 30 tablet 1    calcium carbonate (OS-CLAIRE 500 MG [place]. [place]) 500 MG tablet Take 500 mg by mouth daily       carvedilol (COREG) 6.25 MG tablet Take 1 tablet (6.25 mg) by mouth 2 times daily (with meals) 180 tablet 3    cholecalciferol (VITAMIN D) 400 UNIT TABS tablet Take 400 Units by mouth daily      docusate  sodium (COLACE) 100 MG capsule Take 100 mg by mouth daily       isosorbide mononitrate (IMDUR) 30 MG 24 hr tablet Take 1 tablet (30 mg) by mouth daily 90 tablet 3    nitroGLYcerin (NITROSTAT) 0.4 MG sublingual tablet Place 1 tablet (0.4 mg) under the tongue every 5 minutes as needed for chest pain if you are still having symptoms after 3 doses (15 minutes) call 911. 25 tablet 3    acetaminophen (TYLENOL) 325 MG tablet Take 2 tablets (650 mg) by mouth every 6 hours as needed for mild pain (Patient not taking: Reported on 5/10/2023) 50 tablet 0       ALLERGIES     Allergies   Allergen Reactions    Azithromycin     Gabapentin Itching    Kanamycin     Metronidazole Rash    Pcn [Penicillins] Rash    Streptomycin      rash    Atorvastatin Rash    Lisinopril Rash       PAST MEDICAL HISTORY:  Past Medical History:   Diagnosis Date    Benign essential HTN     Coronary artery disease involving native coronary artery of native heart with unstable angina pectoris (H) 4/2016 4/2016 Cath - 80% hazy stenosis at site of ruptured plaque - HARLEEN placed    Cough     Eczema     Lung nodule     Malignant neoplasm of bladder, part unspecified 1999 Dr Everett    Surgery done in [place]    Mitral regurgitation     4/2016 mod-mod severe MR with multiple jets per [facility]    Mixed hyperlipidemia     NSTEMI (non-ST elevated myocardial infarction) (H)     Osteoporosis     Polyarticular arthritis     Rash     Sciatica        PAST SURGICAL HISTORY:  Past Surgical History:   Procedure Laterality Date    BLADDER TUMOR-ASSOCIATED      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    CATARACT IOL, RT/LT  5-08    HC REMOVAL GALLBLADDER      due to stone 1990    HEART CATH STENT COR W/WO PTCA  4/2016 4/2016 Cath - 80% hazy stenosis at site of ruptured plaque - HARLEEN placed    ZZC APPENDECTOMY      1950       FAMILY HISTORY:  Family History   Problem Relation Age of Onset    Diabetes Brother     Hypertension Son     Cerebrovascular Disease Mother         76     Family History Negative Daughter     Family History Negative Brother     Family History Negative Brother        SOCIAL HISTORY:  Social History     Socioeconomic History    Marital status: Single   Tobacco Use    Smoking status: Never    Smokeless tobacco: Never   Substance and Sexual Activity    Alcohol use: No    Drug use: No    Sexual activity: Not Currently   Other Topics Concern    Caffeine Concern No    Sleep Concern No    Stress Concern No    Weight Concern No    Special Diet No    Exercise Yes     Comment: walking 20 min daily    Seat Belt Yes    Parent/sibling w/ CABG, MI or angioplasty before 65F 55M? No   Social History Narrative    Vamsi AZEVEDO    From [place]    In the  2001           Review of Systems:  Skin:  Negative     Eyes:  Positive for glasses  ENT:  Negative    Respiratory:  Negative    Cardiovascular:  Negative;chest pain;syncope or near-syncope;cyanosis;dizziness palpitations;Positive for;lightheadedness;fatigue  Gastroenterology: Negative    Genitourinary:  not assessed    Musculoskeletal:  Negative    Neurologic:  Negative    Psychiatric:  Negative    Heme/Lymph/Imm:  Negative    Endocrine:  Negative      Physical Exam:  Vitals: /65 (BP Location: Left arm, Cuff Size: Adult Regular)   Pulse 60   Ht 1.524 m (5')   Wt 48.2 kg (106 lb 3.2 oz)   BMI 20.74 kg/m      Constitutional:  cooperative, alert and oriented, well developed, well nourished, in no acute distress thin      Skin:  warm and dry to the touch, no apparent skin lesions or masses noted surgical scars well-healed   resolution of rash on body. Dry scaly skin under hair at nape of neck, red excema type coloration on hands    Head:  normocephalic, no masses or lesions        Eyes:  pupils equal and round, conjunctivae and lids unremarkable, sclera white, no xanthalasma, EOMS intact, no nystagmus        Lymph:No Cervical lymphadenopathy present     ENT:  no pallor or cyanosis, dentition good poor dentition missing some  teeth    Neck:  carotid pulses are full and equal bilaterally, JVP normal, no carotid bruit        Respiratory:  normal breath sounds, clear to auscultation, normal A-P diameter, normal symmetry, normal respiratory excursion, no use of accessory muscles         Cardiac: regular rhythm, normal S1/S2, no S3 or S4, apical impulse not displaced, no murmurs, gallops or rubs       systolic murmur;grade 1;apical        pulses full and equal, no bruits auscultated                                        GI:  abdomen soft, non-tender, BS normoactive, no mass, no HSM, no bruits        Extremities and Muscular Skeletal:  no deformities, clubbing, cyanosis, erythema observed   bilateral LE edema;pitting;1+          Neurological:  no gross motor deficits        Psych:  Alert and Oriented x 3        Recent Lab Results:  LIPID RESULTS:  Lab Results   Component Value Date    CHOL 220 07/14/2017    HDL 53 07/14/2017     07/14/2017    TRIG 330 07/14/2017    CHOLHDLRATIO 3.8 04/05/2010       LIVER ENZYME RESULTS:  Lab Results   Component Value Date    AST 31 03/12/2020    ALT 21 03/12/2020       CBC RESULTS:  Lab Results   Component Value Date    WBC 9.2 09/19/2021    WBC 6.9 03/12/2020    RBC 3.31 (L) 09/19/2021    RBC 4.26 03/12/2020    HGB 9.8 (L) 09/19/2021    HGB 12.6 03/12/2020    HCT 30.7 (L) 09/19/2021    HCT 39.1 03/12/2020    MCV 93 09/19/2021    MCV 92 03/12/2020    MCH 29.6 09/19/2021    MCH 29.6 03/12/2020    MCHC 31.9 09/19/2021    MCHC 32.2 03/12/2020    RDW 12.1 09/19/2021    RDW 12.4 03/12/2020     09/19/2021     03/12/2020       BMP RESULTS:  Lab Results   Component Value Date     09/19/2021     03/12/2020    POTASSIUM 4.6 09/19/2021    POTASSIUM 4.3 03/12/2020    CHLORIDE 107 09/19/2021    CHLORIDE 107 03/12/2020    CO2 25 09/19/2021    CO2 26 03/12/2020    ANIONGAP 5 09/19/2021    ANIONGAP 6 03/12/2020     (H) 09/19/2021     (H) 03/12/2020    BUN 22 09/19/2021    BUN 16  03/12/2020    CR 0.96 09/19/2021    CR 0.74 03/12/2020    GFRESTIMATED 52 (L) 09/19/2021    GFRESTIMATED 71 03/12/2020    GFRESTBLACK 83 03/12/2020    CLAIRE 9.4 09/19/2021    CLAIRE 9.1 03/12/2020        A1C RESULTS:  No results found for: A1C    INR RESULTS:  No results found for: INR        CC  Maura Perrin MD  [address]  [place] [address]      Thank you for allowing me to participate in the care of your patient.      Sincerely,     DR MAURA PERRIN MD     [facility]

## 2023-08-06 NOTE — DISCHARGE INSTRUCTIONS
Nephrology consulted and managing patient's HD needs in hospital.    Steroids as instructed sent to [facility].  This will increase your sugar so make sure you are drinking lots of water and avoid sugar in your diet.  Please follow with your primary care provider to ensure you are improving as expected.  If you continue to have episodes of gout, she may start you on a medication to help prevent recurrent episodes.  Try to follow the gout diet.  Information provided.  Return immediately with any symptoms or new concerns of any kind.

## 2023-08-06 NOTE — ED PROVIDER NOTES
History     Chief Complaint:  Foot Swelling       The history is provided by the patient and a relative. The history is limited by a language barrier. A  was used (Mandarin).      Mary Polanco is a 89 year old female with a history of CAD, hypertension, and gout who presents with pain, redness, and swelling of the right foot since yesterday.  She remarks this is similar to to prior episodes when she has had similar symptoms on the left foot and was diagnosed with gout.  She tried Tylenol yesterday but has had no analgesics today.  She denies any other affected joints, vomiting, or fever.  She denies history of diabetes.    Independent Historian:   See above.     Review of External Notes:   Reviewed primary care note from 6/14/23 where patient was seen for an annual wellness visit. Also reviewed ED note from 9/19/21 when patient was diagnosed with gout and prescribed prednisone. Noted patient's A1C was 6.3% and uric acid was 7.6.     Medications:  Nitrostat  Imdur  Colace  Coreg  Aspirin  Norvasc      Past Medical History:    Sciatica  Polyarticular arthritis  Osteoporosis   NSTEMI  Hyperlipidemia   Mitral regurgitation  Malignant neoplasm of bladder  Lung nodule  Eczema   CAD  Hypertension    Gout  GERD  Community-acquired pneumonia   ACS     Past Surgical History:    Heart catheterization stent COR W/WO PTCA  Cholecystectomy  Cataract IOL  Appendectomy  Bladder tumor removal        Physical Exam   Patient Vitals for the past 24 hrs:   BP Temp Temp src Pulse Resp SpO2 Height Weight   08/06/23 1003 102/72 98  F (36.7  C) Temporal 69 18 95 % 1.524 m (5') 46.7 kg (103 lb)        Physical Exam  General: Well-developed and well-nourished. Well appearing elderly Chinese woman. Cooperative.  Head:  Atraumatic.  Eyes:  Conjunctivae, lids, and sclerae are normal.  ENT:    Wearing a facemask.  Neck:  Supple. Normal range of motion.  CV:  2+ right DP.  Resp:  No respiratory distress.   GI:  Non-distended.     MS:  Normal ROM. No bilateral lower extremity edema.  There is tenderness, erythema, and edema/effusion of the right MTP joint.  No fluctuance, crepitus, or induration.  No open wounds. No other arthritis.  Skin:  Warm. Non-diaphoretic. No pallor.  MTP skin changes as above.  Neuro:  Awake. A&Ox3. Normal strength.  Psych: Normal mood and affect. Normal speech.  Vitals reviewed.     Emergency Department Course     Emergency Department Course & Assessments:  Assessments:  1043 I obtained history and examined the patient as noted above.     Independent Interpretation (X-rays, CTs, rhythm strip):  Not applicable.     Consultations/Discussion of Management or Tests:  Not applicable.    Social Determinants of Health affecting care:   Patient presents with supportive family members, daughter and son-in-law.   Does not speak English and  was used.  Established care providers.    Disposition:  The patient was discharged.     Impression & Plan      Medical Decision Making:  Mary is a 89 year old woman presenting with 1 day of right MTP joint pain, redness, and swelling similar to when she has had diagnosis of gout in the contralateral foot.  She denies any other concerns and appears quite well on exam.  The right first MTP has tenderness, edema, and erythema without involvement of other joints.  Given a very classic presentation for podagra, history of gout, and lack of systemic signs of illness, no emergent work-up is indicated.  We discussed treatment plan with prednisone similar to her prior ED visit.  There is no evidence of septic joint.  I did  her on increased glucose while on steroids as she has borderline readings in the past.  I encouraged her to follow-up with her primary care provider to ensure she is improving as expected.  If she has recurrent flares of gout she may be a candidate for allopurinol.  I provided her with a handout for diet for gout and also discussed indications to return to  the emergency department.  All of her and her family's questions were answered and they verbalized understanding.  Amenable to discharge.    Diagnosis:    ICD-10-CM    1. Podagra  M10.9            Discharge Medications:  Discharge Medication List as of 8/6/2023 11:07 AM        START taking these medications    Details   predniSONE (DELTASONE) 10 MG tablet Take 4 tablets (40 mg) by mouth daily for 5 days, THEN 2 tablets (20 mg) daily for 3 days, THEN 1 tablet (10 mg) daily for 3 days, THEN 0.5 tablets (5 mg) daily for 3 days., Disp-31 tablet, R-0, E-Prescribe              Scribe Disclosure:  I, Fanny Tate, am serving as a scribe at 10:19 AM on 8/6/2023 to document services personally performed by Maria Ines Hoang MD based on my observations and the provider's statements to me.     8/6/2023   Maria Ines Hoang MD Dixson, Kylie S, MD  08/08/23 0942

## 2023-08-06 NOTE — ED TRIAGE NOTES
Pt has hx of gout. Started having R foot swelling and pain last night.      Triage Assessment       Row Name 08/06/23 1005       Respiratory WDL    Respiratory WDL WDL       Cardiac WDL    Cardiac WDL WDL       Cognitive/Neuro/Behavioral WDL    Cognitive/Neuro/Behavioral WDL WDL

## 2023-10-18 PROBLEM — J18.9 MULTIFOCAL PNEUMONIA: Status: ACTIVE | Noted: 2023-01-01

## 2023-10-18 PROBLEM — J18.9 PNEUMONIA: Status: ACTIVE | Noted: 2023-01-01

## 2023-10-19 NOTE — PHARMACY-ADMISSION MEDICATION HISTORY
Pharmacist Admission Medication History    Admission medication history is complete. The information provided in this note is only as accurate as the sources available at the time of the update.    Information Source(s): Patient, Family member, CareEverywhere/SureScripts, and med bottles  via in-person    Pertinent Information:     Changes made to PTA medication list:  Added: allopurinol, senna  Deleted: docusate, vit d, calcium   Changed: amlodipine 5mg --> 2.5mg    Allergies reviewed with patient and updates made in EHR: unable to assess    Medication History Completed By: Akil Aliheyder, RPH 10/18/2023 11:04 PM    PTA Med List   Medication Sig Last Dose    acetaminophen (TYLENOL) 325 MG tablet Take 2 tablets (650 mg) by mouth every 6 hours as needed for mild pain     allopurinol (ZYLOPRIM) 100 MG tablet Take 100 mg by mouth daily 10/18/2023    amLODIPine (NORVASC) 5 MG tablet Take 1 tablet (5 mg) by mouth daily (Patient taking differently: Take 2.5 mg by mouth daily) 10/18/2023    aspirin EC 81 MG EC tablet Take 1 tablet (81 mg) by mouth daily 10/18/2023    carvedilol (COREG) 6.25 MG tablet Take 1 tablet (6.25 mg) by mouth 2 times daily (with meals) 10/18/2023 at x2    isosorbide mononitrate (IMDUR) 30 MG 24 hr tablet Take 1 tablet (30 mg) by mouth daily 10/18/2023    nitroGLYcerin (NITROSTAT) 0.4 MG sublingual tablet Place 1 tablet (0.4 mg) under the tongue every 5 minutes as needed for chest pain if you are still having symptoms after 3 doses (15 minutes) call 911.     senna (SENOKOT) 8.6 MG tablet Take 1 tablet by mouth 2 times daily 10/18/2023

## 2023-10-19 NOTE — PROGRESS NOTES
"   10/19/23 1600   Appointment Info   Signing Clinician's Name / Credentials (PT) Dashashane Mitchell DPT       Present yes   Language mandarin via [employer]   Living Environment   People in Home alone   Current Living Arrangements apartment   Home Accessibility no concerns   Living Environment Comments Pt lives alone but has son and daughter who live nearby   Self-Care   Usual Activity Tolerance good   Current Activity Tolerance moderate   Equipment Currently Used at Home walker, rolling   Fall history within last six months no   Activity/Exercise/Self-Care Comment Pt IND for mobility with use of FWW and for ADLs at baseline   General Information   Onset of Illness/Injury or Date of Surgery 10/18/23   Referring Physician Angel Broussard MD   Pertinent History of Current Problem (include personal factors and/or comorbidities that impact the POC) \"89F hx of Tinnitus, Gout, Osteoporosis, CAD s/p stenting 2019 presenting with Dyspnea and CP found to have multifocal PNA.\"   Existing Precautions/Restrictions fall;oxygen therapy device and L/min  (2L/min via NC)   Cognition   Affect/Mental Status (Cognition) WFL   Orientation Status (Cognition) oriented x 3   Follows Commands (Cognition) WFL   Pain Assessment   Patient Currently in Pain No   Posture    Posture Forward head position   Range of Motion (ROM)   Range of Motion ROM is WFL   Strength (Manual Muscle Testing)   Strength (Manual Muscle Testing) Able to perform R SLR;Able to perform L SLR;Deficits observed during functional mobility   Strength Comments decreased functional strength and activity tolerance   Bed Mobility   Comment, (Bed Mobility) SBA sup>sit   Transfers   Comment, (Transfers) CGA sit>stand to FWW   Gait/Stairs (Locomotion)   Comment, (Gait/Stairs) Pt amb 5 ft with FWW and CGA, slow pace, steady   Balance   Balance Comments currently benefits from use of FWW for upright mobility   Sensory Examination   Sensory Perception patient " reports no sensory changes   Clinical Impression   Criteria for Skilled Therapeutic Intervention Yes, treatment indicated   PT Diagnosis (PT) impaired IND with functional mobility   Influenced by the following impairments decreased functional strength, activity tolerance, balance   Functional limitations due to impairments impaired bed mobility, transfers, ambulation   Clinical Presentation (PT Evaluation Complexity) stable   Clinical Presentation Rationale Based on current presentation, PMH, social support   Clinical Decision Making (Complexity) low complexity   Planned Therapy Interventions (PT) balance training;gait training;home exercise program;bed mobility training;patient/family education;strengthening;transfer training;progressive activity/exercise;home program guidelines   Risk & Benefits of therapy have been explained evaluation/treatment results reviewed;care plan/treatment goals reviewed;risks/benefits reviewed;current/potential barriers reviewed;participants voiced agreement with care plan;participants included;patient   PT Total Evaluation Time   PT Eval, Low Complexity Minutes (49955) 10   Physical Therapy Goals   PT Frequency Daily   PT Predicted Duration/Target Date for Goal Attainment 10/26/23   PT Goals Bed Mobility;Transfers;Gait   PT: Bed Mobility Independent;Supine to/from sit   PT: Transfers Modified independent;Sit to/from stand;Bed to/from chair;Assistive device   PT: Gait Modified independent;Assistive device;Rolling walker;100 feet   Interventions   Interventions Quick Adds Gait Training;Therapeutic Activity   Therapeutic Activity   Therapeutic Activities: dynamic activities to improve functional performance Minutes (83614) 8   Symptoms Noted During/After Treatment Fatigue;Shortness of breath   Treatment Detail/Skilled Intervention Pt able to don socks with SBA. Time spent to gather mobility equipment and manage lines for safe mobility. Following eval, completed further sit<>Stands with  SBA at FWW. Pt on 2 L O2 throughout session, time spent for skilled monitoring of vitals. Max desat 88%, recovers with seated rest and cues for PLB. Pt returned sit>sup with SBA. Remained supine with alarm armed and all needs in reach.   Gait Training   Gait Training Minutes (36562) 10   Symptoms Noted During/After Treatment (Gait Training) increased pain;shortness of breath   Treatment Detail/Skilled Intervention Pt cued for gait with FWW and CGA-SBA. Pt with slow pace, short step length, no instability observed. Cued for pacing and PLB. Seated rest break required between bouts for recovery.   Distance in Feet 25 x2   PT Discharge Planning   PT Plan progress gait and activity tolerance, monitor O2 sats   PT Discharge Recommendation (DC Rec) Transitional Care Facility   PT Rationale for DC Rec Pt currently below reported Too baseline, able to complete limited mobility with FWW and Ax1. Currently requires 2 L O2 for mobility. Pt would benefit from continued skilled rehab at TCU setting to maximize safety and IND with mobility. Pending further IP PT and improvement in activity tolerance, pt may progress to be able to return home with HHPT..   PT Brief overview of current status Ax1 FWW, 2 L O2   Total Session Time   Timed Code Treatment Minutes 18   Total Session Time (sum of timed and untimed services) 28

## 2023-10-19 NOTE — H&P
Assessment/Plan    89F hx of Tinnitus, Gout, Osteoporosis, CAD s/p stenting 2019 presenting with Dyspnea and CP found to have multifocal PNA.     Sepsis 2/2 Multifocal Pneumonia  Hypoxic Respiratory Failure  ``CT PE: multifocal pna  ``Presenting for Acute onset CP/Dyspnea while walking in her house. Had to stop her acitivities and sit does as she felt unwell. Associated with 1-2 coughing episodes that were non productive and associated with chills. No sick contacts/recent travel  ``In the ER, SBP 150s, T 100.3>101.2, labs showed no evidence of LA or WBC elevations. Given doxy and rocephin. Patient stated she felt better but still had SOB. Hypoxic on RA so placed on 2-3L NC. Exam unremarkable apart from LE edema and coarse breath sounds. Not in distress. EKG NSR, Trop negative, Viral Panel negative.  --O2  --c/w rocephin/doxy  --follow blood cultures  --IVF  --HOB 30 degress  --PT OT SW  --Repeat CBC in AM  --No indication for isolation  --Tylenol  --Admission given CURB 65 score 2, moderate risk of decompensation.     HTN  CAD s/p LCx stenting 2016  Cardiomegaly  Hx Palpitations  ``CT: Stable Mild Cardiomegaly  ``Negative stress test in 2019  ``Hx of refusing statin therapy  ``Presenting with acute CP/dyspnea, CT PE negative for PE. Trop/EKG negative  --TTE  --will continue aspirin/coreg/imdur/PRN nitroglycerin    Chronic Lymphedema  --PT OT SW  --SCDs    Gout  Osteoporosis  --will resume allopurinol    Bladder tumor s/p resection  --no acute intervention    Tinnitus/Sensorineural Hearing loss  --No acute intervention    Supportive Care  DVT ppx: SCDs  Diet: General  Pain Control: Tylenol PRN  Upper GI ppx: Zofran  Lower GI ppx: senna  Lines/Catheters: IV  TTE/Dopplers: TTE only  Contact Precautions: none  PT/OT/Social/Wound/Palliative Consults: PT OT SW  Code Status: Full for now, patient and daughter need more time to think about it.    History of Present Illness  Subjective: Presenting for Acute onset CP/Dyspnea  while walking in her house. Had to stop her acitivities and sit does as she felt unwell. Associated with 1-2 coughing episodes that were non productive and associated with chills. No sick contacts/recent travel. Symptoms improved in the ER    ED course: Rocephin/Doxy given, O2 started    ROS: 10 point ROS neg other than the symptoms noted above in the HPI.     Environment/ADLs: Lives at home    Physical Exam  BP (!) 155/70   Pulse 84   Temp 100.3  F (37.9  C) (Oral)   Resp 24   SpO2 97%     Constitutional: Aox3, not in distress  HEENT: Normocephalic, no scleral icterus  Abdomen: soft, no distention/tenderness/guarding  Lungs: coarse breath sounds bilaterally  Heart: Regular s1s2, no evidence of murmurs  Extremities/Neuro:No focal strength/sensation deficits, bilateral LE edema  Skin: No obvious signs of skin breakdown  Psychiatric: appropriate affect, insight and judgment  EKG: NSR    I have personally spent 76 minutes total time today in preparing to see the patient (eg, review of tests), obtaining and/or reviewing separately obtained history, performing a medically appropriate examination and/or evaluation, counseling and educating the patient/family/caregiver, ordering medications, tests, or procedures, referring and communicating with other health care professionals, documenting clinical information in the electronic or other health record, independently interpreting results and communicating results to the patient/ family/caregiver and care coordination.    EMR History    Past Medical Hx:   Past Medical History:   Diagnosis Date    Benign essential HTN     Coronary artery disease involving native coronary artery of native heart with unstable angina pectoris (H) 4/2016 4/2016 Cath - 80% hazy stenosis at site of ruptured plaque - HARLEEN placed    Cough     Eczema     Lung nodule     Malignant neoplasm of bladder, part unspecified 1999 Dr Everett    Surgery done in [place]    Mitral regurgitation     4/2016  mod-mod severe MR with multiple jets per Echo    Mixed hyperlipidemia     NSTEMI (non-ST elevated myocardial infarction) (H)     Osteoporosis     Polyarticular arthritis     Rash     Sciatica         Home Medications: Present in Med Rec    Allergies:   Allergies   Allergen Reactions    Azithromycin     Gabapentin Itching    Kanamycin     Metronidazole Rash    Pcn [Penicillins] Rash    Streptomycin      rash    Atorvastatin Rash    Lisinopril Rash        Pertinent Family Hx:   Family History   Problem Relation Age of Onset    Diabetes Brother     Hypertension Son     Cerebrovascular Disease Mother         76    Family History Negative Daughter     Family History Negative Brother     Family History Negative Brother         Surgical Hx:   Past Surgical History:   Procedure Laterality Date    BLADDER TUMOR-ASSOCIATED      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    CATARACT IOL, RT/LT  5-08    HC REMOVAL GALLBLADDER      due to stone 1990    HEART CATH STENT COR W/WO PTCA  4/2016 4/2016 Cath - 80% hazy stenosis at site of ruptured plaque - HARLEEN placed    ZZC APPENDECTOMY      1950        Substance Hx:   History   Drug Use No   ,  Social History    Substance and Sexual Activity      Alcohol use: No  ,   History   Smoking Status    Never   Smokeless Tobacco    Never   ,   History   Sexual Activity    Sexual activity: Not Currently              Imaging/Labs    Imaging: CT Chest Pulmonary Embolism w Contrast    Result Date: 10/18/2023  EXAM: CT CHEST PULMONARY EMBOLISM W CONTRAST LOCATION: [facility] DATE: 10/18/2023 INDICATION: Abrupt shortness of breath with chest pain COMPARISON: 03/12/2020 TECHNIQUE: CT chest pulmonary angiogram during arterial phase injection of IV contrast. Multiplanar reformats and MIP reconstructions were performed. Dose reduction techniques were used. CONTRAST: 52 mL Isovue 370 FINDINGS: ANGIOGRAM CHEST: Pulmonary arteries are normal caliber and negative for pulmonary  emboli. Thoracic aorta is negative for dissection. No CT evidence of right heart strain. LUNGS AND PLEURA: Multifocal airspace opacities are seen in the bilateral lung, with trace left pleural effusion. No right pleural effusion. No pneumothorax. 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 MEDIASTINUM/AXILLAE: No lymphadenopathy. Stable mild cardiomegaly without pericardial effusion. CORONARY ARTERY CALCIFICATION: Mild in the left coronary artery distribution. UPPER ABDOMEN: Normal. MUSCULOSKELETAL: A healed fracture deformities in the sternum is redemonstrated. No acute fracture or suspicious osseous lesions.     IMPRESSION: 1.  No pulmonary embolism. 2.  Bilateral multifocal pneumonia. 3.  Stable mild cardiomegaly.         Recent Labs   Lab Test 10/18/23  2025 09/19/21  2131    137   POTASSIUM 4.3 4.6   CHLORIDE 105 107   CO2 23 25   ANIONGAP 11 5   * 135*   BUN 21.6 22   CR 0.78 0.96   CLAIRE 9.5 9.4     CBC RESULTS:   Recent Labs   Lab Test 10/18/23  2025   WBC 10.6   RBC 4.01   HGB 12.0   HCT 36.6   MCV 91   MCH 29.9   MCHC 32.8   RDW 12.7         Liver Function Studies -   Recent Labs   Lab Test 10/18/23  2025   PROTTOTAL 7.4   ALBUMIN 3.8   BILITOTAL 0.3   ALKPHOS 77   AST 27   ALT 11      Troponin I ES   Date Value Ref Range Status   03/13/2020 <0.015 0.000 - 0.045 ug/L Final     Comment:     The 99th percentile for upper reference range is 0.045 ug/L.  Troponin values   in the range of 0.045 - 0.120 ug/L may be associated with risks of adverse   clinical events.

## 2023-10-19 NOTE — PROGRESS NOTES
RECEIVING UNIT ED HANDOFF REVIEW    ED Nurse Handoff Report was reviewed by: Francia Nicholas RN on October 19, 2023 at 12:52 PM

## 2023-10-19 NOTE — ED TRIAGE NOTES
CP and SOB since 1600.     Triage Assessment (Adult)       Row Name 10/18/23 2014          Triage Assessment    Airway WDL WDL        Respiratory WDL    Respiratory WDL X;rhythm/pattern     Rhythm/Pattern, Respiratory shallow;shortness of breath;tachypneic        Skin Circulation/Temperature WDL    Skin Circulation/Temperature WDL X;temperature     Skin Temperature warm        Cardiac WDL    Cardiac WDL X;chest pain        Chest Pain Assessment    Chest Pain Location midsternal;upper back, right;upper back, left      Associated Signs/Symptoms dyspnea        Peripheral/Neurovascular WDL    Peripheral Neurovascular WDL WDL        Cognitive/Neuro/Behavioral WDL    Cognitive/Neuro/Behavioral WDL WDL

## 2023-10-19 NOTE — ED PROVIDER NOTES
History     Chief Complaint:  Chest Pain and Shortness of Breath       The history is provided by the patient.      Mary Polanco is a 89 year old female with a history of hypertension, hyperlipidemia, coronary artery disease, gout, stent placement 7 years ago who presents to the ED with sudden onset shortness of breath since late this afternoon. Patient was well yesterday and went on a walk this afternoon at 1:30 pm. A couple hours after the walk, the patient suddenly developed chest and back pain and had difficulties breathing. Patient also had a 100.3 fever and chills. The patient denies current chest pain. Additionally, she denies nausea, abdominal pain, consistent cough, runny noise, sore throat, or pain with deep breath. Patient has not history of lung problem or smoking.     Independent Historian:   Daughter - They report past medical history  Daughter assists with translation as patient is Mandarin speaking.  Review of External Notes:   none     Medications:    acetaminophen (TYLENOL) 325 MG tablet  allopurinol (ZYLOPRIM) 100 MG tablet  amLODIPine (NORVASC) 5 MG tablet  aspirin EC 81 MG EC tablet  carvedilol (COREG) 6.25 MG tablet  isosorbide mononitrate (IMDUR) 30 MG 24 hr tablet  nitroGLYcerin (NITROSTAT) 0.4 MG sublingual tablet  senna (SENOKOT) 8.6 MG tablet      Past Medical History:    Past Medical History:   Diagnosis Date    Benign essential HTN     Coronary artery disease involving native coronary artery of native heart with unstable angina pectoris (H) 4/2016    Cough     Eczema     Lung nodule     Malignant neoplasm of bladder, part unspecified 1999 Dr Everett    Mitral regurgitation     Mixed hyperlipidemia     NSTEMI (non-ST elevated myocardial infarction) (H)     Osteoporosis     Polyarticular arthritis     Rash     Sciatica      Past Surgical History:    Past Surgical History:   Procedure Laterality Date    BLADDER TUMOR-ASSOCIATED      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    CATARACT  IOL, RT/LT  5-08    HC REMOVAL GALLBLADDER      due to stone 1990    HEART CATH STENT COR W/WO PTCA  4/2016 4/2016 Cath - 80% hazy stenosis at site of ruptured plaque - HARLEEN placed    ZZC APPENDECTOMY      1950     Physical Exam   Patient Vitals for the past 24 hrs:   BP Temp Temp src Pulse Resp SpO2   10/18/23 2330 136/66 (!) 101.2  F (38.4  C) Temporal 84 20 97 %   10/18/23 2150 -- -- -- 84 24 97 %   10/18/23 2049 -- -- -- 78 20 98 %   10/18/23 2036 (!) 155/70 -- -- 79 -- --   10/18/23 2033 -- -- -- 80 -- 97 %   10/18/23 2028 136/82 -- -- 81 16 98 %   10/18/23 2023 -- -- -- 85 27 96 %   10/18/23 2020 -- -- -- -- -- 90 %   10/18/23 2019 (!) 146/72 -- -- 88 -- --   10/18/23 2013 (!) 144/61 100.3  F (37.9  C) Oral 84 (!) 35 (!) 81 %     Physical Exam  GENERAL: Patient appears tachypneic and moderate distress.  HEAD: Atraumatic.  NECK: No rigidity  CV: RRR, no murmurs rubs or gallops  PULM: CTAB.  Mild retractions bilaterally.  ABD: Soft, nontender, nondistended, no guarding  DERM: No rash. Skin warm and dry  EXTREMITY: Moving all extremities without difficulty. No calf tenderness or peripheral edema  VASCULAR: Symmetric pulses bilaterally    Emergency Department Course   ECG  ECG taken at 2021, ECG read at 2022  Normal sinus rhythm  Normal ECG   Rate 85 bpm. MN interval 170 ms. QRS duration 86 ms. QT/QTc 360/428 ms. P-R-T axes 69 47 71.     Imaging:  CT Chest Pulmonary Embolism w Contrast   Final Result   IMPRESSION:      1.  No pulmonary embolism.      2.  Bilateral multifocal pneumonia.      3.  Stable mild cardiomegaly.      Echocardiogram Complete    (Results Pending)     Laboratory:  Labs Ordered and Resulted from Time of ED Arrival to Time of ED Departure   COMPREHENSIVE METABOLIC PANEL - Abnormal       Result Value    Sodium 139      Potassium 4.3      Carbon Dioxide (CO2) 23      Anion Gap 11      Urea Nitrogen 21.6      Creatinine 0.78      GFR Estimate 70      Calcium 9.5      Chloride 105      Glucose  136 (*)     Alkaline Phosphatase 77      AST 27      ALT 11      Protein Total 7.4      Albumin 3.8      Bilirubin Total 0.3     BLOOD GAS VENOUS WITH OXYHEMOGLOBIN - Abnormal    pH Venous 7.40      pCO2 Venous 40      pO2 Venous 24 (*)     Bicarbonate Venous 25      FIO2 21      Oxyhemoglobin Venous 37 (*)     Base Excess/Deficit -0.1     TROPONIN T, HIGH SENSITIVITY - Normal    Troponin T, High Sensitivity 11     INFLUENZA A/B, RSV, & SARS-COV2 PCR - Normal    Influenza A PCR Negative      Influenza B PCR Negative      RSV PCR Negative      SARS CoV2 PCR Negative     LACTIC ACID WHOLE BLOOD - Normal    Lactic Acid 1.8     NT PROBNP INPATIENT - Normal    N terminal Pro BNP Inpatient 236     CBC WITH PLATELETS AND DIFFERENTIAL    WBC Count 10.6      RBC Count 4.01      Hemoglobin 12.0      Hematocrit 36.6      MCV 91      MCH 29.9      MCHC 32.8      RDW 12.7      Platelet Count 284      % Neutrophils 67      % Lymphocytes 19      % Monocytes 9      Mids % (Monos, Eos, Basos)        % Eosinophils 3      % Basophils 1      % Immature Granulocytes 1      NRBCs per 100 WBC 0      Absolute Neutrophils 7.2      Absolute Lymphocytes 2.0      Absolute Monocytes 1.0      Mids Abs (Monos, Eos, Basos)        Absolute Eosinophils 0.3      Absolute Basophils 0.1      Absolute Immature Granulocytes 0.1      Absolute NRBCs 0.0     BLOOD CULTURE   BLOOD CULTURE     Emergency Department Course & Assessments:         Interventions:  Medications   melatonin tablet 1 mg (has no administration in time range)   sodium chloride 0.9 % infusion ( Intravenous $New Bag 10/18/23 2335)   acetaminophen (TYLENOL) tablet 650 mg (650 mg Oral $Given 10/18/23 2338)     Or   acetaminophen (TYLENOL) Suppository 650 mg ( Rectal See Alternative 10/18/23 2338)   ondansetron (ZOFRAN ODT) ODT tab 4 mg (has no administration in time range)     Or   ondansetron (ZOFRAN) injection 4 mg (has no administration in time range)   prochlorperazine (COMPAZINE)  injection 5 mg (has no administration in time range)     Or   prochlorperazine (COMPAZINE) tablet 5 mg (has no administration in time range)     Or   prochlorperazine (COMPAZINE) suppository 12.5 mg (has no administration in time range)   cefTRIAXone (ROCEPHIN) 1 g vial to attach to  mL bag for ADULTS or NS 50 mL bag for PEDS (has no administration in time range)   doxycycline (VIBRAMYCIN) 100 mg vial to attach to  mL bag (100 mg Intravenous $New Bag 10/18/23 2335)   allopurinol (ZYLOPRIM) tablet 100 mg (has no administration in time range)   amLODIPine (NORVASC) tablet 2.5 mg (has no administration in time range)   aspirin EC tablet 81 mg (has no administration in time range)   carvedilol (COREG) tablet 6.25 mg (has no administration in time range)   nitroGLYcerin (NITROSTAT) sublingual tablet 0.4 mg (has no administration in time range)   isosorbide mononitrate (IMDUR) 24 hr tablet 30 mg (has no administration in time range)   sennosides (SENOKOT) tablet 1 tablet (1 tablet Oral Not Given 10/19/23 0028)   iopamidol (ISOVUE-370) solution 52 mL (52 mLs Intravenous $Given 10/18/23 2126)   Saline Flush (80 mLs Intravenous $Given 10/18/23 2126)   cefTRIAXone (ROCEPHIN) 1 g vial to attach to  mL bag for ADULTS or NS 50 mL bag for PEDS (0 g Intravenous Stopped 10/18/23 2331)      Independent Interpretation (X-rays, CTs, rhythm strip):  None    Consultations/Discussion of Management or Tests:  ED Course as of 10/19/23 0121   Wed Oct 18, 2023   2025 I obtained history and examined the patient as noted above.     Dr. Broussard - internal medicine    Social Determinants of Health affecting care:   None    Disposition:  The patient was admitted to the hospital under the care of Dr. Aarti MD.     Impression & Plan    CMS Diagnoses: None    Medical Decision Making:  Symptoms consistent with pneumonia.   Chronic conditions complicating -coronary disease, hypertension, elderly.  DDx considered ACS, PE,  myocarditis, sepsis.  Patient was promptly brought to stabilization room due to hypoxia and tachypnea.  She was placed on nasal cannula and she felt much better.  Initial concern was for PE due to abrupt onset of shortness of breath and chest pain with clear lungs.  CT PE was ordered.  CT scan returned negative for PE but did show multifocal pneumonia.  Initial temperature 100.3, which thought could be due to pneumonia versus a PE.  Labs showed no leukocytosis.  Lactate within normal limits.  Not consistent with severe sepsis. CMP unremarkable.  Troponin and BNP within normal limits.  COVID and flu test were negative.  Patient high-risk for discharge.  Given IV ceftriaxone and doxycycline restarted promptly upon return of CT scan result.   Patient later spiked a temperature.  Fever treated with Tylenol.  Discussed with hospital Dr.  Patient to be admitted.        Diagnosis:    ICD-10-CM    1. Multifocal pneumonia  J18.9         Scribe Disclosure:  Kishor BUSCH, am serving as a scribe at 1:15 AM on 10/19/2023 to document services personally performed by Micky Chappell MD based on my observations and the provider's statements to me.   10/18/2023   Micky Chappell MD Foss, Kevin, MD  10/19/23 0140

## 2023-10-19 NOTE — PROGRESS NOTES
[facility]    Medicine Progress Note - Hospitalist Service    Date of Admission:  10/18/2023    Assessment & Plan   89F hx of Tinnitus, Gout, Osteoporosis, CAD s/p stenting 2019 presenting with Dyspnea and CP found to have multifocal PNA.      Sepsis 2/2 Multifocal Pneumonia  Acute hypoxic Respiratory Failure  ``CT PE: multifocal pna  ``Presenting for Acute onset CP/Dyspnea while walking in her house. Had to stop her acitivities and sit does as she felt unwell. Associated with 1-2 coughing episodes that were non productive and associated with chills. No sick contacts/recent travel  ``In the ER, SBP 150s, T 100.3>101.2, labs showed no evidence of LA or WBC elevations. Given doxy and rocephin. Patient stated she felt better but still had SOB. Hypoxic on RA so placed on 2-3L NC. Exam unremarkable apart from LE edema and coarse breath sounds. Not in distress. EKG NSR, Trop negative, Viral Panel negative.  - O2 as needed, goal 90% or higher  - rocephin/doxy continued  - follow blood cultures  - HOB 30 degress  - PT/OT/SW  - Tylenol prn  - Admission given CURB 65 score 2, moderate risk of decompensation.      HTN  CAD s/p LCx stenting 2016  Cardiomegaly  Hx Palpitations  *CT: Stable Mild Cardiomegaly  *Negative stress test in 2019  *reported Hx of refusing statin therapy  *Presenting with acute CP/dyspnea, CT PE negative for PE. Trop/EKG negative  - TTE  - will continue aspirin/coreg/imdur/PRN nitroglycerin     Chronic Lymphedema  - PT OT SW  - SCDs     Gout  Osteoporosis  - will resume allopurinol     Bladder tumor s/p resection  - no acute intervention     Tinnitus/Sensorineural Hearing loss  - No acute intervention        Diet: Combination Diet Regular Diet Adult    DVT Prophylaxis: Pneumatic Compression Devices  Gaona Catheter: Not present  Lines: None     Cardiac Monitoring: None  Code Status: Full Code    Clinically Significant Risk Factors Present on Admission           # Hypercalcemia:  corrected calcium is >10.1, will monitor as appropriate    # Hypoalbuminemia: Lowest albumin = 2.9 g/dL at 10/19/2023  7:54 AM, will monitor as appropriate   # Drug Induced Platelet Defect: home medication list includes an antiplatelet medication   # Hypertension: Noted on problem list                 Disposition Plan     Expected Discharge Date: 10/19/2023                    Renny Izaguirre MD  Hospitalist Service  [facility]  Securely message with [employer] [place] (more info)  Text page via [facility] Paging/Directory   ______________________________________________________________________    Interval History   Care assumed today. Seen and examined in ED. Son interpreting. No new complaints or issues reported. Still needing O2.     Physical Exam   Vital Signs: Temp: 98.3  F (36.8  C) Temp src: Oral BP: 130/57 Pulse: 71   Resp: 19 SpO2: 93 % O2 Device: Nasal cannula Oxygen Delivery: 2 LPM  Weight: 0 lbs 0 oz    Gen: NAD, pleasant, fatigued - son present and interpreting  HEENT: EOMI, MMM  Resp: no focal crackles, no wheezes, no increased work of resp  CV: S1S2 heard, reg rhythm, reg rate  Abdo: soft, nontender, nondistended, bowel sounds present  Ext: calves nontender, well perfused  Neuro: aa, conversant in Mandarin, CN grossly intact, no facial asymmetry      Medical Decision Making       38 MINUTES SPENT BY ME on the date of service doing chart review, history, exam, documentation & further activities per the note.      Data     I have personally reviewed the following data over the past 24 hrs:    9.7  \   10.7 (L)   / 226     139 109 (H) 15.3 /  102 (H)   3.8 21 (L) 0.68 \     ALT: 8 AST: 21 AP: 57 TBILI: 0.5   ALB: 2.9 (L) TOT PROTEIN: 6.2 (L) LIPASE: N/A     Trop: 11 BNP: 236     Procal: N/A CRP: N/A Lactic Acid: 1.5

## 2023-10-19 NOTE — ED NOTES
[facility]  ED Nurse Handoff Report    ED Chief complaint: Chest Pain and Shortness of Breath      ED Diagnosis:   Final diagnoses:   Multifocal pneumonia       Code Status: Full Code    Allergies:   Allergies   Allergen Reactions    Azithromycin     Gabapentin Itching    Kanamycin     Metronidazole Rash    Pcn [Penicillins] Rash    Streptomycin      rash    Atorvastatin Rash    Lisinopril Rash       Patient Story: Lives with family, Mandarin speaking only. Presents with 1 days of cough and SOB.  Focused Assessment:  Alert and oriented x4. In 5L of O2 via NC. 18G in L AC. Stand by assist. Sinus rhythm.     Labs Ordered and Resulted from Time of ED Arrival to Time of ED Departure   BLOOD GAS VENOUS WITH OXYHEMOGLOBIN - Abnormal       Result Value    pH Venous 7.40      pCO2 Venous 40      pO2 Venous 24 (*)     Bicarbonate Venous 25      FIO2 21      Oxyhemoglobin Venous 37 (*)     Base Excess/Deficit -0.1     LACTIC ACID WHOLE BLOOD - Normal    Lactic Acid 1.8     CBC WITH PLATELETS AND DIFFERENTIAL    WBC Count 10.6      RBC Count 4.01      Hemoglobin 12.0      Hematocrit 36.6      MCV 91      MCH 29.9      MCHC 32.8      RDW 12.7      Platelet Count 284      % Neutrophils 67      % Lymphocytes 19      % Monocytes 9      Mids % (Monos, Eos, Basos)        % Eosinophils 3      % Basophils 1      % Immature Granulocytes 1      NRBCs per 100 WBC 0      Absolute Neutrophils 7.2      Absolute Lymphocytes 2.0      Absolute Monocytes 1.0      Mids Abs (Monos, Eos, Basos)        Absolute Eosinophils 0.3      Absolute Basophils 0.1      Absolute Immature Granulocytes 0.1      Absolute NRBCs 0.0     TROPONIN T, HIGH SENSITIVITY   COMPREHENSIVE METABOLIC PANEL   INFLUENZA A/B, RSV, & SARS-COV2 PCR   NT PROBNP INPATIENT   BLOOD CULTURE   BLOOD CULTURE     CT Chest Pulmonary Embolism w Contrast    (Results Pending)         Treatments and/or interventions provided: Monitoring  Patient's response to treatments  and/or interventions: Tolerated well    To be done/followed up on inpatient unit:  Continue with plan of care per admitting MD.      Does this patient have any cognitive concerns?:  N/A    Activity level - Baseline/Home:  Independent  Activity Level - Current:   Stand with Assist    Patient's Preferred language: Mandarin   Needed?: Yes    Isolation: COVID r/o and special precautions  Infection: COVID r/o and special precautions  Patient tested for COVID 19 prior to admission: YES  Bariatric?: No    Vital Signs:   Vitals:    10/18/23 2020 10/18/23 2023 10/18/23 2028 10/18/23 2033   BP:   136/82    Pulse:  85 81 80   Resp:  27 16    Temp:       TempSrc:       SpO2: 90% 96% 98% 97%       Cardiac Rhythm:     Was the PSS-3 completed:   Yes  What interventions are required if any?               Family Comments: Family at bedside  OBS brochure/video discussed/provided to patient/family: N/A                For the majority of the shift this patient's behavior was Green.       ED NURSE PHONE NUMBER: *[phone]

## 2023-10-20 NOTE — PROGRESS NOTES
Pt arrived to this unit at ~ 1315. Mandarin speaking. A&O x4, up x1 with walker and GB. VSS.On O2 2L. LS clear. Denied SOB or CP. Had pain to R greater toe when walking. Regular diet. Adequate I&O.Continent. Ambulated to the BR. Doesn't like purewick. Uses IS. PT recommends TCU.

## 2023-10-20 NOTE — PLAN OF CARE
Goal Outcome Evaluation:    Denies CP. 2LPM NC O2 at rest. Ipad interpretor used for assessments, med pass etc. All pt needs met at this time. PT ambulated with pt in hallway with 2 LPM NC and pt dropped to 87%, RN increased O2 to 3 L NC O2 when pt ambulated to bathroom and dropped to 91% when ambulating back to bed.

## 2023-10-20 NOTE — PROGRESS NOTES
Patient is A&Ox4, mandarin speaking. Denies pain, SOB during the shift. SL, receives IV abx as scheduled. VSS on O2 2L on NC. Sleeping well during the night. Pending discharge.

## 2023-10-20 NOTE — PROGRESS NOTES
"   10/20/23 1213   Appointment Info   Signing Clinician's Name / Credentials (OT) Uri Fuller, OTR/L       Present yes   Language Mandrin   Living Environment   People in Home alone   Current Living Arrangements apartment   Home Accessibility no concerns   Transportation Anticipated family or friend will provide   Living Environment Comments Pt lives alone but has son and daughter who live nearby   Self-Care   Usual Activity Tolerance good   Current Activity Tolerance moderate   Equipment Currently Used at Home none   Fall history within last six months no   Activity/Exercise/Self-Care Comment Pt IND for mobility with use of FWW. Has assist from PCA 8.5 hours per week who assists with bathing and housekeeping tasks. Pt is otherwise independent with ADL. Uses FWW out of apartment and no AD inside.   General Information   Onset of Illness/Injury or Date of Surgery 10/18/23   Referring Physician Angel Broussard MD   Patient/Family Therapy Goal Statement (OT) To return home   Additional Occupational Profile Info/Pertinent History of Current Problem 89F hx of Tinnitus, Gout, Osteoporosis, CAD s/p stenting 2019 presenting with Dyspnea and CP found to have multifocal PNA.   Existing Precautions/Restrictions fall;oxygen therapy device and L/min  (2 LPM)   Cognitive Status Examination   Orientation Status orientation to person, place and time   Follows Commands follows one-step commands;75-90% accuracy;delayed response/completion;repetition of directions required   Cognitive Status Comments Patient's son reports that memory is \"so so\". Difficulty to assess cognition given language barrier and  delay.  Will continue to monitor functional cognition.   Pain Assessment   Patient Currently in Pain Yes, see Vital Sign flowsheet  (Pain at IV site)   Range of Motion Comprehensive   General Range of Motion bilateral upper extremity ROM WFL   Strength Comprehensive (MMT)   Comment, General Manual " Muscle Testing (MMT) Assessment Global weakness noted with ADL and B UE MMT   Coordination   Upper Extremity Coordination No deficits were identified   Transfers   Transfers sit-stand transfer;toilet transfer   Sit-Stand Transfer   Sit-Stand [place] (Transfers) supervision   Assistive Device (Sit-Stand Transfers) walker, front-wheeled   Toilet Transfer   [place] Level (Toilet Transfer) supervision   Assistive Device (Toilet Transfer) walker, front-wheeled   Activities of Daily Living   BADL Assessment/Intervention toileting;grooming   Grooming Assessment/Training   Position (Grooming) sink side   [place] Level (Grooming) oral care regimen;supervision   Toileting   [place] Level (Toileting) toileting skills;adjust/manage clothing;perform perineal hygiene;supervision   Clinical Impression   Criteria for Skilled Therapeutic Interventions Met (OT) Yes, treatment indicated   OT Diagnosis Decreased ADL IND   OT Problem List-Impairments impacting ADL problems related to;activity tolerance impaired;cognition;mobility;strength   Assessment of Occupational Performance 3-5 Performance Deficits   Identified Performance Deficits Dressing, bathing, functional mobility, home mgmt   Planned Therapy Interventions (OT) ADL retraining;IADL retraining;cognition;transfer training;strengthening;home program guidelines;progressive activity/exercise   Clinical Decision Making Complexity (OT) problem focused assessment/low complexity   Risk & Benefits of therapy have been explained evaluation/treatment results reviewed;care plan/treatment goals reviewed;risks/benefits reviewed;current/potential barriers reviewed;participants voiced agreement with care plan;participants included;patient   OT Total Evaluation Time   OT Eval, Low Complexity Minutes (12517) 10   OT Goals   Therapy Frequency (OT) 5 times/week   OT Predicted Duration/Target Date for Goal Attainment 10/27/23   OT Goals Hygiene/Grooming;Upper Body Dressing;Lower  Body Dressing;Upper Body Bathing;Toilet Transfer/Toileting;Meal Preparation   OT: Hygiene/Grooming modified independent;using adaptive equipment   OT: Upper Body Dressing Modified independent;using adaptive equipment;including set-up/clothing retrieval   OT: Lower Body Dressing Modified independent;using adaptive equipment;including set-up/clothing retrieval   OT: Upper Body Bathing Modified independent;using adaptive equipment   OT: Toilet Transfer/Toileting Modified independent;toilet transfer;cleaning and garment management   OT: Meal Preparation Modified independent;with simple meal preparation   Self-Care/Home Management   Self-Care/Home Mgmt/ADL, Compensatory, Meal Prep Minutes (10923) 90   Symptoms Noted During/After Treatment (Meal Preparation/Planning Training) fatigue;shortness of breath   Treatment Detail/Skilled Intervention Patient is seated in recliner at encounter and agreeable to OT. Extended time for communication and direction with session given  present. STS and ambulating to and from bathroom with close SBA-CGA and FWW. Moderate cueing for appropriate WW use and to keep WW with pt during pivots and ADL. spO2 stabe pre activity and post activity on 2 L O2. SOB during activity and pt cued for deep breathing. Tolerates 4 min standing at sink to complete g/h task of oral cares. Requires repition of cues to engage in task. Seated in recliner and pt reporting increased pain at IV site. RN notifed promptly. Providing education to patient and son on recommendations given pt currently level of function. Edu that pt would be unsafe to return home alone at this point. Left with alarm on and RN present attenting to pt needs.   OT Discharge Planning   OT Plan Monitor cognition with ADL, ADL tolerance, dressing   OT Discharge Recommendation (DC Rec) Transitional Care Facility   OT Rationale for DC Rec Patient is currently below baseline and limited by weakness and decreased activity tolerance.  Recommend continued skilled OT at TCU to progress safety and IND with I/ADL.   OT Brief overview of current status SB-CGA with FWW   Total Session Time   Timed Code Treatment Minutes 25   Total Session Time (sum of timed and untimed services) 35

## 2023-10-20 NOTE — PROGRESS NOTES
[id number]    Pt A&Ox4; VSS on RA. Denies pain or SOB this shift. Ambulated to BR x2 this shift; voiding well. A of 1 GB/W. SL between IV abx. Mandarin speaking. Takes pills whole with water. Recommends discharge to TCU per PT note.

## 2023-10-20 NOTE — PROGRESS NOTES
[facility]    Medicine Progress Note - Hospitalist Service    Date of Admission:  10/18/2023    Assessment & Plan   89F hx of Tinnitus, Gout, Osteoporosis, CAD s/p stenting 2019 presenting with Dyspnea and CP found to have multifocal PNA.      Sepsis 2/2 Multifocal Pneumonia  Acute hypoxic Respiratory Failure  ``CT PE: multifocal pna  ``Presenting for Acute onset CP/Dyspnea while walking in her house. Had to stop her acitivities and sit does as she felt unwell. Associated with 1-2 coughing episodes that were non productive and associated with chills. No sick contacts/recent travel  ``In the ER, SBP 150s, T 100.3>101.2, labs showed no evidence of LA or WBC elevations. Given doxy and rocephin. Patient stated she felt better but still had SOB. Hypoxic on RA so placed on 2-3L NC. Exam unremarkable apart from LE edema and coarse breath sounds. Not in distress. EKG NSR, Trop negative, Viral Panel negative.  - O2 as needed, goal 90% or higher, now on 2 L at rest, 3 L with activity  - rocephin/doxy continued  - follow blood cultures (no growth after 1 day)  - HOB 30 degress  - PT/OT/SW  - Tylenol prn  - Admission given CURB 65 score 2, moderate risk of decompensation.      HTN  CAD s/p LCx stenting 2016  Cardiomegaly  Hx Palpitations  *CT: Stable Mild Cardiomegaly  *Negative stress test in 2019  *reported Hx of refusing statin therapy  *Presenting with acute CP/dyspnea, CT PE negative for PE. Trop/EKG negative  - TTE shows EF 60 to 65%, mild pulmonary hypertension  - continue aspirin/coreg/imdur/PRN nitroglycerin     Chronic Lymphedema  - PT OT SW  - SCDs     Gout  Osteoporosis  - resume allopurinol     Bladder tumor s/p resection  - no acute intervention     Tinnitus/Sensorineural Hearing loss  - No acute intervention      Diet: Combination Diet Regular Diet Adult    DVT Prophylaxis: Pneumatic Compression Devices  Gaona Catheter: Not present  Lines: None     Cardiac Monitoring: None  Code Status:  "Full Code    Clinically Significant Risk Factors           # Hypercalcemia: corrected calcium is >10.1, will monitor as appropriate    # Hypoalbuminemia: Lowest albumin = 2.9 g/dL at 10/19/2023  7:54 AM, will monitor as appropriate     # Hypertension: Noted on problem list                   Disposition Plan      Expected Discharge Date: 10/23/2023      Destination: TCU         Bárbara De Paz MD  Hospitalist Service  [facility]  Securely message with [employer] (more info)  Text page via [employer] Paging/Directory   ______________________________________________________________________    Interval History   \"The air is a little harder when I am walking.\"  Patient seen with benefit of a Mandarin .  She denies feeling short of breath at rest, but says she does feel winded with activity.  She says she is not coughing at all and has no GI complaints.  Her son is at the bedside, he asks about timing for discharge.  They are both pleased with tentative plan for discharge to TCU.    Physical Exam   Vital Signs: Temp: 98.1  F (36.7  C) Temp src: Oral BP: 137/67 Pulse: 64   Resp: 16 SpO2: 96 % O2 Device: Nasal cannula Oxygen Delivery: 1.5 LPM  Weight: 0 lbs 0 oz    Constitutional: Awake, alert, cooperative, no apparent distress  Respiratory: Clear to auscultation anteriorly  Cardiovascular: Regular rate and rhythm  GI: Normal bowel sounds, soft, non-distended, non-tender  Skin/Integumen: No rash on exposed skin.  No lower extremity edema  Other: Mood is very pleasant        Medical Decision Making       35 MINUTES SPENT BY ME on the date of service doing chart review, history, exam, documentation & further activities per the note.      Data     I have personally reviewed the following data over the past 24 hrs:    8.8  \   11.0 (L)   / 240     142 111 (H) 15.1 /  100 (H)   3.9 22 0.65 \     Procal: N/A CRP: N/A Lactic Acid: 1.5         "

## 2023-10-20 NOTE — CONSULTS
Care Management Initial Consult    General Information  Assessment completed with: Patient ( services: Tova), Patient, Son  Type of CM/SW Visit: Initial Assessment    Primary Care Provider verified and updated as needed: Yes   Readmission within the last 30 days: no previous admission in last 30 days      Reason for Consult: discharge planning  Advance Care Planning:            Communication Assessment  Patient's communication style: spoken language (non-English)    Hearing Difficulty or Deaf: yes   Wear Glasses or Blind: yes    Cognitive  Cognitive/Neuro/Behavioral: WDL        Orientation: oriented x 4             Living Environment:   People in home: alone     Current living Arrangements: apartment      Able to return to prior arrangements:  (U rec)       Family/Social Support:  Care provided by: self (PCA)  Provides care for: no one, unable/limited ability to care for self  Marital Status:   Children          Description of Support System: Supportive, Involved    Support Assessment: Adequate family and caregiver support    Current Resources:   Patient receiving home care services: No     Community Resources: None  Equipment currently used at home: grab bar, tub/shower, shower chair  Supplies currently used at home: None    Employment/Financial:  Employment Status: retired        Financial Concerns: none   Referral to Financial Worker: No       Does the patient's insurance plan have a 3 day qualifying hospital stay waiver?  No    Lifestyle & Psychosocial Needs:  Social Determinants of Health     Food Insecurity: Not on file   Depression: Not on file   Housing Stability: Not on file   Tobacco Use: Low Risk  (11/21/2022)    Patient History     Smoking Tobacco Use: Never     Smokeless Tobacco Use: Never     Passive Exposure: Not on file   Financial Resource Strain: Not on file   Alcohol Use: Not on file   Transportation Needs: Not on file   Physical Activity: Not on file   Interpersonal Safety:  Not on file   Stress: Not on file   Social Connections: Not on file       Functional Status:  Prior to admission patient needed assistance:   Dependent ADLs:: Independent  Dependent IADLs:: Cleaning, Cooking, Laundry, Meal Preparation       Mental Health Status:  Mental Health Status: No Current Concerns       Chemical Dependency Status:  Chemical Dependency Status: No Current Concerns             Values/Beliefs:  Spiritual, Cultural Beliefs, Adventism Practices, Values that affect care: yes               Additional Information:  CM initial consult    Care management was consulted for discharge planning/disposition.  Writer completed consult with the patient using  service: [employer].  's name is Naheed.  patient's son was present in the room.    Patient stated that he lived in an apartment alone.     Patient stated that he moved around independently with no assistive equipment. Patient stated they have grab bars in the bathroom and use a shower chair.     Patient stated they have a PCA that helps them bathe cook and clean through the insurance.  Patient stated they can dress and managed her own medications independently.     Patient denied receiving home health care or being connected to any community resources.    Patient stated they are earning income through Social Security.  Patient stated there are no financial concerns.      Writer updated patient on current discharge planning.  Patient is agreeing to TCU recommendation.  Writer provided education as to the process of discharge planning and what is covered by insurance.     Patient's son provided 3 choices for TCU: [facility], [facility], and [facility].    Writer will send out those referrals and update family.          GUSTAVO Murrieta  [facility]  Social Work

## 2023-10-21 NOTE — PLAN OF CARE
Goal Outcome Evaluation:        Ipad used for interpretation. VSS on room air, up with assist of 1 with gait belt and walker to bathroom. Voiding adequately, denies pain, denies chest pain. On 2-3L O2 via NC at rest and with activity. O2 saturation between 90-94%. Continue with plan of care.

## 2023-10-21 NOTE — PLAN OF CARE
Goal Outcome Evaluation:    Denies CP. 1.5 LPM NC O2 when at rest, 3 LPM O2 when ambulating, pt O2 fell to a brief 89% but went back up to 90%. All pt needs met at this time. Ipad interpretor used for all assessments and interactions with pt.

## 2023-10-21 NOTE — PROGRESS NOTES
[facility]    Medicine Progress Note - Hospitalist Service    Date of Admission:  10/18/2023    Assessment & Plan   89F hx of Tinnitus, Gout, Osteoporosis, CAD s/p stenting 2019 presenting with Dyspnea and CP found to have multifocal PNA.      Sepsis 2/2 Multifocal Pneumonia  Acute hypoxic Respiratory Failure  ``CT PE: multifocal pna  ``Presenting for Acute onset CP/Dyspnea while walking in her house. Had to stop her acitivities and sit does as she felt unwell. Associated with 1-2 coughing episodes that were non productive and associated with chills. No sick contacts/recent travel  ``In the ER, SBP 150s, T 100.3>101.2, labs showed no evidence of LA or WBC elevations. Given doxy and rocephin. Patient stated she felt better but still had SOB. Hypoxic on RA so placed on 2-3L NC. Exam unremarkable apart from LE edema and coarse breath sounds. Not in distress. EKG NSR, Trop negative, Viral Panel negative.  - O2 as needed, goal 90% or higher, now on 2 L at rest, 3 L with activity  - rocephin/doxy continued   blood cultures remain negative   - HOB 30 degress  - PT/OT/SW  - Tylenol prn  - Admission given CURB 65 score 2, moderate risk of decompensation.   Encourge IS   Wean O2 as tolerated      HTN  CAD s/p LCx stenting 2016  Cardiomegaly  Hx Palpitations  *CT: Stable Mild Cardiomegaly  *Negative stress test in 2019  *reported Hx of refusing statin therapy  *Presenting with acute CP/dyspnea, CT PE negative for PE. Trop/EKG negative  - TTE shows EF 60 to 65%, mild pulmonary hypertension  - continue aspirin/coreg/imdur/PRN nitroglycerin     Chronic Lymphedema  - PT OT SW  - SCDs     Gout  Osteoporosis  - resume allopurinol     Bladder tumor s/p resection  - no acute intervention     Tinnitus/Sensorineural Hearing loss  - No acute intervention      Diet: Combination Diet Regular Diet Adult    DVT Prophylaxis: Pneumatic Compression Devices  Gaona Catheter: Not present  Lines: None     Cardiac  Monitoring: None  Code Status: Full Code    Clinically Significant Risk Factors              # Hypoalbuminemia: Lowest albumin = 2.9 g/dL at 10/19/2023  7:54 AM, will monitor as appropriate     # Hypertension: Noted on problem list            # Financial/Environmental Concerns: none         Disposition Plan      Expected Discharge Date: 10/23/2023      Destination: ARNALDO Calixto MD  Hospitalist Service  [facility]  Securely message with [employer] (more info)  Text page via [employer] Paging/Directory   ______________________________________________________________________    Interval History   Resting in bed. Intermittent coughing. No SOB currently . Needing 2liters of O2     Physical Exam   Vital Signs: Temp: 97.7  F (36.5  C) Temp src: Oral BP: 109/51 Pulse: 76   Resp: 16 SpO2: 92 % O2 Device: Nasal cannula Oxygen Delivery: 1.5 LPM  Weight: 0 lbs 0 oz    Constitutional: Awake, alert, cooperative, no apparent distress  Respiratory: Clear to auscultation anteriorly  Cardiovascular: Regular rate and rhythm  GI: Normal bowel sounds, soft, non-distended, non-tender  Skin/Integumen: No rash on exposed skin.  No lower extremity edema  Other: Mood is very pleasant        Medical Decision Making       45 MINUTES SPENT BY ME on the date of service doing chart review, history, exam, documentation & further activities per the note.      Data

## 2023-10-22 NOTE — PROCEDURES
[facility]    Single Lumen Midline Placement    Date/Time: 10/22/2023 2:58 PM    Performed by: Vanessa Carpenter RN  Authorized by: Marti Calixto MD  Indications: vascular access      UNIVERSAL PROTOCOL   Site Marked: Yes  Prior Images Obtained and Reviewed:  NA  Required items: Required blood products, implants, devices and special equipment available    Patient identity confirmed:  Verbally with patient, arm band and hospital-assigned identification number  NA - No sedation, light sedation, or local anesthesia  Confirmation Checklist:  Patient's identity using two indicators, relevant allergies, procedure was appropriate and matched the consent or emergent situation and correct equipment/implants were available  Time out: Immediately prior to the procedure a time out was called    Universal Protocol: the Joint Commission Universal Protocol was followed    Preparation: Patient was prepped and draped in usual sterile fashion       ANESTHESIA    Anesthesia:  Local infiltration  Local Anesthetic:  Lidocaine 1% without epinephrine  Anesthetic Total (mL):  1      SEDATION    Patient Sedated: No        Preparation: skin prepped with 2% chlorhexidine and skin prepped with ChloraPrep  Skin prep agent: skin prep agent completely dried prior to procedure  Sterile barriers: maximum sterile barriers were used: cap, mask, sterile gown, sterile gloves, and large sterile sheet  Hand hygiene: hand hygiene performed prior to central venous catheter insertion  Type of line used: Midline  Catheter type: single lumen  Lumen type: valved  Catheter size: 4 Fr  Brand: Bard  Lot number: [id number]  Placement method: MST and ultrasound  Number of attempts: 1  Difficulty threading catheter: no  Successful placement: yes  Orientation: left    Location: basilic vein  Tip Location: distal to axillary vein  Arm circumference: adults 10 cm  Extremity circumference: 24  Visible catheter length: 0  Internal length: 10  cm  Total catheter length: 10  Dressing and securement: blood cleaned with CHG, chlorhexidine patch applied, statlock and occlusive dressing applied  Post procedure assessment: blood return through all ports  PROCEDURE   Patient Tolerance:  Patient tolerated the procedure well with no immediate complicationsDescribe Procedure: OK to use

## 2023-10-22 NOTE — PROGRESS NOTES
Bedside Swallow Evaluation      10/22/23 1200   Appointment Info   Signing Clinician's Name / Credentials (SLP) Jessica Franco MA, CCC-SLP   General Information   Onset of Illness/Injury or Date of Surgery 10/18/23   Referring Physician Marti Calixto MD   Patient/Family Therapy Goal Statement (SLP) Did not state   Pertinent History of Current Problem Per MD note: 89F hx of Tinnitus, Gout, Osteoporosis, CAD s/p stenting 2019 presenting with Dyspnea and CP found to have multifocal PNA.   General Observations Pleasant, sitting in chair eating lunch       Present yes   Language Mandarin  via iPad   Type of Evaluation   Type of Evaluation Swallow Evaluation   Oral Motor   Oral Musculature generally intact   Dentition (Oral Motor)   Dentition (Oral Motor) adequate dentition;some missing teeth   Facial Symmetry (Oral Motor)   Facial Symmetry (Oral Motor) WNL   Lip Function (Oral Motor)   Lip Range of Motion (Oral Motor) WNL   Tongue Function (Oral Motor)   Tongue ROM (Oral Motor) WNL   Jaw Function (Oral Motor)   Jaw Function (Oral Motor) not tested   Facial Sensation   Facial Sensation WNL   Cough/Swallow/Gag Reflex (Oral Motor)   Volitional Throat Clear/Cough (Oral Motor) impaired;reduced strength   Volitional Swallow (Oral Motor) WNL   Vocal Quality/Secretion Management (Oral Motor)   Vocal Quality (Oral Motor) WFL   Secretion Management (Oral Motor) WNL   General Swallowing Observations   Past History of Dysphagia No documented or reported hx of dysphagia   Respiratory Support nasal cannula   Current Diet/Method of Nutritional Intake (General Swallowing Observations, NIS) regular diet;thin liquids (level 0)   Swallowing Evaluation Clinical swallow evaluation   Clinical Swallow Evaluation   Feeding Assistance no assistance needed   Additional evaluation(s) completed today No   Clinical Swallow Evaluation Textures Trialed thin liquids;pureed;solid foods   Clinical Swallow Eval: Thin  Liquid Texture Trial   Mode of Presentation, Thin Liquids straw;self-fed   Volume of Liquid or Food Presented 5 sips   Oral Phase of Swallow premature pharyngeal entry   Pharyngeal Phase of Swallow intact   Diagnostic Statement Single straw sips of thin liquid with delay in swallow initiation but no overt s/sx of aspiration   Clinical Swallow Evaluation: Puree Solid Texture Trial   Mode of Presentation, Puree spoon;self-fed   Volume of Puree Presented 2 bites   Oral Phase, Puree WFL   Pharyngeal Phase, Puree intact   Diagnostic Statement no difficulties   Clinical Swallow Evaluation: Solid Food Texture Trial   Mode of Presentation self-fed   Volume Presented 1 bite of salad, 1 bite of meat   Oral Phase WFL   Pharyngeal Phase intact   Diagnostic Statement Slow mastication, no oral residue. No interest in eating   Esophageal Phase of Swallow   Patient reports or presents with symptoms of esophageal dysphagia No   Swallowing Recommendations   Diet Consistency Recommendations regular diet;thin liquids (level 0)   Supervision Level for Intake patient independent   Mode of Delivery Recommendations bolus size, small;slow rate of intake   Postural Recommendations none   Swallowing Maneuver Recommendations alternate food and liquid intake   Recommended Feeding/Eating Techniques (Swallow Eval) maintain upright sitting position for eating   Medication Administration Recommendations, Swallowing (SLP) Whole as tolerated   Instrumental Assessment Recommendations instrumental evaluation not recommended at this time   Comment, Swallowing Recommendations Patient not interested in eating, willing to take bites with SLP. Consumed small amount of water, solid foods, and purees without difficulty   Clinical Impression   Criteria for Skilled Therapeutic Interventions Met (SLP Eval) Evaluation only   SLP Diagnosis Functional swallow   Risks & Benefits of therapy have been explained evaluation/treatment results reviewed;care plan/treatment  goals reviewed;risks/benefits reviewed;current/potential barriers reviewed;participants voiced agreement with care plan;participants included;patient   Clinical Impression Comments Patient presents with functional swallow on evaluation. Pt reports she does not have difficulty with swallowing or eating. She does not cough or choke, she does not feel her aspiration pneumonia is related to eating. She reports minimal appetite and does not want to eat. No difficulties observed during lunch. Recommend continue regular diet with thin liquids. Sitting upright in the chair, alternate consistencies, and small sips/bites. No further IP SLP needs.   SLP Total Evaluation Time   Eval: oral/pharyngeal swallow function, clinical swallow Minutes (55832) 30   SLP Discharge Planning   SLP Plan no IP SLP needs   SLP Discharge Recommendation home with assist;Transitional Care Facility   SLP Rationale for DC Rec Pt at baseline diet, no SLP follow-up   SLP Brief overview of current status  Clinical swallow evaluation completed. Minimal appetite but no concerns. Recommend continue regular diet with thin liquids. Sitting upright in the chair, alternate consistencies, and small sips/bites.   Total Session Time   Total Session Time (sum of timed and untimed services) 30

## 2023-10-22 NOTE — PROGRESS NOTES
MD Notification    Notified Person: MD    Notified Person Name: Angel Broussard     Notification Date/Time: 10/22 1:13 am      Notification Interaction: [employer] messaging    Purpose of Notification: 101 temp, gave prn tylenol    Orders Received: Recheck temp in 1 hour     Comments:

## 2023-10-22 NOTE — PLAN OF CARE
Goal Outcome Evaluation:         Pt A&Ox4. Mandarin speaking, ipad interpretor utilized. On 1- 1.5L NC. Temp of 101 overnight, prn tylenol given, temp 99.9. A1 GB/W, up to BR. Reg diet. Denies pain. Reports SOB on exertion, tolerating 3L O2 NC when ambulating. PIV SL.

## 2023-10-22 NOTE — PROVIDER NOTIFICATION
To Dr Calixto via [employer] messaging at 1416  Pt ambulated to bathroom and on her usual 3 LPM NC, went down to 85%, RN turned up to 6 LPM and took a few minutes to get back up to 90%. This was a change because she normally only goes down to 90% on 3 LPM when ambulating.     MD responded at 1417- encourage IS and continue to monitor    RN to Dr Calixto at 1555  Pt now need 4 LPM NC to maintain above 90%, RR 30, pt verbalizes that she feels SOB at rest. Previously only on exertion, lasix 20 mg oral given.     MD- stat chest xray put in, if SOB and RR get worse call RRT.     RN to Dr Calixto at 1715  Chest xray results in, thanks    MD at 1717 put in new orders

## 2023-10-22 NOTE — PROGRESS NOTES
[facility]    Medicine Progress Note - Hospitalist Service    Date of Admission:  10/18/2023    Assessment & Plan   89F hx of Tinnitus, Gout, Osteoporosis, CAD s/p stenting 2019 presenting with Dyspnea and CP found to have multifocal PNA.      Sepsis 2/2 Multifocal Pneumonia  Acute hypoxic Respiratory Failure  ``CT PE: multifocal pna  ``Presenting for Acute onset CP/Dyspnea while walking in her house. Had to stop her acitivities and sit does as she felt unwell. Associated with 1-2 coughing episodes that were non productive and associated with chills. No sick contacts/recent travel  ``In the ER, SBP 150s, T 100.3>101.2, labs showed no evidence of LA or WBC elevations. Given doxy and rocephin. Patient stated she felt better but still had SOB. Hypoxic on RA so placed on 2-3L NC. Exam unremarkable apart from LE edema and coarse breath sounds. Not in distress. EKG NSR, Trop negative, Viral Panel negative.  - O2 as needed, goal 90% or higher, now on 2 L at rest, 3 L with activity  - rocephin/doxy continued   blood cultures remain negative   - HOB 30 degress  - PT/OT/SW  - Tylenol prn  - Admission given CURB 65 score 2, moderate risk of decompensation.   Encourge IS   Wean O2 as tolerated   Pt was fbrile upto 101 last night. Will switch ceftriaxone to IV cefepime to cover for aspiration pneumonia   Speech path to evaluate for Dysphagia      HTN  CAD s/p LCx stenting 2016  Cardiomegaly  Hx Palpitations  *CT: Stable Mild Cardiomegaly  *Negative stress test in 2019  *reported Hx of refusing statin therapy  *Presenting with acute CP/dyspnea, CT PE negative for PE. Trop/EKG negative  - TTE shows EF 60 to 65%, mild pulmonary hypertension  - continue aspirin/coreg/imdur/PRN nitroglycerin     Chronic Lymphedema  - PT OT SW  - SCDs     Gout  Osteoporosis  - resume allopurinol     Bladder tumor s/p resection  - no acute intervention     Tinnitus/Sensorineural Hearing loss  - No acute intervention      Diet:  Combination Diet Regular Diet Adult    DVT Prophylaxis: Pneumatic Compression Devices  Gaona Catheter: Not present  Lines: None     Cardiac Monitoring: None  Code Status: Full Code    Clinically Significant Risk Factors              # Hypoalbuminemia: Lowest albumin = 2.9 g/dL at 10/19/2023  7:54 AM, will monitor as appropriate     # Hypertension: Noted on problem list            # Financial/Environmental Concerns: none         Disposition Plan      Expected Discharge Date: 10/23/2023      Destination: U         Marti Calixto MD  Hospitalist Service  [facility]  Securely message with [employer] (more info)  Text page via [employer] Paging/Directory   ______________________________________________________________________    Interval History   Resting in bed. Intermittent coughing. No SOB currently . Was febrile last night. Needing 1.5- 2liters of O2     Physical Exam   Vital Signs: Temp: 98.4  F (36.9  C) Temp src: Oral BP: 105/51 Pulse: 75   Resp: 18 SpO2: 92 % O2 Device: Nasal cannula Oxygen Delivery: 1.5 LPM  Weight: 0 lbs 0 oz    Constitutional: Awake, alert, cooperative, no apparent distress  Respiratory: Clear to auscultation anteriorly  Cardiovascular: Regular rate and rhythm  GI: Normal bowel sounds, soft, non-distended, non-tender  Skin/Integumen: No rash on exposed skin.  No lower extremity edema  Other: Mood is very pleasant        Medical Decision Making       45 MINUTES SPENT BY ME on the date of service doing chart review, history, exam, documentation & further activities per the note.      Data

## 2023-10-22 NOTE — PLAN OF CARE
Goal Outcome Evaluation:    Denies CP, dyspnea on exertion. 1.5 LPM NC at rest. 3 LPM NC with ambulation, pt O2 sat went down to 90% with ambulation. Then this afternoon pt was feeling SOB and requiring 4 LPM NC at rest, and 6 LPM NC with activity. RN paged MD, see note. All pt needs met at this time.

## 2023-10-23 NOTE — PROGRESS NOTES
[facility]    Medicine Progress Note - Hospitalist Service    Date of Admission:  10/18/2023    Assessment & Plan   89F hx of Tinnitus, Gout, Osteoporosis, CAD s/p stenting 2019 presenting with Dyspnea and CP found to have multifocal PNA.      Sepsis 2/2 Multifocal Pneumonia  Acute hypoxic Respiratory Failure    ``CT PE: multifocal pna  ``Presenting for Acute onset CP/Dyspnea while walking in her house. Had to stop her acitivities and sit does as she felt unwell. Associated with 1-2 coughing episodes that were non productive and associated with chills. No sick contacts/recent travel  ``In the ER, SBP 150s, T 100.3>101.2, labs showed no evidence of LA or WBC elevations. Given doxy and rocephin. Patient stated she felt better but still had SOB. Hypoxic on RA so placed on 2-3L NC. Exam unremarkable apart from LE edema and coarse breath sounds. Not in distress. EKG NSR, Trop negative, Viral Panel negative.  - O2 as needed, goal 90% or higher, now on 2 L at rest, 3 L with activity  - rocephin/doxy continued   blood cultures remain negative   - HOB 30 degress  - PT/OT/SW  - Tylenol prn  - Admission given CURB 65 score 2, moderate risk of decompensation.   Encourge IS   Wean O2 as tolerated   Pt was febrile upto 101 on 10/21 over night. Will switch ceftriaxone to IV cefepime to cover for aspiration pneumonia   Speech path evaluated for dysphagia and continued on regular diet as she tolerated it well      Possible acute diastolic congestive heart failure exacerbation    Patient became more more dyspneic and needing 6 L of oxygen with exertion on 10/22/2023  Chest x-ray repeat showed  Patchy airspace opacities within the right mid and lower lung as well as left perihilar region favoring superimposed infectious/inflammatory infiltrate.  New mild diffuse interstitial opacities favoring a mild interstitial edema pattern    Patient was given 20 mg of IV Lasix and started her on Lasix 20 mg p.o.  daily  Diuresed well with Lasix.  Shortness of breath improved and oxygen requirements have come down to 2 L today    Continue Lasix 20 mg p.o. daily. follow BMP closely with diuresis        Mild hypokalemia potassium 3.4;  Replace per protocol     HTN  CAD s/p LCx stenting 2016  Cardiomegaly  Hx Palpitations  *CT: Stable Mild Cardiomegaly  *Negative stress test in 2019  *reported Hx of refusing statin therapy  *Presenting with acute CP/dyspnea, CT PE negative for PE. Trop/EKG negative  - TTE shows EF 60 to 65%, mild pulmonary hypertension  - continue aspirin/coreg/imdur/PRN nitroglycerin     Chronic Lymphedema  - PT OT SW  - SCDs     Gout  Osteoporosis  - resume allopurinol     Bladder tumor s/p resection  - no acute intervention     Tinnitus/Sensorineural Hearing loss  - No acute intervention      Diet: 2 Gram Sodium Diet    DVT Prophylaxis: Pneumatic Compression Devices  Gaona Catheter: Not present  Lines: PRESENT           Cardiac Monitoring: None  Code Status: Full Code    Clinically Significant Risk Factors              # Hypoalbuminemia: Lowest albumin = 2.9 g/dL at 10/19/2023  7:54 AM, will monitor as appropriate     # Hypertension: Noted on problem list            # Financial/Environmental Concerns: none         Disposition Plan      Expected Discharge Date: In the next 2 days if remains stable  Destination: ARNALDO Calixto MD  Hospitalist Service  [facility]  Securely message with [email or web address] (more info)  Text page via [employer] Paging/Directory   ______________________________________________________________________    Interval History   Resting in bed. Intermittent coughing. No SOB currently. Needing  2liters of O2 by nasal cannula    Physical Exam   Vital Signs: Temp: 99.2  F (37.3  C) Temp src: Oral BP: (!) 140/50 Pulse: 69   Resp: 20 SpO2: 90 % O2 Device: Nasal cannula Oxygen Delivery: 3 LPM  Weight: 0 lbs 0 oz    Constitutional: Awake, alert, cooperative, no apparent  distress  Respiratory: Clear to auscultation anteriorly  Cardiovascular: Regular rate and rhythm  GI: Normal bowel sounds, soft, non-distended, non-tender  Skin/Integumen: No rash on exposed skin.  No lower extremity edema  Other: Mood is very pleasant        Medical Decision Making       52 MINUTES SPENT BY ME on the date of service doing chart review, history, exam, documentation & further activities per the note.      Data     I have personally reviewed the following data over the past 24 hrs:    10.4  \   10.6 (L)   / 274     135 101 22.0 /  122 (H)   3.4 24 0.66 \     Procal: N/A CRP: N/A Lactic Acid: 1.2

## 2023-10-23 NOTE — PLAN OF CARE
Goal Outcome Evaluation:         Pt A&Ox4. Mandarin speaking, ipad interpretor utilized. A1 GB/W, up to bedside commode. Denies pain. On 1.5- 2L O2 NC at rest, continued to require 6L w/ activity. Reports SOB on exertion, denies SOB at rest. Midline SL.

## 2023-10-23 NOTE — PLAN OF CARE
Reason for Admission: pneumonia    Cognitive/Mentation: A/Ox 4. Mandarin speaking. Needs .     Neuros/CMS: Intact    VS: VSS with 02 at 4LPM via NC    Tele: n/a.    GI: Last BM 10/22/23. continent.    : continent. Voiding adequately.    Pulmonary: LS coarse. Needed 02 via NC at 3L at rest and 4L with activity to maintain 02 sats > 90%. Purse lipped breathing encouraged. Dyspnea with exertion. Denies CP. Reports SOB with activity. Encouraged use of incentive spirometer.    Pain: denies. Managed on oral analgesics    Drains/Lines: SL Midline to LUE    Skin: scattered bruises    Activity: Assist x 1 with GBW.     Diet: regular with thin liquids. Takes pills whole.     Therapies recs: TCU    Discharge: pending.  identified.    End of shift summary: Daughter at bedside able to interpret. Increased oxygen needs from 2L at beginning of shift to 4L at end of shift.

## 2023-10-24 NOTE — PROGRESS NOTES
MD Notification    Notified Person: MD    Notified Person Name: Dr Rogers    Notification Date/Time: 2236    Notification Interaction: [employer]     Purpose of Notification: Patient is on oxymask at 10LPM, 90% SpO2. Has not gone above 90%. Would you recommend BiPap? Was on 2L NC at beginning of day    Orders Received: RT to start HFNC     Comments: RT recommended for the pt to stay on oxymask as their SpO2 is within normal range- 92%

## 2023-10-24 NOTE — CODE/RAPID RESPONSE
[facility]    House FERNANDEZ RRT Note  10/24/2023   Time Called: 13:20    RRT called for: I was asked to evaluate the patient by primary hospitalist provider Dr. Calixto for increasing oxygen needs     Assessment & Plan   Acute hypoxic respiratory failure secondary to multifocal pneumonia   Respiratory alkalosis   Alternatively considered possible acute diastolic heart failure. Noted  She was started on daily 20 mg IV lasix on 10/21. Echo on admission shows EF60-65% and mild pulmonary hypertension (35-45 mmHg). Pulmonary embolism ruled out on admission CT chest from 10/18. Viral etiology ruled out on admission. Additionally, considered aspiration pneumonia and SLP has evaluated and stated no concerns and may proceed with regular diet and thin liquids.     On my arrival Ms. He is sitting upright in recliner chair talking with her daughter. SpO2 87-88% on 9-10L oxymask. She is not in distress. Speaking in full sentences. Endorses dry cough, chills and mild nausea. Denies CP, SOB, WYNNE, orthopnea, dizziness, or pain. On exam appears euvolemic, skin is warm and dry, LS with rales to bilateral lower bases. Palatable pulses. No obsessed accessory muscle use.       INTERVENTIONS:  -respiratory aerobic culture  -s.pneumoniae antigen urine   -noted she is on ceftin, doxycycline and prednisone   -ABG   -HFNC   -pCXR  -additional 20 mg IV lasix  -daily weights   -intake and output q shift   -previously ordered mucomyst and albuterol nebs  -previously ordered chest physical therapy   -previously ordered RCAT consult   -transfer to [facility] [place] primary hospitalist Dr. Calixto   -patient and children updated on plan of care at the bedside.     I personally reviewed the radiographs showing increased pulmonary venous congestion, no effusions, of PTX     Working diagnosis:diastolic HF     At the end of the RRT patient was able to amubluate up to bathroom without distress. Sitting upright in chair. No increased work of  breathing.     Disposition will transfer to [facility] [place]     Discussed with and defer further cares to hospitalist attending physician Dr. Calixto.    Interval History     Mary Kapadia a 89 year old female w/ PMH of tinnitus, gout, osteoporosis, CAD s/p stenting 2019 who was admitted on 10/18/2023 for multifocal PNA.    Code Status: Full Code    Allergies   Allergies   Allergen Reactions    Azithromycin     Gabapentin Itching    Kanamycin     Metronidazole Rash    Pcn [Penicillins] Rash    Streptomycin      rash    Atorvastatin Rash    Lisinopril Rash       Physical Exam   Vital Signs with Ranges:  Temp:  [97.5  F (36.4  C)-99.7  F (37.6  C)] 98.8  F (37.1  C)  Pulse:  [73-82] 76  Resp:  [16-24] 24  BP: (108-128)/(50-64) 108/54  SpO2:  [75 %-94 %] 90 %  I/O last 3 completed shifts:  In: 960 [P.O.:960]  Out: 750 [Urine:750]    Physical Exam  Constitutional:       Appearance: She is underweight. She is not ill-appearing or diaphoretic.      Interventions: Face mask in place.   HENT:      Nose: No congestion or rhinorrhea.      Mouth/Throat:      Mouth: Mucous membranes are moist.   Eyes:      Pupils: Pupils are equal, round, and reactive to light.   Cardiovascular:      Rate and Rhythm: Normal rate and regular rhythm.      Pulses: Normal pulses.      Heart sounds: Normal heart sounds. No murmur heard.  Pulmonary:      Effort: Pulmonary effort is normal. No respiratory distress.      Breath sounds: Examination of the right-lower field reveals rales. Examination of the left-lower field reveals rales. Rhonchi and rales present.   Abdominal:      General: Bowel sounds are normal. There is distension.      Palpations: Abdomen is soft.      Tenderness: There is no abdominal tenderness.   Musculoskeletal:         General: Normal range of motion.      Right lower leg: No edema.      Left lower leg: No edema.   Skin:     General: Skin is warm and dry.      Capillary Refill: Capillary refill takes less than 2 seconds.   Neurological:       Mental Status: She is alert and oriented to person, place, and time. Mental status is at baseline.   Psychiatric:         Mood and Affect: Mood normal.         Behavior: Behavior normal. Behavior is cooperative.         Thought Content: Thought content normal.           Data     ABG:  Recent Labs   Lab 10/24/23  1348   PH 7.49*   PO2 52*   PCO2 34*   HCO3 25   YOUSUF 2.2*         IMAGING: (X-ray/CT/MRI)   Recent Results (from the past 24 hour(s))   XR Chest Port 1 View    Narrative    XR CHEST PORT 1 VIEW 10/24/2023 2:14 PM    HISTORY: worsening hypoxia    COMPARISON: 10/22/2023    FINDINGS: There is pulmonary venous congestion and perihilar airspace  disease, mildly improved on the right compared to prior. This may  represent edema and/or infection. No large effusions or pneumothorax.  Unchanged cardiac size. Aortic atherosclerosis.    DAVID AYERS MD         SYSTEM ID:  [id number]       CBC with Diff:  Recent Labs   Lab Test 10/23/23  0831   WBC 10.4   HGB 10.6*   MCV 89           Lactic Acid:    Recent Labs   Lab 10/22/23  1631 10/19/23  1444 10/18/23  2028   LACT 1.2 1.5 1.8       Comprehensive Metabolic Panel:  Recent Labs   Lab 10/24/23  1121 10/23/23  0831 10/20/23  0707 10/19/23  0754   NA  --  135   < > 139   POTASSIUM 3.4 3.4   < > 3.8   CHLORIDE  --  101   < > 109*   CO2  --  24   < > 21*   ANIONGAP  --  10   < > 9   GLC  --  122*   < > 102*   BUN  --  22.0   < > 15.3   CR  --  0.66   < > 0.68   GFRESTIMATED  --  81   < > 81   CLAIRE  --  9.1   < > 8.6   PROTTOTAL  --   --   --  6.2*   ALBUMIN  --   --   --  2.9*   BILITOTAL  --   --   --  0.5   ALKPHOS  --   --   --  57   AST  --   --   --  21   ALT  --   --   --  8    < > = values in this interval not displayed.       Time Spent on this Encounter   I spent 50 minutes on the unit/floor managing the care of Mary Polanco. Over 50% of my time was spent counseling the patient and/or coordinating care regarding services listed in this note.    Carin BRYAN  LUPE Alejandre  [facility]  Securely message with the [employer] Web Console (learn more here)  Text page via [employer] Paging/Directory

## 2023-10-24 NOTE — PROGRESS NOTES
A&Ox4. On 5LPM via Oxymask at rest, SpO2 steady @90-91%. Beginning of shift was on 4L, increased to 10L weaned off, now at 5L. Provider aware. Mandarin speaking. A of 1 gb/w, to bedside commode. Voiding adequately. Lung sounds coarse. Denies pain. Midline cath LUE.

## 2023-10-24 NOTE — PROGRESS NOTES
MD Notification    Notified Person: MD    Notified Person Name:  Sue    Notification Date/Time: 1957 10/23    Notification Interaction: amcom    Purpose of Notification: 2417, YH. FYI pt on 9LPM due to increase oxygen demand at rest. Now at 90% O2. TY      Orders Received: No order, no call back  Comments: Still on oxymask at 10LMP 90% SpO2

## 2023-10-24 NOTE — PLAN OF CARE
Reason for Admission: pneumonia     Cognitive/Mentation: A/Ox 4. Mandarin speaking. Needs .      Neuros/CMS: Intact     VS: hypoxia; increased oxygen needs this shift. Placed on High flow oxygen by RT.     Tele: n/a.     GI: Last BM 10/23/23. continent. Held senna and miralax for loose stool.     : continent. Voiding adequately.     Pulmonary: LS crackles. Needed 02 via oxymask at 9L at rest. Purse lipped breathing encouraged. Dyspnea with exertion. Denies CP. Reports SOB with activity. Encouraged use of incentive spirometer.     Pain: denies. Managed on oral analgesics     Drains/Lines: SL Midline to LUE     Skin: scattered bruises     Activity: Assist x 1 with GBW.      Diet: 2 gm NA diet with thin liquids. Takes pills whole.      Therapies recs: TCU     Discharge: pending.  identified.     End of shift summary: Daughter at bedside able to interpret. Increased oxygen needs from 5L at beginning of shift to 40L. To be transferred to [facility].

## 2023-10-24 NOTE — PROGRESS NOTES
[facility]    Medicine Progress Note - Hospitalist Service    Date of Admission:  10/18/2023    Assessment & Plan   89F hx of Tinnitus, Gout, Osteoporosis, CAD s/p stenting 2019 presenting with Dyspnea and CP found to have multifocal PNA.      Sepsis 2/2 Multifocal Pneumonia  Acute hypoxic Respiratory Failure    ``CT PE: multifocal pna  ``Presenting for Acute onset CP/Dyspnea while walking in her house. Had to stop her acitivities and sit does as she felt unwell. Associated with 1-2 coughing episodes that were non productive and associated with chills. No sick contacts/recent travel  ``In the ER, SBP 150s, T 100.3>101.2, labs showed no evidence of LA or WBC elevations. Given doxy and rocephin. Patient stated she felt better but still had SOB. Hypoxic on RA so placed on 2-3L NC. Exam unremarkable apart from LE edema and coarse breath sounds. Not in distress. EKG NSR, Trop negative, Viral Panel negative.  - O2 as needed, goal 90% or higher, now on 2 L at rest, 3 L with activity  - rocephin/doxy continued   blood cultures remain negative   - HOB 30 degress  - PT/OT/SW  - Tylenol prn  - Admission given CURB 65 score 2, moderate risk of decompensation.   Encourge IS   Wean O2 as tolerated   Pt was febrile upto 101 on 10/21 over night. Will switch ceftriaxone to IV cefepime to cover for aspiration pneumonia   Speech path evaluated for dysphagia and continued on regular diet as she tolerated it well    Patient with worsening respiratory status today and needing 10 L of oxygen by nasal cannula.  Patient is unable to clear secretions as per the daughter at bedside and having coarse breath sounds    RRT requested with house provider this afternoon for evaluation  Added Mucomyst and albuterol nebs  R CAT consult  Chest physical therapy ordered to help with clearing secretions      Possible acute diastolic congestive heart failure exacerbation    Patient became more more dyspneic and needing 6 L of  oxygen with exertion on 10/22/2023  Chest x-ray repeat showed  Patchy airspace opacities within the right mid and lower lung as well as left perihilar region favoring superimposed infectious/inflammatory infiltrate.  New mild diffuse interstitial opacities favoring a mild interstitial edema pattern    Patient was given 20 mg of IV Lasix and started her on Lasix 20 mg p.o. daily  Diuresed well with Lasix.  Shortness of breath improved and oxygen requirements have come down to 2 L today    Continue Lasix 20 mg p.o. daily. follow BMP closely with diuresis        Mild hypokalemia potassium 3.4;  Replace per protocol     HTN  CAD s/p LCx stenting 2016  Cardiomegaly  Hx Palpitations  *CT: Stable Mild Cardiomegaly  *Negative stress test in 2019  *reported Hx of refusing statin therapy  *Presenting with acute CP/dyspnea, CT PE negative for PE. Trop/EKG negative  - TTE shows EF 60 to 65%, mild pulmonary hypertension  - continue aspirin/coreg/imdur/PRN nitroglycerin     Chronic Lymphedema  - PT OT SW  - SCDs     Gout  Osteoporosis  - resume allopurinol     Bladder tumor s/p resection  - no acute intervention     Tinnitus/Sensorineural Hearing loss  - No acute intervention      Diet: 2 Gram Sodium Diet    DVT Prophylaxis: Pneumatic Compression Devices  Gaona Catheter: Not present  Lines: PRESENT           Cardiac Monitoring: None  Code Status: Full Code    Clinically Significant Risk Factors              # Hypoalbuminemia: Lowest albumin = 2.9 g/dL at 10/19/2023  7:54 AM, will monitor as appropriate     # Hypertension: Noted on problem list            # Financial/Environmental Concerns: none         Disposition Plan      Expected Discharge Date: In the next 3-4 days if remains stable and respiratory status improves  Destination: TCU             Marti Calixto MD  Hospitalist Service  [facility]  Securely message with [employer] (more info)  Text page via [employer] Paging/Directory    ______________________________________________________________________    Interval History   Resting in chair.  Patient hypoxia worse today needing 10 L of oxygen by facemask today.  Unable to clear secretions as per the daughter.  RRT requested this afternoon    Physical Exam   Vital Signs: Temp: 98.8  F (37.1  C) Temp src: Oral BP: 108/54 Pulse: 76   Resp: 24 SpO2: 90 % O2 Device: Oxymask Oxygen Delivery: 9 LPM  Weight: 0 lbs 0 oz    Constitutional: Awake, alert, cooperative, resting in bed with oxy mask on  Respiratory: Coarse breath sounds bilaterally  Cardiovascular: Regular rate and rhythm  GI: Normal bowel sounds, soft, non-distended, non-tender  Skin/Integumen: No rash on exposed skin.  No lower extremity edema  Other: Mood is very pleasant        Medical Decision Making       52 MINUTES SPENT BY ME on the date of service doing chart review, history, exam, documentation & further activities per the note.      Data     I have personally reviewed the following data over the past 24 hrs:    N/A  \   N/A   / N/A     N/A N/A N/A /  N/A   3.4 N/A N/A \

## 2023-10-25 NOTE — PLAN OF CARE
Shift pt arrival to [facility]  - 2300: VSS. Neuro: Disoriented to time, situation (assessed w/ pt's family member translating at bedside). Pulm: Lungs: clear/diminished throughout, on HFNC. GI/: BS present, loose BM x 1; Void: WDL output, urine occurrence charted x 1. Diet: 2 gram sodium. Access: Midline. Denied pain.     [facility] admission shift:  - Continued on HFNC  - Up to bedside commode  - Passed on to RN taking over care that pt is on K+ protocol, but noted that the protocol was unable to process as pt had not had a weight/height in over a week per the order screen when I checked. I placed new K+ lab draw at change of shift since over 12 hrs had passed since previous K+ value

## 2023-10-25 NOTE — PROGRESS NOTES
[facility]    Medicine Progress Note - Hospitalist Service    Date of Admission:  10/18/2023    Assessment & Plan   89F hx of Tinnitus, Gout, Osteoporosis, CAD s/p stenting 2019 presenting with Dyspnea and CP found to have multifocal PNA.      Sepsis 2/2 bilateral multifocal Pneumonia  Acute hypoxic Respiratory Failure needing oxygen by high flow nasal cannula on 10/24/2023  Acute diastolic heart failure with preserved EF exacerbation  ``CT PE: multifocal pna  ``Presenting for Acute onset CP/Dyspnea while walking in her house. Had to stop her acitivities and sit does as she felt unwell. Associated with 1-2 coughing episodes that were non productive and associated with chills. No sick contacts/recent travel  ``In the ER, SBP 150s, T 100.3>101.2, labs showed no evidence of LA or WBC elevations. Given doxy and rocephin. Patient stated she felt better but still had SOB. Hypoxic on RA so placed on 2-3L NC. Exam unremarkable apart from LE edema and coarse breath sounds. Not in distress. EKG NSR, Trop negative, Viral Panel negative.  - O2 as needed, goal 90% or higher, now on 2 L at rest, 3 L with activity  - rocephin/doxy continued   blood cultures remain negative   - HOB 30 degress  - PT/OT/SW  - Tylenol prn  - Admission given CURB 65 score 2, moderate risk of decompensation.   Encourge IS   Wean O2 as tolerated   Pt was febrile upto 101 on 10/21 over night. Will switch ceftriaxone to IV cefepime to cover for aspiration pneumonia   Speech path evaluated for dysphagia and continued on regular diet as she tolerated it well    Patient with worsening respiratory status today and needing 10 L of oxygen by nasal cannula.  Patient is unable to clear secretions as per the daughter at bedside and having coarse breath sounds with bilateral crackles    RRT requested patient was evaluated by house provider.  Chest x-ray repeated on 10/24/2023 showed pulmonary edema.  Patient was given a total of 40 mg of IV  Lasix.  Transition to high flow nasal cannula from oxy mask  Added Mucomyst and albuterol nebs  R CAT consult  Chest physical therapy ordered to help with clearing secretions  Wean off of oxygen as tolerated  Patient was given total of 40 mg IV of IV Lasix on 10/24/2023  Cardiology consultation requested continued on Lasix 20 mg every 12 hours today      Possible acute diastolic congestive heart failure exacerbation    Patient became more more dyspneic and needing 6 L of oxygen with exertion on 10/22/2023  Chest x-ray repeat showed  Patchy airspace opacities within the right mid and lower lung as well as left perihilar region favoring superimposed infectious/inflammatory infiltrate.  New mild diffuse interstitial opacities favoring a mild interstitial edema pattern    Patient was given 20 mg of IV Lasix on 10/23/2024 and started her on Lasix 20 mg p.o. daily  Diuresed well with Lasix.  Shortness of breath improved and oxygen requirements have come down to 2 L today    Continue Lasix 20 mg p.o. daily. follow BMP closely with diuresis  Versus shortness of breath on 10/24/2023 chest x-ray showed worsening pulmonary edema  Patient was given total of 40 mg of IV Lasix.  Shortness of breath improving  Started on Lasix 20 mg IV every 12 hours as per cardiology      Mild hypokalemia potassium 3.4;  Replace per protocol     HTN  CAD s/p LCx stenting 2016  Cardiomegaly  Hx Palpitations  *CT: Stable Mild Cardiomegaly  *Negative stress test in 2019  *reported Hx of refusing statin therapy  *Presenting with acute CP/dyspnea, CT PE negative for PE. Trop/EKG negative  - TTE shows EF 60 to 65%, mild pulmonary hypertension  - continue aspirin/coreg/imdur/PRN nitroglycerin     Chronic Lymphedema  - PT OT SW  - SCDs     Gout  Osteoporosis  - resume allopurinol     Bladder tumor s/p resection  - no acute intervention     Tinnitus/Sensorineural Hearing loss  - No acute intervention      Diet: 2 Gram Sodium Diet    DVT Prophylaxis:  subcutaneous lovenox   Gaona Catheter: Not present  Lines: PRESENT           Cardiac Monitoring: ACTIVE order. Indication: Tachyarrhythmias, acute (48 hours)  Code Status: Full Code    Clinically Significant Risk Factors        # Hypokalemia: Lowest K = 3.3 mmol/L in last 2 days, will replace as needed       # Hypoalbuminemia: Lowest albumin = 2.9 g/dL at 10/19/2023  7:54 AM, will monitor as appropriate     # Hypertension: Noted on problem list            # Financial/Environmental Concerns: none         Disposition Plan      Expected Discharge Date: In the next 3-4 days if remains stable and respiratory status improves  Destination: TCU         CODE STATUS discussion was done with the daughter who is at bedside about changing her CODE STATUS to DNR/DNI with her advanced age and fragility that the resuscitation will be right for her mother.  She was going to talk to her brother about CODE STATUS but has not done that yet as her mother respiratory status got worse yesterday needing [place] [facility] [place].  Palliative care consultation requested regarding ongoing goals of care discussion.  Appreciate their assistance    Marti Calixto MD  Hospitalist Service  [facility]  Securely message with [employer] (more info)  Text page via [employer] Paging/Directory   ______________________________________________________________________    Interval History   Resting in chair.  Shortness of breath better today.  Still on high flow nasal cannula  Physical Exam   Vital Signs: Temp: 97.9  F (36.6  C) Temp src: Oral BP: 120/57 Pulse: 78   Resp: 22 SpO2: 94 % O2 Device: High Flow Nasal Cannula (HFNC) Oxygen Delivery: 40 LPM  Weight: 104 lbs .91 oz    Constitutional: Awake, alert, cooperative, resting in bed with high flow nasal cannula  Respiratory: Bibasilar crackles heard on auscultation  Cardiovascular: Regular rate and rhythm  GI: Normal bowel sounds, soft, non-distended, non-tender  Skin/Integumen: No rash on exposed  skin.  No lower extremity edema  Other: Mood is very pleasant        Medical Decision Making       52 MINUTES SPENT BY ME on the date of service doing chart review, history, exam, documentation & further activities per the note.      Data     I have personally reviewed the following data over the past 24 hrs:    8.8  \   10.5 (L)   / 296     137 101 21.9 /  165 (H)   3.3 (L); 3.3 (L) 25 0.63 \     Trop: N/A BNP: 311     Procal: N/A CRP: N/A Lactic Acid: N/A

## 2023-10-25 NOTE — CONSULTS
"Palliative Care Consultation Note  [facility]      Patient: Mary Polanco  Date of Admission:  10/18/2023    Requesting Clinician / Team: Dr Calixto/Hospitalist  Reason for consult: Goals of care     Recommendations & Counseling     GOALS OF CARE:   Restorative without limits   Reviewed GOC with professional Mandarin  via phone. Goals are to continue current cares with hopes to wean off HFNC oxygen. Daughter plans to take Mary home with her after discharge.  Reviewed code status but patient wants to remain full code and consider information over time.    ADVANCE CARE PLANNING:  No health care directive on file. Per  informed consent policy, next of kin should be involved if patient becomes unable.  Code status: Full Code    MEDICAL MANAGEMENT:   We are not actively managing symptoms at this time.    PSYCHOSOCIAL/SPIRITUAL SUPPORT:  Family Daughter Rivka is very involved and supportive.  [place] community: Congregational   Spiritual - would appreciate spiritual support- needs Mandarin .    Palliative Care will not continue to follow. Thank you for the consult and allowing us to aid in the care of Mary Polanco. We can be recalled if further needs are identified    HARSHAD Mixon  During regular M-F work hours ([id number]) -- please contact me on Vocera   In my absence, securely message our team via [email or web address] \"[facility]\" or go to On Call tab in [employer], search for \"[facility]\"   After regular work hours and on weekends/holidays, to reach our on call physician, securely message via [email or web address] \"[facility]\" or go to On Call tab in [employer], search for \"[facility]\"     Palliative Summary/HPI     Mary Polanco is a 89 year old female with a past medical history of CAD, HTN, gout,  who presented on 10/18/23 with shortness of breath. Upon assessment she was found to have sepsis secondary to bilateral multifocal pneumonia. Antibiotics were " changed 10/22 from ceftriaxone to cefepime as she has been having fevers up to 101 and there were concerns for aspiration pneumonia. Also having diuresis. She was evaluated by [facility] 10/22 and recommends regular diet.    Today, the patient was seen for:  Goals of care    Palliative Care Summary:   Met with Mary and her daughter Rivka in room using Mandarin phone .   I introduced our role as an extra layer of support and how we help patients and families dealing with serious, potentially life-limiting illnesses. I explained the composition of the palliative care team.  Palliative care helps patients and families navigate their care while focusing on the whole person; providing emotional, social and spiritual support  Palliative care often assists with symptom management, information sharing about what to expect from the illness, available treatment options and what effect those options may have on the disease course, and provide effective communication and caring support. and Introduced the role of palliative care as an interdisciplinary team that cares for patients with serious illness to help support symptom management, communication, coping for patients and their families as well as support with medical decision making.    Prognosis, Goals, & Planning:    Functional Status just prior to this current hospitalization:  has had no recent hospitalizations, weight loss or loss of function. There is not reported or documented decline in patient's function.  There is no reported or documented weight loss.  Palliative Performance Scale (PPS): 70%  Significant evidence of disease.  Full/normal self-care & LOC; normal or reduced intake, reduced ambulation and activity/work.  Estimated Median Survival in Days: 145 to 108  days.   ECOG1 (Restricted in physically strenuous activity but ambulatory and able to carry out work of a light or sedentary nature)    Prognosis, Goals, and/or Advance Care Planning:  Advance Care  Planning Discussion 10/25/2023. IOrin CNS met with Patient and their family today at the hospital to discuss Advance Care Planning. Mary He  would benefit from family assistance with decisions  and daughter Rosie was present for this discussion.  Those present were informed of the voluntary nature of this discussion and wished to proceed.  The discussion included:  Review of code status, review of respiratory status and desire for intubation if status worsens, discussion about HCD and if patient has one, which she states she does not . This discussion began at 1:30and ended at -2 pm  for a total of 30 minutes.    Code Status was addressed today:   Yes -Reviewed code status but patient wants to remain full code and consider information over time.    Patient's decision making preferences: shared with support from loved ones        Patient has decision-making capacity today for complex decisions:Questionable          Coping, Meaning, & Spirituality:   Mood, coping, and/or meaning in the context of serious illness were addressed today: No    Social:   Living situation:lives alone  Important relationships/caregivers:daughter Rosie     Medications:  I have reviewed this patient's medication profile and medications from this hospitalization.      Noted scheduled meds are:   acetylcysteine  2 mL Nebulization 4x Daily    albuterol  2.5 mg Nebulization 4x Daily    allopurinol  100 mg Oral Daily    amLODIPine  2.5 mg Oral Daily    aspirin  81 mg Oral Daily    carvedilol  6.25 mg Oral BID w/meals    cefuroxime  500 mg Oral Q12H [facility] (08/20)    doxycycline hyclate  100 mg Oral Q12H [facility] (08/20)    enoxaparin ANTICOAGULANT  30 mg Subcutaneous Q24H    furosemide  20 mg Intravenous Q12H    isosorbide mononitrate  30 mg Oral Daily    pantoprazole  40 mg Oral QAM AC    polyethylene glycol  17 g Oral Daily    sennosides  1 tablet Oral BID    sodium chloride (PF)  10 mL Intracatheter Q8H       Noted PRN meds  are:  acetaminophen **OR** acetaminophen, lidocaine 4%, lidocaine (buffered or not buffered), lidocaine (buffered or not buffered), melatonin, nitroGLYcerin, ondansetron **OR** ondansetron, prochlorperazine **OR** prochlorperazine **OR** prochlorperazine, sodium chloride (PF), sodium chloride (PF)    ROS:  Comprehensive ROS is  reviewed and is negative except as here & per HPI:     PHYSICAL EXAM:  Vital Signs: Temp: 97.9  F (36.6  C) Temp src: Oral BP: 120/57 Pulse: 78   Resp: 22 SpO2: 94 % O2 Device: High Flow Nasal Cannula (HFNC) Oxygen Delivery: 40 LPM  Weight: 104 lbs .91 oz    GENERAL:  Alert, fatigued, no  distress, sitting up in bed.  HEAD: Normocephalic atraumatic  SCLERA: Anicteric  EXTREMITIES: Warm; mild LE edema  ABDOMEN:  No distention  RESPIRATORY: Breathing non labored on HFNC  CARDIOVASCULAR: RRR  NEUROLOGIC: Alert.  PSYCH: Calm, cooperative.    Data reviewed:  Lab Results   Component Value Date    WBC 8.8 10/25/2023    WBC 10.4 10/23/2023    WBC 8.8 10/20/2023    HGB 10.5 (L) 10/25/2023    HGB 10.6 (L) 10/23/2023    HGB 11.0 (L) 10/20/2023    HCT 31.4 (L) 10/25/2023    HCT 31.5 (L) 10/23/2023    HCT 34.4 (L) 10/20/2023     10/25/2023     10/23/2023     10/20/2023     10/25/2023     10/23/2023     10/20/2023    POTASSIUM 3.3 (L) 10/25/2023    POTASSIUM 3.3 (L) 10/25/2023    POTASSIUM 3.4 10/24/2023    CHLORIDE 101 10/25/2023    CHLORIDE 101 10/23/2023    CHLORIDE 111 (H) 10/20/2023    CO2 25 10/25/2023    CO2 24 10/23/2023    CO2 22 10/20/2023    BUN 21.9 10/25/2023    BUN 22.0 10/23/2023    BUN 15.1 10/20/2023    CR 0.63 10/25/2023    CR 0.66 10/23/2023    CR 0.65 10/20/2023     (H) 10/25/2023     (H) 10/23/2023     (H) 10/20/2023    SED 29 12/07/2009    DD 0.6 (H) 03/12/2020    DD 0.5 07/20/2012    NTBNPI 311 10/25/2023    NTBNPI 236 10/18/2023    TROPI <0.015 03/13/2020    TROPI <0.015 03/13/2020    TROPI <0.015 03/12/2020    AST 21  10/19/2023    AST 27 10/18/2023    AST 31 03/12/2020    ALT 8 10/19/2023    ALT 11 10/18/2023    ALT 21 03/12/2020    ALKPHOS 57 10/19/2023    ALKPHOS 77 10/18/2023    ALKPHOS 83 03/12/2020    BILITOTAL 0.5 10/19/2023    BILITOTAL 0.3 10/18/2023    BILITOTAL 0.2 03/12/2020      Lab Results   Component Value Date    ALBUMIN 2.9 10/19/2023    ALBUMIN 3.5 03/12/2020         Results for orders placed or performed during the hospital encounter of 10/18/23   CT Chest Pulmonary Embolism w Contrast    Impression    IMPRESSION:    1.  No pulmonary embolism.    2.  Bilateral multifocal pneumonia.    3.  Stable mild cardiomegaly.   XR Chest Port 1 View    Impression    IMPRESSION: New, mild diffuse interstitial opacities, favoring a mild interstitial edema pattern.     Additional patchy airspace opacities within the right mid and lower lung, as well as the left perihilar region, favoring superimposed infectious/inflammatory infiltrate. Continued imaging follow-up to resolution is recommended.    Likely small left pleural effusion. No pneumothorax.    Mild cardiomegaly. Atherosclerosis of the thoracic aorta.   Echocardiogram Complete   Result Value Ref Range    LVEF  60-65%      TOTAL TIME SPENT:  70 minutes.  Time spent in today's visit, review of chart/investigations today, communicating with other providers and documentation today.    Much or all of the text in this note was generated through the use of Dragon dictation, voice to text software. Errors in spelling or words which appear to be out of context are unintentional and may be present due to having escaped editing.

## 2023-10-25 NOTE — PROGRESS NOTES
Patient remain on HFNC with current settings of 35L FiO2 45% with SpO2 in the low 90's. All nebs and given as ordered. Vest treatment done this morning 10 Hz for 10 minutes per MD order for CPT BID. Pt tolerated the treatment well. BS diminished. Skin intact under oxygen device.  Will cont to follow.  10/25/2023  Lorena Frank, RT

## 2023-10-25 NOTE — PLAN OF CARE
IMC: Alert and oriented x3-4, per family who translates for pt. Vital signs stable on HFNC 45% FiO2 35 LPM. Assist of 1 GBW. Tolerating 2gm NA diet. Lung sounds diminished and coarse at bases. Bowel sounds active, passing flatus, no BM during shift, adequate urine output. Skin CDI. Denies pain. Denies nausea. Tele SR. PIV SL. K replaced x2 during shift, recheck at 2146.

## 2023-10-25 NOTE — PROGRESS NOTES
Pt remains on 50-54% 40L HFNC through out the night. APo2 from low 90's to 96%. Fio2 weaned down from 70%. Skin intact. Mepilex under both cheeks. Will continue to monitor and wean FIo2 as able.    Bean Hines, RT

## 2023-10-25 NOTE — CONSULTS
[facility]    Cardiology Consultation     Date of Admission:  10/18/2023    Assessment & Plan   Mary Polanco is a 89 year old female who was admitted on 10/18/2023.    Acute respiratory failure secondary to multifocal pneumonia -this was the primary reason for admission  Acute on chronic heart failure with preserved ejection fraction  History of coronary artery disease with previous circumflex stent in 2016, normal ejection fraction  Hypertension    Discussion  Patient presented with pneumonia and was treated with antibiotics.  This seems to be improving although there was some worsening shortness of breath 2 days ago which may be related to fluid retention.  She likely has heart failure with preserved ejection fraction in addition to the pneumonia.  She was given IV Lasix yesterday and I would continue that today and hold off on p.o. Lasix.  Her baseline weight at home is around 103 pounds and yesterday was 104 pounds here.    At today's visit, reviewed the admission chest x-ray and x-rays done yesterday.  CT chest on admission was reviewed.  Labs were reviewed including admission N-terminal proBNP which was within normal limits.  We will repeat N-terminal proBNP today.  We will continue to follow.  Today's medical decision making was of high complexity.  I have discussed the plan with the patient as well as her daughter was at bedside.        Toi Canales MD, MD    Primary Care Physician   Kevon Sawyer    Reason for Consult   Reason for consult: I was asked by hospitalist to evaluate this patient for respiratory failure.    History of Present Illness   Mary Polacno is a 89 year old female who originally from [place] who presents with increasing shortness of breath while walking in her house with some chest pain.  There was so 2 episodes of cough without sputum production.  Patient also present to the emergency room with shortness of breath and had low-grade fever with with chest x-ray  and CT showing multifocal pneumonia bilateral infiltrates and some pulmonary congestion.  N-terminal proBNP on admission was normal.  Patient was hypoxic on room air and was placed on nasal oxygen.  She was also started on Rocephin and doxycycline.  On 22nd October she had worsening shortness of breath requiring more oxygen and chest x-ray showed some pulmonary congestion was started on IV Lasix.  Echocardiogram done on admission was reviewed by me and revealed ejection fraction that was normal with mild pulmonary hypertension.    She has past medical history of coronary disease LAD stent in 2019.  Last nuclear stress test in 2019 revealed no ischemia.    We are consulted for heart failure with preserved ejection fraction.    Past Medical History   Past Medical History:   Diagnosis Date    Benign essential HTN     Coronary artery disease involving native coronary artery of native heart with unstable angina pectoris (H) 4/2016 4/2016 Cath - 80% hazy stenosis at site of ruptured plaque - HARLEEN placed    Cough     Eczema     Lung nodule     Malignant neoplasm of bladder, part unspecified 1999 Dr Everett    Surgery done in [place]    Mitral regurgitation     4/2016 mod-mod severe MR with multiple jets per Echo    Mixed hyperlipidemia     NSTEMI (non-ST elevated myocardial infarction) (H)     Osteoporosis     Polyarticular arthritis     Rash     Sciatica          Past Surgical History   Past Surgical History:   Procedure Laterality Date    BLADDER TUMOR-ASSOCIATED      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    CATARACT IOL, RT/LT  5-08    HC REMOVAL GALLBLADDER      due to stone 1990    HEART CATH STENT COR W/WO PTCA  4/2016 4/2016 Cath - 80% hazy stenosis at site of ruptured plaque - HARLEEN placed    ZZC APPENDECTOMY      1950         Prior to Admission Medications   Prior to Admission Medications   Prescriptions Last Dose Informant Patient Reported? Taking?   acetaminophen (TYLENOL) 325 MG tablet  Self No Yes    Sig: Take 2 tablets (650 mg) by mouth every 6 hours as needed for mild pain   allopurinol (ZYLOPRIM) 100 MG tablet 10/18/2023  Yes Yes   Sig: Take 100 mg by mouth daily   amLODIPine (NORVASC) 5 MG tablet 10/18/2023  No Yes   Sig: Take 1 tablet (5 mg) by mouth daily   Patient taking differently: Take 2.5 mg by mouth daily   aspirin EC 81 MG EC tablet 10/18/2023 Self No Yes   Sig: Take 1 tablet (81 mg) by mouth daily   carvedilol (COREG) 6.25 MG tablet 10/18/2023 at x2  No Yes   Sig: Take 1 tablet (6.25 mg) by mouth 2 times daily (with meals)   isosorbide mononitrate (IMDUR) 30 MG 24 hr tablet 10/18/2023  No Yes   Sig: Take 1 tablet (30 mg) by mouth daily   nitroGLYcerin (NITROSTAT) 0.4 MG sublingual tablet   No Yes   Sig: Place 1 tablet (0.4 mg) under the tongue every 5 minutes as needed for chest pain if you are still having symptoms after 3 doses (15 minutes) call 911.   senna (SENOKOT) 8.6 MG tablet 10/18/2023  Yes Yes   Sig: Take 1 tablet by mouth 2 times daily      Facility-Administered Medications: None     Current Facility-Administered Medications   Medication Dose Route Frequency    acetylcysteine  2 mL Nebulization 4x Daily    albuterol  2.5 mg Nebulization 4x Daily    allopurinol  100 mg Oral Daily    amLODIPine  2.5 mg Oral Daily    aspirin  81 mg Oral Daily    carvedilol  6.25 mg Oral BID w/meals    cefuroxime  500 mg Oral Q12H Replaced by [facility] (08/20)    doxycycline hyclate  100 mg Oral Q12H Replaced by [facility] (08/20)    furosemide  20 mg Oral Daily    isosorbide mononitrate  30 mg Oral Daily    polyethylene glycol  17 g Oral Daily    potassium chloride  20 mEq Oral Once    predniSONE  40 mg Oral Daily    sennosides  1 tablet Oral BID    sodium chloride (PF)  10 mL Intracatheter Q8H     Current Facility-Administered Medications   Medication Last Rate     Allergies   Allergies   Allergen Reactions    Azithromycin     Gabapentin Itching    Kanamycin     Metronidazole Rash    Pcn [Penicillins] Rash    Streptomycin      rash     "Atorvastatin Rash    Lisinopril Rash       Social History    reports that she has never smoked. She has never used smokeless tobacco. She reports that she does not drink alcohol and does not use drugs.      Family History   I have reviewed this patient's family history and updated it with pertinent information if needed.  Family History   Problem Relation Age of Onset    Diabetes Brother     Hypertension Son     Cerebrovascular Disease Mother         76    Family History Negative Daughter     Family History Negative Brother     Family History Negative Brother           Review of Systems   A comprehensive review of system was performed and is negative other than that noted in the HPI or here.     Physical Exam   Vital Signs with Ranges  Temp:  [97.5  F (36.4  C)-98.8  F (37.1  C)] 98.2  F (36.8  C)  Pulse:  [57-86] 58  Resp:  [11-24] 19  BP: ()/(48-88) 109/55  FiO2 (%):  [50 %-70 %] 55 %  SpO2:  [88 %-96 %] 94 %  Wt Readings from Last 4 Encounters:   10/24/23 47.2 kg (104 lb 0.9 oz)   08/06/23 46.7 kg (103 lb)   05/10/23 48.2 kg (106 lb 3.2 oz)   11/21/22 47.1 kg (103 lb 12.8 oz)     I/O last 3 completed shifts:  In: 110 [P.O.:100; I.V.:10]  Out: 1600 [Urine:1600]      Vitals: /55   Pulse 58   Temp 98.2  F (36.8  C) (Oral)   Resp 19   Ht 1.524 m (5')   Wt 47.2 kg (104 lb 0.9 oz)   SpO2 94%   BMI 20.32 kg/m      Physical Exam:   General - Alert and oriented to time place and person in no acute distress  Eyes - No scleral icterus  HEENT - Neck supple, moist mucous membranes  Cardiovascular -regular S1-S2 with S4, JVP 8-9  Extremities - There is no edema  Respiratory -bilateral basal rales, fine  Skin - No pallor or cyanosis  Gastrointestinal - Non tender and non distended without rebound or guarding  Psych - Appropriate affect   Neurological - No gross motor neurological focal deficits    No lab results found in last 7 days.    Invalid input(s): \"TROPONINIES\"    Recent Labs   Lab 10/25/23  0577 " "10/24/23  1121 10/23/23  0831 10/20/23  0707 10/19/23  0754 10/18/23  2025   WBC 8.8  --  10.4 8.8 9.7 10.6   HGB 10.5*  --  10.6* 11.0* 10.7* 12.0   MCV 88  --  89 91 91 91     --  274 240 226 284     --  135 142 139 139   POTASSIUM 3.3*  3.3* 3.4 3.4 3.9 3.8 4.3   CHLORIDE 101  --  101 111* 109* 105   CO2 25  --  24 22 21* 23   BUN 21.9  --  22.0 15.1 15.3 21.6   CR 0.63  --  0.66 0.65 0.68 0.78   GFRESTIMATED 82  --  81 82 81 70   ANIONGAP 11  --  10 9 9 11   CLAIRE 9.3  --  9.1 9.0 8.6 9.5   *  --  122* 100* 102* 136*   ALBUMIN  --   --   --   --  2.9* 3.8   PROTTOTAL  --   --   --   --  6.2* 7.4   BILITOTAL  --   --   --   --  0.5 0.3   ALKPHOS  --   --   --   --  57 77   ALT  --   --   --   --  8 11   AST  --   --   --   --  21 27     Recent Labs   Lab Test 07/14/17  0000 04/10/16  0535   CHOL 220 170   HDL 53 55    88   TRIG 330 133     Recent Labs   Lab 10/25/23  0526 10/23/23  0831 10/20/23  0707   WBC 8.8 10.4 8.8   HGB 10.5* 10.6* 11.0*   HCT 31.4* 31.5* 34.4*   MCV 88 89 91    274 240     Recent Labs   Lab 10/24/23  1348 10/18/23  2028   PH 7.49*  --    PHV  --  7.40   PO2 52*  --    PO2V  --  24*   PCO2 34*  --    PCO2V  --  40   HCO3 25  --    HCO3V  --  25     Recent Labs   Lab 10/18/23  2025   NTBNPI 236     No results for input(s): \"DD\" in the last 168 hours.  No results for input(s): \"SED\", \"CRP\" in the last 168 hours.  Recent Labs   Lab 10/25/23  0526 10/23/23  0831 10/20/23  0707    274 240     No results for input(s): \"TSH\" in the last 168 hours.  No results for input(s): \"COLOR\", \"APPEARANCE\", \"URINEGLC\", \"URINEBILI\", \"URINEKETONE\", \"SG\", \"UBLD\", \"URINEPH\", \"PROTEIN\", \"UROBILINOGEN\", \"NITRITE\", \"LEUKEST\", \"RBCU\", \"WBCU\" in the last 168 hours.    Imaging:  Recent Results (from the past 48 hour(s))   XR Chest Port 1 View    Narrative    XR CHEST PORT 1 VIEW 10/24/2023 2:14 PM    HISTORY: worsening hypoxia    COMPARISON: 10/22/2023    FINDINGS: There is " pulmonary venous congestion and perihilar airspace  disease, mildly improved on the right compared to prior. This may  represent edema and/or infection. No large effusions or pneumothorax.  Unchanged cardiac size. Aortic atherosclerosis.    DAVID AYERS MD         SYSTEM ID:  [id number]       Clinically Significant Risk Factors        # Hypokalemia: Lowest K = 3.3 mmol/L in last 2 days, will replace as needed       # Hypoalbuminemia: Lowest albumin = 2.9 g/dL at 10/19/2023  7:54 AM, will monitor as appropriate     # Hypertension: Noted on problem list            # Financial/Environmental Concerns: none      Pneumonia    Pulmonary Heart Disease (Pulmonary hypertension or Cor pulmonale): Pulmonary Hypertension, unspecified    Chronic Fatigue and Other Debilities: Chronic fatigue, unspecified

## 2023-10-26 NOTE — PLAN OF CARE
Orientations: x4; mandarin speaking but able to make needs know as well as communicate most assessment question answers in english  Vitals/Pain: VSS on HFNC 35L and 45%  Tele: SR  Lines/Drains: 1L midline cath to DUNCAN  Skin/Wounds: some scattered bruising  GI/: +BM this shift, + BS, AUOP via bedside commode  Labs: Abnormal/Trends, Electrolyte Replacement- k 3.5 redraw in for tomorrow AM  Ambulation/Assist: x1 GB/W  Sleep Quality: fair  Plan: wean O2 as able, poss. TCU discharge when able

## 2023-10-26 NOTE — PROGRESS NOTES
Patient remains on HFNC 35L, 45%. Sputum cup left at bedside and explained directions for sputum sample collection which was translated to patient by a family member. Pt has dry cough but occasionally productive per pt. All nebs given as ordered. BS diminished on right side, RISHI clear, LLL clear/dim. Utilizing flutter valve with patient instead of vest CPT therapy as this was initially verbalized by NP at time of order, and patient is already with minimum secretions.   RT to continue to follow    Rupesh Haider, PATRICIA  10/26/23  6:15 PM

## 2023-10-26 NOTE — PROGRESS NOTES
[facility]    Medicine Progress Note - Hospitalist Service    Date of Admission:  10/18/2023    Assessment & Plan   89F hx of Tinnitus, Gout, Osteoporosis, CAD s/p stenting 2019 presenting with Dyspnea and CP found to have multifocal PNA.      Sepsis 2/2 bilateral multifocal Pneumonia  Acute hypoxic Respiratory Failure needing oxygen by high flow nasal cannula on 10/24/2023  Acute diastolic heart failure with preserved EF exacerbation    Patient admitted with shortness of breath and chest pain found to have multifocal pneumonia on CT chest PE protocol, no pulm embolism, sepsis currently was admitted, initially started on IV ceftriaxone and doxycycline.  She was hypoxic, febrile of 101  F on admission  During her hospital course she required min for the oxygen is increasing and currently on high flow nasal cannula.  She had an RRT on 10/24/2023 because of worsening shortness of breath, thought at that time she has some fluid overload and was started on IV Lasix.  Although her BNP was completely normal and echocardiogram showed normal EF no systolic dysfunction.    Her IV ceftriaxone initially changed to cefepime and not changed to oral on cefuroxime on 10/24/2023 the same day when she had RRT.  At this time she is still needing high flow oxygen, although feeling somewhat better.  I will keep her on IV antibiotic with IV cefepime at this time, continue with oral doxycycline.  She is on IV Lasix 20 mg twice daily, looks very much euvolemic at this time.  No lower extremity edema no crackles on examination.  No JVD.  I will continue IV Lasix today and likely discontinue from tomorrow.    At this time, I will check Legionella antibodies, mycoplasma antibodies, will send antifungal serology, blastomycosis aspergillosis and Coccidioides antibodies.,  Check Fungitell assay, ESR CRP, send hypersensitivity pneumonitis panel, ANCA and myeloperoxidase antibodies and proteinase antibodies, check  rheumatoid factor, send sputum for Gram stain and culture, will check PJP as well    Patient does live in a very old apartment, patient family not sure if they have the mold in the home or not.  No recent travel though.  COVID-19/influenza/respiratory syncytial virus was negative on admission    At this time I will continue with Mucomyst nebulization, add Pulmicort nebulization twice daily, DuoNeb nebulization 3 times daily, incentive spirometry and flutter  Try to wean her high flow nasal cannula to regular cannula as tolerated.    Possible acute diastolic congestive heart failure exacerbation  Patient became more more dyspneic and needing 6 L of oxygen with exertion on 10/22/2023  Chest x-ray repeat showed  Patchy airspace opacities within the right mid and lower lung as well as left perihilar region favoring superimposed infectious/inflammatory infiltrate.  New mild diffuse interstitial opacities favoring a mild interstitial edema pattern    Patient was given 20 mg of IV Lasix on 10/23/2024 and started her on Lasix 20 mg p.o. daily  Diuresed well with Lasix.    shortness of breath on 10/24/2023 chest x-ray showed worsening pulmonary edema  Patient was given total of 40 mg of IV Lasix.    Started on Lasix 20 mg IV every 12 hours as per cardiology  Not sure if this pulmonary edema or ARDS type picture, given BNP normal echo completely normal.  Patient does not look volume overloaded at this time.  Agree with keeping on the drier side.      Hypokalemia: resolved  She is on electrolyte replacement protocol we will continue with that     HTN  CAD s/p LCx stenting 2016  Cardiomegaly  Hx Palpitations  *CT: Stable Mild Cardiomegaly  *Negative stress test in 2019  *reported Hx of refusing statin therapy  *Presenting with acute CP/dyspnea, CT PE negative for PE. Trop/EKG negative  - TTE shows EF 60 to 65%, mild pulmonary hypertension  - continue aspirin/coreg/imdur/PRN nitroglycerin     Chronic Lymphedema  - PT OT SW  -  SCDs, no lower extremity edema at this time.     Gout  Osteoporosis  -Continue with allopurinol.  No acute exacerbation at this time     Bladder tumor s/p resection  - no acute intervention     Tinnitus/Sensorineural Hearing loss  -Stable      Diet: 2 Gram Sodium Diet    DVT Prophylaxis: subcutaneous lovenox   Gaona Catheter: Not present  Lines: PRESENT           Cardiac Monitoring: ACTIVE order. Indication: Tachyarrhythmias, acute (48 hours)  Code Status: Full Code as per discussed with the family by the previous hospitalist    Clinically Significant Risk Factors        # Hypokalemia: Lowest K = 3.3 mmol/L in last 2 days, will replace as needed       # Hypoalbuminemia: Lowest albumin = 2.9 g/dL at 10/19/2023  7:54 AM, will monitor as appropriate     # Hypertension: Noted on problem list            # Financial/Environmental Concerns: none         Disposition Plan      Expected Discharge Date: To be decided once able to wean her off from high flow nasal cannula.  Destination: ARNALDO Shaw MD  Hospitalist Service  [facility]  Securely message with [employer] (more info)  Text page via [employer] Paging/Directory   ______________________________________________________________________    Interval History   Patient seen and evaluated in the room today, working with PT, able to get up and walk in the room with the PT with high flow nasal cannula on.  Still having some shortness of breath but is better than before.  She did have a lot of cough this morning.    She is on 35 L of high flow nasal cannula at this time    No other significant event overnight      Physical Exam   Vital Signs: Temp: 97.9  F (36.6  C) Temp src: Oral BP: 110/58 Pulse: 67   Resp: 20 SpO2: 92 % O2 Device: High Flow Nasal Cannula (HFNC) Oxygen Delivery: 35 LPM  Weight: 110 lbs 7.21 oz    Constitutional: Awake, alert, cooperative, resting in bed with high flow nasal cannula  Respiratory: Diminished air entry  bilaterally  Cardiovascular: Regular rate and rhythm  GI: Normal bowel sounds, soft, non-distended, non-tender  Skin/Integumen: No rash on exposed skin.  No lower extremity edema  Other: Mood is very pleasant        Medical Decision Making       52 MINUTES SPENT BY ME on the date of service doing chart review, history, exam, documentation & further activities per the note.      Data     I have personally reviewed the following data over the past 24 hrs:    N/A  \   N/A   / N/A     138 101 26.6 (H) /  117 (H)   3.5 26 0.66 \

## 2023-10-26 NOTE — PROGRESS NOTES
Cardiology Progress Note  Toi Canales MD         Assessment and Plan:        Community-acquired/aspiration pneumonia, on IV antibiotics  Acute on chronic heart failure with preserved ejection fraction, diuresed well yesterday with negative I/O balance although weights are not reliable.  We will continue IV Lasix today.  Still requires high flow oxygen at with 45% FiO2.  Continue to wean.    Plan  Continue IV Lasix today.  Respiratory therapy and wean oxygen as able.  Antibiotics per hospitalist  Incentive spirometry    Plan discussed with the patient.  At today's visit, I reviewed the hospitalist notes as well as the labs including BMP which reveals normal creatinine.  Potassium 3.5.  Replace as needed.                   Interval History:     Resp status stable, no chest pain          Review of Systems:     The 5 point Review of Systems is negative other than noted in the HPI          Physical Exam:        Blood pressure 132/63, pulse 67, temperature 97.9  F (36.6  C), temperature source Oral, resp. rate 13, height 1.524 m (5'), weight 50.1 kg (110 lb 7.2 oz), SpO2 92%.  Vitals:    10/24/23 2352 10/26/23 0609   Weight: 47.2 kg (104 lb 0.9 oz) 50.1 kg (110 lb 7.2 oz)     Weights since admission  Vitals:    10/24/23 2352 10/26/23 0609   Weight: 47.2 kg (104 lb 0.9 oz) 50.1 kg (110 lb 7.2 oz)     Vital Signs with Ranges  Temp:  [97.9  F (36.6  C)-98.4  F (36.9  C)] 97.9  F (36.6  C)  Pulse:  [60-77] 67  Resp:  [6-30] 13  BP: (102-132)/(54-63) 132/63  FiO2 (%):  [40 %-60 %] 40 %  SpO2:  [90 %-95 %] 92 %  I/O's Last 24 hours  I/O last 3 completed shifts:  In: 240 [P.O.:240]  Out: 400 [Urine:400]  Net I/O since admission  10/21 0700 - 10/26 0659  In: 2030 [P.O.:2020; I.V.:10]  Out: 6850 [Urine:6850]  Net: -4820    EXAM:    Constitutional:   On high flow oxygen     Lungs:   Basal rales     Cardiovascular:   normal S1 and S2 and no murmur noted     Extremities and Back:   No edema            Medications:          acetylcysteine  2 mL Nebulization 4x Daily    albuterol  2.5 mg Nebulization 4x Daily    allopurinol  100 mg Oral Daily    amLODIPine  2.5 mg Oral Daily    aspirin  81 mg Oral Daily    carvedilol  6.25 mg Oral BID w/meals    cefuroxime  500 mg Oral Q12H [facility] (08/20)    doxycycline hyclate  100 mg Oral Q12H [facility] (08/20)    enoxaparin ANTICOAGULANT  40 mg Subcutaneous Q24H    furosemide  20 mg Intravenous Q12H    isosorbide mononitrate  30 mg Oral Daily    pantoprazole  40 mg Oral QAM AC    polyethylene glycol  17 g Oral Daily    sennosides  1 tablet Oral BID    sodium chloride (PF)  10 mL Intracatheter Q8H            Data:      All new lab and imaging data was reviewed.   Recent Labs   Lab Test 10/25/23  0526 10/23/23  0831 10/20/23  0707   WBC 8.8 10.4 8.8   HGB 10.5* 10.6* 11.0*   MCV 88 89 91    274 240      Recent Labs   Lab Test 10/26/23  0817 10/26/23  0528 10/25/23  2147 10/25/23  1258 10/25/23  0526 10/24/23  1121 10/23/23  0831   NA  --  138  --   --  137  --  135   POTASSIUM  --  3.5 3.9 3.4 3.3*  3.3*   < > 3.4   CHLORIDE  --  101  --   --  101  --  101   CO2  --  26  --   --  25  --  24   BUN  --  26.6*  --   --  21.9  --  22.0   CR  --  0.66  --   --  0.63  --  0.66   ANIONGAP  --  11  --   --  11  --  10   CLAIRE  --  9.2  --   --  9.3  --  9.1   * 145*  --   --  165*  --  122*    < > = values in this interval not displayed.        Imaging:   No results found for this or any previous visit (from the past 24 hour(s)).

## 2023-10-27 NOTE — PROGRESS NOTES
[facility]    Medicine Progress Note - Hospitalist Service    Date of Admission:  10/18/2023    Assessment & Plan   89F hx of Tinnitus, Gout, Osteoporosis, CAD s/p stenting 2019 presenting with Dyspnea and CP found to have multifocal PNA.      Sepsis 2/2 bilateral multifocal Pneumonia  Acute hypoxic Respiratory Failure needing oxygen by high flow nasal cannula on 10/24/2023  Acute diastolic heart failure with preserved EF exacerbation    Patient admitted with shortness of breath and chest pain found to have multifocal pneumonia on CT chest PE protocol, no pulm embolism, sepsis currently was admitted, initially started on IV ceftriaxone and doxycycline.  She was hypoxic, febrile of 101  F on admission  During her hospital course she required min for the oxygen is increasing and currently on high flow nasal cannula.  She had an RRT on 10/24/2023 because of worsening shortness of breath, thought at that time she has some fluid overload and was started on IV Lasix.  Although her BNP was completely normal and echocardiogram showed normal EF no systolic dysfunction.    Her IV ceftriaxone initially changed to cefepime and not changed to oral on cefuroxime on 10/24/2023 the same day when she had RRT.  At this time she is still needing high flow oxygen, although feeling somewhat better.  I will keep her on IV antibiotic with IV cefepime at this time, continue with oral doxycycline.  She is on IV Lasix 20 mg twice daily, looks very much euvolemic at this time.  No lower extremity edema no crackles on examination.  No JVD.  I will continue IV Lasix today and likely discontinue from tomorrow.    At this time, I will check Legionella antibodies, mycoplasma antibodies, will send antifungal serology, blastomycosis aspergillosis and Coccidioides antibodies.,  Check Fungitell assay, ESR CRP, send hypersensitivity pneumonitis panel, ANCA and myeloperoxidase antibodies and proteinase antibodies, check  rheumatoid factor, send sputum for Gram stain and culture, will check PJP as well    Patient does live in a very old apartment, patient family not sure if they have the mold in the home or not.  No recent travel though, although was outside picking up the apples before feeling sick..  COVID-19/influenza/respiratory syncytial virus was negative on admission    At this time I will continue with Mucomyst nebulization, add Pulmicort nebulization twice daily, DuoNeb nebulization 3 times daily, incentive spirometry and flutter  Try to wean her high flow nasal cannula to regular cannula as tolerated.  We will repeat the CT scan of the chest with contrast today, consult pulmonary to evaluate the patient.    Possible acute diastolic congestive heart failure exacerbation: Resolved  Patient became more more dyspneic and needing 6 L of oxygen with exertion on 10/22/2023  Chest x-ray repeat showed  Patchy airspace opacities within the right mid and lower lung as well as left perihilar region favoring superimposed infectious/inflammatory infiltrate.  New mild diffuse interstitial opacities favoring a mild interstitial edema pattern    Patient was given 20 mg of IV Lasix on 10/23/2024 and started her on Lasix 20 mg p.o. daily  Diuresed well with Lasix.    shortness of breath on 10/24/2023 chest x-ray showed worsening pulmonary edema  Patient was given total of 40 mg of IV Lasix.    Started on Lasix 20 mg IV every 12 hours as per cardiology  Not sure if this pulmonary edema or ARDS type picture, given BNP normal echo completely normal.  Patient does not look volume overloaded at this time.  \  No sign of fluid overload at this time, will discontinue IV Lasix now.      Hypokalemia: resolved  She is on electrolyte replacement protocol we will continue with that     HTN  CAD s/p LCx stenting 2016  Cardiomegaly  Hx Palpitations  *CT: Stable Mild Cardiomegaly  *Negative stress test in 2019  *reported Hx of refusing statin  therapy  *Presenting with acute CP/dyspnea, CT PE negative for PE. Trop/EKG negative  - TTE shows EF 60 to 65%, mild pulmonary hypertension  - continue aspirin/coreg/imdur/PRN nitroglycerin     Chronic Lymphedema  - PT OT SW  - SCDs, no lower extremity edema at this time.     Gout  Osteoporosis  -Continue with allopurinol.  No acute exacerbation at this time     Bladder tumor s/p resection  - no acute intervention     Tinnitus/Sensorineural Hearing loss  -Stable        Discussed with the patient daughter at bedside.  Send sputum for Gram stain culture and PJP  Other fungal studies and ANCA pending  Repeat CT scan of chest with contrast  Consult pulmonary to evaluate the patient.    Discussed with the patient, daughter and the nursing staff to give the patient    Diet: 2 Gram Sodium Diet    DVT Prophylaxis: subcutaneous lovenox   Gaona Catheter: Not present  Lines: PRESENT           Cardiac Monitoring: ACTIVE order. Indication: Tachyarrhythmias, acute (48 hours)  Code Status: Full Code as per discussed with the family by the previous hospitalist    Clinically Significant Risk Factors              # Hypoalbuminemia: Lowest albumin = 2.9 g/dL at 10/19/2023  7:54 AM, will monitor as appropriate     # Hypertension: Noted on problem list            # Financial/Environmental Concerns: none         Disposition Plan      Expected Discharge Date: To be decided once able to wean her off from high flow nasal cannula.  Destination: ARNALDO Shaw MD  Hospitalist Service  [facility]  Securely message with [employer] (more info)  Text page via [facility] Paging/Directory   ______________________________________________________________________    Interval History   Patient seen and evaluated in the room, daughter at bedside, patient feeling overall okay, denies any chest pain or shortness of breath no fever chills or cough.  Able to cough up some phlegm this morning    Overnight desaturated and high  flow oxygen increased to 50 L    No other significant event overnight      Physical Exam   Vital Signs: Temp: 98.2  F (36.8  C) Temp src: Oral BP: 118/60 Pulse: 79   Resp: 30 SpO2: 91 % O2 Device: High Flow Nasal Cannula (HFNC) Oxygen Delivery: 50 LPM  Weight: 110 lbs 7.21 oz    Constitutional: Awake, alert, cooperative, resting in bed with high flow nasal cannula  Respiratory: Diminished air entry bilaterally, occasional crackles positive mostly in the left  Cardiovascular: Regular rate and rhythm  GI: Normal bowel sounds, soft, non-distended, non-tender  Skin/Integumen: No rash on exposed skin.  No lower extremity edema  Other: Mood is very pleasant        Medical Decision Making       52 MINUTES SPENT BY ME on the date of service doing chart review, history, exam, documentation & further activities per the note.      Data     I have personally reviewed the following data over the past 24 hrs:    10.7  \   10.1 (L)   / 334     136 100 28.3 (H) /  109 (H)   3.4 27 0.80 \     Procal: N/A CRP: 88.89 (H) Lactic Acid: N/A

## 2023-10-27 NOTE — PROGRESS NOTES
Cardiology Progress Note  Toi Canales MD         Assessment and Plan:        Community-acquired/aspiration pneumonia, on IV antibiotics, still ongoing fever and elevated crp  ?Acute on chronic heart failure with preserved ejection fraction, diuresed well yesterday with negative I/O balance although weights are not reliable.  We will continue IV Lasix today.  Still requires high flow oxygen at with 45% FiO2.  Continue to wean.    Plan  Her hypoxia is mainly because of persistent pneumonia.  I believe that element of heart failure is questionable given normal N-terminal proBNP and normal EF but it is reasonable to keep her on the drier side if there is ARDS.  I agree with pulmonary consult.  We will transition IV Lasix to p.o. in a day or 2 at the same dose.   We will sign off.  Recall if questions.                   Interval History:     Resp status stable, no chest pain          Review of Systems:     The 5 point Review of Systems is negative other than noted in the HPI          Physical Exam:        Blood pressure 116/55, pulse 76, temperature 100.1  F (37.8  C), temperature source Oral, resp. rate 17, height 1.524 m (5'), weight 50.1 kg (110 lb 7.2 oz), SpO2 92%.  Vitals:    10/24/23 2352 10/26/23 0609   Weight: 47.2 kg (104 lb 0.9 oz) 50.1 kg (110 lb 7.2 oz)     Weights since admission  Vitals:    10/24/23 2352 10/26/23 0609   Weight: 47.2 kg (104 lb 0.9 oz) 50.1 kg (110 lb 7.2 oz)     Vital Signs with Ranges  Temp:  [97.6  F (36.4  C)-100.3  F (37.9  C)] 100.1  F (37.8  C)  Pulse:  [67-76] 76  Resp:  [11-27] 17  BP: (106-116)/(49-70) 116/55  FiO2 (%):  [45 %-72 %] 67 %  SpO2:  [87 %-93 %] 92 %  I/O's Last 24 hours  I/O last 3 completed shifts:  In: -   Out: 1550 [Urine:1250; Other:300]  Net I/O since admission  10/22 0700 - 10/27 0659  In: 2030 [P.O.:2020; I.V.:10]  Out: 7250 [Urine:6950]  Net: -5249    EXAM:    Constitutional:   On high flow oxygen     Lungs:   Bilateral lower ruminant basal rales      Cardiovascular:   normal S1 and S2 and no murmur noted     Extremities and Back:   No edema            Medications:         acetylcysteine  2 mL Nebulization 4x Daily    allopurinol  100 mg Oral Daily    amLODIPine  2.5 mg Oral Daily    aspirin  81 mg Oral Daily    budesonide  0.5 mg Nebulization BID    carvedilol  6.25 mg Oral BID w/meals    ceFEPIme  2 g Intravenous Q12H    doxycycline hyclate  100 mg Oral [facility]H [facility]    enoxaparin ANTICOAGULANT  40 mg Subcutaneous Q24H    furosemide  20 mg Intravenous Q12H    ipratropium - albuterol 0.5 mg/2.5 mg/3 mL  3 mL Nebulization 3 times daily    isosorbide mononitrate  30 mg Oral Daily    pantoprazole  40 mg Oral QAM AC    polyethylene glycol  17 g Oral Daily    sennosides  1 tablet Oral BID    sodium chloride (PF)  10 mL Intracatheter Q8H            Data:      All new lab and imaging data was reviewed.   Recent Labs   Lab Test 10/27/23  0540 10/25/23  0526 10/23/23  0831   WBC 10.7 8.8 10.4   HGB 10.1* 10.5* 10.6*   MCV 88 88 89    296 274      Recent Labs   Lab Test 10/27/23  0540 10/26/23  0817 10/26/23  0528 10/25/23  2147 10/25/23  1258 10/25/23  0526     --  138  --   --  137   POTASSIUM 3.4  --  3.5 3.9   < > 3.3*  3.3*   CHLORIDE 100  --  101  --   --  101   CO2 27  --  26  --   --  25   BUN 28.3*  --  26.6*  --   --  21.9   CR 0.80  --  0.66  --   --  0.63   ANIONGAP 9  --  11  --   --  11   CLAIRE 8.8  --  9.2  --   --  9.3   * 117* 145*  --   --  165*    < > = values in this interval not displayed.        Imaging:   No results found for this or any previous visit (from the past 24 hour(s)).

## 2023-10-27 NOTE — PROGRESS NOTES
[id number]  Orientations: A&O x4, mandarin speaking.  Vitals/Pain: VSS on HFNC. Denies pain  Tele: NSR  Lines/Drains: L Midline.   Skin/Wounds: scattered bruising.   GI/: continent bowel and bladder. Tolerating diet.  Labs: Abnormal/Trends, Electrolyte Replacement- K replaced PO per protocol  Ambulation/Assist: stand by with GB.   Plan: wean O2 as able, scheduled nebs per RT. Encourage activity. Continue plan of care.

## 2023-10-27 NOTE — PLAN OF CARE
([id number]) IMC: Alert and oriented x3-4, per family who translates for pt. Vital signs stable on HFNC 45% FiO2 35 LPM. Assist of 1 GBW. Tolerating 2gm NA diet. Lung sounds diminished at bases. Bowel sounds active, passing flatus, loose BM during shift, adequate urine output. Skin CDI. Denies pain. Denies nausea. Tele SR. PIV SL. K WDL, recheck tomorrow am.

## 2023-10-27 NOTE — CONSULTS
Pulmonary Consult  Mary Polanco MRN: [id number]  4/24/1930  Date of Admission:10/18/2023  Primary care provider: Kevon Sawyer  ___________________________________    Mary Polanco MRN# [id number]   YOB: 1930 Age: 89 year old   Date of Admission: 10/18/2023     Reason for consult: respiratory failure, presume pneumonia not improving.          Assessment and Recommendations:     ## Acute hypoxic respiratory failure  ## Multifocal PNA  ## Mild pulm HTN, new  ## Pulmonary edema  Very rapid onset of dyspnea. Was feeling well and walking around the day prior to presentation and felt well the morning of presentation then developed sudden onset of chest and back pain with dyspnea. Has rhinorrhea and low grade fever Tm 101.2. Requiring HFNC at 60-70% with increasing FiO2 and flow needs over the past few days. Initially on 2L NC, then as low as 1.5, then fluctuated between 2 and 4 until 10/23 when she experienced a rapid decline going to 2L NC to HFNC as high as 70%.  PCT slightly elevated at 0.24, CRP 88.89 late in the admission, no leukocytosis. Troponin WNL.    I believe her worsening oxygenation is due to pulmonary edema, despite the normal BNP recently.  She is experienced increase in her weight, CT findings consistent with pulmonary edema and bedside ultrasound with B-lines in a dependent distribution.    -Recommend increasing diuretics  -Ensure I's and O's are being accurately documented (intake has not been documented for the past 3 days)  -Daily weights        Abisai Forman M.D.  Pulmonary & Critical Care  Pager: Click Here to page      I spent 55 minutes dedicated to this care so far today excluding procedures, including review of medical records, review of imaging (results & images), time with patient and time in documentation.             HPI:     Mary Polanco is a 89 year old female with HO HTN, CAD/NSTEMI s/p stent 7 yrs ago, mitral  regurgitation, chronic lymphedema, malignant neoplasm or the bladder, polyarticular arthritis, cough, lung nodule,  admitted 10/18/2023 for respiratory failure.    Presented to the ED with sudden onset dyspnea the afternoon of presentation. Felt well the day prior and was able to take a walk partway down the block with her daughter. A few hrs after the walk suddenly developed chest and back pain and dyspnea as well as temp of 100.3 and rigors.    Denied abd pain, cough, rhinorrhea, sore throat or pleuritic pain. Never smoker.    No leukocytosis this admission, rare low grade fevers.    Flu, COVID, RSV, Blood Cx, S pneumo antigen, fungitell, blastomycosis, and mycoplasma all negative.    Requiring HFNC at 60-70% with increasing FiO2 and flow needs over the past few days. Initially on 2L NC, then as low as 1.5, then fluctuated between 2 and 4 until 10/23 when she experienced a rapid decline going to 2L NC to HFNC as high as 70%.    Unfortunately it seems the first weight we have for this admission is from 10/24 and ericka 3kg by 10/26.    I/O ordered but intake not being documented.             Past Medical History:     Past Medical History:   Diagnosis Date    Benign essential HTN     Coronary artery disease involving native coronary artery of native heart with unstable angina pectoris (H) 4/2016 4/2016 Cath - 80% hazy stenosis at site of ruptured plaque - HARLEEN placed    Cough     Eczema     Lung nodule     Malignant neoplasm of bladder, part unspecified 1999 Dr Everett    Surgery done in [place]    Mitral regurgitation     4/2016 mod-mod severe MR with multiple jets per Echo    Mixed hyperlipidemia     NSTEMI (non-ST elevated myocardial infarction) (H)     Osteoporosis     Polyarticular arthritis     Rash     Sciatica               Past Surgical History:      Past Surgical History:   Procedure Laterality Date    BLADDER TUMOR-ASSOCIATED      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    CATARACT IOL, RT/LT  5-08     HC REMOVAL GALLBLADDER      due to stone 1990    HEART CATH STENT COR W/WO PTCA  4/2016 4/2016 Cath - 80% hazy stenosis at site of ruptured plaque - HARLEEN placed    ZZC APPENDECTOMY      1950              Social History:     Social History     Socioeconomic History    Marital status: Single     Spouse name: Not on file    Number of children: Not on file    Years of education: Not on file    Highest education level: Not on file   Occupational History    Not on file   Tobacco Use    Smoking status: Never    Smokeless tobacco: Never   Substance and Sexual Activity    Alcohol use: No    Drug use: No    Sexual activity: Not Currently   Other Topics Concern     Service Not Asked    Blood Transfusions Not Asked    Caffeine Concern No    Occupational Exposure Not Asked    Hobby Hazards Not Asked    Sleep Concern No    Stress Concern No    Weight Concern No    Special Diet No    Back Care Not Asked    Exercise Yes     Comment: walking 20 min daily    Bike Helmet Not Asked    Seat Belt Yes    Self-Exams Not Asked    Parent/sibling w/ CABG, MI or angioplasty before 65F 55M? No   Social History Narrative    Vamsi AZEVEDO    From [place]    In the  2001         Social Determinants of Health     Financial Resource Strain: Not on file   Food Insecurity: Not on file   Transportation Needs: Not on file   Physical Activity: Not on file   Stress: Not on file   Social Connections: Not on file   Interpersonal Safety: Not on file   Housing Stability: Not on file               Family History:     Family History   Problem Relation Age of Onset    Diabetes Brother     Hypertension Son     Cerebrovascular Disease Mother         76    Family History Negative Daughter     Family History Negative Brother     Family History Negative Brother               Immunizations:     Immunization History   Administered Date(s) Administered    Influenza (IIV3) PF 10/17/2008, 12/07/2009, 09/20/2010    Influenza Vaccine 65+ (FLUAD) 10/06/2023     Pneumococcal 23 valent 01/08/2008    TD,PF 7+ (Tenivac) 02/16/2007    Zoster vaccine, live 12/07/2009              Allergies:     Allergies   Allergen Reactions    Azithromycin     Gabapentin Itching    Kanamycin     Metronidazole Rash    Pcn [Penicillins] Rash    Streptomycin      rash    Atorvastatin Rash    Lisinopril Rash                Medications:     Current Facility-Administered Medications   Medication    acetaminophen (TYLENOL) tablet 650 mg    Or    acetaminophen (TYLENOL) Suppository 650 mg    acetylcysteine (MUCOMYST) 20 % nebulizer solution 2 mL    allopurinol (ZYLOPRIM) tablet 100 mg    amLODIPine (NORVASC) tablet 2.5 mg    aspirin EC tablet 81 mg    budesonide (PULMICORT) neb solution 0.5 mg    carvedilol (COREG) tablet 6.25 mg    ceFEPIme (MAXIPIME) 2 g vial to attach to  mL bag for ADULTS or 50 mL bag for PEDS    doxycycline hyclate (VIBRAMYCIN) capsule 100 mg    enoxaparin ANTICOAGULANT (LOVENOX) injection 40 mg    furosemide (LASIX) injection 20 mg    ipratropium - albuterol 0.5 mg/2.5 mg/3 mL (DUONEB) neb solution 3 mL    isosorbide mononitrate (IMDUR) 24 hr tablet 30 mg    lidocaine (LMX4) cream    lidocaine 1 % 0.1-1 mL    lidocaine 1 % 0.1-1 mL    melatonin tablet 1 mg    nitroGLYcerin (NITROSTAT) sublingual tablet 0.4 mg    ondansetron (ZOFRAN ODT) ODT tab 4 mg    Or    ondansetron (ZOFRAN) injection 4 mg    pantoprazole (PROTONIX) EC tablet 40 mg    polyethylene glycol (MIRALAX) Packet 17 g    prochlorperazine (COMPAZINE) injection 5 mg    Or    prochlorperazine (COMPAZINE) tablet 5 mg    Or    prochlorperazine (COMPAZINE) suppository 12.5 mg    sennosides (SENOKOT) tablet 1 tablet    sodium chloride (PF) 0.9% PF flush 10 mL    sodium chloride (PF) 0.9% PF flush 10-20 mL    sodium chloride (PF) 0.9% PF flush 3 mL               Review of Systems:     Review of Systems   Constitutional:  Positive for chills and malaise/fatigue.   Respiratory:  Positive for shortness of breath. Negative  "for cough and wheezing.    Cardiovascular:  Positive for chest pain. Negative for leg swelling.              Exam:   /55 (BP Location: Right arm, Patient Position: Semi-Cardoso's, Cuff Size: Adult Small)   Pulse 76   Temp 100.1  F (37.8  C) (Oral)   Resp 17   Ht 1.524 m (5')   Wt 50.1 kg (110 lb 7.2 oz)   SpO2 92%   BMI 21.57 kg/m      Vitals:    10/24/23 2352 10/26/23 0609   Weight: 47.2 kg (104 lb 0.9 oz) 50.1 kg (110 lb 7.2 oz)         Physical Exam  Vitals and nursing note reviewed.   Cardiovascular:      Rate and Rhythm: Normal rate and regular rhythm.   Pulmonary:      Effort: Pulmonary effort is normal.      Comments: Crackles in the bases bilaterally  Musculoskeletal:      Right lower leg: No edema.      Left lower leg: No edema.   Neurological:      Mental Status: She is alert.                Data:   ROUTINE ICU LABS (Last four results)  CMP  Recent Labs   Lab 10/27/23  0540 10/26/23  0817 10/26/23  0528 10/25/23  2147 10/25/23  1258 10/25/23  0526 10/24/23  1121 10/23/23  0831     --  138  --   --  137  --  135   POTASSIUM 3.4  --  3.5 3.9 3.4 3.3*  3.3*   < > 3.4   CHLORIDE 100  --  101  --   --  101  --  101   CO2 27  --  26  --   --  25  --  24   ANIONGAP 9  --  11  --   --  11  --  10   * 117* 145*  --   --  165*  --  122*   BUN 28.3*  --  26.6*  --   --  21.9  --  22.0   CR 0.80  --  0.66  --   --  0.63  --  0.66   GFRESTIMATED 68  --  81  --   --  82  --  81   CLAIRE 8.8  --  9.2  --   --  9.3  --  9.1    < > = values in this interval not displayed.     CBC  Recent Labs   Lab 10/27/23  0540 10/25/23  0526 10/23/23  0831   WBC 10.7 8.8 10.4   RBC 3.49* 3.56* 3.56*   HGB 10.1* 10.5* 10.6*   HCT 30.6* 31.4* 31.5*   MCV 88 88 89   MCH 28.9 29.5 29.8   MCHC 33.0 33.4 33.7   RDW 12.6 12.1 12.6    296 274     INFLAMMATIONNo lab results found in last 7 days.    Invalid input(s): \"ESR\"    INRNo lab results found in last 7 days.  Arterial Blood Gas  Recent Labs   Lab " "10/24/23  1348   PH 7.49*   PCO2 34*   PO2 52*   HCO3 25   O2PER 9       ALL CULTURESNo results for input(s): \"CULT\" in the last 168 hours.           Imaging:       CXR 10/24/23  There is pulmonary venous congestion and perihilar airspace  disease, mildly improved on the right compared to prior. This may  represent edema and/or infection. No large effusions or pneumothorax.  Unchanged cardiac size. Aortic atherosclerosis.      CXR 10/22/23  New, mild diffuse interstitial opacities, favoring a mild interstitial edema pattern.   Additional patchy airspace opacities within the right mid and lower lung, as well as the left perihilar region, favoring superimposed infectious/inflammatory infiltrate. Continued imaging follow-up to resolution is recommended.  Likely small left pleural effusion. No pneumothorax.  Mild cardiomegaly. Atherosclerosis of the thoracic aorta.      ECHO 10/18/23  The visual ejection fraction is 60-65%.  Mild (35-45mmHg) pulmonary hypertension is present.  Contrast was used without apparent complications.      CT Chest 10/18/23  1.  No pulmonary embolism.  2.  Bilateral multifocal pneumonia.  3.  Stable mild cardiomegaly.          The above note was dictated using voice recognition software and may include typographical errors. Please contact the author for any clarifications.    "

## 2023-10-27 NOTE — CARE PLAN
Orientations: A&Ox3-4 (disorented to situation at times per family who helps translate)  Vitals/Pain: VSS on HF NC 65-70% FiO2 55L. Pt denies pain   Tele: NSR  Lines/Drains: midline SL   Skin/Wounds: WDL  GI/: Voiding AUOP, BS+, no BM on shift. 2gm NA diet  Labs: Abnormal/Trends, Electrolyte Replacement- K 3.4, CRP 88.89, hgb 10.1, CO2 trending up 27  Ambulation/Assist: A1 GBW  Sleep Quality: Fair  Plan: mandarin speaking, continue to wean O2

## 2023-10-28 NOTE — CODE/RAPID RESPONSE
[facility]    RRT Note  10/28/2023   Time Called: 5:22 PM    RRT called for: New onset A-fib with RVR    Assessment & Plan   IMPRESSION & PLAN:    Mary Polanco is a 89 year old female w/ PMH of CAD with PCI, hypertension, gout, osteoporosis who was admitted on 10/18/2023 for acute hypoxic respiratory failure from multifocal pneumonia organism yet unspecified.  She has been on broad-spectrum antimicrobials initially with ceftriaxone, later changed to cefepime, also doxycycline.  She ruled out for PE on the day of admission.  She was diuresed out of concern for possible HFpEF, LV EF is 60-65%.    While at rest on the afternoon of 10/28/2023 in bed patient had sudden onset new A-fib with RVR.  For this reason RRT was activated.  History and translation is provided by the patient's nephew.  Patient has palpitations, denies chest pain, endorses some degree of dyspnea.  The family reports she has been taking liquids and some solids today.  Last meal was before 1pm.  Heart rate previously had been 60s-80s.  Most recent hospitalist progress note indicates she has a history of palpitations.  Nephew notes that during the recent infectious disease consultation while hospitalized he did not realize patient's daughter with whom the patient frequently stays at her home visited a hobby farm with imported exotic animals.  Patient's daughter was exposed to these exotic animals and patient was potentially exposed in a secondhand manner.      HR ranges from 125-149, respiratory 22, SPO2 90-93% on 60% HFNC which is an increase from 50% earlier in the day.  BP 91/74, 96/66.  Oral temp 99.1, rectal 99.7.  Patient generally appears younger than stated age and not distressed follows commands easily to wiggle toes and show thumbs up    I personally reviewed the radiographs bilateral infiltrates, no effusion or pulm edema    Impression  New onset A-fib with RVR, stable tachyarrhythmia  Likely secondary to her acute  respiratory failure.  Considered PE although she ruled out for this on day of admission has been on enoxaparin since 10/25/2023, may be from ACS, electrolyte abnormalities, hypovolemia from vigorous diuresis.  She is -7 L since admission, -1.4 L in last 24 hours.    INTERVENTIONS:  -Add on mag-->2.1  - Stat EKG reviewed--> A-fib is new when compared with 10/18/2023, normal axis no Q waves, possible subtle inferior ST depression  - Troponin x2  -Valsalva maneuver transiently drop the heart rate down to 70s-80s although not sustained; she also intermittently has paroxysms where the heart rate will drop down to the 70s-80s  - 250 mL LR bolus x2  - Hold amlodipine, Imdur, continue Coreg  - Metoprolol as needed IV 2.5 mg IV every 5 minutes for heart rate sustained greater than 120  -Anticipate lactic acid BPA firing so we will get a level  -Onset was at 5:19 PM so can trial amiodarone if blood pressure will support it  -She last ate prior to 1 PM, if she becomes unstable she potentially could be cardioverted as well, will hold off for now on try rate and/or rhythm controlling medications  -Continue telemetry/IMC status  - Additional IV access secured  -Could also try to increase respiratory support with NIPPV      Last 24H PRN:     acetaminophen (TYLENOL) tablet 650 mg, 650 mg at 10/27/23 1936 **OR** acetaminophen (TYLENOL) Suppository 650 mg    metoprolol (LOPRESSOR) injection 2.5 mg, 2.5 mg at 10/28/23 1818    Working diagnosis: New onset A-fib with RVR likely secondary to acute Evoxac respiratory failure    At the end of the RRT 2nd 250mL IVF bolus infusing, IV metoprolol given w/o significant change in HR; labs are being drawn    disposition continue current level of care    We will asked the night house NP to follow-up on patient's lab data    Code Status: Full Code    Allergies   Allergies   Allergen Reactions    Azithromycin     Gabapentin Itching    Kanamycin     Metronidazole Rash    Pcn [Penicillins] Rash     Streptomycin      rash    Atorvastatin Rash    Lisinopril Rash       Physical Exam   Vital Signs with Ranges:  Temp:  [97.4  F (36.3  C)-100.1  F (37.8  C)] 99.7  F (37.6  C)  Pulse:  [] 122  Resp:  [10-30] 27  BP: ()/(49-74) 95/69  FiO2 (%):  [58 %-76 %] 58 %  SpO2:  [88 %-96 %] 92 %  I/O last 3 completed shifts:  In: 120 [P.O.:120]  Out: 1100 [Urine:1100]      Constitutional: vs as above and/or per EMR  General:  adult pt lying in bed without acute distress   GCS:   Motor 6=Obeys commands   Verbal Not tested   Eye Opening 4=Spontaneous   Total: 10+     Neuro: +follows commands wiggle toes and thumbs up bilat, face symmetric, tongue midline, speech fluent  Eyes defer  Head, ENT & mouth: NC/AT,  mouth moist oral mucosa  Neck: supple  CV irregularly irregular rhythm to A-fib  resp: Tachypneic with respirate 22, no rales, wheezes or rhonchi  gi:normoactive bowel sounds, soft, nontender, nondisteded  Ext: no edema or mottling  Skin: no rashes on exposed skin  Lymph: defer  Musculoskeletal no bony joint deformities      Data     EKG:  Interpreted by NEYDA Palacios CNP  Time reviewed: 5:35 PM  Symptoms at time of EKG: Palpitations  Rhythm: atrial fibrillation - rapid  Rate: Tachycardia  Axis: Normal  Ectopy: none  Conduction: normal  ST Segments/ T Waves: ST Segment Depression II, III, and aVF  Q Waves: none  Comparison to prior: A-fib is new when compared to similar study from 10/18/2023.  Inferior subtle ST depressions also new    Clinical Impression: New onset A-fib with RVR with subtle ST depression in inferior leads new compared with prior study on 10/18/2023    ABG:  -  IMAGING: (X-ray/CT/MRI)   Recent Results (from the past 24 hour(s))   XR Chest Port 1 View    Narrative    EXAM: XR CHEST PORT 1 VIEW  LOCATION: [facility]  DATE: 10/28/2023    INDICATION: rapid response, eval for worsening infiltrates in setting of new arrhythmia  COMPARISON: 10/24/2023      Impression     IMPRESSION: Mild diffuse interstitial prominence but overall there is improved aeration of lungs. No new infiltrate. Heart size and pulmonary vessels remain normal.       CBC with Diff:  Recent Labs   Lab Test 10/28/23  0539   WBC 12.3*   HGB 11.1*   MCV 88           Lactic Acid:    Pending    Comprehensive Metabolic Panel:  Recent Labs   Lab 10/28/23  1333 10/28/23  0539   NA  --  136   POTASSIUM 3.9 3.3*   CHLORIDE  --  95*   CO2  --  27   ANIONGAP  --  14   GLC  --  142*   BUN  --  26.8*   CR  --  0.81   GFRESTIMATED  --  67   CLAIRE  --  9.2   MAG 2.1  --    PHOS  --  3.6   ALBUMIN  --  3.0*       Time Spent on this Encounter   I spent 60 minutes of critical care time on the unit/floor managing the care of Mary Polanco. Upon evaluation, this patient had a high probability of imminent or life-threatening deterioration due to new afib w/ RVR, which required my direct attention, intervention, and personal management including review of EKG, provision of antiarrhythmics 100% of my time was spent at the bedside counseling the patient and/or coordinating care regarding services listed in this note.    NEYDA Palacios [facility]  Hospitalist Service  [facility]  Securely message with [employer] (more info)  Text page via [employer] Paging/Directory

## 2023-10-28 NOTE — PLAN OF CARE
A&Ox4. Hi-smith 30L 58%.  Mandarin speaking. A of 1 pivot  to bedside commode. Voiding adequately; BM+. LS diminished. Denies pain. Midline cath LUE; no blood return.  2gm Na+ diet.  Tele=SR.  Wean O2 as able.  Replaced K+ recheck in for a.m.  CTM.

## 2023-10-28 NOTE — PROGRESS NOTES
[facility]    Medicine Progress Note - Hospitalist Service    Date of Admission:  10/18/2023    Assessment & Plan   89F hx of Tinnitus, Gout, Osteoporosis, CAD s/p stenting 2019 presenting with Dyspnea and CP found to have multifocal PNA.      Sepsis 2/2 bilateral multifocal Pneumonia  Acute hypoxic Respiratory Failure needing oxygen by high flow nasal cannula on 10/24/2023  Acute diastolic heart failure with preserved EF exacerbation    Patient admitted with shortness of breath and chest pain found to have multifocal pneumonia on CT chest PE protocol, no pulm embolism, sepsis currently was admitted, initially started on IV ceftriaxone and doxycycline.  She was hypoxic, febrile of 101  F on admission  During her hospital course she required min for the oxygen is increasing and currently on high flow nasal cannula.  She had an RRT on 10/24/2023 because of worsening shortness of breath, thought at that time she has some fluid overload and was started on IV Lasix.  Although her BNP was completely normal and echocardiogram showed normal EF no systolic dysfunction.    Her IV ceftriaxone initially changed to cefepime and not changed to oral on cefuroxime on 10/24/2023 the same day when she had RRT.  At this time she is still needing high flow oxygen, although feeling somewhat better.  I will keep her on IV antibiotic with IV cefepime at this time, continue with oral doxycycline.  She is on IV Lasix 20 mg twice daily, looks very much euvolemic at this time.  No lower extremity edema no crackles on examination.  No JVD.  I will continue IV Lasix today and likely discontinue from tomorrow.    At this time, I will check Legionella antibodies, mycoplasma antibodies, will send antifungal serology, blastomycosis aspergillosis and Coccidioides antibodies.,  Check Fungitell assay, ESR CRP, send hypersensitivity pneumonitis panel, ANCA and myeloperoxidase antibodies and proteinase antibodies, check  rheumatoid factor, send sputum for Gram stain and culture, will check PJP as well.  Most of the labs are pending, but Legionella negative, myeloperoxidase antibodies negative.    Patient does live in a very old apartment, patient family not sure if they have the mold in the home or not.  No recent travel though, although was outside picking up the apples before feeling sick..  COVID-19/influenza/respiratory syncytial virus was negative on admission    At this time I will continue with Mucomyst nebulization, add Pulmicort nebulization twice daily, DuoNeb nebulization 3 times daily, incentive spirometry and flutter  Try to wean her high flow nasal cannula to regular cannula as tolerated.  CT chest without contrast reviewed that was done on 10/27/2023, showed worsening multifocal pneumonia particular within the right upper lobe and bilateral lower lobes.  New small left and trace right pleural effusion mild interstitial pulmonary edema.    I do not think so her symptoms are secondary to CHF or pulmonary edema, is likely secondary to worsening bilateral multifocal worsening pneumonia.  She does not look fluid overloaded, BNP is normal, no elevated JVD, echocardiogram with normal EF no diastolic dysfunction as well as normal variability of the inferior vena cava.  In fact cardiology has discontinue her diuretics and I agree with that.  Consult infectious disease to evaluate the patient.    Possible acute diastolic congestive heart failure exacerbation: Resolved  Patient became more more dyspneic and needing 6 L of oxygen with exertion on 10/22/2023  Chest x-ray repeat showed  Patchy airspace opacities within the right mid and lower lung as well as left perihilar region favoring superimposed infectious/inflammatory infiltrate.  New mild diffuse interstitial opacities favoring a mild interstitial edema pattern    Patient was given 20 mg of IV Lasix on 10/23/2024 and started her on Lasix 20 mg p.o. daily  Diuresed well with  Lasix.    shortness of breath on 10/24/2023 chest x-ray showed worsening pulmonary edema  Patient was given total of 40 mg of IV Lasix.    Started on Lasix 20 mg IV every 12 hours as per cardiology  Not sure if this pulmonary edema or ARDS type picture, given BNP normal echo completely normal.  Patient does not look volume overloaded at this time.  \  No sign of fluid overload at this time, agree with discontinue IV Lasix      Hypokalemia: resolved  She is on electrolyte replacement protocol we will continue with that     HTN  CAD s/p LCx stenting 2016  Cardiomegaly  Hx Palpitations  *CT: Stable Mild Cardiomegaly  *Negative stress test in 2019  *reported Hx of refusing statin therapy  *Presenting with acute CP/dyspnea, CT PE negative for PE. Trop/EKG negative  - TTE shows EF 60 to 65%, mild pulmonary hypertension  - continue aspirin/coreg/imdur/PRN nitroglycerin     Chronic Lymphedema  - PT OT SW  - SCDs, no lower extremity edema at this time.     Gout  Osteoporosis  -Continue with allopurinol.  No acute exacerbation at this time     Bladder tumor s/p resection  - no acute intervention     Tinnitus/Sensorineural Hearing loss  -Stable      Appreciate input from the pulmonary, but I do not believe her symptoms are secondary to pulmonary edema.  I agree with cardiology evaluation, to discontinue the Lasix at this time  If her symptoms not improve, she may likely need bronchoscopy  I will like to to try to wean her off from the oxygen, I did decrease her flow to 30 L this morning from 60 and FiO2 to 60% with that she is still saturating more than 93%.  Asked RT continue weaning her oxygen from high flow oxygen at this time.  Continue IV cefepime for now      Discussed with the patient, patient daughter and the nursing staff thank you the patient.    Diet: 2 Gram Sodium Diet    DVT Prophylaxis: subcutaneous lovenox   Gaona Catheter: Not present  Lines: PRESENT           Cardiac Monitoring: ACTIVE order. Indication:  Tachyarrhythmias, acute (48 hours)  Code Status: Full Code as per discussed with the family by the previous hospitalist    Clinically Significant Risk Factors        # Hypokalemia: Lowest K = 3.3 mmol/L in last 2 days, will replace as needed       # Hypoalbuminemia: Lowest albumin = 2.9 g/dL at 10/19/2023  7:54 AM, will monitor as appropriate     # Hypertension: Noted on problem list            # Financial/Environmental Concerns: none         Disposition Plan      Expected Discharge Date: To be decided once able to wean her off from high flow nasal cannula.  Destination: ARNALDO Shaw MD  Hospitalist Service  [facility]  Securely message with [employer] (more info)  Text page via [employer] Paging/Directory   ______________________________________________________________________    Interval History   Patient feeling better this morning, she was eating her breakfast, Seroquel she did have bowel movement yesterday  Breathing is better, although overnight her high flow nasal cannula increased to 60 L/min  She had low-grade temp of 100  F last evening afebrile this morning.    No other significant event overnight        Physical Exam   Vital Signs: Temp: 99.3  F (37.4  C) Temp src: Oral BP: 117/61 Pulse: 81   Resp: 27 SpO2: 93 % O2 Device: High Flow Nasal Cannula (HFNC) Oxygen Delivery: 30 LPM  Weight: 98 lbs 12.8 oz    Constitutional: Awake, alert, cooperative, resting in bed with high flow nasal cannula  Respiratory: Diminished air entry bilaterally, positive bilateral crackles no wheezing  Cardiovascular: Regular rate and rhythm  GI: Normal bowel sounds, soft, non-distended, non-tender  Skin/Integumen: No rash on exposed skin.  No lower extremity edema  Other: Mood is very pleasant        Medical Decision Making           Data     I have personally reviewed the following data over the past 24 hrs:    12.3 (H)  \   11.1 (L)   / 331     136 95 (L) 26.8 (H) /  142 (H)   3.3 (L) 27 0.81 \      ALT: N/A AST: N/A AP: N/A TBILI: N/A   ALB: 3.0 (L) TOT PROTEIN: N/A LIPASE: N/A

## 2023-10-28 NOTE — CONSULTS
Note INFECTIOUS DISEASES CONSULTATION NOTE  Covering for Dimitris Swenson & Jazmine     Date 10/28/2023     Name /  Mary Polanco   1930     MRN [id number]     Thank you for asking us to see Mary Polanco for infectious diseases evaluation  REQUESTING PROVIDER Dr. Shaw  REASON FOR CONSULT Worsening pneumonia      CHIEF COMPLAINT    weakness    HPI    89 year old female with HO HTN, CAD/NSTEMI s/p stent 7 yrs ago, mitral regurgitation, chronic lymphedema, malignant neoplasm or the bladder, polyarticular arthritis, cough, lung nodule.    10.18.23 - sudden onset of dyspnea, chest and back pain, fevers Tm 100.3 F and rigors  -> went to [facility] ED -> blood cultures sent -> Chest CT PE: no PE, bilateral multifocal pneumonia -> started ceftriaxone + doxycycline -> influenza/RSV/COVID-19 PCR negative    10.23.23 - increased FiO2 requirement to HFNC 70% from 2 LPM/NC    10.24.23 - worsening dyspnea -> Chest imaging with pulmonary edema -> procalcitonin 0.24 -> started IV diuretics -> ceftriaxone changed to cefepime    10.25.23 - urine strep pneumoniae Ag negative     10.27.23 - urine legionella Ag negative      ROS    Rest of 15 pt ROS negative      Infection Risk Factors    x Animal / bird exposure   Denies exposure to parturient animals or farm animals. Denies exposure to bird or bat droppings. No pets and no exotic animal exposure.    / Travel / residence location   Born in [place]. Last time went home was in . No travel elsewhere.   / Environmental exposure   Denies exposure to decayed wood or trees. Patient was picking up apples before feeling sick   x Bites (mosquito/tick)      x Ill contacts      ? TB/exposure / + PPD history      x HIV risks        PFSH  Past Medical History:   Diagnosis Date     Benign essential HTN      Coronary artery disease involving native coronary artery of native heart with unstable angina pectoris (H) 2016 Cath - 80% hazy stenosis at site of ruptured plaque - HARLEEN placed     Cough       Eczema      Lung nodule      Malignant neoplasm of bladder, part unspecified 1999 Dr Everett    Surgery done in [place]     Mitral regurgitation     4/2016 mod-mod severe MR with multiple jets per Echo     Mixed hyperlipidemia      NSTEMI (non-ST elevated myocardial infarction) (H)      Osteoporosis      Polyarticular arthritis      Rash      Sciatica      Past Surgical History:   Procedure Laterality Date     BLADDER TUMOR-ASSOCIATED      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     CATARACT IOL, RT/LT  5-08     HC REMOVAL GALLBLADDER      due to stone 1990     HEART CATH STENT COR W/WO PTCA  4/2016 4/2016 Cath - 80% hazy stenosis at site of ruptured plaque - HARLEEN placed     ZZC APPENDECTOMY      1950      Problem (# of Occurrences) Relation (Name,Age of Onset)    Diabetes (1) Brother    Hypertension (1) Son    Cerebrovascular Disease (1) Mother: 76    Family History Negative (3) Daughter, Brother, Brother          Family History   Problem Relation Age of Onset     Diabetes Brother      Hypertension Son      Cerebrovascular Disease Mother         76     Family History Negative Daughter      Family History Negative Brother      Family History Negative Brother      Social History     Socioeconomic History     Marital status: Single   Tobacco Use     Smoking status: Never     Smokeless tobacco: Never   Substance and Sexual Activity     Alcohol use: No     Drug use: No     Sexual activity: Not Currently   Other Topics Concern     Caffeine Concern No     Sleep Concern No     Stress Concern No     Weight Concern No     Special Diet No     Exercise Yes     Comment: walking 20 min daily     Seat Belt Yes     Parent/sibling w/ CABG, MI or angioplasty before 65F 55M? No   Social History Narrative    Vamsi AZEVEDO    From [place]    In the  2001         Allergies   Allergen Reactions     Azithromycin      Gabapentin Itching     Kanamycin      Metronidazole Rash     Pcn [Penicillins] Rash     Streptomycin      rash      Atorvastatin Rash     Lisinopril Rash     Immunization History   Administered Date(s) Administered     Influenza (IIV3) PF 10/17/2008, 12/07/2009, 09/20/2010     Influenza Vaccine 65+ (FLUAD) 10/06/2023     Pneumococcal 23 valent 01/08/2008     TD,PF 7+ (Tenivac) 02/16/2007     Zoster vaccine, live 12/07/2009     EXAM  /61   Pulse 82   Temp 98.8  F (37.1  C) (Oral)   Resp 12   Ht 1.524 m (5')   Wt 44.8 kg (98 lb 12.8 oz)   SpO2 (!) 88%   BMI 19.30 kg/m    general     Awake, alert, not in distress     resp     Symmetrical chest expansion, no retractions, (+) crackles. On high flow nasal cannula   CV     S1 and S2, RRR, no murmur, no rub   GI     NABS, soft, non-tender, no hepatosplenomegaly.   Mus-Skel     No edema, no effusion.   neuro     AAO x 3, no focal deficits   psyche     Appropriate mood and affect   skin     Dry, no rash or lesions.   HEENT     Non-hyperemic posterior pharyngeal wall, no exudates, no thrush, no ulcers. No sinus tenderness. Poor dentition.   Lymph nodes   No cervical lymphadenopathy         DATA  Images  10/28/23 CXR:  IMPRESSION: Mild diffuse interstitial prominence but overall there is improved aeration of lungs. No new infiltrate. Heart size and pulmonary vessels remain normal.    10/27/23 CT chest w/o:  IMPRESSION:   1.  Since 10/18/2023 CT, worsened multifocal pneumonia, particularly  within the right upper lobe and bilateral lower lobes.   2.  New small left and trace right pleural effusions. Mild  interstitial pulmonary edema.  3.  Similar small pulmonary nodules measuring up to 3 mm.     Microbiology test results    Microbiology data    Blood cultures   10/18 NG   Urine strep pneumoniae Ag 10/25 negative   Urine legionella Ag 10/27 negative   Influenza/RSV/COVID-19 PCR 10/18 negative     PENDING ID WORK-UP:          Legionella pneumophila Antibodies (Types 1-6), IgG, IgM, and IgA by KAY       LABS  Recent Labs   Lab Test 10/28/23  0545 10/27/23  0523 10/20/23  0764  10/19/23  0754 10/18/23  2025   WBC 12.3* 10.7   < > 9.7 10.6   AST  --   --   --  21 27   ALT  --   --   --  8 11   BILITOTAL  --   --   --  0.5 0.3   ALKPHOS  --   --   --  57 77    < > = values in this interval not displayed.          ASSESSMENT   MED DECISION MAKING  Patient Active Problem List   Diagnosis Code     IMMUNIZATION HISTORY      Routine general medical examination at a health care facility Z00.00     Osteoporosis M81.0     Eczema L30.9     Polyarticular arthritis M13.0     CARDIOVASCULAR SCREENING; LDL GOAL LESS THAN 160 Z13.6     Cough R05.9     Respiratory infection J98.8     Lung nodule R91.1     Sciatica M54.30     ACS (acute coronary syndrome) (H) I24.9     Rash R21     Mitral regurgitation I34.0     NSTEMI (non-ST elevated myocardial infarction) (H) I21.4     CAD -- S/P Stent 2016 I25.110     Mixed hyperlipidemia E78.2     Benign essential HTN I10     Community acquired pneumonia J18.9     Palpitations R00.2     Chest pain R07.9     Left foot pain M79.672     Foot pain, left M79.672     Acute gouty arthritis M10.9     Physical deconditioning R53.81     Essential hypertension I10     Debility R53.81     Acute gout of left foot, unspecified cause M10.9     Chronic pain of left lower extremity M79.605, G89.29     Coronary artery disease involving native coronary artery of native heart without angina pectoris, with h/o stent I25.10     Gastroesophageal reflux disease with esophagitis K21.00     Pneumonia J18.9     Multifocal pneumonia J18.9     Multifocal pneumonia  --10/27 CT chest:  worsened multifocal pneumonia, particularly within the right upper lobe and bilateral lower lobes    Acute hypoxic respiratory failure - on HFNC  Acute diastolic heart failure with preserved EF exacerbation      RECOMMENDATIONS:  Obtain sputum culture with GS if able to expectorate.  Repeat procalcitonin.   Multiple infectious and non-infectious work-up pending.  Patient not really immunocompromised so not likely  PJP.  Await serum fungitell, serologies for coccidioides, blastomyces and aspergillus.  Send serum & urine histoplasma Ag and blastomyces Ag, serum aspergillus galactomannan Ag, serum cryptococcal Ag.  MRSA PCR nares.  Nasopharyngeal swab for respiratory viral panel.    Continue renally adjusted cefepime (10/24).  Continue doxycycline for atypical coverage. Although urine legionella Ag negative, await legionella serologies as urinary antigen only detects serotype 1.    Add IV vancomycin with pharmacy dosing.  Monitor clinical response.  Ideally would recommend bronchoscopy but this is not feasible at this time due to high oxygen requirement.     Discussed in details with patient and family.            Nikolay Fields MD, FACP  Covering for Drs Dimitris & Jose & Ali  Infectious Disease service    Current Meds Reviewed  Current Facility-Administered Medications   Medication     acetaminophen (TYLENOL) tablet 650 mg    Or     acetaminophen (TYLENOL) Suppository 650 mg     acetylcysteine (MUCOMYST) 20 % nebulizer solution 2 mL     allopurinol (ZYLOPRIM) tablet 100 mg     amLODIPine (NORVASC) tablet 2.5 mg     aspirin EC tablet 81 mg     budesonide (PULMICORT) neb solution 0.5 mg     carvedilol (COREG) tablet 6.25 mg     ceFEPIme (MAXIPIME) 2 g vial to attach to  mL bag for ADULTS or 50 mL bag for PEDS     doxycycline hyclate (VIBRAMYCIN) capsule 100 mg     enoxaparin ANTICOAGULANT (LOVENOX) injection 40 mg     furosemide (LASIX) injection 40 mg     ipratropium - albuterol 0.5 mg/2.5 mg/3 mL (DUONEB) neb solution 3 mL     isosorbide mononitrate (IMDUR) 24 hr tablet 30 mg     lidocaine (LMX4) cream     lidocaine 1 % 0.1-1 mL     melatonin tablet 1 mg     nitroGLYcerin (NITROSTAT) sublingual tablet 0.4 mg     ondansetron (ZOFRAN ODT) ODT tab 4 mg    Or     ondansetron (ZOFRAN) injection 4 mg     pantoprazole (PROTONIX) EC tablet 40 mg     polyethylene glycol (MIRALAX) Packet 17 g     prochlorperazine (COMPAZINE)  injection 5 mg    Or     prochlorperazine (COMPAZINE) tablet 5 mg    Or     prochlorperazine (COMPAZINE) suppository 12.5 mg     sennosides (SENOKOT) tablet 1 tablet     sodium chloride (PF) 0.9% PF flush 10 mL     sodium chloride (PF) 0.9% PF flush 10-20 mL     sodium chloride (PF) 0.9% PF flush 3 mL

## 2023-10-28 NOTE — PHARMACY-VANCOMYCIN DOSING SERVICE
Pharmacy Vancomycin Initial Note  Date of Service 2023  Patient's  1930  89 year old, female    Indication: [facility]-Associated Pneumonia    Current estimated CrCl = Estimated Creatinine Clearance: 30.7 mL/min (based on SCr of 0.81 mg/dL).    Creatinine for last 3 days  10/26/2023:  5:28 AM Creatinine 0.66 mg/dL  10/27/2023:  5:40 AM Creatinine 0.80 mg/dL  10/28/2023:  5:39 AM Creatinine 0.81 mg/dL    Recent Vancomycin Level(s) for last 3 days  No results found for requested labs within last 3 days.      Vancomycin IV Administrations (past 72 hours)        No vancomycin orders with administrations in past 72 hours.                    Nephrotoxins and other renal medications (From now, onward)      Start     Dose/Rate Route Frequency Ordered Stop    10/28/23 1700  vancomycin (VANCOCIN) 1,000 mg in 200 mL dextrose intermittent infusion         1,000 mg  200 mL/hr over 1 Hours Intravenous ONCE 10/28/23 1630      10/27/23 1500  furosemide (LASIX) injection 40 mg         40 mg  over 1-3 Minutes Intravenous EVERY 12 HOURS 10/27/23 1433              Contrast Orders - past 72 hours (72h ago, onward)      Start     Dose/Rate Route Frequency Stop    10/27/23 1200  iopamidol (ISOVUE-370) solution 55 mL  Status:  Discontinued         55 mL Intravenous ONCE 10/27/23 1230            [employer] Prediction of Planned Initial Vancomycin Regimen  Loading dose: 1000 mg at 00:00 10/28/2023.  Regimen: 750 mg IV every 24 hours.  Start time: 16:32 on 10/28/2023  Exposure target: AUC24 (range)400-600 mg/L.hr   AUC24,ss: 475 mg/L.hr  Probability of AUC24 > 400: 70 %  Ctrough,ss: 14.8 mg/L  Probability of Ctrough,ss > 20: 22 %  Probability of nephrotoxicity (Lodise JUAN ): 10 %        Plan:  Start vancomycin  750 mg IV q24h after load of 1000 mg.   Vancomycin monitoring method: AUC  Vancomycin therapeutic monitoring goal: 400-600 mg*h/L  Pharmacy will check vancomycin levels as appropriate in 1-3 Days.    Serum  creatinine levels will be ordered daily for the first week of therapy and at least twice weekly for subsequent weeks.      Akil Aliheyder, RPH

## 2023-10-28 NOTE — PLAN OF CARE
Neuro: A&O x4 Mandarin speaking  Tele/cardiac: SR  Respiration: HFNC 80% 60L  Activity: A1 GBW to bedside commode  Pain: denies  Drips: midline saline locked, no blood return  Drains/tubes: none  Skin: scattered bruising  GI/: continent, 2g sodium diet  Aggression color: green  Isolation: none  Plan: wean O2 as able

## 2023-10-29 NOTE — PROGRESS NOTES
Note Infectious Disease Consult Service Progress Note  Covering for Jose Costa & Jazmine   Pt Name Mary He   Date 10/29/2023   MRN [id number]     Notes / labs / imaging test results and other data were reviewed    CHIEF COMPLAINT: REASON FOR VISIT    Worsening multifocal pneumonia    HPI    89 year old female with HO HTN, CAD/NSTEMI s/p stent 7 yrs ago, mitral regurgitation, chronic lymphedema, malignant neoplasm or the bladder, polyarticular arthritis, cough, lung nodule.     10.18.23 - sudden onset of dyspnea, chest and back pain, fevers Tm 100.3 F and rigors  -> went to [facility] ED -> blood cultures sent -> Chest CT PE: no PE, bilateral multifocal pneumonia -> started ceftriaxone + doxycycline -> influenza/RSV/COVID-19 PCR negative     10.23.23 - increased FiO2 requirement to HFNC 70% from 2 LPM/NC     10.24.23 - worsening dyspnea -> Chest imaging with pulmonary edema -> procalcitonin 0.24 -> started IV diuretics -> ceftriaxone changed to cefepime     10.25.23 - urine strep pneumoniae Ag negative      10.27.23 - urine legionella Ag negative       Infection Risk Factors     x Animal / bird exposure    Denies exposure to parturient animals or farm animals. Denies exposure to bird or bat droppings. No pets and no exotic animal exposure.    / Travel / residence location    Born in [place]. Last time went home was in 2006. No travel elsewhere.   / Environmental exposure    Denies exposure to decayed wood or trees. Patient was picking up apples before feeling sick         10/29/2023  INTERIM UPDATE    She is feeling better today with decreased FiO2 requirement.  No fevers.  Sitting on the chair with family at bedside.     Remainder of 14-point ROS was negative except as listed above    PFSH:  Personal / Family / Social Histories were reviewed and updated    Vital Signs: /82   Pulse 73   Temp 97.2  F (36.2  C) (Axillary)   Resp 14   Ht 1.524 m (5')   Wt 44.9 kg (98 lb 15.8 oz)   SpO2 97%   BMI 19.33 kg/m     EXAM  general      Awake, alert, not in distress      resp      Symmetrical chest expansion, no retractions, (+) crackles. On high flow nasal cannula   CV      S1 and S2, RRR, no murmur, no rub   GI      NABS, soft, non-tender, no hepatosplenomegaly.   Mus-Skel      No edema, no effusion.   neuro      AAO x 3, no focal deficits   psyche      Appropriate mood and affect   skin      Dry, no rash or lesions.   HEENT      Non-hyperemic posterior pharyngeal wall, no exudates, no thrush, no ulcers. No sinus tenderness. Poor dentition.   Lymph nodes   No cervical lymphadenopathy            Data  Microbiology     Microbiology data     Sputum culture 10/29 Oral contamination   RVP 10/29 negative   MRSA PCR 10/29 negative   Blood cultures    10/18 NG   Urine strep pneumoniae Ag 10/25 negative   Serum cryptococcal Ag 10/28 negative   Serum fungitell  10/27 negative   Urine legionella Ag 10/27 negative   Influenza/RSV/COVID-19 PCR 10/18 negative      PENDING ID WORK-UP:          Legionella pneumophila Antibodies (Types 1-6), IgG, IgM, and IgA by KAY       Imaging  Images  10/28/23 CXR:  IMPRESSION: Mild diffuse interstitial prominence but overall there is improved aeration of lungs. No new infiltrate. Heart size and pulmonary vessels remain normal.     10/27/23 CT chest w/o:  IMPRESSION:   1.  Since 10/18/2023 CT, worsened multifocal pneumonia, particularly  within the right upper lobe and bilateral lower lobes.   2.  New small left and trace right pleural effusions. Mild  interstitial pulmonary edema.  3.  Similar small pulmonary nodules measuring up to 3 mm.     LABS  Lab Results   Component Value Date/Time    WBC 13.5 (H) 10/29/2023 05:49 AM    WBC 12.3 (H) 10/28/2023 05:39 AM    WBC 10.7 10/27/2023 05:40 AM    WBC 8.8 10/25/2023 05:26 AM    WBC 6.9 03/12/2020 08:11 PM    WBC 7.9 03/04/2020 09:51 AM    WBC 7.9 03/04/2020 02:39 AM    WBC 11.0 06/07/2019 06:21 AM    AST 21 10/19/2023 07:54 AM    AST 27 10/18/2023 08:25 PM     AST 31 03/12/2020 08:11 PM    AST 43 03/04/2020 02:39 AM    AST 35 06/06/2019 12:19 AM    AST 17 07/14/2017 12:00 AM    ALT 8 10/19/2023 07:54 AM    ALT 11 10/18/2023 08:25 PM    ALT 21 03/12/2020 08:11 PM    ALT 18 03/04/2020 02:39 AM    ALT 21 06/06/2019 12:19 AM    ALT 14 07/14/2017 12:00 AM    ALKPHOS 57 10/19/2023 07:54 AM    ALKPHOS 77 10/18/2023 08:25 PM    ALKPHOS 83 03/12/2020 08:11 PM    ALKPHOS 69 03/04/2020 02:39 AM    ALKPHOS 91 06/06/2019 12:19 AM    ALKPHOS 70 07/14/2017 12:00 AM           ASSESSMENT & SUGGESTIONS  Patient Active Problem List   Diagnosis Code    IMMUNIZATION HISTORY     Routine general medical examination at a health care facility Z00.00    Osteoporosis M81.0    Eczema L30.9    Polyarticular arthritis M13.0    CARDIOVASCULAR SCREENING; LDL GOAL LESS THAN 160 Z13.6    Cough R05.9    Respiratory infection J98.8    Lung nodule R91.1    Sciatica M54.30    ACS (acute coronary syndrome) (H) I24.9    Rash R21    Mitral regurgitation I34.0    NSTEMI (non-ST elevated myocardial infarction) (H) I21.4    CAD -- S/P Stent 2016 I25.110    Mixed hyperlipidemia E78.2    Benign essential HTN I10    Community acquired pneumonia J18.9    Palpitations R00.2    Chest pain R07.9    Left foot pain M79.672    Foot pain, left M79.672    Acute gouty arthritis M10.9    Physical deconditioning R53.81    Essential hypertension I10    Debility R53.81    Acute gout of left foot, unspecified cause M10.9    Chronic pain of left lower extremity M79.605, G89.29    Coronary artery disease involving native coronary artery of native heart without angina pectoris, with h/o stent I25.10    Gastroesophageal reflux disease with esophagitis K21.00    Pneumonia J18.9    Multifocal pneumonia J18.9        Multifocal pneumonia  --10/27 CT chest:  worsened multifocal pneumonia, particularly within the right upper lobe and bilateral lower lobes  --procacitonin 0.41   --serum fungitell negative.     Acute hypoxic respiratory  failure - on HFNC  Acute diastolic heart failure with preserved EF exacerbation        RECOMMENDATIONS:  10/29 sputum culture with oral contamination. Recommend sending new sputum culture with GS.  Multiple infectious and non-infectious work-up pending.  Patient not really immunocompromised and serum fungitell negative. so not likely PJP.     Await serum & urine histoplasma Ag and blastomyces Ag, serum aspergillus galactomannan Ag.    Continue renally adjusted cefepime (10/24).  Continue doxycycline for atypical coverage. Although urine legionella Ag negative, await legionella serologies as urinary antigen only detects serotype 1.  Total duration of treatment will be 7 days, through 10/31/23.     As MRSA PCR negative, discontinue IV vancomycin.  Monitor clinical response.     Ideally would recommend bronchoscopy but this is not feasible at this time due to high oxygen requirement.     Discussed with patient and family and RN.            Nikolay Fields MD, FACP  Covering for Grant Shanks & Jose & Jazmine  Infectious Disease service      CURRENT MED REVIEWD  Current Facility-Administered Medications   Medication    acetaminophen (TYLENOL) tablet 650 mg    Or    acetaminophen (TYLENOL) Suppository 650 mg    acetylcysteine (MUCOMYST) 20 % nebulizer solution 2 mL    allopurinol (ZYLOPRIM) tablet 100 mg    [Held by provider] amLODIPine (NORVASC) tablet 2.5 mg    aspirin EC tablet 81 mg    budesonide (PULMICORT) neb solution 0.5 mg    carboxymethylcellulose PF (REFRESH PLUS) 0.5 % ophthalmic solution 1 drop    carvedilol (COREG) tablet 6.25 mg    ceFEPIme (MAXIPIME) 2 g vial to attach to  mL bag for ADULTS or 50 mL bag for PEDS    doxycycline hyclate (VIBRAMYCIN) capsule 100 mg    enoxaparin ANTICOAGULANT (LOVENOX) injection 40 mg    furosemide (LASIX) injection 40 mg    ipratropium - albuterol 0.5 mg/2.5 mg/3 mL (DUONEB) neb solution 3 mL    [Held by provider] isosorbide mononitrate (IMDUR) 24 hr tablet 30 mg     lidocaine (LMX4) cream    lidocaine 1 % 0.1-1 mL    melatonin tablet 1 mg    metoprolol (LOPRESSOR) injection 2.5 mg    nitroGLYcerin (NITROSTAT) sublingual tablet 0.4 mg    No lozenges or gum should be given while patient on BIPAP/AVAPS/AVAPS AE    ondansetron (ZOFRAN ODT) ODT tab 4 mg    Or    ondansetron (ZOFRAN) injection 4 mg    pantoprazole (PROTONIX) EC tablet 40 mg    Patient may continue current oral medications    polyethylene glycol (MIRALAX) Packet 17 g    prochlorperazine (COMPAZINE) injection 5 mg    Or    prochlorperazine (COMPAZINE) tablet 5 mg    Or    prochlorperazine (COMPAZINE) suppository 12.5 mg    sennosides (SENOKOT) tablet 1 tablet    sodium chloride (PF) 0.9% PF flush 10 mL    sodium chloride (PF) 0.9% PF flush 10-20 mL    sodium chloride (PF) 0.9% PF flush 3 mL    vancomycin (VANCOCIN) 750 mg in sodium chloride 0.9 % 250 mL intermittent infusion

## 2023-10-29 NOTE — CODE/RAPID RESPONSE
House FERNANDEZ brief follow up:     Was asked by colleague, NEYDA Navarrete CNP, to follow on repeat troponin and lactic acid; please refer to Rosalba's initial RRT note for further details.      --Lactic acid within normal limits.   --Troponin delta flat 9-->12.  --Hypokalemia; replete per protocol  --To receive previously scheduled Coreg 6.25 mg this evening  --We will trial BiPAP for overnight to increase respiratory support   -- VSS    Continue previously outlined plan of care.        Addendum 19:51: Patient converted back to sinus rhythm at 19:30 pm     Carin Alejandre NP  [facility]  Securely message with the Vocera Web Console (learn more here)  Text page via [employer] Paging/Directory    Recent Labs   Lab 10/28/23  1931 10/28/23  1839 10/22/23  1631   LACT 1.6 2.1* 1.2     Troponin T: 9 -->12    No charge note.

## 2023-10-29 NOTE — PROGRESS NOTES
[facility]    Medicine Progress Note - Hospitalist Service    Date of Admission:  10/18/2023    Assessment & Plan   89F hx of Tinnitus, Gout, Osteoporosis, CAD s/p stenting 2019 presenting with Dyspnea and CP found to have multifocal PNA.      Sepsis 2/2 bilateral multifocal Pneumonia  Acute hypoxic Respiratory Failure needing oxygen by high flow nasal cannula on 10/24/2023  Acute diastolic heart failure with preserved EF exacerbation    Patient admitted with shortness of breath and chest pain found to have multifocal pneumonia on CT chest PE protocol, no pulm embolism, sepsis currently was admitted, initially started on IV ceftriaxone and doxycycline.  She was hypoxic, febrile of 101  F on admission  During her hospital course she required min for the oxygen is increasing and currently on high flow nasal cannula.  She had an RRT on 10/24/2023 because of worsening shortness of breath, thought at that time she has some fluid overload and was started on IV Lasix.  Although her BNP was completely normal and echocardiogram showed normal EF no systolic dysfunction.    Her IV ceftriaxone initially changed to cefepime and not changed to oral on cefuroxime on 10/24/2023 the same day when she had RRT.  At this time she is still needing high flow oxygen, although feeling somewhat better.  I will keep her on IV antibiotic with IV cefepime at this time, continue with oral doxycycline.  She is on IV Lasix 20 mg twice daily, looks very much euvolemic at this time.  No lower extremity edema no crackles on examination.  No JVD.  I will continue IV Lasix today and likely discontinue from tomorrow.    At this time, I will check Legionella antibodies, mycoplasma antibodies, will send antifungal serology, blastomycosis aspergillosis and Coccidioides antibodies.,  Check Fungitell assay, ESR CRP, send hypersensitivity pneumonitis panel, ANCA and myeloperoxidase antibodies and proteinase antibodies, check  rheumatoid factor, send sputum for Gram stain and culture, will check PJP as well.  Most of the labs are pending, but Legionella negative, myeloperoxidase antibodies negative.    Patient does live in a very old apartment, patient family not sure if they have the mold in the home or not.  No recent travel though, although was outside picking up the apples before feeling sick..  COVID-19/influenza/respiratory syncytial virus was negative on admission    At this time I will continue with Mucomyst nebulization, add Pulmicort nebulization twice daily, DuoNeb nebulization 3 times daily, incentive spirometry and flutter  Try to wean her high flow nasal cannula to regular cannula as tolerated.  CT chest without contrast reviewed that was done on 10/27/2023, showed worsening multifocal pneumonia particular within the right upper lobe and bilateral lower lobes.  New small left and trace right pleural effusion mild interstitial pulmonary edema.    I do not think so her symptoms are secondary to CHF or pulmonary edema, is likely secondary to worsening bilateral multifocal worsening pneumonia.  She does not look fluid overloaded, BNP is normal, no elevated JVD, echocardiogram with normal EF no diastolic dysfunction as well as normal variability of the inferior vena cava.  In fact cardiology has discontinue her diuretics and I agree with that.  Factious disease was consulted, and vancomycin was added.  Continue cefepime, doxycycline and vancomycin for now.    Legionella antibodies negative, myeloperoxidase antibodies and proteinase antibodies negative, Coccidioides antibodies negative, cryptococcus negative, Fungitell assay negative, rheumatoid factor negative.    Continue aggressive incentive spirometry, flutter and try to wean her oxygen down.    Possible acute diastolic congestive heart failure exacerbation: Resolved  Patient became more more dyspneic and needing 6 L of oxygen with exertion on 10/22/2023  Chest x-ray repeat  showed  Patchy airspace opacities within the right mid and lower lung as well as left perihilar region favoring superimposed infectious/inflammatory infiltrate.  New mild diffuse interstitial opacities favoring a mild interstitial edema pattern    Patient was given 20 mg of IV Lasix on 10/23/2024 and started her on Lasix 20 mg p.o. daily  Diuresed well with Lasix.    shortness of breath on 10/24/2023 chest x-ray showed worsening pulmonary edema  Patient was given total of 40 mg of IV Lasix.    Started on Lasix 20 mg IV every 12 hours as per cardiology, increase to 40 mg twice daily  Not sure if this pulmonary edema or ARDS type picture, given BNP normal echo completely normal.  Patient does not look volume overloaded at this time.  \  No sign of fluid overload, she is still on IV Lasix 40 twice daily so I will discontinue IV Lasix today.      Hypokalemia: resolved  She is on electrolyte replacement protocol we will continue with that     HTN  CAD s/p LCx stenting 2016  Cardiomegaly  Hx Palpitations  *CT: Stable Mild Cardiomegaly  *Negative stress test in 2019  *reported Hx of refusing statin therapy  *Presenting with acute CP/dyspnea, CT PE negative for PE. Trop/EKG negative  - TTE shows EF 60 to 65%, mild pulmonary hypertension  - continue aspirin/coreg/imdur/PRN nitroglycerin     Chronic Lymphedema  - PT OT SW  - SCDs, no lower extremity edema at this time.     Gout  Osteoporosis  -Continue with allopurinol.  No acute exacerbation at this time     Bladder tumor s/p resection  - no acute intervention     Tinnitus/Sensorineural Hearing loss  -Stable    Episode of A-fib with RVR on 10/28/2023  Overnight converted back to normal sinus rhythm.  Continue low-dose Coreg    The patient will benefit from bronchoscopy, but may not be able to tolerate as she is on high flow nasal cannula.  Appreciate input from the ID  Use antiemetic protocol for nausea  Incentive spirometry, flutter, as no wheezing will discontinue DuoNeb  may be the cause of her A-fib.  She has been on IV Lasix higher dose for some time without any much improvement.    Alesia with the patient, patient and son/nephew and the nursing staff taking care of the patient.      Diet: 2 Gram Sodium Diet    DVT Prophylaxis: subcutaneous lovenox   Gaona Catheter: Not present  Lines: PRESENT           Cardiac Monitoring: ACTIVE order. Indication: Tachyarrhythmias, acute (48 hours)  Code Status: Full Code as per discussed with the family by the previous hospitalist    Clinically Significant Risk Factors        # Hypokalemia: Lowest K = 3.3 mmol/L in last 2 days, will replace as needed       # Hypoalbuminemia: Lowest albumin = 2.9 g/dL at 10/19/2023  7:54 AM, will monitor as appropriate     # Hypertension: Noted on problem list            # Financial/Environmental Concerns: none         Disposition Plan      Expected Discharge Date: To be decided once able to wean her off from high flow nasal cannula.  Destination: ARNALDO Shaw MD  Hospitalist Service  [facility]  Securely message with [employer] (more info)  Text page via [employer] Paging/Directory   ______________________________________________________________________    Interval History   Overnight events reviewed, the patient had an episode of A-fib with RVR, now converted back to normal sinus rhythm.  Feeling somewhat nauseous this morning, breathing stable no fever chills chest pain abdominal pain dysuria hematuria constipation diarrhea.  Able to sputum for Gram stain culture yesterday.    No other significant event overnight        Physical Exam   Vital Signs: Temp: 97.2  F (36.2  C) Temp src: Axillary BP: 101/53 Pulse: 73   Resp: 14 SpO2: 96 % O2 Device: High Flow Nasal Cannula (HFNC) Oxygen Delivery: 40 LPM  Weight: 98 lbs 15.78 oz    Constitutional: Awake, alert, cooperative, resting in bed with high flow nasal cannula  Respiratory: Improved air entry bilaterally, positive fine bilateral  crackles no wheezing  Cardiovascular: Regular rate and rhythm  GI: Normal bowel sounds, soft, non-distended, non-tender  Skin/Integumen: No rash on exposed skin.  No lower extremity edema  Other: Mood is very pleasant        Medical Decision Making           Data     I have personally reviewed the following data over the past 24 hrs:    13.5 (H)  \   11.1 (L)   / 371     135 97 (L) 25.4 (H) /  131 (H)   3.5 26 0.82 \     Trop: 12 BNP: N/A     Procal: 0.41 (H) CRP: N/A Lactic Acid: 1.6

## 2023-10-29 NOTE — PLAN OF CARE
Goal Outcome Evaluation:      Plan of Care Reviewed With: patient, family    Overall Patient Progress: no change     DATE & TIME: 10/29/2023 0700 - 1900  Cognitive Concerns/ Orientation : A/Ox4  BEHAVIOR & AGGRESSION TOOL COLOR: Green  ABNL VS/O2: Able to wean FiO2 to 55%, 40 Lpm; Lung sounds are diminished. Encouraged to use IS and flutter valve.  MOBILITY: A1 with GB; up in the chair for few hours - tolerating well  PAIN MANAGMENT:  denies pain the whole shift  DIET: 2 Gram Na diet; was nauseated one time - Zofran IV was administered, effective  BOWEL/BLADDER: continent; was able to use bedside commode 2x  TELEMETRY RHYTHM: NSR with BBB  SKIN: skin is clean and intact; able to shift weight in bed to prevent pressure injury  TESTS/PROCEDURES: need to re-collect sputum (previous sample was contaminated)

## 2023-10-29 NOTE — PLAN OF CARE
Neuro: A&O x4 Mandarin speaking  Tele/cardiac: SR  Respiration: HFNC 60% 40L  Activity: A1 GBW to bedside commode  Pain: denies  Drips: midline saline locked, no blood return  Drains/tubes: none  Skin: scattered bruising  GI/: continent, 2g sodium diet  Aggression color: green  Isolation: none  Plan: wean O2 as able  Pt had RRT for new onset Afib, see NP notes, converted back to SR at 1930, has been on bipap the whole night, switched back to HFNC this AM

## 2023-10-30 NOTE — PROGRESS NOTES
[facility]    Infectious Disease Progress Note    Date of Service (when I saw the patient): 10/30/2023     Assessment & Plan   Mary He is a 89 year old who was admitted on 10/18/2023.     Multifocal pneumonia  --10/27 CT chest:  worsened multifocal pneumonia, particularly within the right upper lobe and bilateral lower lobes  --procacitonin 0.41   --serum fungitell negative.  2. Acute hypoxic respiratory failure - on HFNC  3. Acute diastolic heart failure with preserved EF exacerbation  4. Patient born in [place] should check for TB reactivation given age place in Airborne isolation  I reviewed chart for any previous investigation regarding TB I did not find any         RECOMMENDATIONS:  10/29 sputum culture with oral contamination. Recommend sending new sputum culture with GS.  Send AFB stain and cultures   Check MTB PCR  Check quantiferon   Multiple infectious and non-infectious work-up pending.  Patient not really immunocompromised and serum fungitell negative. so not likely PJP.   Await serum & urine histoplasma Ag and blastomyces Ag, serum aspergillus galactomannan Ag.     Continue renally adjusted cefepime (10/24).  Continue doxycycline for atypical coverage. Although urine legionella Ag negative, await legionella serologies as urinary antigen only detects serotype 1.  Total duration of treatment will be 7 days, through 10/31/23.     As MRSA PCR negative off  IV vancomycin.  Monitor clinical response.      Ideally would recommend bronchoscopy but this is not feasible at this time due to high oxygen requirement.          Maritza Garcia MD    Interval History   No new rashes or issues with antibiotics   Labs reviewed   No fevers no positive micro     Physical Exam   Temp: 99.4  F (37.4  C) Temp src: Oral BP: 117/45 Pulse: 70   Resp: 23 SpO2: 96 % O2 Device: High Flow Nasal Cannula (HFNC) Oxygen Delivery: 50 LPM  Vitals:    10/28/23 0359 10/29/23 0503 10/30/23 0636   Weight: 44.8 kg (98 lb  12.8 oz) 44.9 kg (98 lb 15.8 oz) 44.3 kg (97 lb 10.6 oz)     Vital Signs with Ranges  Temp:  [97.2  F (36.2  C)-99.4  F (37.4  C)] 99.4  F (37.4  C)  Pulse:  [66-81] 70  Resp:  [5-49] 23  BP: ()/(42-68) 117/45  FiO2 (%):  [55 %-65 %] 65 %  SpO2:  [90 %-96 %] 96 %    Constitutional: on 50 L HFNC   Lungs: shallow breaths   Cardiovascular: Regular rate and rhythm, normal S1 and S2, and no murmur noted  Abdomen: Normal bowel sounds, soft, non-distended, non-tender  Skin: No rashes, no cyanosis, no edema  Other:    Medications    - MEDICATION INSTRUCTIONS -      - MEDICATION INSTRUCTIONS -        acetylcysteine  2 mL Nebulization 4x Daily    allopurinol  100 mg Oral Daily    [Held by provider] amLODIPine  2.5 mg Oral Daily    aspirin  81 mg Oral Daily    budesonide  0.5 mg Nebulization BID    carvedilol  6.25 mg Oral BID w/meals    [START ON 10/31/2023] ceFEPIme  2 g Intravenous Q24H    enoxaparin ANTICOAGULANT  30 mg Subcutaneous Q24H    [Held by provider] isosorbide mononitrate  30 mg Oral Daily    pantoprazole  40 mg Oral QAM AC    polyethylene glycol  17 g Oral Daily    sennosides  1 tablet Oral BID    sodium chloride (PF)  10 mL Intracatheter Q8H    sodium phosphate  9 mmol Intravenous Once       Data   All microbiology laboratory data reviewed.  Recent Labs   Lab Test 10/30/23  0551 10/29/23  0549 10/28/23  0539   WBC 12.9* 13.5* 12.3*   HGB 10.0* 11.1* 11.1*   HCT 30.8* 33.1* 33.5*   MCV 89 88 88    371 331     Recent Labs   Lab Test 10/30/23  0551 10/29/23  0549 10/28/23  0539   CR 0.90 0.82 0.81     Recent Labs   Lab Test 10/27/23  0540   SED 44*     Recent Labs   Lab Test 09/15/16  1436   CULT Canceled, Test credited >10 Squamous epithelial cells/low power field indicates   oral contamination. Please recollect.  *       All cultures:  Recent Labs   Lab 10/29/23  0609   CULTURE >10 Squamous epithelial cells/low power field indicates oral contamination. Please recollect.      Blood culture:  Results  for orders placed or performed during the hospital encounter of 10/18/23   Blood Culture Peripheral Blood    Specimen: Peripheral Blood   Result Value Ref Range    Culture No Growth    Blood Culture Peripheral Blood    Specimen: Peripheral Blood   Result Value Ref Range    Culture No Growth       Urine culture:  No results found for this or any previous visit.

## 2023-10-30 NOTE — PLAN OF CARE
Neuro: A&O x4 Mandarin speaking  Tele/cardiac: SR  Respiration: HFNC 60% 45L  Activity: A1 GBW to bedside commode  Pain: denies  Drips: midline and PIV saline locked, no blood return  Drains/tubes: none  Skin: intact  GI/: continent, 2g sodium diet  Aggression color: green  Isolation: none  Plan: wean O2 as able, need sputum sample

## 2023-10-30 NOTE — PROGRESS NOTES
[facility]    Medicine Progress Note - Hospitalist Service    Date of Admission:  10/18/2023    Assessment & Plan   89F hx of Tinnitus, Gout, Osteoporosis, CAD s/p stenting 2019 presenting with Dyspnea and CP found to have multifocal PNA.      Sepsis 2/2 bilateral multifocal Pneumonia  Acute hypoxic Respiratory Failure needing oxygen by high flow nasal cannula on 10/24/2023  Acute diastolic heart failure with preserved EF exacerbation: Treated    Patient admitted with shortness of breath and chest pain found to have multifocal pneumonia on CT chest PE protocol, no pulm embolism, sepsis currently was admitted, initially started on IV ceftriaxone and doxycycline.  She was hypoxic, febrile of 101  F on admission  During her hospital course she required min for the oxygen is increasing and currently on high flow nasal cannula.  She had an RRT on 10/24/2023 because of worsening shortness of breath, thought at that time she has some fluid overload and was started on IV Lasix.  Although her BNP was completely normal and echocardiogram showed normal EF no systolic dysfunction.    Her IV ceftriaxone initially changed to cefepime and then changed to oral on cefuroxime on 10/24/2023 the same day when she had RRT.  At this time she is still needing high flow oxygen, although feeling somewhat better.  I will keep her on IV antibiotic with IV cefepime at this time, and oral oral doxycycline.  She is on IV Lasix 20 mg twice daily, looks very much euvolemic at this time.  No lower extremity edema no crackles on examination.  No JVD.  Did increase IV Lasix to 40 twice daily as per pulmonary recommendation    Check Legionella antibodies, mycoplasma antibodies, send antifungal serology, blastomycosis aspergillosis and Coccidioides antibodies.,  Check Fungitell assay, ESR CRP, send hypersensitivity pneumonitis panel, ANCA and myeloperoxidase antibodies and proteinase antibodies, check rheumatoid factor, send  sputum for Gram stain and culture, will check PJP as well.  Most of the fungal labs  are pending, but Legionella negative, myeloperoxidase antibodies negative.    Patient does live in a very old apartment, patient family not sure if they have the mold in the home or not.  No recent travel though, although was outside picking up the apples before feeling sick..  COVID-19/influenza/respiratory syncytial virus was negative on admission    At this time I will continue with Mucomyst nebulization, add Pulmicort nebulization twice daily, DuoNeb nebulization 3 times daily, incentive spirometry and flutter  Try to wean her high flow nasal cannula to regular cannula as tolerated.  CT chest without contrast reviewed that was done on 10/27/2023, showed worsening multifocal pneumonia particular within the right upper lobe and bilateral lower lobes.  New small left and trace right pleural effusion mild interstitial pulmonary edema.    I do not think so her symptoms are secondary to CHF or pulmonary edema, is likely secondary to worsening bilateral multifocal worsening pneumonia.  She does not look fluid overloaded, BNP is normal, no elevated JVD, echocardiogram with normal EF no diastolic dysfunction as well as normal variability of the inferior vena cava.  In fact cardiology has discontinue her diuretics and I agree with that.  Infectious disease was consulted, and vancomycin was added.  Continue cefepime, doxycycline and vancomycin, but MRSA screen come back negative so vancomycin discontinued  She already uses about 30 days of doxycycline so I will discontinue the doxycycline today as well..    Legionella antibodies negative, myeloperoxidase antibodies and proteinase antibodies negative, Coccidioides antibodies negative, cryptococcus negative, Fungitell assay negative, rheumatoid factor negative.  Respiratory panel negative    Still awaiting aspergillosis, blastomycosis and histoplasmosis serology.  Ideally she will need  bronchoscopy with BAL but given her oxygen requirement and high flow we will not be able to do it    Continue aggressive incentive spirometry, flutter and try to wean her off oxygen.    Infectious disease wanted to rule out TB as well    At this point I will cut down her FiO2 60% and flow to 30 L/min, she tolerated well this morning.  Await fungal serology, pulmonary and ID to follow.    Possible acute diastolic congestive heart failure exacerbation: Resolved  Patient became more more dyspneic and needing 6 L of oxygen with exertion on 10/22/2023  Chest x-ray repeat showed  Patchy airspace opacities within the right mid and lower lung as well as left perihilar region favoring superimposed infectious/inflammatory infiltrate.  New mild diffuse interstitial opacities favoring a mild interstitial edema pattern    Patient was given 20 mg of IV Lasix on 10/23/2024 and started her on Lasix 20 mg p.o. daily  Diuresed well with Lasix.    shortness of breath on 10/24/2023 chest x-ray showed worsening pulmonary edema  Patient was given total of 40 mg of IV Lasix.    Started on Lasix 20 mg IV every 12 hours as per cardiology, increase to 40 mg twice daily  Not sure if this pulmonary edema or ARDS type picture, given BNP normal echo completely normal.  Patient does not look volume overloaded at this time.  \  No sign of fluid overload, she is still on IV Lasix 40 twice daily so I will discontinue IV Lasix today.      Hypokalemia: resolved  She is on electrolyte replacement protocol we will continue with that     HTN  CAD s/p LCx stenting 2016  Cardiomegaly  Hx Palpitations  *CT: Stable Mild Cardiomegaly  *Negative stress test in 2019  *reported Hx of refusing statin therapy  *Presenting with acute CP/dyspnea, CT PE negative for PE. Trop/EKG negative  - TTE shows EF 60 to 65%, mild pulmonary hypertension  - continue aspirin/coreg/imdur/PRN nitroglycerin     Chronic Lymphedema  - PT OT SW  - SCDs, no lower extremity edema at this  time.     Gout  Osteoporosis  -Continue with allopurinol.  No acute exacerbation at this time     Bladder tumor s/p resection  - no acute intervention     Tinnitus/Sensorineural Hearing loss  -Stable    Episode of A-fib with RVR on 10/28/2023  Overnight converted back to normal sinus rhythm.  Continue low-dose Coreg      Discontinue doxycycline is more than 13 days for atypical coverage  Vancomycin discontinue as MRSA PCR negative  Continue IV cefepime for now  Await for fungal serology  Appreciate input from pulmonary and infectious disease  Continue try to wean her oxygen down, I did decrease the flow rate to 30 L/min and FiO2 to 60% this morning    Discussed with patient patient and nephew and the nursing staff thank you the patient.      Diet: 2 Gram Sodium Diet    DVT Prophylaxis: subcutaneous lovenox   Gaona Catheter: Not present  Lines: PRESENT           Cardiac Monitoring: ACTIVE order. Indication: Tachyarrhythmias, acute (48 hours)  Code Status: Full Code as per discussed with the family by the previous hospitalist    Clinically Significant Risk Factors        # Hypokalemia: Lowest K = 3.3 mmol/L in last 2 days, will replace as needed       # Hypoalbuminemia: Lowest albumin = 2.6 g/dL at 10/30/2023  5:51 AM, will monitor as appropriate     # Hypertension: Noted on problem list            # Financial/Environmental Concerns: none         Disposition Plan      Expected Discharge Date: To be decided once able to wean her off from high flow nasal cannula.  Destination: ARNALDO Shaw MD  Hospitalist Service  [facility]  Securely message with mobli (more info)  Text page via [employer] Paging/Directory   ______________________________________________________________________    Interval History   Patient had a good night sleep, eat well this morning.  Breathing comfortable no fever chills chest pain headache dizziness or lightheadedness at this time.    No other significant event  overnight        Physical Exam   Vital Signs: Temp: 98.2  F (36.8  C) Temp src: Oral BP: 107/47 Pulse: 68   Resp: 23 SpO2: 96 % O2 Device: (P) High Flow Nasal Cannula (HFNC) Oxygen Delivery: (P) 30 LPM  Weight: 97 lbs 10.62 oz    Constitutional: Awake, alert, cooperative, resting in bed with high flow nasal cannula  Respiratory: Improved air entry bilaterally, positive fine bilateral crackles no wheezing  Cardiovascular: Regular rate and rhythm  GI: Normal bowel sounds, soft, non-distended, non-tender  Skin/Integumen: No rash on exposed skin.  No lower extremity edema  Other: Mood is very pleasant        Medical Decision Making           Data     I have personally reviewed the following data over the past 24 hrs:    12.9 (H)  \   10.0 (L)   / 338     132 (L) 97 (L) 28.1 (H) /  112 (H)   3.8 27 0.90 \     ALT: N/A AST: N/A AP: N/A TBILI: N/A   ALB: 2.6 (L) TOT PROTEIN: N/A LIPASE: N/A     Procal: N/A CRP: 98.36 (H) Lactic Acid: N/A

## 2023-10-30 NOTE — PLAN OF CARE
Transfer from  for TB rule out around 1400. A&Ox4, tele SR. On high flow at 65% 30L satting 93-98%. Sputum sample needed but nonproductive cough. Small BM this shift/voids well in BSC. Electrolyte rechecks in AM. Midline and PIV SL. Family translates for Pt, form signed and in front of chart. Visitor policy gone over w/ family members.

## 2023-10-30 NOTE — PROVIDER NOTIFICATION
Text page to hospitalist Dr. Shaw via [employer] messaging:    phos 2.3, do you want replacement?  also noticed Na decreased slightly to 132 from 135 (on 2G NA diet)    UPDATE: phos replacement protocol ordered, replacement ordered per protocol

## 2023-10-30 NOTE — PROGRESS NOTES
"Care Management Follow Up    Length of Stay (days): 12    Expected Discharge Date: 11/01/2023     Concerns to be Addressed: all concerns addressed in this encounter     Patient plan of care discussed at interdisciplinary rounds: Yes    Anticipated Discharge Disposition: Skilled Nursing Facility, Transitional Care     Anticipated Discharge Services: None  Anticipated Discharge DME: None    Patient/family educated on Medicare website which has current facility and service quality ratings: yes  Education Provided on the Discharge Plan: Yes  Patient/Family in Agreement with the Plan: yes    Referrals Placed by CM/SW:    Private pay costs discussed: Not applicable    Additional Information:  Writer attended morning charge report. In report it is said that pt /family were wanting to keep pt full code per meeting with palliative on 10/25/2023. Writer able to see that 10/25/2023 palliative note with provider Juli states that pt's daughter is wanting to bring pt home at time of discharge. Care coordinator states pt will likely be hospitalized a few more days.   Writer attempted to get ahold of bedside RN. No answer. Writer left [employer] message requesting to see if bedside RN had heard back from doctor. Writer also left voicemail questioning if pt's family had been in to talk to pt today.  Writer sent Dr.Khan dougherty message regarding when estimated discharge date may be. Writer awaiting response.    Writer spoke with Verónica with [facility] as [facility] had accepted pt for TCU. Verónica states that they feel pt is critically ill and would like a new referral sent closer to date of discharge. Verónica states they will also need \"Her O2 would need to be stable for 48 hours on or under 6L .\" Care management will plan to follow up with [facility] closer to pt's date of discharge.     DANETTE Wong  Social Work  [facility]        "

## 2023-10-30 NOTE — PROGRESS NOTES
[facility]   Pulmonary Progress Note  Mary Polanco MRN: [id number]  4/24/1930  Date of Admission:10/18/2023  Date of Service: 10/30/2023  ___________________________________    Assessment & Plan  Mary Polanco is a 89 year old female, w/ PMHx most significant for hypertension, CAD/non-STEMI SP stent 7 years ago, mitral regurgitation, and chronic lymphedema, admitted w/ acute hypoxemic respiratory failure.  She had chest CT scan done on 10/27/2023 which showed worsening multifocal pneumonia with infiltrate worsening especially in the right upper lobe and bilateral lower lobes, bilateral pleural effusion which were small, this was compared to CT PE study 10/18/2023.  Patient did not improve with diuresis over the weekend.  However currently pathology is likely consistent with inflammatory pneumonitis versus infectious pneumonitis.  So far the infectious work-up has been negative, fungal antigens and serology is still pending, ID recommended to rule out TB unfortunately with her high oxygen requirement we are not able to do bronchoscopy with BAL which would be beneficial in this case.  Other possibilities include inhalational injury especially with the upper lobe predominance, nothing consistent with that in the history obtained from the relative.  Aspiration pneumonitis is also a possibility with right predominance.  Other inflammatory pneumonitis etiologies are also possible, organizing pneumonia that is precipitated by aspiration or bacterial pneumonia is a possibility.    Acute hypoxemic respiratory failure  Bilateral right more than left infiltrate  Bilateral small pleural effusions   -Continue to monitor and follow-up the infectious work-up per primary and ID   -Continue to monitor in and out, avoid volume overload   -If intubated then might consider bronchoscopy with BAL   -Recommend speech therapy evaluation for aspiration in the interim   -If infection is thought to be less likely by ID then might  consider a trial of steroids        Chart documentation was completed, in part, with [employer] voice-recognition software. Even though reviewed, some grammatical, spelling, and word errors may remain.    Dianna Rosas MD  Pulmonary & Critical Care       Interval History    - Nursing notes reviewed.   -Patient is still on high flow nasal cannula up to 60% and 30 L/min   -ID ordered airborne precaution to rule out TB   -Patient is afebrile   -Patient niece was with her in the room, and he stated that he witnessed frequent cough after eating and drinking water in the past.    Physical Exam Temp:  [97.2  F (36.2  C)-99.4  F (37.4  C)] 99.4  F (37.4  C)  Pulse:  [66-81] 70  Resp:  [5-49] 23  BP: ()/(42-68) 117/45  FiO2 (%):  [55 %-65 %] 65 %  SpO2:  [90 %-96 %] 96 %  I/O last 3 completed shifts:  In: 350 [P.O.:240; I.V.:110]  Out: 500 [Urine:200; Other:300]  Wt Readings from Last 1 Encounters:   10/30/23 44.3 kg (97 lb 10.6 oz)    Body mass index is 19.07 kg/m . FiO2 (%): 65 %  Resp: 23      Exam:  General: NAD  HEENT: MMM  CV: RRR, no murmur, click, rub  Resp: Equal b/l air entry, bilateral crackles.  Abd: Soft, ND  Extremities: WWP, no LE edema  Neuro: AAOx3, no focal deficits    Data   Labs: reviewed in EMR and notable labs listed below.  CMP:   Recent Labs   Lab 10/30/23  0551 10/29/23  0549 10/29/23  0022 10/28/23  1839 10/28/23  1333 10/28/23  0539 10/27/23  1316 10/27/23  0540   * 135  --   --   --  136  --  136   POTASSIUM 3.8 3.5 4.1 3.3* 3.9 3.3*   < > 3.4   CHLORIDE 97* 97*  --   --   --  95*  --  100   CO2 27 26  --   --   --  27  --  27   ANIONGAP 8 12  --   --   --  14  --  9   * 131*  --   --   --  142*  --  109*   BUN 28.1* 25.4*  --   --   --  26.8*  --  28.3*   CR 0.90 0.82  --   --   --  0.81  --  0.80   GFRESTIMATED 59* 66  --   --   --  67  --  68   CLAIRE 8.9 8.9  --   --   --  9.2  --  8.8   MAG  --   --   --   --  2.1  --   --   --    PHOS 2.3*  --   --   --   --  3.6  --   --     ALBUMIN 2.6*  --   --   --   --  3.0*  --   --     < > = values in this interval not displayed.     CBC:   Recent Labs   Lab 10/30/23  0551 10/29/23  0549 10/28/23  0539 10/27/23  0540   WBC 12.9* 13.5* 12.3* 10.7   RBC 3.47* 3.76* 3.82 3.49*   HGB 10.0* 11.1* 11.1* 10.1*   HCT 30.8* 33.1* 33.5* 30.6*   MCV 89 88 88 88   MCH 28.8 29.5 29.1 28.9   MCHC 32.5 33.5 33.1 33.0   RDW 12.6 12.4 12.4 12.6    371 331 334       Culture Data   7-Day Micro Results       Collected Updated Procedure Result Status      10/29/2023 0612 10/29/2023 1352 Respiratory Panel PCR [[id number]]    Swab from Nasopharyngeal    Final result Component Value   Adenovirus Not Detected   Coronavirus Not Detected   This test detects Coronavirus 229E, HKU1, NL63 and OC43 but does not distinguish between them. It does not detect MERS ( Respiratory Syndrome), SARS (Severe Acute Respiratory Syndrome) or 2019-nCoV (Novel 2019) Coronavirus.   Human Metapneumovirus Not Detected   Human Rhin/Enterovirus Not Detected   Influenza A Not Detected   Influenza A, H1 Not Detected   Influenza A 2009 H1N1 Not Detected   Influenza A, H3 Not Detected   Influenza B Not Detected   Parainfluenza Virus 1 Not Detected   Parainfluenza Virus 2 Not Detected   Parainfluenza Virus 3 Not Detected   Parainfluenza Virus 4 Not Detected   Respiratory Syncytial Virus A Not Detected   Respiratory Syncytial Virus B Not Detected   Chlamydia Pneumoniae Not Detected   Mycoplasma Pneumoniae Not Detected            10/29/2023 0609 10/29/2023 1435 Respiratory Aerobic Bacterial Culture with Gram Stain [[id number]]   Sputum from Expectorate    Final result Component Value   Culture >10 Squamous epithelial cells/low power field indicates oral contamination. Please recollect.   Gram Stain Result >10 Squamous epithelial cells/low power field    <25 PMNs/low power field    2+ Mixed jeremy               10/29/2023 0418 10/29/2023 1325 MRSA MSSA PCR, Nasal Swab  [[id number]]    Swab from Nare, Right    Final result Component Value   MRSA Target DNA Negative   SA Target DNA Negative            10/29/2023 0414 10/29/2023 0431 Blastomyces Agn Quant EIA Non Blood [[id number]]   Urine, Voided    In process Component Value   No component results            10/29/2023 0413 10/29/2023 0435 Histoplasma Galactomannan Antigen Quant by EIA, Urine [[id number]]   Urine, Clean Catch    In process Component Value   No component results            10/28/2023 1931 10/28/2023 2342 Cryptococcus antigen [[id number]]   Blood from Arm, Right    Final result Component Value   Cryptococcal Antigen Negative            10/28/2023 1839 10/28/2023 1845 Blastomyces Agn Quant EIA Blood [[id number]]   Blood from Hand, Right    In process Component Value   No component results            10/28/2023 1839 10/28/2023 1845 Aspergillus Galactomannan Antigen [[id number]]   Blood from Hand, Right    In process Component Value   No component results            10/28/2023 0539 10/28/2023 1556 Histoplasma Capsulatum Antigen [[id number]]   Blood from Hand, Right    In process Component Value   No component results            10/27/2023 1639 10/27/2023 1908 Legionella pneumophila antigen urine [[id number]]   Urine, NOS    Final result Component Value   Legionella pneumophila serogroup 1 urinary antigen Negative   Suggests no recent or current infection. Infection due to Legionella cannot be ruled out, since other serogroups and species may cause disease, antigen may not be present in urine in early infection, and the level of antigen present in the urine may be below detectable limits of the test.            10/27/2023 0541 10/30/2023 0001 Mycoplasma pneumonia abys IgG and IgM [[id number]]   (Abnormal)   Blood from Arm, Right    Final result Component Value Units   Myco Pneumoniae IgG 0.15 U/L   INTERPRETIVE INFORMATION:  Mycoplasma pneumoniae Ab, IgG    0.09 U/L or less ............ Negative    0.10 -  0.32 U/L ............. Equivocal    0.33 U/L or greater ......... Positive    INTERPRETIVE DATA: Over 50% of healthy adults have a   relatively high background of specific M. pneumoniae IgG   antibodies in their sera, probably because of past M.   pneumoniae infections. Therefore, paired sera obtained with   a time interval of 1 to 3 weeks are highly recommended in   adults to confirm reinfection by M. pneumoniae, which is   demonstrated by a significant change in IgG antibodies. A   significant change is indicated if one sample is above 0.32   U/L and the other is below 0.20 U/L.   Myco Pneumoniae IgM 0.03 U/L   INTERPRETIVE INFORMATION:  Mycoplasma pneumoniae Ab, IgM    0.76 U/L or less .......... Negative: No clinically                                significant amount of                                M. pneumoniae IgM antibody                                detected.    0.77 - 0.95 U/L ........... Low Positive: M. pneumoniae-                                specific IgM presumptively                                detected. Collection of a                                follow-up sample in 1-2                                weeks is recommended to                                assure reactivity.    0.96 U/L or greater ....... Positive: Highly significant                                amount of M. pneumoniae-                                specific IgM antibody                                detected. However, low levels                                of IgM antibodies may                                occasionally persist for more                                than 12 months post-infection.  Performed By: [facility]  [address] [place] [id number]  : Fabrice Serna MD, PhD  CLIA Number: [id number]            10/27/2023 0540 10/27/2023 0607 Blastomyces antibody ID [[id number]]   Peripheral Blood    In process Component Value   No component results             10/27/2023 0540 10/28/2023 1726 1,3 Beta D glucan fungitell [[id number]]   Blood from Arm, Right    Final result Component Value Units   (1,3)-Beta-D-Glucan <31 pg/mL   B-D GLUCAN INTERPRETATION (1,3) Negative    INTERPRETIVE INFORMATION: (1,3)-beta-D-glucan (Fungitell)      Less than 31 pg/mL ................... Negative    31-59 pg/mL .......................... Negative    60-79 pg/mL .......................... Indeterminate    Greater than or equal to 80 pg/mL .... Positive    The Fungitell test is indicated for presumptive diagnosis   of fungal infection and should be used in conjunction with   other diagnostic procedures. This test does not detect   certain fungal species such as Cryptococcus, which produce   very low levels of (1,3)-beta-D-glucan. This test will not   detect the zygomycetes, such as Absidia, Mucor, and   Rhizopus, which are not known to produce   (1,3)-beta-D-glucan. In addition, the yeast phase of   Blastomyces dermatitidis produces little   (1,3)-beta-D-glucan and may not be detected by the assay.  Performed By: [facility]  [address] [place] [address]  : Fabrice Serna MD, PhD  CLIA Number: [id number]            10/25/2023 0637 10/25/2023 1120 Streptococcus pneumoniae antigen [[id number]]   Urine, Clean Catch    Final result Component Value   Streptococcus pneumoniae antigen Negative   A negative Streptococcus pneumoniae antigen result does not rule out infection with Streptococcus pneumoniae.                      Virology Data:   Lab Results   Component Value Date    FLUAH1 Not Detected 10/29/2023    FLUAH3 Not Detected 10/29/2023    [id number] Not Detected 10/29/2023    IFLUB Not Detected 10/29/2023    RSVA Not Detected 10/29/2023    RSVB Not Detected 10/29/2023    PIV1 Not Detected 10/29/2023    PIV2 Not Detected 10/29/2023    PIV3 Not Detected 10/29/2023    HMPV Not Detected 10/29/2023       Most Recent [facility] Pulmonary Function Testing  "(FVC/FEV1 only)  No results found for: \"20002\"  No results found for: \"20003\"  No results found for: \"20015\"  No results found for: \"20016\"    Imaging: reviewed in EMR and notable imaging listed below.  Chest x-ray on 10/28/2023  IMPRESSION: Mild diffuse interstitial prominence but overall there is improved aeration of lungs. No new infiltrate. Heart size and pulmonary vessels remain normal.     Medications    acetylcysteine  2 mL Nebulization 4x Daily    allopurinol  100 mg Oral Daily    [Held by provider] amLODIPine  2.5 mg Oral Daily    aspirin  81 mg Oral Daily    budesonide  0.5 mg Nebulization BID    carvedilol  6.25 mg Oral BID w/meals    [START ON 10/31/2023] ceFEPIme  2 g Intravenous Q24H    enoxaparin ANTICOAGULANT  30 mg Subcutaneous Q24H    [Held by provider] isosorbide mononitrate  30 mg Oral Daily    pantoprazole  40 mg Oral QAM AC    polyethylene glycol  17 g Oral Daily    sennosides  1 tablet Oral BID    sodium chloride (PF)  10 mL Intracatheter Q8H    sodium phosphate  9 mmol Intravenous Once       "

## 2023-10-31 NOTE — PLAN OF CARE
Pt alert and oriented x4. Pt c/o gout foot pain at times. Pt slept well during the night. Pt in sinus rhythm. Pt up to bedside commode with assist of 1. Pt desats to low 80s with ambulation, otherwise mid 90s on hfnc 45L, 70% fio2. Pt denies dizziness, denies nausea, denies sob. pt using IS to 300. Encouraged pulmonary toilet. pt with diminished lung sounds. pt with poor appetite, drinking water. Pt with frequent nonproductive cough. Family and pt updated on plan of care.

## 2023-10-31 NOTE — PROGRESS NOTES
[facility]    Medicine Progress Note - Hospitalist Service    Date of Admission:  10/18/2023    Assessment & Plan   89F hx of Tinnitus, Gout, Osteoporosis, CAD s/p stenting 2019 presenting with Dyspnea and CP found to have multifocal PNA.      Sepsis 2/2 bilateral multifocal Pneumonia  Acute hypoxic Respiratory Failure needing oxygen by high flow nasal cannula on 10/24/2023  Acute diastolic heart failure with preserved EF exacerbation: Treated    Patient admitted with shortness of breath and chest pain found to have multifocal pneumonia on CT chest PE protocol, no pulm embolism, sepsis currently was admitted, initially started on IV ceftriaxone and doxycycline.  She was hypoxic, febrile of 101  F on admission  During her hospital course she required min for the oxygen is increasing and currently on high flow nasal cannula.  She had an RRT on 10/24/2023 because of worsening shortness of breath, thought at that time she has some fluid overload and was started on IV Lasix.  Although her BNP was completely normal and echocardiogram showed normal EF no systolic dysfunction.    Her IV ceftriaxone initially changed to cefepime and then changed to oral on cefuroxime on 10/24/2023 the same day when she had RRT.  At this time she is still needing high flow oxygen, although feeling somewhat better.  I will keep her on IV antibiotic with IV cefepime at this time, she completed almost 14 days of doxycycline so discontinued on 10/30/2023  She was on IV Lasix 20 mg twice daily, increase it to 40 mg twice daily, looks very much euvolemic at this time.  No lower extremity edema no crackles on examination.  No JVD normal BNP on multiple occasion.  Did increase IV Lasix to 40 twice daily as per pulmonary recommendation, now discontinued.    Check Legionella antibodies, mycoplasma antibodies, send antifungal serology, blastomycosis aspergillosis and Coccidioides antibodies.,  Check Fungitell assay, ESR CRP,  send hypersensitivity pneumonitis panel, ANCA and myeloperoxidase antibodies and proteinase antibodies, check rheumatoid factor, send sputum for Gram stain and culture, will check PJP as well.  Fungitell assay, histoplasmosis, aspergillosis, come back negative blastomycosis serology pending, Legionella negative, myeloperoxidase antibodies negative.  ANCA negative arthritis factor negative    Patient does live in a very old apartment, patient family not sure if they have the mold in the home or not.  No recent travel though, although was outside picking up the apples before feeling sick..  COVID-19/influenza/respiratory syncytial virus was negative on admission    At this time I will continue with Mucomyst nebulization, add Pulmicort nebulization twice daily, DuoNeb nebulization 3 times daily, incentive spirometry and flutter  Try to wean her high flow nasal cannula to regular cannula as tolerated.  CT chest without contrast reviewed that was done on 10/27/2023, showed worsening multifocal pneumonia particular within the right upper lobe and bilateral lower lobes.  New small left and trace right pleural effusion mild interstitial pulmonary edema.    I do not think so her symptoms are secondary to CHF or pulmonary edema, is likely secondary to worsening bilateral multifocal worsening pneumonia.  She does not look fluid overloaded, BNP is normal, no elevated JVD, echocardiogram with normal EF no diastolic dysfunction as well as normal variability of the inferior vena cava.  In fact cardiology has discontinue her diuretics and I agree with that.  Infectious disease was consulted, and vancomycin was added.  Continue cefepime, doxycycline and vancomycin, but MRSA screen come back negative so vancomycin discontinued  She already uses about 13 days of doxycycline so doxycycline was discontinued on 10/30/2023    Legionella antibodies negative, myeloperoxidase antibodies and proteinase antibodies negative, Coccidioides  antibodies negative, cryptococcus negative, Fungitell assay negative, rheumatoid factor negative.  Histoplasmosis and aspergillosis negative as well, blastomycosis serology pending  Respiratory panel negative  Hypersensitivity panel pending    Ideally she will need bronchoscopy with BAL but given her oxygen requirement and high flow we will not be able to do it    Continue aggressive incentive spirometry, flutter and try to wean her off oxygen.    Infectious disease wanted to rule out TB, but the patient CT mages and clinical picture does not fit to TB but may need to rule that out  If QuantiFERON negative, may need to give her a trial of IV steroids for possible pneumonitis  Discussed with the pulmonary in detail today        Possible acute diastolic congestive heart failure exacerbation: Resolved  Patient became more more dyspneic and needing 6 L of oxygen with exertion on 10/22/2023  Chest x-ray repeat showed  Patchy airspace opacities within the right mid and lower lung as well as left perihilar region favoring superimposed infectious/inflammatory infiltrate.  New mild diffuse interstitial opacities favoring a mild interstitial edema pattern    Patient was given 20 mg of IV Lasix on 10/23/2024 and started her on Lasix 20 mg p.o. daily  Diuresed well with Lasix.    shortness of breath on 10/24/2023 chest x-ray showed worsening pulmonary edema  Patient was given total of 40 mg of IV Lasix.    Started on Lasix 20 mg IV every 12 hours as per cardiology, increase to 40 mg twice daily  Not sure if this pulmonary edema or ARDS type picture, given BNP normal echo completely normal.  Patient does not look volume overloaded at this time.  \  No sign of fluid overload, she was on IV Lasix 40 twice daily so I will discontinue IV Lasix  10/30/2020    Hypokalemia: resolved  She is on electrolyte replacement protocol we will continue with that     HTN  CAD s/p LCx stenting 2016  Cardiomegaly  Hx Palpitations  *CT: Stable Mild  Cardiomegaly  *Negative stress test in 2019  *reported Hx of refusing statin therapy  *Presenting with acute CP/dyspnea, CT PE negative for PE. Trop/EKG negative  - TTE shows EF 60 to 65%, mild pulmonary hypertension  - continue aspirin/coreg/imdur/PRN nitroglycerin     Chronic Lymphedema  - PT OT SW  - SCDs, no lower extremity edema at this time.     Gout  Osteoporosis  -Continue with allopurinol.  No acute exacerbation at this time     Bladder tumor s/p resection  - no acute intervention     Tinnitus/Sensorineural Hearing loss  -Stable    Episode of A-fib with RVR on 10/28/2023  Overnight converted back to normal sinus rhythm.  Continue low-dose Coreg        Discussed with the patient, patient nephew at bedside, pulmonary and the nursing staff thank you the patient      Diet: 2 Gram Sodium Diet  Snacks/Supplements Adult: Ensure Clear; Between Meals    DVT Prophylaxis: subcutaneous lovenox   Gaona Catheter: Not present  Lines: PRESENT           Cardiac Monitoring: ACTIVE order. Indication: Tachyarrhythmias, acute (48 hours)  Code Status: Full Code as per discussed with the family by the previous hospitalist    Clinically Significant Risk Factors           # Hypercalcemia: corrected calcium is >10.1, will monitor as appropriate    # Hypoalbuminemia: Lowest albumin = 2.4 g/dL at 10/31/2023  5:53 AM, will monitor as appropriate     # Hypertension: Noted on problem list            # Financial/Environmental Concerns: none         Disposition Plan      Expected Discharge Date: To be decided once able to wean her off from high flow nasal cannula.  Destination: ARNALDO Shaw MD  Hospitalist Service  [facility]  Securely message with [email or web address]-[email or web address] (more info)  Text page via [employer] Paging/Directory   ______________________________________________________________________    Interval History   Patient seen and evaluated in her room today, she moved to the ICU for airborne precautions.  Remain on  high flow nasal cannula, occasional nausea and decreased appetite unable to provide any sputum sample.  No fever chills chest pain abdominal pain dysuria hematuria constipation diarrhea at this time    No other significant event overnight        Physical Exam   Vital Signs: Temp: 98.7  F (37.1  C) Temp src: Oral BP: 121/55 Pulse: 74   Resp: 24 SpO2: 94 % O2 Device: High Flow Nasal Cannula (HFNC) Oxygen Delivery: 45 LPM  Weight: 97 lbs .04 oz    Constitutional: Awake and alert, cooperative, resting in bed with high flow nasal cannula  Respiratory: Improved air entry bilaterally, continue bilateral basal crackles no wheezing  Cardiovascular: Regular rate and rhythm  GI: Normal bowel sounds, soft, non-distended, non-tender  Skin/Integumen: No rash on exposed skin.  No lower extremity edema  Other: Mood is very pleasant        Medical Decision Making           Data     I have personally reviewed the following data over the past 24 hrs:    12.8 (H)  \   10.6 (L)   / 339     136 100 20.6 /  118 (H)   4.0 25 0.76 \     ALT: N/A AST: N/A AP: N/A TBILI: N/A   ALB: 2.4 (L) TOT PROTEIN: N/A LIPASE: N/A     Trop: N/A BNP: 644     Procal: 0.29 (H) CRP: 116.44 (H) Lactic Acid: N/A

## 2023-10-31 NOTE — PROGRESS NOTES
[facility]   Pulmonary Progress Note  Mary Polanco MRN: [id number]  4/24/1930  Date of Admission:10/18/2023  Date of Service: 10/31/2023  ___________________________________    Assessment & Plan  Mary Polanco is a 89 year old female, w/ PMHx most significant for hypertension, CAD/non-STEMI SP stent 7 years ago, mitral regurgitation, and chronic lymphedema, admitted w/ acute hypoxemic respiratory failure.  She had chest CT scan done on 10/27/2023 which showed worsening multifocal pneumonia with infiltrate worsening especially in the right upper lobe and bilateral lower lobes, bilateral pleural effusion which were small, this was compared to CT PE study 10/18/2023.  Patient did not improve with diuresis over the weekend.  However currently pathology is likely consistent with inflammatory pneumonitis versus infectious pneumonitis.  So far the infectious work-up has been negative including fungal antigens and serology, ID recommended to rule out TB unfortunately with her high oxygen requirement we are not able to do bronchoscopy with BAL which would be beneficial in this case.  Other possibilities include inhalational injury especially with the upper lobe predominance, nothing consistent with that in the history obtained from the relative.  Aspiration pneumonitis is also a possibility with right predominance.  Other inflammatory pneumonitis etiologies are also possible, organizing pneumonia that is precipitated by aspiration or bacterial pneumonia is a possibility.    The patient is in a tough spot, she is still requiring high flow nasal cannula, we are unable to do bronchoscopy for further work-up and to rule out TB, I do think that her imaging studies less likely to be explained by tuberculosis, from the pulmonary standpoint might be reasonable to trial steroids if ID felt that infectious etiology is unlikely to explain her clinical picture, I discussed with ID, we will attempt to get sputum sent for TB  identification studies, but if not able we will revisit in the next 24 to 48 hours if we can consider a trial of steroids.    Acute hypoxemic respiratory failure  Bilateral right more than left infiltrate  Bilateral small pleural effusions   -Continue to monitor and follow-up the infectious work-up per primary and ID   -Continue to monitor in and out, avoid volume overload   -If intubated then might consider bronchoscopy with BAL   -Recommend speech therapy evaluation for aspiration in the interim   -If infection is thought to be less likely by ID then might consider a trial of steroids   -Chest x-ray tomorrow, can consider CT scan if safe for transport        Chart documentation was completed, in part, with [employer] voice-recognition software. Even though reviewed, some grammatical, spelling, and word errors may remain.    Dianna Rosas MD  Pulmonary & Critical Care       Interval History    -Nursing notes reviewed.   -Patient is still on high flow nasal cannula up to 75% and 50 L/min   -She was transferred to the ICU for negative pressure room to rule out tuberculosis    Physical Exam Temp:  [98.6  F (37  C)-100.7  F (38.2  C)] 98.7  F (37.1  C)  Pulse:  [62-84] 74  Resp:  [15-36] 24  BP: ()/(37-56) 121/55  FiO2 (%):  [40 %-85 %] 75 %  SpO2:  [89 %-96 %] 93 %  I/O last 3 completed shifts:  In: 480 [P.O.:480]  Out: -   Wt Readings from Last 1 Encounters:   10/31/23 44 kg (97 lb)    Body mass index is 18.94 kg/m . FiO2 (%): 75 %  Resp: 24      Exam:  General: NAD  HEENT: MMM  CV: RRR, no murmur, click, rub  Resp: Equal b/l air entry, bilateral crackles  Abd: Soft, ND  Extremities: WWP, no LE edema  Neuro: AAOx3, no focal deficits    Data   Labs: reviewed in EMR and notable labs listed below.  CMP:   Recent Labs   Lab 10/31/23  0553 10/30/23  0551 10/29/23  0549 10/29/23  0022 10/28/23  1839 10/28/23  1333 10/28/23  0539    132* 135  --   --   --  136   POTASSIUM 4.0 3.8 3.5 4.1   < > 3.9 3.3*   CHLORIDE 100  97* 97*  --   --   --  95*   CO2 25 27 26  --   --   --  27   ANIONGAP 11 8 12  --   --   --  14   * 112* 131*  --   --   --  142*   BUN 20.6 28.1* 25.4*  --   --   --  26.8*   CR 0.76 0.90 0.82  --   --   --  0.81   GFRESTIMATED 73 59* 66  --   --   --  67   CLAIRE 8.8 8.9 8.9  --   --   --  9.2   MAG  --   --   --   --   --  2.1  --    PHOS 2.5 2.3*  --   --   --   --  3.6   ALBUMIN 2.4* 2.6*  --   --   --   --  3.0*    < > = values in this interval not displayed.     CBC:   Recent Labs   Lab 10/31/23  0553 10/30/23  0551 10/29/23  0549 10/28/23  0539   WBC 12.8* 12.9* 13.5* 12.3*   RBC 3.61* 3.47* 3.76* 3.82   HGB 10.6* 10.0* 11.1* 11.1*   HCT 32.1* 30.8* 33.1* 33.5*   MCV 89 89 88 88   MCH 29.4 28.8 29.5 29.1   MCHC 33.0 32.5 33.5 33.1   RDW 12.6 12.6 12.4 12.4    338 371 331       Culture Data   7-Day Micro Results       Collected Updated Procedure Result Status      10/30/2023 1256 10/30/2023 1321 Quantiferon TB Gold Plus Grey Tube [[id number]]   Blood from Hand, Left    In process Component Value   No component results            10/30/2023 1256 10/30/2023 1321 Quantiferon TB Gold Plus Green Tube [[id number]]   Blood from Hand, Left    In process Component Value   No component results            10/30/2023 1256 10/30/2023 1321 Quantiferon TB Gold Plus Yellow Tube [[id number]]   Blood from Hand, Left    In process Component Value   No component results            10/30/2023 1256 10/30/2023 1321 Quantiferon TB Gold Plus Purple Tube [[id number]]   Blood from Hand, Left    In process Component Value   No component results            10/29/2023 0612 10/29/2023 1352 Respiratory Panel PCR [[id number]]    Swab from Nasopharyngeal    Final result Component Value   Adenovirus Not Detected   Coronavirus Not Detected   This test detects Coronavirus 229E, HKU1, NL63 and OC43 but does not distinguish between them. It does not detect MERS ( Respiratory Syndrome), SARS (Severe Acute  Respiratory Syndrome) or 2019-nCoV (Novel 2019) Coronavirus.   Human Metapneumovirus Not Detected   Human Rhin/Enterovirus Not Detected   Influenza A Not Detected   Influenza A, H1 Not Detected   Influenza A 2009 H1N1 Not Detected   Influenza A, H3 Not Detected   Influenza B Not Detected   Parainfluenza Virus 1 Not Detected   Parainfluenza Virus 2 Not Detected   Parainfluenza Virus 3 Not Detected   Parainfluenza Virus 4 Not Detected   Respiratory Syncytial Virus A Not Detected   Respiratory Syncytial Virus B Not Detected   Chlamydia Pneumoniae Not Detected   Mycoplasma Pneumoniae Not Detected            10/29/2023 0609 10/29/2023 1435 Respiratory Aerobic Bacterial Culture with Gram Stain [[id number]]   Sputum from Expectorate    Final result Component Value   Culture >10 Squamous epithelial cells/low power field indicates oral contamination. Please recollect.   Gram Stain Result >10 Squamous epithelial cells/low power field    <25 PMNs/low power field    2+ Mixed jeremy               10/29/2023 0418 10/29/2023 1325 MRSA MSSA PCR, Nasal Swab [[id number]]    Swab from Nare, Right    Final result Component Value   MRSA Target DNA Negative   SA Target DNA Negative            10/29/2023 0414 10/29/2023 0431 Blastomyces Agn Quant EIA Non Blood [[id number]]   Urine, Voided    In process Component Value   No component results            10/29/2023 0413 10/30/2023 2104 Histoplasma Galactomannan Antigen Quant by EIA, Urine [[id number]]   Urine, Clean Catch    Final result Component Value Units   Histoplasma Galactomannan Ag Quant, Urn Not Detected ng/mL   Histoplasma Galactomannan Ag Interp, Urn Not Detected    INTERPRETIVE DATA: Histoplasma Galactomannan Antigen                     Quantitative by EIA, Urine  Less than 0.4 ng/ml = Not Detected  0.4-0.7 ng/mL = Detected (below the limit of quantification)  0.8-24.0 ng/mL = Detected  Greater than 24.0 ng/mL = Detected (above the limit of   quantification)    The  quantitative range of this assay is 0.8-24.0 ng/mL.   Antigen concentrations between 0.4-.07 or >24.0 ng/mL fall   outside the linear range of the assay and cannot be   accurately quantified.    This EIA test should be used in conjunction with other   diagnostic procedures, including microbiological culture,   histological examination of biopsy samples, and/or   radiographic evidence, to aid in the diagnosis of   histoplasmosis.    This test was developed and its performance characteristics   determined by [facility]. It has not been cleared or   approved by the U.S. Food and Drug Administration. This   test was performed in a CLIA-certified laboratory and is   intended for clinical purposes.  Performed By: [facility]  [address] [place] [id number]  : Fabrice Serna MD, PhD  IA Number: [id number]            10/28/2023 1931 10/28/2023 2342 Cryptococcus antigen [[id number]]   Blood from Arm, Right    Final result Component Value   Cryptococcal Antigen Negative            10/28/2023 1839 10/28/2023 1845 Blastomyces Agn Quant EIA Blood [[id number]]   Blood from Hand, Right    In process Component Value   No component results            10/28/2023 1839 10/30/2023 1400 Aspergillus Galactomannan Antigen [[id number]]   Blood from Hand, Right    Final result Component Value   Aspergillus Galactomannan Index 0.12   Aspergillus Galact AG Negative   INTERPRETIVE INFORMATION: Aspergillus Galactomannan Antigen   by EIA  Negative results do not exclude the diagnosis of invasive  aspergillosis. A single positive test result (index equal  to or greater than 0.5) should be clinically correlated  by testing a separate serum specimen because many agents  (e.g. foods, antibiotics) may cross-react with the test.  If invasive aspergillosis is suspected in high-risk  patients, serial sampling is recommended.  Performed By: [facility]  [address] [place] [address]  [id number]  : Fabrice Serna MD, PhD  IA Number: [id number]            10/28/2023 0539 10/28/2023 1556 Histoplasma Capsulatum Antigen [[id number]]   Blood from Hand, Right    In process Component Value   No component results            10/27/2023 1639 10/27/2023 1908 Legionella pneumophila antigen urine [[id number]]   Urine, NOS    Final result Component Value   Legionella pneumophila serogroup 1 urinary antigen Negative   Suggests no recent or current infection. Infection due to Legionella cannot be ruled out, since other serogroups and species may cause disease, antigen may not be present in urine in early infection, and the level of antigen present in the urine may be below detectable limits of the test.            10/27/2023 0541 10/30/2023 0001 Mycoplasma pneumonia abys IgG and IgM [[id number]]   (Abnormal)   Blood from Arm, Right    Final result Component Value Units   Myco Pneumoniae IgG 0.15 U/L   INTERPRETIVE INFORMATION:  Mycoplasma pneumoniae Ab, IgG    0.09 U/L or less ............ Negative    0.10 - 0.32 U/L ............. Equivocal    0.33 U/L or greater ......... Positive    INTERPRETIVE DATA: Over 50% of healthy adults have a   relatively high background of specific M. pneumoniae IgG   antibodies in their sera, probably because of past M.   pneumoniae infections. Therefore, paired sera obtained with   a time interval of 1 to 3 weeks are highly recommended in   adults to confirm reinfection by M. pneumoniae, which is   demonstrated by a significant change in IgG antibodies. A   significant change is indicated if one sample is above 0.32   U/L and the other is below 0.20 U/L.   Myco Pneumoniae IgM 0.03 U/L   INTERPRETIVE INFORMATION:  Mycoplasma pneumoniae Ab, IgM    0.76 U/L or less .......... Negative: No clinically                                significant amount of                                M. pneumoniae IgM antibody                                detected.    0.77 -  0.95 U/L ........... Low Positive: M. pneumoniae-                                specific IgM presumptively                                detected. Collection of a                                follow-up sample in 1-2                                weeks is recommended to                                assure reactivity.    0.96 U/L or greater ....... Positive: Highly significant                                amount of M. pneumoniae-                                specific IgM antibody                                detected. However, low levels                                of IgM antibodies may                                occasionally persist for more                                than 12 months post-infection.  Performed By: [facility]  [address] [place] [id number]  : Fabrice Serna MD, PhD  CLIA Number: [id number]            10/27/2023 0540 10/27/2023 0607 Blastomyces antibody ID [[id number]]   Peripheral Blood    In process Component Value   No component results            10/27/2023 0540 10/28/2023 1726 1,3 Beta D glucan fungitell [[id number]]   Blood from Arm, Right    Final result Component Value Units   (1,3)-Beta-D-Glucan <31 pg/mL   B-D GLUCAN INTERPRETATION (1,3) Negative    INTERPRETIVE INFORMATION: (1,3)-beta-D-glucan (Fungitell)      Less than 31 pg/mL ................... Negative    31-59 pg/mL .......................... Negative    60-79 pg/mL .......................... Indeterminate    Greater than or equal to 80 pg/mL .... Positive    The Fungitell test is indicated for presumptive diagnosis   of fungal infection and should be used in conjunction with   other diagnostic procedures. This test does not detect   certain fungal species such as Cryptococcus, which produce   very low levels of (1,3)-beta-D-glucan. This test will not   detect the zygomycetes, such as Absidia, Mucor, and   Rhizopus, which are not known to produce   (1,3)-beta-D-glucan.  In addition, the yeast phase of   Blastomyces dermatitidis produces little   (1,3)-beta-D-glucan and may not be detected by the assay.  Performed By: [facility]  [address] [place] [id number]  : Fabrice Serna MD, PhD  CLIA Number: [id number]            10/25/2023 0637 10/25/2023 1120 Streptococcus pneumoniae antigen [[id number]]   Urine, Clean Catch    Final result Component Value   Streptococcus pneumoniae antigen Negative   A negative Streptococcus pneumoniae antigen result does not rule out infection with Streptococcus pneumoniae.                      Virology Data:   Lab Results   Component Value Date    FLUAH1 Not Detected 10/29/2023    FLUAH3 Not Detected 10/29/2023    [id number] Not Detected 10/29/2023    IFLUB Not Detected 10/29/2023    RSVA Not Detected 10/29/2023    RSVB Not Detected 10/29/2023    PIV1 Not Detected 10/29/2023    PIV2 Not Detected 10/29/2023    PIV3 Not Detected 10/29/2023    HMPV Not Detected 10/29/2023       Most Recent [facility] Pulmonary Function Testing (FVC/FEV1 only)  No PFT in the past    Imaging: reviewed in EMR and notable imaging listed below.  No imaging studies in the last 24 hours    Medications    acetylcysteine  2 mL Nebulization 4x Daily    allopurinol  100 mg Oral Daily    [Held by provider] amLODIPine  2.5 mg Oral Daily    aspirin  81 mg Oral Daily    budesonide  0.5 mg Nebulization BID    carvedilol  6.25 mg Oral BID w/meals    ceFEPIme  2 g Intravenous Q12H    enoxaparin ANTICOAGULANT  30 mg Subcutaneous Q24H    ipratropium - albuterol 0.5 mg/2.5 mg/3 mL  3 mL Nebulization 4x daily    [Held by provider] isosorbide mononitrate  30 mg Oral Daily    pantoprazole  40 mg Oral QAM AC    polyethylene glycol  17 g Oral Daily    sennosides  1 tablet Oral BID    sodium chloride (PF)  10 mL Intracatheter Q8H

## 2023-10-31 NOTE — PLAN OF CARE
[phone]    Mandarin interpretor called via [employer] for morning assessment but difficult for pt and interpretor to hear each other. Pt nephew Bryant at bedside assisted in interpretor and said was not needed. Writer attempted to explain assistance sometimes helpful if med terminology. Interpretor did not explain this nor explain pt declining interpretor, rather she said she would stay on the line in background if needed.  All questions answered with nephew.    Pt seems to be A&O, VSS on HFNC- settings adjusted several times as she desat significantly to 80% when up to bedside commode this morning + coughing, weaned back down again to 59% 30L  Tele NSR  Up with assist x1 GB to bedside commode.  LS diminished. More coughing this morning when first awake, less now, not productive so sputum sample not obtained. IS poorly tolerated, able to do 10x rep flutter valve.  BS+, small soft BM today.  Voiding  Denies pain.  Phos replacement infusing    Transfered to ICU for negative airflow room for TB rule out.

## 2023-11-01 NOTE — PROGRESS NOTES
Care Management Follow Up    Length of Stay (days): 14    Expected Discharge Date: 11/07/2023     Concerns to be Addressed: all concerns addressed in this encounter     Patient plan of care discussed at interdisciplinary rounds: Yes    Anticipated Discharge Disposition: Skilled Nursing Facility, Transitional Care     Anticipated Discharge Services: None  Anticipated Discharge DME: None    Patient/family educated on Medicare website which has current facility and service quality ratings: yes  Education Provided on the Discharge Plan: Yes  Patient/Family in Agreement with the Plan: yes    Referrals Placed by CM/SW:    Private pay costs discussed: Not applicable    Additional Information:  Received call from Patient  Harley [phone].  She is following patent for discharge plan.  She can assist with any discharge needs. Updated that currently still in ICU requiring HFNC.  Current recommendations are for TCU.   Mellyfrances mentions that patient is currently only getting homemaking services, no PCA services.    CC to follow for discharge plan.     Shelley Ayers RN

## 2023-11-01 NOTE — PROGRESS NOTES
On 75% 40 L per high flow sat at 90-91%. Requested to get up to commode. Assist to dangling position pt with dry cough, and desat to 77%  not able to recover until FIO2 of 90% now, at 95%. Small amount of thick yellow secretion coughed up. In bed resting. Void per bed pan for now.    Tommie Gray, RN

## 2023-11-01 NOTE — PROGRESS NOTES
SLP Bedside Swallow Re-Evaluation  11/01/23 1110   Appointment Info   Signing Clinician's Name / Credentials (SLP) Yolande Mims MA [facility] SLP   General Information   Onset of Illness/Injury or Date of Surgery 10/18/23   Referring Physician Dr. Saul   Patient/Family Therapy Goal Statement (SLP) To continue po with smooth/soft food   Pertinent History of Current Problem Per notes: 89F hx of Tinnitus, Gout, Osteoporosis, CAD s/p stenting 2019 presenting with Dyspnea and CP found to have multifocal PNA.Sepsis 2/2 bilateral multifocal Pneumonia  Acute hypoxic Respiratory Failure needing oxygen by high flow nasal cannula on 10/24/2023  Acute diastolic heart failure with preserved EF exacerbation  ``CT PE: multifocal pna  ``Presenting for Acute onset CP/Dyspnea while walking in her house. Had to stop her acitivities and sit does as she felt unwell. Associated with 1-2 coughing episodes that were non productive and associated with chills. No sick contacts/recent travel  ``In the ER, SBP 150s, T 100.3>101.2, labs showed no evidence of LA or WBC elevations. Given doxy and rocephin. Patient stated she felt better but still had SOB. Hypoxic on RA so placed on 2-3L NC. Exam unremarkable apart from LE edema and coarse breath sounds. Not in distress. EKG NSR, Trop negative, Viral Panel negative.  - O2 as needed, goal 90% or higher, now on 2 L at rest, 3 L with activity  - rocephin/doxy continued   blood cultures remain negative   - HOB 30 degress  - PT/OT/SW  - Tylenol prn  - Admission given CURB 65 score 2, moderate risk of decompensation.   Encourge IS   Wean O2 as tolerated   Pt was febrile upto 101 on 10/21 over night. Will switch ceftriaxone to IV cefepime to cover for aspiration pneumonia   Speech path evaluated for dysphagia and continued on regular diet as she tolerated it well     Patient with worsening respiratory status today and needing 10 L of oxygen by nasal cannula.  Patient is unable to clear secretions as per  the daughter at bedside and having coarse breath sounds with bilateral crackles     RRT requested patient was evaluated by house provider.  Chest x-ray repeated on 10/24/2023 showed pulmonary edema.  Patient was given a total of 40 mg of IV Lasix.  Transition to high flow nasal cannula from oxy mask  Added Mucomyst and albuterol nebs  R CAT consult  Chest physical therapy ordered to help with clearing secretions  Wean off of oxygen as tolerated  Patient was given total of 40 mg IV of IV Lasix on 10/24/2023  Cardiology consultation requested continued on Lasix 20 mg every 12 hours today       Chest x-ray repeat showed  Patchy airspace opacities within the right mid and lower lung as well as left perihilar region favoring superimposed infectious/inflammatory infiltrate.  New mild diffuse interstitial opacities favoring a mild interstitial edema pattern   General Observations Reconsult; Pt was alert and cooperative.  RR increased to 40 briefly with any activity.  Saturation levels above 92% on 45-50L.  Family present to provide background information and interpret for pt (family signed waiver per chart notes).   Type of Evaluation   Type of Evaluation Swallow Evaluation   Oral Motor   Oral Musculature generally intact   Dentition (Oral Motor)   Dentition (Oral Motor) dental appliance/dentures  (pt put in dentures for semi-solid trials)   Cough/Swallow/Gag Reflex (Oral Motor)   Comment, Cough/Swallow/Gag Reflex (Oral Motor) Frequent dry cough   Vocal Quality/Secretion Management (Oral Motor)   Comment, Vocal Quality/Secretion Management (Oral Motor) Decreased vocal intensity   General Swallowing Observations   Past History of Dysphagia SLP B/S Anibal 10/22 WFL, family and pt report some increased swallowing difficulty the last few days, pt reports she coughs after eating at times, pt feels globus sensation at times and that it is harder to swallow today   Respiratory Support high-flow  (45-50L 63%, Sats above 92% RR  24-40)   Current Diet/Method of Nutritional Intake (General Swallowing Observations, NIS) regular diet;thin liquids (level 0)   Swallowing Evaluation Clinical swallow evaluation   Clinical Swallow Evaluation   Clinical Swallow Evaluation Textures Trialed thin liquids;soft & bite-sized   Clinical Swallow Eval: Thin Liquid Texture Trial   Mode of Presentation, Thin Liquids straw   Volume of Liquid or Food Presented sips x 5   Oral Phase of Swallow WFL   Pharyngeal Phase of Swallow reduction in laryngeal movement  (? slight swallow delay)   Diagnostic Statement no immediate overt aspiration signs, delayed cough after all trials completed- ? baseline vs related to trials   Clinical Swallow Eval: Soft & Bite Sized   Mode of Presentation   (bites x 2 w/family assist)   Volume Presented bites x 2   Oral Phase effortful AP movement  (slow oral phase, RR to 35 briefly)   Pharyngeal Phase reduction in laryngeal movement;repeated swallows  (? swallow delay)   Diagnostic Statement pt reported mild globus sensation after each bite   Esophageal Phase of Swallow   Esophageal comments Vague report of recent emesis and globus sensation.   Swallowing Recommendations   Diet Consistency Recommendations easy to chew (level 7);thin liquids (level 0)  (Pick smooth/soft food, only eat if RR below 30, sit at 90 degrees, supervision, slow rate, small bites/sips, alternate textures, extra swallows, hold po if respiratory status declines/aspiration signs noted)   Instrumental Assessment Recommendations   (VFSS if pt weaned off HFNC)   Clinical Impression   Criteria for Skilled Therapeutic Interventions Met (SLP Eval) Yes, treatment indicated   SLP Diagnosis Mild-moderate oral-pharyngeal dysphagia, increased aspiration risk due to HFNC needs/increased RR at times   Risks & Benefits of therapy have been explained evaluation/treatment results reviewed;care plan/treatment goals reviewed;risks/benefits reviewed;current/potential barriers  reviewed;participants voiced agreement with care plan;participants included;patient;daughter   Clinical Impression Comments Mild-moderate oral-pharyngeal dysphagia and increased aspiration risk due to HFNC needs/increased RR were observed at bedside during today's SLP swallow evaluation.  Difficult to determine source of pt's delayed cough after liquids as baseline vs related to po trials.  Deficits/risk factors include worsening pneumonia, increased respiratory needs with HFNC, increased RR to 40 at times, decreased laryngeal elevation, need for multiple swallows, feeling of mild globus sensation, and delayed cough after thin liquid trials.  Recommend caution with continued po intake, selection of smooth/soft food from an IDDSI Diet Level 7 and thin liquids, 1:1 supervision/assist, cues to use safe swallow strategies/precautions, and hold po if respiratory status declines/aspiration signs noted.  A VFSS is recommended if pt can be weaned off HFNC.  Pt/family in agreement with diet modification, use of precautions, and VFSS as able.  Plan to provide swallow Tx to assess diet tolerance and train strategies.   SLP Total Evaluation Time   Eval: oral/pharyngeal swallow function, clinical swallow Minutes (89350) 15   Interventions   Interventions Quick Adds Swallowing Dysfunction   Swallowing Dysfunction &/or Oral Function for Feeding   Treatment of Swallowing Dysfunction &/or Oral Function for Feeding Minutes (65358) 15   Symptoms Noted During/After Treatment Shortness of breath   Treatment Detail/Skilled Intervention Education provided regarding inability to r/o aspiration at bedside given multiple risk factors, recommended smooth to soft diet selection from an IDDSI Diet Level 7, use of precautions/strategies, and holding po if RR above 30, aspiration signs present, and/or respiratory status declines.  Pt, family, and RN verbalized understanding.  Mod-max cues were given for strategies to clear globus sensation in Tx.   Strategies were effective.   SLP Discharge Planning   SLP Plan SLP - assess po tolerwance, train strategies, VFSS if weaned off HFNC   SLP Discharge Recommendation Transitional Care Facility   SLP Rationale for DC Rec Swallow function is below baseline; Assist with meals after discharge, continue SLP Tx to maximize safety for a po diet   SLP Brief overview of current status  Mild-moderate oral-pharyngeal dysphagia and increased aspiration risk due to HFNC needs/increased RR;  Rec: caution with continued po intake, selection of smooth/soft food from an IDDSI Diet Level 7 and thin liquids, 1:1 supervision/assist, cues to use safe swallow strategies/precautions, and hold po if respiratory status declines/aspiration signs noted   Total Session Time   Total Session Time (sum of timed and untimed services) 30

## 2023-11-01 NOTE — PROGRESS NOTES
[facility]    HOSPITALIST PROGRESS NOTE :   --------------------------------------------------    Date of Admission:  10/18/2023    Cumulative Summary: Mary Polanco is a 89 year old female hx of Tinnitus, Gout, Osteoporosis, CAD s/p stenting 2019 presenting with Dyspnea and CP found to have multifocal PNA and was was admitted on 10/18/2023.     Assessment & Plan     Sepsis 2/2 bilateral multifocal Pneumonia  Acute hypoxic Respiratory Failure needing oxygen by high flow nasal cannula on 10/24/2023  Acute diastolic heart failure with preserved EF exacerbation: Treated    Hospital course:     Patient admitted with shortness of breath and chest pain found to have multifocal pneumonia on CT chest PE protocol, no pulm embolism  Patient was diagnosed with sepsis, initially was started on IV ceftriaxone and doxycycline.  She was hypoxic, febrile of 101  F on admission  During her hospital course , due to increased Oxygen requirement ,Patient was placed on HFNC  Patient had RRT on 10/24/2023 for worsening SOB, concern for fluid overload and was started on IV Lasix  Later her BNP and echocardiogram came back normal  Her IV ceftriaxone initially was changed to cefepime and then was changed to oral cefuroxime on 10/24/2023 this, the same day when she had RRT  Patient stayed on IV Lasix 20 mg twice daily, increased to 40 mg twice daily per pulmonary recommendations, now discontinued  Checked Legionella antibodies, mycoplasma antibodies  Antifungal serology was sent along with blastomycosis, aspergillosis and Coccidioides antibodies.  Check fungal WSA, ESR, CRP, hypersensitivity pneumonitis panel, ANCA and myeloperoxidase antibodies and proteinase antibodies were also sent  Rheumatoid factor was also checked  Gram stain and cultures were also checked for PJP  Fungitell assay, histoplasmosis, aspergillosis, come back negative   Blastomycosis serology pending, Legionella negative, myeloperoxidase antibodies  negative.   ANCA negative arthritis factor negative  Patient does live in a very old apartment, patient family not sure if they have the mold in the home or not.  COVID-19/influenza/respiratory syncytial virus was negative on admission  CT chest repeated on 10/27/2023, showed worsening multifocal pneumonia particularly within the right upper lobe and bilateral lower lobes.  There was small left and trace right pleural effusion mild interstitial pulmonary edema  Less clinical suspicion for fluid overload.  In fact cardiology has discontinued her diuretics, agree with cardiology discontinuing diuretics.  Infectious disease was consulted on 10/28/2023  Initially vancomycin was added, MRSA screen came back negative, Vanco was discontinued  Later she was switched to doxycycline and has completed 14-day course of antibiotic on 10/30/2023  Currently patient remains on cefepime.  Work up Results : Legionella antibodies , myeloperoxidase antibodies and proteinase antibodies , Coccidioides antibodies , cryptococcus, Fungitell assay, rheumatoid factor ,Histoplasmosis , QuantiFERON-TB gold plus and aspergillosis all came back negative     PLAN:  --Continue to monitor patient in ICU closely.  --Continue patient on high flow nasal cannula, try to wean her high flow nasal cannula  --Continue Mucomyst nebulized solution along with Pulmicort nebulizer twice daily.  --Continue DuoNeb nebulization 3 times daily, incentive spirometry and flutter valve.  --Follow-up on Blastomyces serology.  --Follow-up on hypersensitivity panel  --Pulmonary following, TAVR thinking about possible trial of IV steroids for hypersensitivity pneumonitis if QuantiFERON TB test is negative.  --Ideally she will need bronchoscopy with BAL but given her oxygen requirement and high flow nasal cannula, this is not an option at this point.  --Per pulmonary if patient gets intubated, then they might consider bronchoscopy with BAL.  --Recommending a speech therapy  evaluation for aspiration in the interim  --Chest x-ray reviewed from this morning 11/1/2023, showing bilateral airspace opacities most confluent in the right upper lobe and retrocardiac region of the left lower lobe have significantly increased in density with new small left pleural effusion compatible with worsening pneumonia  --Patient, daughter and granddaughter were updated at the bedside in detail, patient does not want to be intubated , wishes were confirmed in the presence of daughter and grand daughter        Possible acute diastolic congestive heart failure exacerbation: Resolved  Patient became more more dyspneic and needing 6 L of oxygen with exertion on 10/22/2023  As above, repeat Chest xray  showed Patchy airspace opacities within the right mid and lower lung as well as left perihilar region favoring superimposed infectious/inflammatory infiltrate. New mild diffuse interstitial opacities favoring a mild interstitial edema pattern    -- As above, patient was initially on oral Lasix, was later switched to IV Lasix  -- During her course of his stay, patient was also evaluated by cardiology  -- Patient has also undergone echocardiogram, showed normal EF of 60 to 65%, mild pulmonary hypertension no wall motion abnormalities on 10/18/2023  -- IV diuretics are discontinued now on 10/30/2023 due to patient being euvolemic     Hypokalemia: resolved  -- She is on electrolyte replacement protocol we will continue with that     HTN  CAD s/p LCx stenting 2016  Cardiomegaly  Hx Palpitations  *CT: Stable Mild Cardiomegaly  *Negative stress test in 2019  *reported Hx of refusing statin therapy  *Presenting with acute CP/dyspnea, CT PE negative for PE. Trop/EKG negative    -- TTE shows EF 60 to 65%, mild pulmonary hypertension  -- Continue aspirin/coreg/imdur/PRN nitroglycerin     Chronic Lymphedema  -- PT OT SW  -- SCDs, no lower extremity edema at this time.     Gout  Osteoporosis  --Continue with Allopurinol.  No  acute exacerbation at this time     Bladder tumor s/p resection  -- no acute intervention     Tinnitus/Sensorineural Hearing loss  --Stable     Episode of A-fib with RVR on 10/28/2023  --Overnight converted back to normal sinus rhythm.  --Continue low-dose Coreg    Clinically Significant Risk Factors              # Hypoalbuminemia: Lowest albumin = 2.4 g/dL at 11/1/2023  5:50 AM, will monitor as appropriate     # Hypertension: Noted on problem list            # Financial/Environmental Concerns: none         Diet: 2 Gram Sodium Diet  Snacks/Supplements Adult: Ensure Clear; Between Meals    Gaona Catheter: Not present  DVT Prophylaxis: Enoxaparin (Lovenox) SQ  Code Status: Full Code, will have further discussion with patient and family regarding code status as patient does not want to be intubated clearly     The patient's care was discussed with the Bedside Nurse, Patient, and Patient's Family.    Medical Decision Making       60 MINUTES SPENT BY ME on the date of service doing chart review, history, exam, documentation & further activities per the note.        Disposition Plan     Expected Discharge Date: 11/07/2023,  3:00 PM    Destination: nursing home  Discharge Comments: [facility] accepted  not medically ready d/t o2 needs and SOB, High flow started d/t increase in O2 need over night.  Midline in place  10/27 hiflow 67% 50L     Entered: Aure Saul MD 11/01/2023, 6:54 AM       Aure Saul MD, MD, FACP  Text Page (7am - 6pm)    ----------------------------------------------------------------------------------------------------------------------      Interval History   Patient was assumed this morning, patient was seen and examined.  Daughter and granddaughter also present at the bedside.  Patient developed significant hypoxia after she was transferred to bedside commode last night, currently is on 50 L and 85% FiO2.    -Data reviewed today: I reviewed all new labs and imaging results over the last 24 hours.    I  personally reviewed chest x-ray done this morning which is concerning for worsening infiltrates .    Physical Exam   Temp: 98.6  F (37  C) Temp src: Oral BP: 113/52 Pulse: 64   Resp: 21 SpO2: 94 % O2 Device: High Flow Nasal Cannula (HFNC) Oxygen Delivery: 45 LPM  Vitals:    10/30/23 0636 10/31/23 0400 11/01/23 0400   Weight: 44.3 kg (97 lb 10.6 oz) 44 kg (97 lb) 46.4 kg (102 lb 4.7 oz)     Vital Signs with Ranges  Temp:  [98.1  F (36.7  C)-101.3  F (38.5  C)] 98.6  F (37  C)  Pulse:  [62-87] 64  Resp:  [16-35] 21  BP: ()/(40-78) 113/52  FiO2 (%):  [60 %-85 %] 85 %  SpO2:  [77 %-95 %] 94 %  I/O last 3 completed shifts:  In: 480 [P.O.:480]  Out: 75 [Urine:75]    GENERAL: Alert , awake and oriented. NAD. Conversational, appropriate, wearing HFNC, sitting in bed, alert and awake, answering questions, daughter and granddaughter at bedside are able to facilitate communication as patient is mandrin speaking  HEENT: Normocephalic. EOMI. No icterus or injection. Nares normal.   LUNGS: Bilateral rhonchi, no wheezing, not using accessory muscles  HEART: Regular rate. Extremities perfused.   ABDOMEN: Soft, nontender, and nondistended. Positive bowel sounds.   EXTREMITIES: No LE edema noted.   NEUROLOGIC: Moves extremities x4 on command. No acute focal neurologic abnormalities noted.     Medications    - MEDICATION INSTRUCTIONS -      - MEDICATION INSTRUCTIONS -        acetylcysteine  2 mL Nebulization 4x Daily    allopurinol  100 mg Oral Daily    [Held by provider] amLODIPine  2.5 mg Oral Daily    aspirin  81 mg Oral Daily    budesonide  0.5 mg Nebulization BID    carvedilol  6.25 mg Oral BID w/meals    ceFEPIme  2 g Intravenous Q12H    enoxaparin ANTICOAGULANT  30 mg Subcutaneous Q24H    ipratropium - albuterol 0.5 mg/2.5 mg/3 mL  3 mL Nebulization 4x daily    [Held by provider] isosorbide mononitrate  30 mg Oral Daily    pantoprazole  40 mg Oral QAM AC    polyethylene glycol  17 g Oral Daily    sennosides  1 tablet Oral  BID    sodium chloride (PF)  10 mL Intracatheter Q8H       Data   Recent Labs   Lab 11/01/23  0550 11/01/23 0540 10/31/23  2034 10/31/23  0553 10/30/23  0551   WBC 13.4*  --   --  12.8* 12.9*   HGB 10.3*  --   --  10.6* 10.0*   MCV 91  --   --  89 89     --   --  339 338     --   --  136 132*   POTASSIUM 3.8  --   --  4.0 3.8   CHLORIDE 104  --   --  100 97*   CO2 25  --   --  25 27   BUN 21.7  --   --  20.6 28.1*   CR 0.74  --   --  0.76 0.90   ANIONGAP 8  --   --  11 8   CLAIRE 8.7  --   --  8.8 8.9   * 122* 162* 118* 112*   ALBUMIN 2.4*  --   --  2.4* 2.6*       Imaging:   Recent Results (from the past 24 hour(s))   XR Chest Port 1 View    Narrative    EXAM: XR CHEST PORT 1 VIEW  LOCATION: [facility]  DATE: 11/1/2023    INDICATION: Follow-up lung infiltrates on prior chest x-ray.  COMPARISON: 10/28/2023      Impression    IMPRESSION: Bilateral airspace opacities most confluent in the right upper lobe and retrocardiac region of the left lower lobe have significantly increased in density, with new small left pleural effusion, compatible with worsening pneumonia. No definite   right pleural effusion. No pneumothorax. Stable cardiomediastinal contour. Pulmonary vascularity is normal.

## 2023-11-01 NOTE — PROGRESS NOTES
[facility]   Pulmonary Progress Note  Mary Polanco MRN: [id number]  4/24/1930  Date of Admission:10/18/2023  Date of Service: 11/01/2023  ___________________________________    Assessment & Plan  Mary Polanco is a 89 year old female, w/ PMHx most significant for hypertension, CAD/non-STEMI SP stent 7 years ago, mitral regurgitation, and chronic lymphedema, admitted w/ acute hypoxemic respiratory failure.  She had chest CT scan done on 10/27/2023 which showed worsening multifocal pneumonia with infiltrate worsening especially in the right upper lobe and bilateral lower lobes, bilateral pleural effusion which were small, this was compared to CT PE study 10/18/2023.  Patient did not improve with diuresis over the weekend.  However currently pathology is likely consistent with inflammatory pneumonitis versus infectious pneumonitis.  So far the infectious work-up has been negative including fungal antigens and serology, ID recommended to rule out TB unfortunately with her high oxygen requirement we are not able to do bronchoscopy with BAL which would be beneficial in this case.  Other possibilities include inhalational injury especially with the upper lobe predominance, nothing consistent with that in the history obtained from the relative.  Aspiration pneumonitis is also a possibility with right predominance.  Other inflammatory pneumonitis etiologies are also possible, organizing pneumonia that is precipitated by aspiration or bacterial pneumonia is a possibility.    Given the worsening respiratory status and low likelihood of infectious etiologies, I think it is reasonable to start steroids for possible organizing pneumonia.  I discussed with the family and explained to them that there is a slight risk of worsening if this is unidentified infection and they accept the risk, the daughter at the bedside shared with me that the patient does not want to be intubated and would like to change her code status to DO  NOT INTUBATE, I recommended they also change to DO NOT RESUSCITATE with no chest compression if cardiac arrest, she said they will talk about.    Acute hypoxemic respiratory failure  Bilateral right more than left infiltrate  Bilateral small pleural effusions   -Continue to monitor and follow-up the infectious work-up per primary and ID   -Continue to monitor in and out, avoid volume overload, consider gentle diuresis    -Recommend steroids with prednisone 1.5 mg/kg which would be equivalent to 60 mg daily equivalent dose of solumedrol    -I paged the hospitalist to follow up with the family about the discussion regarding the code status       Chart documentation was completed, in part, with [employer] voice-recognition software. Even though reviewed, some grammatical, spelling, and word errors may remain.    Dianna Rosas MD  Pulmonary & Critical Care       Interval History    -Nursing notes reviewed.   -Patient is worse from the respiratory standpoint, she is on more O2 support and she desaturates much easier, she started coughing and she had some blood tinged phlegm     Physical Exam Temp:  [98.1  F (36.7  C)-101.3  F (38.5  C)] 100.2  F (37.9  C)  Pulse:  [62-87] 77  Resp:  [16-35] 21  BP: ()/(40-62) 117/55  FiO2 (%):  [70 %-85 %] 80 %  SpO2:  [77 %-95 %] 94 %  I/O last 3 completed shifts:  In: -   Out: 175 [Urine:175]  Wt Readings from Last 1 Encounters:   11/01/23 46.4 kg (102 lb 4.7 oz)    Body mass index is 19.98 kg/m . FiO2 (%): 80 %  Resp: 21      Exam:  General: NAD  HEENT: MMM  CV: RRR, no murmur, click, rub  Resp: Equal b/l air entry, bilateral crackles  Abd: Soft, ND  Extremities: WWP, no LE edema  Neuro: AAOx3, no focal deficits    Data   Labs: reviewed in EMR and notable labs listed below.  CMP:   Recent Labs   Lab 11/01/23  1154 11/01/23  0922 11/01/23  0550 11/01/23  0540 10/31/23  2034 10/31/23  0553 10/30/23  0551 10/29/23  0549 10/28/23  1839 10/28/23  1333 10/28/23  0539   NA  --   --  137  --    --  136 132* 135  --   --  136   POTASSIUM  --   --  3.8  --   --  4.0 3.8 3.5   < > 3.9 3.3*   CHLORIDE  --   --  104  --   --  100 97* 97*  --   --  95*   CO2  --   --  25  --   --  25 27 26  --   --  27   ANIONGAP  --   --  8  --   --  11 8 12  --   --  14   * 119* 124* 122*   < > 118* 112* 131*  --   --  142*   BUN  --   --  21.7  --   --  20.6 28.1* 25.4*  --   --  26.8*   CR  --   --  0.74  --   --  0.76 0.90 0.82  --   --  0.81   GFRESTIMATED  --   --  75  --   --  73 59* 66  --   --  67   CLAIRE  --   --  8.7  --   --  8.8 8.9 8.9  --   --  9.2   MAG  --   --   --   --   --   --   --   --   --  2.1  --    PHOS  --   --  2.5  --   --  2.5 2.3*  --   --   --  3.6   ALBUMIN  --   --  2.4*  --   --  2.4* 2.6*  --   --   --  3.0*    < > = values in this interval not displayed.     CBC:   Recent Labs   Lab 11/01/23  0550 10/31/23  0553 10/30/23  0551 10/29/23  0549   WBC 13.4* 12.8* 12.9* 13.5*   RBC 3.49* 3.61* 3.47* 3.76*   HGB 10.3* 10.6* 10.0* 11.1*   HCT 31.7* 32.1* 30.8* 33.1*   MCV 91 89 89 88   MCH 29.5 29.4 28.8 29.5   MCHC 32.5 33.0 32.5 33.5   RDW 12.6 12.6 12.6 12.4    339 338 371       Culture Data   7-Day Micro Results       Collected Updated Procedure Result Status      11/01/2023 0928 11/01/2023 1008 Respiratory Aerobic Bacterial Culture with Gram Stain [[id number]]   Sputum from Expectorate    In process Component Value   No component results               11/01/2023 0928 11/01/2023 1009 Mycobacterium tuberculosis Complex Detection and Rifampin Resistance by PCR [[id number]]   Sputum from Expectorate    In process Component Value   No component results            11/01/2023 0928 11/01/2023 1008 Acid-Fast Bacilli Culture and Stain [[id number]]   Sputum from Expectorate    In process Component Value   No component results            11/01/2023 0545 11/01/2023 0555 Acid-Fast Bacilli Culture and Stain [[id number]]    Sputum from Mouth    In process Component Value   No component  results            11/01/2023 0545 11/01/2023 0555 Acid-Fast Bacilli Culture and Stain [[id number]]   Sputum from Mouth    In process Component Value   No component results            10/31/2023 1620 10/31/2023 1907 Respiratory Aerobic Bacterial Culture with Gram Stain [[id number]]   Sputum from Expectorate    Final result Component Value   Culture >10 Squamous epithelial cells/low power field indicates oral contamination. Please recollect.   Gram Stain Result >10 Squamous epithelial cells/low power field    >25 PMNs/low power field    3+ Mixed jeremy               10/31/2023 1620 10/31/2023 1638 Acid-Fast Bacilli Culture and Stain [[id number]]    Sputum from Expectorate    In process Component Value   No component results            10/31/2023 1620 10/31/2023 1638 Acid-Fast Bacilli Culture and Stain [[id number]]   Sputum from Expectorate    In process Component Value   No component results            10/30/2023 1256 10/31/2023 1759 Quantiferon TB Gold Plus Grey Tube [[id number]]   Blood from Hand, Left    Final result Component Value Units   Quantiferon Nil Tube 0.01 IU/mL            10/30/2023 1256 10/31/2023 1758 Quantiferon TB Gold Plus Green Tube [[id number]]   Blood from Hand, Left    Final result Component Value Units   Quantiferon TB1 Tube 0.01 IU/mL            10/30/2023 1256 10/31/2023 1758 Quantiferon TB Gold Plus Yellow Tube [[id number]]   Blood from Hand, Left    Final result Component Value   Quantiferon TB2 Tube 0.02            10/30/2023 1256 10/31/2023 1758 Quantiferon TB Gold Plus Purple Tube [[id number]]   Blood from Hand, Left    Final result Component Value Units   Quantiferon Mitogen 3.53 IU/mL            10/30/2023 1256 10/31/2023 1909 Quantiferon TB Gold Plus [[id number]]   Blood from Hand, Left    Final result Component Value Units   Quantiferon-TB Gold Plus Negative    No interferon gamma response to M.tuberculosis antigens was detected. Infection with M.tuberculosis is  unlikely, however a single negative result does not exclude infection. In patients at high risk for infection, a second test should be considered in accordance with the 2017 ATS/IDSA/CDC Clinical Pract  ice Guidelines for Diagnosis of Tuberculosis in Adults and Children    TB1 Ag minus Nil Value 0.00 IU/mL   TB2 Ag minus Nil Value 0.01 IU/mL   Mitogen minus Nil Result 3.52 IU/mL   Nil Result 0.01 IU/mL            10/29/2023 0612 10/29/2023 1352 Respiratory Panel PCR [[id number]]    Swab from Nasopharyngeal    Final result Component Value   Adenovirus Not Detected   Coronavirus Not Detected   This test detects Coronavirus 229E, HKU1, NL63 and OC43 but does not distinguish between them. It does not detect MERS ( Respiratory Syndrome), SARS (Severe Acute Respiratory Syndrome) or 2019-nCoV (Novel 2019) Coronavirus.   Human Metapneumovirus Not Detected   Human Rhin/Enterovirus Not Detected   Influenza A Not Detected   Influenza A, H1 Not Detected   Influenza A 2009 H1N1 Not Detected   Influenza A, H3 Not Detected   Influenza B Not Detected   Parainfluenza Virus 1 Not Detected   Parainfluenza Virus 2 Not Detected   Parainfluenza Virus 3 Not Detected   Parainfluenza Virus 4 Not Detected   Respiratory Syncytial Virus A Not Detected   Respiratory Syncytial Virus B Not Detected   Chlamydia Pneumoniae Not Detected   Mycoplasma Pneumoniae Not Detected            10/29/2023 0609 10/29/2023 1435 Respiratory Aerobic Bacterial Culture with Gram Stain [[id number]]   Sputum from Expectorate    Final result Component Value   Culture >10 Squamous epithelial cells/low power field indicates oral contamination. Please recollect.   Gram Stain Result >10 Squamous epithelial cells/low power field    <25 PMNs/low power field    2+ Mixed jeremy               10/29/2023 0418 10/29/2023 1325 MRSA MSSA PCR, Nasal Swab [[id number]]    Swab from Nare, Right    Final result Component Value   MRSA Target DNA Negative   SA Target  DNA Negative            10/29/2023 0414 11/01/2023 0943 Blastomyces Agn Quant EIA Non Blood [[id number]]   Urine, Voided    Final result Component Value   See Scanned Result BLASTOMYCES AGN QUANT EIA NON BLOOD-Scanned            10/29/2023 0413 10/30/2023 2104 Histoplasma Galactomannan Antigen Quant by EIA, Urine [[id number]]   Urine, Clean Catch    Final result Component Value Units   Histoplasma Galactomannan Ag Quant, Urn Not Detected ng/mL   Histoplasma Galactomannan Ag Interp, Urn Not Detected    INTERPRETIVE DATA: Histoplasma Galactomannan Antigen                     Quantitative by EIA, Urine  Less than 0.4 ng/ml = Not Detected  0.4-0.7 ng/mL = Detected (below the limit of quantification)  0.8-24.0 ng/mL = Detected  Greater than 24.0 ng/mL = Detected (above the limit of   quantification)    The quantitative range of this assay is 0.8-24.0 ng/mL.   Antigen concentrations between 0.4-.07 or >24.0 ng/mL fall   outside the linear range of the assay and cannot be   accurately quantified.    This EIA test should be used in conjunction with other   diagnostic procedures, including microbiological culture,   histological examination of biopsy samples, and/or   radiographic evidence, to aid in the diagnosis of   histoplasmosis.    This test was developed and its performance characteristics   determined by [facility]. It has not been cleared or   approved by the U.S. Food and Drug Administration. This   test was performed in a CLIA-certified laboratory and is   intended for clinical purposes.  Performed By: [facility]  [address] [place] [id number]  : Fabrice Serna MD, PhD  CLIA Number: [id number]            10/28/2023 1931 10/28/2023 2342 Cryptococcus antigen [[id number]]   Blood from Arm, Right    Final result Component Value   Cryptococcal Antigen Negative            10/28/2023 1839 11/01/2023 1055 Blastomyces Agn Quant EIA Blood [[id number]]   Blood from  Hand, Right    Final result Component Value   See Scanned Result BLASTOMYCES AGN QUANT EIA BLOOD-Scanned            10/28/2023 1839 10/30/2023 1400 Aspergillus Galactomannan Antigen [[id number]]   Blood from Hand, Right    Final result Component Value   Aspergillus Galactomannan Index 0.12   Aspergillus Galact AG Negative   INTERPRETIVE INFORMATION: Aspergillus Galactomannan Antigen   by EIA  Negative results do not exclude the diagnosis of invasive  aspergillosis. A single positive test result (index equal  to or greater than 0.5) should be clinically correlated  by testing a separate serum specimen because many agents  (e.g. foods, antibiotics) may cross-react with the test.  If invasive aspergillosis is suspected in high-risk  patients, serial sampling is recommended.  Performed By: [facility]  [address] [place] [id number]  : Fabrice Serna MD, PhD  CLIA Number: [id number]            10/28/2023 0539 11/01/2023 1021 Histoplasma Capsulatum Antigen [[id number]]   Blood from Hand, Right    Final result Component Value   See Scanned Result HISTOPLASMA CAPSULATUM ANTIGEN-Scanned            10/27/2023 1639 10/27/2023 1908 Legionella pneumophila antigen urine [[id number]]   Urine, NOS    Final result Component Value   Legionella pneumophila serogroup 1 urinary antigen Negative   Suggests no recent or current infection. Infection due to Legionella cannot be ruled out, since other serogroups and species may cause disease, antigen may not be present in urine in early infection, and the level of antigen present in the urine may be below detectable limits of the test.            10/27/2023 0541 10/30/2023 0001 Mycoplasma pneumonia abys IgG and IgM [[id number]]   (Abnormal)   Blood from Arm, Right    Final result Component Value Units   Myco Pneumoniae IgG 0.15 U/L   INTERPRETIVE INFORMATION:  Mycoplasma pneumoniae Ab, IgG    0.09 U/L or less ............ Negative    0.10 -  0.32 U/L ............. Equivocal    0.33 U/L or greater ......... Positive    INTERPRETIVE DATA: Over 50% of healthy adults have a   relatively high background of specific M. pneumoniae IgG   antibodies in their sera, probably because of past M.   pneumoniae infections. Therefore, paired sera obtained with   a time interval of 1 to 3 weeks are highly recommended in   adults to confirm reinfection by M. pneumoniae, which is   demonstrated by a significant change in IgG antibodies. A   significant change is indicated if one sample is above 0.32   U/L and the other is below 0.20 U/L.   Myco Pneumoniae IgM 0.03 U/L   INTERPRETIVE INFORMATION:  Mycoplasma pneumoniae Ab, IgM    0.76 U/L or less .......... Negative: No clinically                                significant amount of                                M. pneumoniae IgM antibody                                detected.    0.77 - 0.95 U/L ........... Low Positive: M. pneumoniae-                                specific IgM presumptively                                detected. Collection of a                                follow-up sample in 1-2                                weeks is recommended to                                assure reactivity.    0.96 U/L or greater ....... Positive: Highly significant                                amount of M. pneumoniae-                                specific IgM antibody                                detected. However, low levels                                of IgM antibodies may                                occasionally persist for more                                than 12 months post-infection.  Performed By: [facility]  [address] [place] [id number]  : Fabrice Serna MD, PhD  CLIA Number: [id number]            10/27/2023 0540 10/27/2023 0607 Blastomyces antibody ID [[id number]]   Peripheral Blood    In process Component Value   No component results             10/27/2023 0540 10/28/2023 1726 1,3 Beta D glucan fungitell [[id number]]   Blood from Arm, Right    Final result Component Value Units   (1,3)-Beta-D-Glucan <31 pg/mL   B-D GLUCAN INTERPRETATION (1,3) Negative    INTERPRETIVE INFORMATION: (1,3)-beta-D-glucan (Fungitell)      Less than 31 pg/mL ................... Negative    31-59 pg/mL .......................... Negative    60-79 pg/mL .......................... Indeterminate    Greater than or equal to 80 pg/mL .... Positive    The Fungitell test is indicated for presumptive diagnosis   of fungal infection and should be used in conjunction with   other diagnostic procedures. This test does not detect   certain fungal species such as Cryptococcus, which produce   very low levels of (1,3)-beta-D-glucan. This test will not   detect the zygomycetes, such as Absidia, Mucor, and   Rhizopus, which are not known to produce   (1,3)-beta-D-glucan. In addition, the yeast phase of   Blastomyces dermatitidis produces little   (1,3)-beta-D-glucan and may not be detected by the assay.  Performed By: [facility]  [address] [place] [address]  : Fabrice Serna MD, PhD  CLIA Number: [id number]                      Virology Data:   Lab Results   Component Value Date    FLUAH1 Not Detected 10/29/2023    FLUAH3 Not Detected 10/29/2023    EV8010 Not Detected 10/29/2023    IFLUB Not Detected 10/29/2023    RSVA Not Detected 10/29/2023    RSVB Not Detected 10/29/2023    PIV1 Not Detected 10/29/2023    PIV2 Not Detected 10/29/2023    PIV3 Not Detected 10/29/2023    HMPV Not Detected 10/29/2023       Most Recent [facility]ze Pulmonary Function Testing (FVC/FEV1 only)  No PFT in the past    Imaging: reviewed in EMR and notable imaging listed below.  CXR 11/1/2023  IMPRESSION: Bilateral airspace opacities most confluent in the right upper lobe and retrocardiac region of the left lower lobe have significantly increased in density, with new small left  pleural effusion, compatible with worsening pneumonia. No definite   right pleural effusion. No pneumothorax. Stable cardiomediastinal contour. Pulmonary vascularity is normal.    Medications    acetylcysteine  2 mL Nebulization 4x Daily    allopurinol  100 mg Oral Daily    [Held by provider] amLODIPine  2.5 mg Oral Daily    aspirin  81 mg Oral Daily    budesonide  0.5 mg Nebulization BID    carvedilol  6.25 mg Oral BID w/meals    ceFEPIme  2 g Intravenous Q12H    enoxaparin ANTICOAGULANT  30 mg Subcutaneous Q24H    insulin aspart   Subcutaneous TID w/meals    insulin aspart  1-7 Units Subcutaneous TID AC    insulin aspart  1-5 Units Subcutaneous At Bedtime    ipratropium - albuterol 0.5 mg/2.5 mg/3 mL  3 mL Nebulization 4x daily    [Held by provider] isosorbide mononitrate  30 mg Oral Daily    pantoprazole  40 mg Oral QAM AC    polyethylene glycol  17 g Oral Daily    sennosides  1 tablet Oral BID    sodium chloride (PF)  10 mL Intracatheter Q8H

## 2023-11-01 NOTE — PROGRESS NOTES
Patient remains on HFNC 50L, 80%. BS diminished t/o. All nebs given as ordered. RT to continue to follow.    Rupesh Haider, RRT

## 2023-11-01 NOTE — PLAN OF CARE
Pt is A&Ox4, Ax1 w/ GB pivot to BSC, 2g Na diet- poor appetite, ensure ordered for between meals. Tele SR. Voids adequately, continent of B&B. Scattered bruising. L midline TKO intermittent antibiotics. L PIV SL. Family translates for Pt, form signed in front of chart. Jabber available. Scheduled nebs, desats to low 80's when getting up to BSC. 300 on IS, provided frequent reminders and encouragement to do IS during this shift.

## 2023-11-01 NOTE — PROGRESS NOTES
"CLINICAL NUTRITION SERVICES  -  ASSESSMENT NOTE      Recommendations Ordered by Registered Dietitian (RD): Ensure BID between meals    Malnutrition: % Weight Loss:  Up to 10% in 6 months (moderate malnutrition)  % Intake:  </= 50% for >/= 5 days (severe malnutrition)  Subcutaneous Fat Loss:  Unable to observe d/t isolation   Muscle Loss:  Unable to observe d/t isolation   Fluid Retention:  None noted    Malnutrition Diagnosis: Moderate malnutrition  In Context of:  Acute illness or injury        REASON FOR ASSESSMENT  Mary Polanco is a 89 year old female seen by Registered Dietitian for LOS      NUTRITION HISTORY  - Unable to obtain nutrition history as patient in isolation for R/O TB.  She is also Mandarin speaking.       CURRENT NUTRITION ORDERS  Diet Order:     2000 mg Sodium + Ensure Clear BID between meals     Current Intake/Tolerance:  Intakes are not recorded very consistently.  Noted that when they are, intake has been 25-50% of meals.  Visited with bedside RN today - he notes that appetite is very poor but she is taking small amounts of food.  He also noted that she would like chocolate Ensure rather than the clear liquids supplements she has been getting.       NUTRITION FOCUSED PHYSICAL ASSESSMENT FOR DIAGNOSING MALNUTRITION)  No:  Airborne isolation for R/O TB                 Observed:    N/A    Obtained from Chart/Interdisciplinary Team:  None     ANTHROPOMETRICS  Height: 5' 0\"  Weight: 46.4 kg (102#)(11/1)  Body mass index is 19.98 kg/m   Weight Status:  Normal BMI  IBW: 45.5 kg   % IBW: 102%  Weight History:   Wt Readings from Last 10 Encounters:   11/01/23 46.4 kg (102 lb 4.7 oz)   08/06/23 46.7 kg (103 lb)   05/10/23 48.2 kg (106 lb 3.2 oz)   11/21/22 47.1 kg (103 lb 12.8 oz)   09/19/21 58.1 kg (128 lb)   05/18/21 50.7 kg (111 lb 12.8 oz)   03/13/20 54.3 kg (119 lb 9.6 oz)   03/04/20 53.5 kg (117 lb 15.1 oz)   06/18/19 53.9 kg (118 lb 12.8 oz)   06/17/19 53.9 kg (118 lb 12.8 oz)     Down 4# or 4% over " the last 6 months    LABS  Labs reviewed    MEDICATIONS  Medications reviewed      ASSESSED NUTRITION NEEDS PER APPROVED PRACTICE GUIDELINES:    Dosing Weight 46.4 kg   Estimated Energy Needs: 8947-3329 kcals (25-30 Kcal/Kg)  Justification: maintenance  Estimated Protein Needs: 55-70 grams protein (1.2-1.5 g pro/Kg)  Justification: hypercatabolism with acute illness  Estimated Fluid Needs: 9345-0024 mL (1 mL/Kcal)  Justification: maintenance    MALNUTRITION:  % Weight Loss:  Up to 10% in 6 months (moderate malnutrition)  % Intake:  </= 50% for >/= 5 days (severe malnutrition)  Subcutaneous Fat Loss:  Unable to observe d/t isolation   Muscle Loss:  Unable to observe d/t isolation   Fluid Retention:  None noted    Malnutrition Diagnosis: Moderate malnutrition  In Context of:  Acute illness or injury    NUTRITION DIAGNOSIS:  Inadequate oral intake related to poor appetite as evidenced by consuming 50% of meals at best, need for oral nutritional supplements       NUTRITION INTERVENTIONS  Recommendations / Nutrition Prescription  Diet as tolerated  Ensure BID between meals     Implementation  Nutrition education: Not appropriate at this time due to patient condition  Medical Food Supplement:  Orders written as above Collaboration and Referral of Nutrition care:  Patient discussed today during interdisciplinary bedside rounds     Nutrition Goals  Patient will increase intake to 50-75% of meals and supplements     MONITORING AND EVALUATION:  Progress towards goals will be monitored and evaluated per protocol and Practice Guidelines    Heike Moralez RD, LD, CNSC   Clinical Dietitian - [facility]

## 2023-11-02 NOTE — PROGRESS NOTES
Attempted sputum induction with patient today at 1215 with hypertonic saline. Lung sounds are clear/diminished and patient has an unproductive dry cough even after treatment with aerobika. Will continue to attempt    Dane Gustafson RRT on 11/2/2023 at 1:55 PM

## 2023-11-02 NOTE — PROVIDER NOTIFICATION
"Brief update:    Paged with request to change patient's CODE STATUS to DNI    Note conversations with pulmonary as well as rounding hospitalist where patient was expressing a desire not to be intubated.  I see this was further discussed, but remained full code with plan for further discussion on rounding note.  I am uncertain if there is some family dynamic or uncertainty as of DNI status resulting in patient remaining full code based on this documentation.  Per note:   \"Full Code, will have further discussion with patient and family regarding code status as patient does not want to be intubated clearly\"      If patient decompensates overnight to the point where she may require intubation, please page me.  I can have overnight discussions with family and patient at that time to clarify their desire for restorative measures including intubation with an  if needed.  It does appear that patient wishes for DNI status, so would not intubate.    If patient is stable overnight, CODE STATUS can be clarified by rounding provider or pulmonary team who had these discussions with patient and family.    Daniel Poon MD  12:26 AM    "

## 2023-11-02 NOTE — PROGRESS NOTES
Patient alert and oriented overnight, follows commands, making needs known, using call light appropriately. Remains on HFNC, O2 needs increased overnight, patient does not have any complaints overnight, has cough, productive at times. Remains on insulin drip, titrated based on blood sugars. Using bedpan with good urine output. Repositioning self with assistance of nursing staff. No PRNs given overnight.

## 2023-11-02 NOTE — PROGRESS NOTES
[facility]    HOSPITALIST PROGRESS NOTE :   --------------------------------------------------    Date of Admission:  10/18/2023    Cumulative Summary: Mary Polanco is a 89 year old female hx of Tinnitus, Gout, Osteoporosis, CAD s/p stenting 2019 presenting with Dyspnea and CP found to have multifocal PNA and was was admitted on 10/18/2023.     Assessment & Plan     Sepsis 2/2 bilateral multifocal Pneumonia  Acute hypoxic Respiratory Failure needing oxygen by high flow nasal cannula on 10/24/2023  Acute diastolic heart failure with preserved EF exacerbation: Treated    Hospital course:     Patient admitted with shortness of breath and chest pain found to have multifocal pneumonia on CT chest PE protocol, no pulm embolism  Patient was diagnosed with sepsis, initially was started on IV ceftriaxone and doxycycline.  She was hypoxic, febrile of 101  F on admission  During her hospital course , due to increased Oxygen requirement ,Patient was placed on HFNC  Patient had RRT on 10/24/2023 for worsening SOB, concern for fluid overload and was started on IV Lasix  Later her BNP and echocardiogram came back normal  Her IV ceftriaxone initially was changed to cefepime and then was changed to oral cefuroxime on 10/24/2023 this, the same day when she had RRT  Patient stayed on IV Lasix 20 mg twice daily, increased to 40 mg twice daily per pulmonary recommendations, now discontinued  Checked Legionella antibodies, mycoplasma antibodies  Antifungal serology was sent along with blastomycosis, aspergillosis and Coccidioides antibodies.  Check fungal WSA, ESR, CRP, hypersensitivity pneumonitis panel, ANCA and myeloperoxidase antibodies and proteinase antibodies were also sent  Rheumatoid factor was also checked  Gram stain and cultures were also checked for PJP  Fungitell assay, histoplasmosis, aspergillosis, come back negative   Blastomycosis serology pending, Legionella negative, myeloperoxidase antibodies  negative.   ANCA negative arthritis factor negative  Patient does live in a very old apartment, patient family not sure if they have the mold in the home or not.  COVID-19/influenza/respiratory syncytial virus was negative on admission  CT chest repeated on 10/27/2023, showed worsening multifocal pneumonia particularly within the right upper lobe and bilateral lower lobes.  There was small left and trace right pleural effusion mild interstitial pulmonary edema  Less clinical suspicion for fluid overload.  In fact cardiology has discontinued her diuretics, agree with cardiology discontinuing diuretics.  Infectious disease was consulted on 10/28/2023  Initially vancomycin was added, MRSA screen came back negative, Vanco was discontinued  Later she was switched to doxycycline and has completed 14-day course of antibiotic on 10/30/2023  Currently patient remains on cefepime.  Work up Results : Legionella antibodies , myeloperoxidase antibodies and proteinase antibodies , Coccidioides antibodies , cryptococcus, Fungitell assay, rheumatoid factor ,Histoplasmosis , QuantiFERON-TB gold plus and aspergillosis all came back negative   11/01/23: Patient has been started on prednisone 1.5 mg/kg, nightly end of May.  Prednisone 50 mg IV every 24 hours due to the concern for organizing pneumonia, patient does not want to be intubated if her condition worsens.    PLAN:      --Continue to monitor patient in ICU closely.  --Continue patient on high flow nasal cannula, try to wean her high flow nasal cannula  --Continue Mucomyst nebulized solution along with Pulmicort nebulizer twice daily.  --Continue DuoNeb nebulization 3 times daily, incentive spirometry and flutter valve.  --Follow-up on Blastomyces serology.  --Follow-up on hypersensitivity panel  --Pulmonary following, started patient on IV Solu-Medrol 50 mg IV daily to treat for organizing pneumonia  --Ideally she will need bronchoscopy with BAL but given her oxygen  requirement and high flow nasal cannula, this is not an option at this point.  --Per pulmonary if patient gets intubated, then they might consider bronchoscopy with BAL.  --Recommending a speech therapy evaluation for aspiration in the interim  --Chest x-ray on 11/1/2023, showing bilateral airspace opacities most confluent in the right upper lobe and retrocardiac region of the left lower lobe have significantly increased in density with new small left pleural effusion compatible with worsening pneumonia  --Discussion with patient and her daughter at bedside regarding CODE STATUS, patient would like to be DNR/DNI, family is still hoping that patient will turn around with addition of the steroids.  Daughter does have questions regarding duration of his steroids or when the will find out if patient is not responsive to steroids also. I did give them information regarding comfort care in case if patient does not make any improvement in coming days.  At this time the plan remains restorative.  Will change CODE STATUS to DNR/DNI   -- Patient has been evaluated by speech therapy, recommending patient to Take n.p.o. from a speech standpoint until respiratory status improved 40 L/min or less.  -- I did discuss with patient and daughter regarding NG tube placement and tube feedings if patient is unable to eat, they will think about that  -- If patient is made n.p.o., will start patient on D5 at 30 mill per hour to avoid hypoglycemia.    possible acute diastolic congestive heart failure exacerbation: Resolved  Patient became more more dyspneic and needing 6 L of oxygen with exertion on 10/22/2023  As above, repeat Chest xray  showed Patchy airspace opacities within the right mid and lower lung as well as left perihilar region favoring superimposed infectious/inflammatory infiltrate. New mild diffuse interstitial opacities favoring a mild interstitial edema pattern    -- As above, patient was initially on oral Lasix, was later  switched to IV Lasix  -- During her course of his stay, patient was also evaluated by cardiology  -- Patient has also undergone echocardiogram, showed normal EF of 60 to 65%, mild pulmonary hypertension no wall motion abnormalities on 10/18/2023  -- IV diuretics are discontinued now on 10/30/2023 due to patient being euvolemic    Hyperglycemia, secondary to acute illness and steroid;  Type 2 diabetes mellitus: Hemoglobin A1c came back 6.5  Patient does not take any medications at home.  --Due to patient being on high-dose steroid has been a started on insulin infusion, blood sugars are in range  --Made n.p.o., will start patient initially on D5 30 mL/h while we continue to have further discussions with family regarding NG tube placement and goals of care.     Hypokalemia: resolved  -- She is on electrolyte replacement protocol we will continue with that     HTN  CAD s/p LCx stenting 2016  Cardiomegaly  Hx Palpitations  *CT: Stable Mild Cardiomegaly  *Negative stress test in 2019  *reported Hx of refusing statin therapy  *Presenting with acute CP/dyspnea, CT PE negative for PE. Trop/EKG negative    -- TTE shows EF 60 to 65%, mild pulmonary hypertension  -- Continue aspirin/coreg/imdur/PRN nitroglycerin     Chronic Lymphedema  -- PT OT SW  -- SCDs, no lower extremity edema at this time.     Gout  Osteoporosis  --Continue with Allopurinol.  No acute exacerbation at this time     Bladder tumor s/p resection  -- no acute intervention     Tinnitus/Sensorineural Hearing loss  --Stable     Episode of A-fib with RVR on 10/28/2023  --Overnight converted back to normal sinus rhythm.  --Continue low-dose Coreg    Clinically Significant Risk Factors              # Hypoalbuminemia: Lowest albumin = 2.4 g/dL at 11/1/2023  5:50 AM, will monitor as appropriate     # Hypertension: Noted on problem list       # DMII: A1C = 6.5 % (Ref range: <5.7 %) within past 6 months    # Moderate Malnutrition: based on nutrition assessment      #  Financial/Environmental Concerns: none         Diet: Snacks/Supplements Adult: Ensure Clear; Between Meals  Combination Diet Easy to Chew (level 7); Thin Liquids (level 0) (1:1 assist/supervision, only eat if RR < 30, pacing/slow rate, pick smooth/soft food items, slow rate, extra swallows, alternate textures, hold po if aspiration signs/respiratory sta...    Gaona Catheter: Not present  DVT Prophylaxis: Enoxaparin (Lovenox) SQ  Code Status: Full Code, will have further discussion with patient and family regarding code status as patient does not want to be intubated clearly     The patient's care was discussed with the Bedside Nurse, Patient, and Patient's Family.    Medical Decision Making       60 MINUTES SPENT BY ME on the date of service doing chart review, history, exam, documentation & further activities per the note.        Disposition Plan     Expected Discharge Date: 11/07/2023,  3:00 PM    Destination: nursing home  Discharge Comments: [facility] accepted  not medically ready d/t o2 needs and SOB, High flow started d/t increase in O2 need over night.  Midline in place  10/27 hiflow 67% 50L     Entered: Aure Saul MD 11/02/2023, 7:15 AM       Aure Saul MD, MD, FACP  Text Page (7am - 6pm)    ----------------------------------------------------------------------------------------------------------------------      Interval History     Patient was seen and examined this morning, sitting in bed, wearing high flow nasal cannula, alert and awake and responsive to questions, event speaking daughter was present at the bedside to help with interpretation.  Patient continues to remain on high flow nasal cannula  She is on 50 L/min and requiring 90% FiO2 which is slightly increased as compared to yesterday.  I did have discussion with patient and daughter regarding CODE STATUS, they are okay to change CODE STATUS to DNR/DNI.  Noted is hoping that additional steroids will help her respiratory status.  I did discuss  with them regarding speech therapy evaluation and recommending patient to be n.p.o., they would like to think about NG tube placement for tube feedings.  I also gave them some information regarding palliative care and comfort care which can be given to the patient in the event if patient does not make any improvement in the coming days.  Patient and daughter showed understanding.  Currently goals of care remains restorative although status has been changed to DNR/DNI, patient will think about NG tube placement.    -Data reviewed today: I reviewed all new labs and imaging results over the last 24 hours.    I personally reviewed chest x-ray done this morning which is concerning for worsening infiltrates .    Physical Exam   Temp: 98.9  F (37.2  C) Temp src: Axillary BP: 129/57 Pulse: 67   Resp: 25 SpO2: 91 % O2 Device: High Flow Nasal Cannula (HFNC) Oxygen Delivery: 50 LPM  Vitals:    10/31/23 0400 11/01/23 0400 11/02/23 0600   Weight: 44 kg (97 lb) 46.4 kg (102 lb 4.7 oz) 46.3 kg (102 lb 1.2 oz)     Vital Signs with Ranges  Temp:  [98.4  F (36.9  C)-100.4  F (38  C)] 98.9  F (37.2  C)  Pulse:  [66-85] 67  Resp:  [11-39] 25  BP: ()/(43-74) 129/57  FiO2 (%):  [70 %-85 %] 85 %  SpO2:  [88 %-96 %] 91 %  I/O last 3 completed shifts:  In: 1331.52 [P.O.:1120; I.V.:211.52]  Out: -     GENERAL: Alert , awake and oriented. NAD. Conversational, appropriate, wearing HFNC, sitting in bed, alert and awake, answering questions, daughter and granddaughter at bedside are able to facilitate communication as patient is mandrin speaking  HEENT: Normocephalic. EOMI. No icterus or injection. Nares normal.   LUNGS: Bilateral rhonchi, no wheezing, not using accessory muscles  HEART: Regular rate. Extremities perfused.   ABDOMEN: Soft, nontender, and nondistended. Positive bowel sounds.   EXTREMITIES: No LE edema noted.   NEUROLOGIC: Moves extremities x4 on command. No acute focal neurologic abnormalities noted.     Medications     dextrose      insulin regular 0.5 Units/hr (11/02/23 0429)    - MEDICATION INSTRUCTIONS -      - MEDICATION INSTRUCTIONS -        acetylcysteine  2 mL Nebulization 4x Daily    allopurinol  100 mg Oral Daily    [Held by provider] amLODIPine  2.5 mg Oral Daily    aspirin  81 mg Oral Daily    budesonide  0.5 mg Nebulization BID    carvedilol  6.25 mg Oral BID w/meals    ceFEPIme  2 g Intravenous Q12H    enoxaparin ANTICOAGULANT  30 mg Subcutaneous Q24H    ipratropium - albuterol 0.5 mg/2.5 mg/3 mL  3 mL Nebulization 4x daily    [Held by provider] isosorbide mononitrate  30 mg Oral Daily    methylPREDNISolone  50 mg Intravenous Q24H    pantoprazole  40 mg Oral QAM AC    polyethylene glycol  17 g Oral Daily    sennosides  1 tablet Oral BID    sodium chloride (PF)  10 mL Intracatheter Q8H       Data   Recent Labs   Lab 11/02/23  0617 11/02/23  0521 11/02/23  0427 11/01/23  0922 11/01/23  0550 10/31/23  2034 10/31/23  0553 10/30/23  0551   WBC  --  14.6*  --   --  13.4*  --  12.8* 12.9*   HGB  --   --   --   --  10.3*  --  10.6* 10.0*   MCV  --   --   --   --  91  --  89 89   PLT  --   --   --   --  328  --  339 338   NA  --  138  --   --  137  --  136 132*   POTASSIUM  --  4.0  --   --  3.8  --  4.0 3.8   CHLORIDE  --  106  --   --  104  --  100 97*   CO2  --  22  --   --  25  --  25 27   BUN  --  22.5  --   --  21.7  --  20.6 28.1*   CR  --  0.65  --   --  0.74  --  0.76 0.90   ANIONGAP  --  10  --   --  8  --  11 8   CLAIRE  --  8.8  --   --  8.7  --  8.8 8.9   * 138* 135*   < > 124*   < > 118* 112*   ALBUMIN  --   --   --   --  2.4*  --  2.4* 2.6*    < > = values in this interval not displayed.       Imaging:   No results found for this or any previous visit (from the past 24 hour(s)).

## 2023-11-02 NOTE — PROGRESS NOTES
[facility]    Infectious Disease Progress Note    Date of Service (when I saw the patient): 11/02/2023     Assessment & Plan   Mary He is a 89 year old who was admitted on 10/18/2023.     Multifocal pneumonia  --10/27 CT chest:  worsened multifocal pneumonia, particularly within the right upper lobe and bilateral lower lobes    --serum fungitell negative.  Aspergillus and Histoplasma serology is negative, Blasto is also negative.   2. Acute hypoxic respiratory failure - on HFNC  3. Acute diastolic heart failure with preserved EF exacerbation  4. Patient born in [place] should check for TB reactivation given age, in Airborne isolation  I reviewed chart for any previous investigation regarding TB I did not find any         RECOMMENDATIONS:  Pending AFB stain and cultures   Check MTB PCR  Quant is negaitve   Started on steroids for possible organizing pneumonia   Patient not really immunocompromised and serum fungitell negative. PJP PCR is negative     Has been on antibiotics since admission no positive micro stop cefepime ( day 7 done)  doxy already off.      Spoke with daughter bedside and explained work up     Maritza Garcia MD    Interval History   No new rashes or issues with antibiotics   Labs reviewed   No fevers no positive micro     Physical Exam   Temp: 98.3  F (36.8  C) Temp src: Oral BP: 129/65 Pulse: 72   Resp: 25 SpO2: 95 % O2 Device: High Flow Nasal Cannula (HFNC) Oxygen Delivery: 60 LPM  Vitals:    10/31/23 0400 11/01/23 0400 11/02/23 0600   Weight: 44 kg (97 lb) 46.4 kg (102 lb 4.7 oz) 46.3 kg (102 lb 1.2 oz)     Vital Signs with Ranges  Temp:  [98.3  F (36.8  C)-100.2  F (37.9  C)] 98.3  F (36.8  C)  Pulse:  [66-85] 72  Resp:  [11-39] 25  BP: ()/(43-74) 129/65  FiO2 (%):  [70 %-100 %] 100 %  SpO2:  [88 %-96 %] 95 %    Constitutional: on 60 L HFNC   Lungs: shallow breaths   Cardiovascular: Regular rate and rhythm, normal S1 and S2, and no murmur noted  Abdomen: Normal  bowel sounds, soft, non-distended, non-tender  Skin: No rashes, no cyanosis, no edema  Other:    Medications    dextrose      insulin regular 0.5 Units/hr (11/02/23 0939)    - MEDICATION INSTRUCTIONS -      - MEDICATION INSTRUCTIONS -        acetylcysteine  2 mL Nebulization 4x Daily    allopurinol  100 mg Oral Daily    [Held by provider] amLODIPine  2.5 mg Oral Daily    aspirin  81 mg Oral Daily    budesonide  0.5 mg Nebulization BID    carvedilol  6.25 mg Oral BID w/meals    ceFEPIme  2 g Intravenous Q12H    enoxaparin ANTICOAGULANT  30 mg Subcutaneous Q24H    ipratropium - albuterol 0.5 mg/2.5 mg/3 mL  3 mL Nebulization 4x daily    [Held by provider] isosorbide mononitrate  30 mg Oral Daily    methylPREDNISolone  62.5 mg Intravenous Q24H    pantoprazole  40 mg Oral QAM AC    polyethylene glycol  17 g Oral Daily    sennosides  1 tablet Oral BID    sodium chloride (PF)  10 mL Intracatheter Q8H       Data   All microbiology laboratory data reviewed.  Recent Labs   Lab Test 11/02/23 0521 11/01/23  0550 10/31/23  0553 10/30/23  0551   WBC 14.6* 13.4* 12.8* 12.9*   HGB  --  10.3* 10.6* 10.0*   HCT  --  31.7* 32.1* 30.8*   MCV  --  91 89 89   PLT  --  328 339 338     Recent Labs   Lab Test 11/02/23  0521 11/01/23  0550 10/31/23  0553   CR 0.65 0.74 0.76     Recent Labs   Lab Test 10/31/23  0553   SED 46*     Recent Labs   Lab Test 09/15/16  1436   CULT Canceled, Test credited >10 Squamous epithelial cells/low power field indicates   oral contamination. Please recollect.  *       All cultures:  Recent Labs   Lab 11/01/23  0928 10/31/23  1620 10/29/23  0609   CULTURE >10 Squamous epithelial cells/low power field indicates oral contamination. Please recollect. >10 Squamous epithelial cells/low power field indicates oral contamination. Please recollect. >10 Squamous epithelial cells/low power field indicates oral contamination. Please recollect.      Blood culture:  Results for orders placed or performed during the  hospital encounter of 10/18/23   Blood Culture Peripheral Blood    Specimen: Peripheral Blood   Result Value Ref Range    Culture No Growth    Blood Culture Peripheral Blood    Specimen: Peripheral Blood   Result Value Ref Range    Culture No Growth       Urine culture:  No results found for this or any previous visit.

## 2023-11-02 NOTE — PLAN OF CARE
SLP: Pt with worsened respiratory status today and concern for acute pneumonia. PO held this AM d/t respiratory status. Pt also noted to be coughing more with intake per chart review.     If taking a more conservative approach, recommend consider NPO from SLP standpoint until respiratory status improves (40lpm or less). SLP can then re-assess swallow function with more cautious recommendation and complete VFSS when weaned from HFNC.    Will defer to MD regarding diet management pending pt's GOC. SLP will follow.

## 2023-11-02 NOTE — PROGRESS NOTES
[facility]   Pulmonary Progress Note  Mary Polanco MRN: [id number]  4/24/1930  Date of Admission:10/18/2023  Date of Service: 11/02/2023  ___________________________________    Assessment & Plan  Mary Polanco is a 89 year old female, w/ PMHx most significant for hypertension, CAD/non-STEMI SP stent 7 years ago, mitral regurgitation, and chronic lymphedema, admitted w/ acute hypoxemic respiratory failure.  She had chest CT scan done on 10/27/2023 which showed worsening multifocal pneumonia with infiltrate worsening especially in the right upper lobe and bilateral lower lobes, bilateral pleural effusion which were small, this was compared to CT PE study 10/18/2023.  Patient did not improve with diuresis over the weekend.  However currently pathology is likely consistent with inflammatory pneumonitis versus infectious pneumonitis.  So far the infectious work-up has been negative including fungal antigens and serology, ID recommended to rule out TB unfortunately with her high oxygen requirement we are not able to do bronchoscopy with BAL which would be beneficial in this case.  Other possibilities include inhalational injury especially with the upper lobe predominance, nothing consistent with that in the history obtained from the relative.  Aspiration pneumonitis is also a possibility with right predominance.  Other inflammatory pneumonitis etiologies are also possible, organizing pneumonia that is precipitated by aspiration or bacterial pneumonia is a possibility.    Given the worsening respiratory status and low likelihood of infectious etiologies, I think it is reasonable to start steroids for possible organizing pneumonia. On 11/1/2023 I discussed with the family and explained to them that there is a slight risk of worsening if this is unidentified infection and they accept the risk, the daughter at the bedside shared with me that the patient does not want to be intubated and would like to change her code  status to DO NOT INTUBATE, I recommended they also change to DO NOT RESUSCITATE with no chest compression if cardiac arrest, her code status was changed today to DNR/DNI.    Acute hypoxemic respiratory failure  Bilateral right more than left infiltrate  Bilateral small pleural effusions   -Continue with the Solumedrol 60 mg once daily and monitor response    -Monitor BS and inflammatory markers    -Monitor and follow the sputum cultures and AFB staines and cultures   -Defer antimicrobial to primary and ID      Chart documentation was completed, in part, with [employer] voice-recognition software. Even though reviewed, some grammatical, spelling, and word errors may remain.    Dianna Rosas MD  Pulmonary & Critical Care       Interval History    -Nursing notes reviewed.   -Patient is stable from the respiratory standpoint, but her general appearance is better per her son who was at the bedside.   -Still having some cough with sputum production     Physical Exam Temp:  [98.4  F (36.9  C)-100.2  F (37.9  C)] 98.9  F (37.2  C)  Pulse:  [66-85] 67  Resp:  [11-39] 25  BP: ()/(43-74) 129/57  FiO2 (%):  [70 %-85 %] 85 %  SpO2:  [88 %-96 %] 91 %  I/O last 3 completed shifts:  In: 1331.52 [P.O.:1120; I.V.:211.52]  Out: -   Wt Readings from Last 1 Encounters:   11/02/23 46.3 kg (102 lb 1.2 oz)    Body mass index is 19.93 kg/m . FiO2 (%): 85 %  Resp: 25      Exam:  General: NAD  HEENT: MMM  CV: RRR, no murmur, click, rub  Resp: Equal b/l air entry, bilateral crackles  Abd: Soft, ND  Extremities: WWP, no LE edema  Neuro: AAOx3, no focal deficits    Data   Labs: reviewed in EMR and notable labs listed below.  CMP:   Recent Labs   Lab 11/02/23  0617 11/02/23  0521 11/02/23  0427 11/02/23  0207 11/01/23  0922 11/01/23  0550 10/31/23  2034 10/31/23  0553 10/30/23  0551 10/28/23  1839 10/28/23  1333 10/28/23  0539   NA  --  138  --   --   --  137  --  136 132*   < >  --  136   POTASSIUM  --  4.0  --   --   --  3.8  --  4.0 3.8   < >  3.9 3.3*   CHLORIDE  --  106  --   --   --  104  --  100 97*   < >  --  95*   CO2  --  22  --   --   --  25  --  25 27   < >  --  27   ANIONGAP  --  10  --   --   --  8  --  11 8   < >  --  14   * 138* 135* 152*   < > 124*   < > 118* 112*   < >  --  142*   BUN  --  22.5  --   --   --  21.7  --  20.6 28.1*   < >  --  26.8*   CR  --  0.65  --   --   --  0.74  --  0.76 0.90   < >  --  0.81   GFRESTIMATED  --  82  --   --   --  75  --  73 59*   < >  --  67   CLAIRE  --  8.8  --   --   --  8.7  --  8.8 8.9   < >  --  9.2   MAG  --   --   --   --   --   --   --   --   --   --  2.1  --    PHOS  --  2.3*  --   --   --  2.5  --  2.5 2.3*  --   --  3.6   ALBUMIN  --   --   --   --   --  2.4*  --  2.4* 2.6*  --   --  3.0*    < > = values in this interval not displayed.     CBC:   Recent Labs   Lab 11/02/23  0521 11/01/23  0550 10/31/23  0553 10/30/23  0551 10/29/23  0549   WBC 14.6* 13.4* 12.8* 12.9* 13.5*   RBC  --  3.49* 3.61* 3.47* 3.76*   HGB  --  10.3* 10.6* 10.0* 11.1*   HCT  --  31.7* 32.1* 30.8* 33.1*   MCV  --  91 89 89 88   MCH  --  29.5 29.4 28.8 29.5   MCHC  --  32.5 33.0 32.5 33.5   RDW  --  12.6 12.6 12.6 12.4   PLT  --  328 339 338 371       Culture Data   7-Day Micro Results       Collected Updated Procedure Result Status      11/01/2023 0928 11/01/2023 1553 Respiratory Aerobic Bacterial Culture with Gram Stain [[id number]]   Sputum from Expectorate    Final result Component Value   Culture >10 Squamous epithelial cells/low power field indicates oral contamination. Please recollect.   Gram Stain Result >10 Squamous epithelial cells/low power field    >25 PMNs/low power field    3+ Mixed jeremy               11/01/2023 0545 11/01/2023 0555 Acid-Fast Bacilli Culture and Stain [[id number]]    Sputum from Mouth    In process Component Value   No component results            11/01/2023 0545 11/01/2023 0555 Acid-Fast Bacilli Culture and Stain [[id number]]   Sputum from Mouth    In process Component Value   No  component results            10/31/2023 1620 10/31/2023 1907 Respiratory Aerobic Bacterial Culture with Gram Stain [[id number]]   Sputum from Expectorate    Final result Component Value   Culture >10 Squamous epithelial cells/low power field indicates oral contamination. Please recollect.   Gram Stain Result >10 Squamous epithelial cells/low power field    >25 PMNs/low power field    3+ Mixed jeremy               10/31/2023 1620 10/31/2023 1638 Acid-Fast Bacilli Culture and Stain [[id number]]    Sputum from Expectorate    In process Component Value   No component results            10/31/2023 1620 10/31/2023 1638 Acid-Fast Bacilli Culture and Stain [[id number]]   Sputum from Expectorate    In process Component Value   No component results            10/30/2023 1256 10/31/2023 1759 Quantiferon TB Gold Plus Grey Tube [[id number]]   Blood from Hand, Left    Final result Component Value Units   Quantiferon Nil Tube 0.01 IU/mL            10/30/2023 1256 10/31/2023 1758 Quantiferon TB Gold Plus Green Tube [[id number]]   Blood from Hand, Left    Final result Component Value Units   Quantiferon TB1 Tube 0.01 IU/mL            10/30/2023 1256 10/31/2023 1758 Quantiferon TB Gold Plus Yellow Tube [[id number]]   Blood from Hand, Left    Final result Component Value   Quantiferon TB2 Tube 0.02            10/30/2023 1256 10/31/2023 1758 Quantiferon TB Gold Plus Purple Tube [[id number]]   Blood from Hand, Left    Final result Component Value Units   Quantiferon Mitogen 3.53 IU/mL            10/30/2023 1256 10/31/2023 1909 Quantiferon TB Gold Plus [[id number]]   Blood from Hand, Left    Final result Component Value Units   Quantiferon-TB Gold Plus Negative    No interferon gamma response to M.tuberculosis antigens was detected. Infection with M.tuberculosis is unlikely, however a single negative result does not exclude infection. In patients at high risk for infection, a second test should be considered in accordance  with the 2017 ATS/IDSA/CDC Clinical Pract  ice Guidelines for Diagnosis of Tuberculosis in Adults and Children    TB1 Ag minus Nil Value 0.00 IU/mL   TB2 Ag minus Nil Value 0.01 IU/mL   Mitogen minus Nil Result 3.52 IU/mL   Nil Result 0.01 IU/mL            10/29/2023 0612 10/29/2023 1352 Respiratory Panel PCR [[id number]]    Swab from Nasopharyngeal    Final result Component Value   Adenovirus Not Detected   Coronavirus Not Detected   This test detects Coronavirus 229E, HKU1, NL63 and OC43 but does not distinguish between them. It does not detect MERS ( Respiratory Syndrome), SARS (Severe Acute Respiratory Syndrome) or 2019-nCoV (Novel 2019) Coronavirus.   Human Metapneumovirus Not Detected   Human Rhin/Enterovirus Not Detected   Influenza A Not Detected   Influenza A, H1 Not Detected   Influenza A 2009 H1N1 Not Detected   Influenza A, H3 Not Detected   Influenza B Not Detected   Parainfluenza Virus 1 Not Detected   Parainfluenza Virus 2 Not Detected   Parainfluenza Virus 3 Not Detected   Parainfluenza Virus 4 Not Detected   Respiratory Syncytial Virus A Not Detected   Respiratory Syncytial Virus B Not Detected   Chlamydia Pneumoniae Not Detected   Mycoplasma Pneumoniae Not Detected            10/29/2023 0609 10/29/2023 1435 Respiratory Aerobic Bacterial Culture with Gram Stain [[id number]]   Sputum from Expectorate    Final result Component Value   Culture >10 Squamous epithelial cells/low power field indicates oral contamination. Please recollect.   Gram Stain Result >10 Squamous epithelial cells/low power field    <25 PMNs/low power field    2+ Mixed jeremy               10/29/2023 0418 10/29/2023 1325 MRSA MSSA PCR, Nasal Swab [[id number]]    Swab from Nare, Right    Final result Component Value   MRSA Target DNA Negative   SA Target DNA Negative            10/29/2023 0414 11/01/2023 0943 Blastomyces Agn Quant EIA Non Blood [[id number]]   Urine, Voided    Final result Component Value   See  Scanned Result BLASTOMYCES AGN QUANT EIA NON BLOOD-Scanned            10/29/2023 0413 10/30/2023 2104 Histoplasma Galactomannan Antigen Quant by EIA, Urine [[id number]]   Urine, Clean Catch    Final result Component Value Units   Histoplasma Galactomannan Ag Quant, Urn Not Detected ng/mL   Histoplasma Galactomannan Ag Interp, Urn Not Detected    INTERPRETIVE DATA: Histoplasma Galactomannan Antigen                     Quantitative by EIA, Urine  Less than 0.4 ng/ml = Not Detected  0.4-0.7 ng/mL = Detected (below the limit of quantification)  0.8-24.0 ng/mL = Detected  Greater than 24.0 ng/mL = Detected (above the limit of   quantification)    The quantitative range of this assay is 0.8-24.0 ng/mL.   Antigen concentrations between 0.4-.07 or >24.0 ng/mL fall   outside the linear range of the assay and cannot be   accurately quantified.    This EIA test should be used in conjunction with other   diagnostic procedures, including microbiological culture,   histological examination of biopsy samples, and/or   radiographic evidence, to aid in the diagnosis of   histoplasmosis.    This test was developed and its performance characteristics   determined by [facility]. It has not been cleared or   approved by the U.S. Food and Drug Administration. This   test was performed in a CLIA-certified laboratory and is   intended for clinical purposes.  Performed By: [facility]  [address] [place] [address]  : Fabrice Serna MD, PhD  CLIA Number: [id number]            10/28/2023 1931 10/28/2023 2342 Cryptococcus antigen [[id number]]   Blood from Arm, Right    Final result Component Value   Cryptococcal Antigen Negative            10/28/2023 1839 11/01/2023 1055 Blastomyces Agn Quant EIA Blood [[id number]]   Blood from Hand, Right    Final result Component Value   See Scanned Result BLASTOMYCES AGN QUANT EIA BLOOD-Scanned            10/28/2023 1839 10/30/2023 1400 Aspergillus  Galactomannan Antigen [[id number]]   Blood from Hand, Right    Final result Component Value   Aspergillus Galactomannan Index 0.12   Aspergillus Galact AG Negative   INTERPRETIVE INFORMATION: Aspergillus Galactomannan Antigen   by EIA  Negative results do not exclude the diagnosis of invasive  aspergillosis. A single positive test result (index equal  to or greater than 0.5) should be clinically correlated  by testing a separate serum specimen because many agents  (e.g. foods, antibiotics) may cross-react with the test.  If invasive aspergillosis is suspected in high-risk  patients, serial sampling is recommended.  Performed By: [facility]  [address] [place] [id number]  : Fabrice Serna MD, PhD  CLIA Number: [id number]            10/28/2023 0539 11/01/2023 1021 Histoplasma Capsulatum Antigen [[id number]]   Blood from Hand, Right    Final result Component Value   See Scanned Result HISTOPLASMA CAPSULATUM ANTIGEN-Scanned            10/27/2023 1639 10/27/2023 1908 Legionella pneumophila antigen urine [[id number]]   Urine, NOS    Final result Component Value   Legionella pneumophila serogroup 1 urinary antigen Negative   Suggests no recent or current infection. Infection due to Legionella cannot be ruled out, since other serogroups and species may cause disease, antigen may not be present in urine in early infection, and the level of antigen present in the urine may be below detectable limits of the test.            10/27/2023 0541 10/30/2023 0001 Mycoplasma pneumonia abys IgG and IgM [[id number]]   (Abnormal)   Blood from Arm, Right    Final result Component Value Units   Myco Pneumoniae IgG 0.15 U/L   INTERPRETIVE INFORMATION:  Mycoplasma pneumoniae Ab, IgG    0.09 U/L or less ............ Negative    0.10 - 0.32 U/L ............. Equivocal    0.33 U/L or greater ......... Positive    INTERPRETIVE DATA: Over 50% of healthy adults have a   relatively high background of  specific M. pneumoniae IgG   antibodies in their sera, probably because of past M.   pneumoniae infections. Therefore, paired sera obtained with   a time interval of 1 to 3 weeks are highly recommended in   adults to confirm reinfection by M. pneumoniae, which is   demonstrated by a significant change in IgG antibodies. A   significant change is indicated if one sample is above 0.32   U/L and the other is below 0.20 U/L.   Myco Pneumoniae IgM 0.03 U/L   INTERPRETIVE INFORMATION:  Mycoplasma pneumoniae Ab, IgM    0.76 U/L or less .......... Negative: No clinically                                significant amount of                                M. pneumoniae IgM antibody                                detected.    0.77 - 0.95 U/L ........... Low Positive: M. pneumoniae-                                specific IgM presumptively                                detected. Collection of a                                follow-up sample in 1-2                                weeks is recommended to                                assure reactivity.    0.96 U/L or greater ....... Positive: Highly significant                                amount of M. pneumoniae-                                specific IgM antibody                                detected. However, low levels                                of IgM antibodies may                                occasionally persist for more                                than 12 months post-infection.  Performed By: [place] [address]  : Fabrice Serna MD, PhD  CLIA Number: [id number]            10/27/2023 0540 11/02/2023 0042 Blastomyces antibody ID [[id number]]   Peripheral Blood    Final result Component Value   Blastomyces dermatitidis Abs, Precipitin Not Detected     No Blastomyces antibodies were detected. This result does   not exclude Blastomyces infection.  Performed By: [facility] [place] [address]  : Fabrice Serna MD, PhD  CLIA Number: [id number]            10/27/2023 0540 10/28/2023 1726 1,3 Beta D glucan fungitell [[id number]]   Blood from Arm, Right    Final result Component Value Units   (1,3)-Beta-D-Glucan <31 pg/mL   B-D GLUCAN INTERPRETATION (1,3) Negative    INTERPRETIVE INFORMATION: (1,3)-beta-D-glucan (Fungitell)      Less than 31 pg/mL ................... Negative    31-59 pg/mL .......................... Negative    60-79 pg/mL .......................... Indeterminate    Greater than or equal to 80 pg/mL .... Positive    The Fungitell test is indicated for presumptive diagnosis   of fungal infection and should be used in conjunction with   other diagnostic procedures. This test does not detect   certain fungal species such as Cryptococcus, which produce   very low levels of (1,3)-beta-D-glucan. This test will not   detect the zygomycetes, such as Absidia, Mucor, and   Rhizopus, which are not known to produce   (1,3)-beta-D-glucan. In addition, the yeast phase of   Blastomyces dermatitidis produces little   (1,3)-beta-D-glucan and may not be detected by the assay.  Performed By: [facility]  [address] [place] [id number]  : Fabrice Serna MD, PhD  CLIA Number: [id number]                      Virology Data:   Lab Results   Component Value Date    FLUAH1 Not Detected 10/29/2023    FLUAH3 Not Detected 10/29/2023    VT8506 Not Detected 10/29/2023    IFLUB Not Detected 10/29/2023    RSVA Not Detected 10/29/2023    RSVB Not Detected 10/29/2023    PIV1 Not Detected 10/29/2023    PIV2 Not Detected 10/29/2023    PIV3 Not Detected 10/29/2023    HMPV Not Detected 10/29/2023       Most Recent [facility] Pulmonary Function Testing (FVC/FEV1 only)  No PFT in the past    Imaging: reviewed in EMR and notable imaging listed below.  No lung imaging studies in the last 24 hours   Medications    acetylcysteine  2 mL  Nebulization 4x Daily    allopurinol  100 mg Oral Daily    [Held by provider] amLODIPine  2.5 mg Oral Daily    aspirin  81 mg Oral Daily    budesonide  0.5 mg Nebulization BID    carvedilol  6.25 mg Oral BID w/meals    ceFEPIme  2 g Intravenous Q12H    enoxaparin ANTICOAGULANT  30 mg Subcutaneous Q24H    ipratropium - albuterol 0.5 mg/2.5 mg/3 mL  3 mL Nebulization 4x daily    [Held by provider] isosorbide mononitrate  30 mg Oral Daily    methylPREDNISolone  62.5 mg Intravenous Q24H    pantoprazole  40 mg Oral QAM AC    polyethylene glycol  17 g Oral Daily    sennosides  1 tablet Oral BID    sodium chloride (PF)  10 mL Intracatheter Q8H

## 2023-11-02 NOTE — PLAN OF CARE
0700 - 1900 RN Shift Summary    Alert and oriented x 4; Neurologically intact. Family assisted with communication. In sinus rhythm, BP WDL. Pulses palpable. Lung sounds coarse, diminished. Coughing out thick creamy sputum. Sputum sample sent. High flow settings weaned down, maintains sats around 92%. Ate two small meal, poor appetite, supplemental nutritional protein drink provided. One BM. Urine output adequate, voids in bedpan and absorbent pad, not measured. Sat in chair for 2 hours, tolerated well. Did not desaturate with activity at this time.  Family at bedside throughout the day; All questions answered and concerns addressed.

## 2023-11-03 NOTE — PROGRESS NOTES
[facility]    Medicine Progress Note - Hospitalist Service    Date of Admission:  10/18/2023    Assessment & Plan   Mary Polanco is a 89 year old female hx of Tinnitus, Gout, Osteoporosis, CAD s/p stenting 2019 presenting with Dyspnea and CP found to have multifocal PNA and was was admitted on 10/18/2023.   Since admission she has had worsening hypoxia and is on high flow oxygen.        Assessment & Plan  Sepsis 2/2 bilateral multifocal Pneumonia  Acute hypoxic Respiratory Failure needing oxygen by high flow nasal cannula on 10/24/2023  Acute diastolic heart failure with preserved EF exacerbation: Treated     Hospital course:     Patient admitted with shortness of breath and chest pain found to have multifocal pneumonia on CT chest PE protocol, no pulm embolism  Patient was diagnosed with sepsis, initially was started on IV ceftriaxone and doxycycline.  She was hypoxic, febrile of 101  F on admission  During her hospital course , due to increased Oxygen requirement ,Patient was placed on HFNC  Patient had RRT on 10/24/2023 for worsening SOB, concern for fluid overload and was started on IV Lasix  Later her BNP and echocardiogram came back normal  Her IV ceftriaxone initially was changed to cefepime and then was changed to oral cefuroxime on 10/24/2023 this, the same day when she had RRT  Patient stayed on IV Lasix 20 mg twice daily, increased to 40 mg twice daily per pulmonary recommendations, now discontinued  Checked Legionella antibodies, mycoplasma antibodies  Antifungal serology was sent along with blastomycosis, aspergillosis and Coccidioides antibodies.  Check fungal WSA, ESR, CRP, hypersensitivity pneumonitis panel, ANCA and myeloperoxidase antibodies and proteinase antibodies were also sent  Rheumatoid factor was also checked  Gram stain and cultures were also checked for PJP  Fungitell assay, histoplasmosis, aspergillosis, come back negative   Blastomycosis serology pending,  Legionella negative, myeloperoxidase antibodies negative.   ANCA negative arthritis factor negative  Patient does live in a very old apartment, patient family not sure if they have the mold in the home or not.  COVID-19/influenza/respiratory syncytial virus was negative on admission  CT chest repeated on 10/27/2023, showed worsening multifocal pneumonia particularly within the right upper lobe and bilateral lower lobes.  There was small left and trace right pleural effusion mild interstitial pulmonary edema  Less clinical suspicion for fluid overload.  In fact cardiology has discontinued her diuretics, agree with cardiology discontinuing diuretics.  Infectious disease was consulted on 10/28/2023  Initially vancomycin was added, MRSA screen came back negative, Vanco was discontinued  Later she was switched to doxycycline and has completed 14-day course of antibiotic on 10/30/2023  Currently patient remains on cefepime.  Work up Results : Legionella antibodies , myeloperoxidase antibodies and proteinase antibodies , Coccidioides antibodies , cryptococcus, Fungitell assay, rheumatoid factor ,Histoplasmosis , QuantiFERON-TB gold plus and aspergillosis all came back negative     11/3 Update:  Continues requiring high Fi02 (80% or higher at times) via HFNC.  Continue empiric abx.  Discussed case with pulm and steroid dose being increased.  Multiple studies still pending though thus far exact etiology of resp failure unclear.  Appreciate ongoing ID and pulm service assistance.  Patient DNR/DNI.  Daughter and patient aware that if she continues to worsen there is not much more room to increase O2.  Palliative care consulted today to further discuss options with comfort should she worsen.   Swallowing worse with higher flow, not able to safely take significant intake.      Possible acute diastolic congestive heart failure exacerbation: Resolved  Patient became more more dyspneic and needing 6 L of oxygen with exertion on  10/22/2023  As above, repeat Chest xray  showed Patchy airspace opacities within the right mid and lower lung as well as left perihilar region favoring superimposed infectious/inflammatory infiltrate. New mild diffuse interstitial opacities favoring a mild interstitial edema pattern     -- As above, patient was initially on oral Lasix, was later switched to IV Lasix  -- During her course of his stay, patient was also evaluated by cardiology  -- Patient has also undergone echocardiogram, showed normal EF of 60 to 65%, mild pulmonary hypertension no wall motion abnormalities on 10/18/2023  -- IV diuretics are discontinued now on 10/30/2023 due to patient being euvolemic  -- Current severe hypoxia NOT felt CHF     Hyperglycemia, secondary to acute illness and steroid;  Type 2 diabetes mellitus: Hemoglobin A1c came back 6.5  Patient does not take any medications at home.  -  sliding scale insulin for now (NPO SCALE).  Intake limited with dysphagia from HFNC.    Hypokalemia: resolved  -- She is on electrolyte replacement protocol we will continue with that     HTN  CAD s/p LCx stenting 2016  Cardiomegaly  Hx Palpitations  *CT: Stable Mild Cardiomegaly  *Negative stress test in 2019  *reported Hx of refusing statin therapy  *Presenting with acute CP/dyspnea, CT PE negative for PE. Trop/EKG negative     -- TTE shows EF 60 to 65%, mild pulmonary hypertension  -- Continue aspirin/coreg/imdur/PRN nitroglycerin as patient able to take.  If ongoing swallow problems will sub IV metoprolol for coreg.     Chronic Lymphedema  -- PT OT SW  -- SCDs, no lower extremity edema at this time.     Gout  Osteoporosis  --Continue with Allopurinol.  No acute exacerbation at this time     Bladder tumor s/p resection  -- no acute intervention     Tinnitus/Sensorineural Hearing loss  --Stable     Episode of A-fib with RVR on 10/28/2023  --Overnight converted back to normal sinus rhythm.  --Continue low-dose beta blocker      Family Update:   Daughter at bedside.    Medical Decision Making       Over 55 MINUTES SPENT BY ME on the date of service doing chart review, history, exam, documentation & further activities per the note.      Labs/Imaging Reviewed:  See Information above and Data section below    PPE Used:  Mask/TB Precautions     Diet: NPO for Medical/Clinical Reasons Except for: No Exceptions    DVT Prophylaxis: Enoxaparin (Lovenox) SQ  Gaona Catheter: Not present  Lines: PRESENT             Cardiac Monitoring: ACTIVE order. Indication: Tachyarrhythmias, acute (48 hours)  Code Status: No CPR- Do NOT Intubate      Clinically Significant Risk Factors              # Hypoalbuminemia: Lowest albumin = 2.4 g/dL at 11/1/2023  5:50 AM, will monitor as appropriate     # Hypertension: Noted on problem list       # DMII: A1C = 6.5 % (Ref range: <5.7 %) within past 6 months    # Moderate Malnutrition: based on nutrition assessment    # Financial/Environmental Concerns: none         Disposition Plan     Expected Discharge Date: 11/07/2023,  3:00 PM    Destination: nursing home  Discharge Comments: [facility] accepted  not medically ready d/t o2 needs and SOB, High flow started d/t increase in O2 need over night.  Midline in place  10/27 hiflow 67% 50L          Sachin Alanis, DO  Hospitalist Service  [facility]  Securely message with [employer] (more info)  Text page via [email or web address] Paging/Directory   ______________________________________________________________________    Interval History   Assumed hospitalist care, history reviewed.  Patients daughter assisted in translation at patient request.  Patient denied any worsening pain. Appetite reduced.  She doesn't feel any more SOB though also doesn't really feel better.  RN reports continued desaturations with any activity, even rolling in bed.    Physical Exam   Vital Signs: Temp: 97.9  F (36.6  C) Temp src: Axillary BP: 128/60 Pulse: 75   Resp: 16 SpO2: 93 % O2 Device: High Flow Nasal Cannula  ([facility]) Oxygen Delivery: 60 LPM  Weight: 101 lbs 10.11 oz    GEN:  Alert, oriented, appears ill but comfortable.  HEENT:  Normocephalic/atraumatic, no scleral icterus, no nasal discharge, mouth moist.  CV:  Regular, very distant.  LUNGS:  Coarse bilaterally, no wheezing.  Symmetric chest rise on inhalation noted.  ABD:  Active bowel sounds, soft, non-tender, mildly distended.  No guarding/rigidity.  EXT:  Trace edema.  No cyanosis.  No acute joint synovitis noted.  SKIN:  Dry to touch, no exanthems noted in the visualized areas.    Medications    dextrose      dextrose 5% and 0.9% NaCl 30 mL/hr at 11/02/23 2031    [Held by provider] insulin regular Stopped (11/02/23 1211)    - MEDICATION INSTRUCTIONS -      - MEDICATION INSTRUCTIONS -        acetylcysteine  2 mL Nebulization 4x Daily    [Held by provider] allopurinol  100 mg Oral Daily    [Held by provider] amLODIPine  2.5 mg Oral Daily    [Held by provider] aspirin  81 mg Oral Daily    budesonide  0.5 mg Nebulization BID    [Held by provider] carvedilol  6.25 mg Oral BID w/meals    enoxaparin ANTICOAGULANT  30 mg Subcutaneous Q24H    insulin aspart  1-6 Units Subcutaneous Q4H    insulin aspart  1-3 Units Subcutaneous At Bedtime    ipratropium - albuterol 0.5 mg/2.5 mg/3 mL  3 mL Nebulization 4x daily    [Held by provider] isosorbide mononitrate  30 mg Oral Daily    methylPREDNISolone  250 mg Intravenous Q12H    metoprolol  2.5 mg Intravenous Q8H    [Held by provider] pantoprazole  40 mg Oral QAM AC    pantoprazole  40 mg Intravenous BID    [Held by provider] polyethylene glycol  17 g Oral Daily    [Held by provider] sennosides  1 tablet Oral BID    sodium chloride (PF)  10 mL Intracatheter Q8H       Data     Labs and Imaging results below reviewed today.    I have personally reviewed the following data over the past 24 hrs:    15.5 (H)  \   10.4 (L)   / 381     N/A N/A N/A /  184 (H)   N/A N/A N/A \     Procal: N/A CRP: 54.58 (H) Lactic Acid: N/A         7-Day Micro  Results       Collected Updated Procedure Result Status       11/02/2023 1911 Acid-Fast Bacilli Culture and Stain   Sputum from Expectorate     Component Value   No component results            11/01/2023 0928 11/01/2023 1553 Respiratory Aerobic Bacterial Culture with Gram Stain [[id number]]   Sputum from Expectorate    Final result Component Value   Culture >10 Squamous epithelial cells/low power field indicates oral contamination. Please recollect.   Gram Stain Result >10 Squamous epithelial cells/low power field    >25 PMNs/low power field    3+ Mixed jeremy               11/01/2023 0545 11/01/2023 0555 Acid-Fast Bacilli Culture and Stain [[id number]]    Sputum from Mouth    In process Component Value   No component results            11/01/2023 0545 11/03/2023 0208 Acid-Fast Bacilli Culture and Stain [[id number]]    Sputum from Mouth    Preliminary result Component Value   Acid Fast Stain No acid fast bacilli seen  [P]    Less than 5 mL of specimen received. A minimum of 5 mL of sputum or fluid is recommended for recovery of acid fast bacilli (AFB). Volumes less than 5 mL are suboptimal and may compromise recovery of AFB from culture.            10/31/2023 1620 10/31/2023 1907 Respiratory Aerobic Bacterial Culture with Gram Stain [[id number]]   Sputum from Expectorate    Final result Component Value   Culture >10 Squamous epithelial cells/low power field indicates oral contamination. Please recollect.   Gram Stain Result >10 Squamous epithelial cells/low power field    >25 PMNs/low power field    3+ Mixed jeremy               10/31/2023 1620 10/31/2023 1638 Acid-Fast Bacilli Culture and Stain [[id number]]    Sputum from Expectorate    In process Component Value   No component results            10/31/2023 1620 11/03/2023 0255 Acid-Fast Bacilli Culture and Stain [[id number]]    Sputum from Expectorate    Preliminary result Component Value   Acid Fast Stain No acid fast bacilli seen  [P]    Less than 5 mL of  specimen received. A minimum of 5 mL of sputum or fluid is recommended for recovery of acid fast bacilli (AFB). Volumes less than 5 mL are suboptimal and may compromise recovery of AFB from culture.   Acid Fast Stain No acid fast bacilli seen  [P]    Less than 5 mL of specimen received. A minimum of 5 mL of sputum or fluid is recommended for recovery of acid fast bacilli (AFB). Volumes less than 5 mL are suboptimal and may compromise recovery of AFB from culture.  This is an appended report. These results have been appended to a previously preliminary verified report.            10/30/2023 1256 10/31/2023 1759 Quantiferon TB Gold Plus Grey Tube [[id number]]   Blood from Hand, Left    Final result Component Value Units   Quantiferon Nil Tube 0.01 IU/mL            10/30/2023 1256 10/31/2023 1758 Quantiferon TB Gold Plus Green Tube [[id number]]   Blood from Hand, Left    Final result Component Value Units   Quantiferon TB1 Tube 0.01 IU/mL            10/30/2023 1256 10/31/2023 1758 Quantiferon TB Gold Plus Yellow Tube [[id number]]   Blood from Hand, Left    Final result Component Value   Quantiferon TB2 Tube 0.02            10/30/2023 1256 10/31/2023 1758 Quantiferon TB Gold Plus Purple Tube [[id number]]   Blood from Hand, Left    Final result Component Value Units   Quantiferon Mitogen 3.53 IU/mL            10/30/2023 1256 10/31/2023 1909 Quantiferon TB Gold Plus [[id number]]   Blood from Hand, Left    Final result Component Value Units   Quantiferon-TB Gold Plus Negative    No interferon gamma response to M.tuberculosis antigens was detected. Infection with M.tuberculosis is unlikely, however a single negative result does not exclude infection. In patients at high risk for infection, a second test should be considered in accordance with the 2017 ATS/IDSA/CDC Clinical Pract  ice Guidelines for Diagnosis of Tuberculosis in Adults and Children    TB1 Ag minus Nil Value 0.00 IU/mL   TB2 Ag minus Nil Value 0.01  IU/mL   Mitogen minus Nil Result 3.52 IU/mL   Nil Result 0.01 IU/mL            10/29/2023 0612 10/29/2023 1352 Respiratory Panel PCR [[id number]]    Swab from Nasopharyngeal    Final result Component Value   Adenovirus Not Detected   Coronavirus Not Detected   This test detects Coronavirus 229E, HKU1, NL63 and OC43 but does not distinguish between them. It does not detect MERS ( Respiratory Syndrome), SARS (Severe Acute Respiratory Syndrome) or 2019-nCoV (Novel 2019) Coronavirus.   Human Metapneumovirus Not Detected   Human Rhin/Enterovirus Not Detected   Influenza A Not Detected   Influenza A, H1 Not Detected   Influenza A 2009 H1N1 Not Detected   Influenza A, H3 Not Detected   Influenza B Not Detected   Parainfluenza Virus 1 Not Detected   Parainfluenza Virus 2 Not Detected   Parainfluenza Virus 3 Not Detected   Parainfluenza Virus 4 Not Detected   Respiratory Syncytial Virus A Not Detected   Respiratory Syncytial Virus B Not Detected   Chlamydia Pneumoniae Not Detected   Mycoplasma Pneumoniae Not Detected            10/29/2023 0609 10/29/2023 1435 Respiratory Aerobic Bacterial Culture with Gram Stain [[id number]]   Sputum from Expectorate    Final result Component Value   Culture >10 Squamous epithelial cells/low power field indicates oral contamination. Please recollect.   Gram Stain Result >10 Squamous epithelial cells/low power field    <25 PMNs/low power field    2+ Mixed jeremy               10/29/2023 0418 10/29/2023 1325 MRSA MSSA PCR, Nasal Swab [[id number]]    Swab from Nare, Right    Final result Component Value   MRSA Target DNA Negative   SA Target DNA Negative            10/29/2023 0414 11/01/2023 0943 Blastomyces Agn Quant EIA Non Blood [[id number]]   Urine, Voided    Final result Component Value   See Scanned Result BLASTOMYCES AGN QUANT EIA NON BLOOD-Scanned            10/29/2023 0413 10/30/2023 2104 Histoplasma Galactomannan Antigen Quant by EIA, Urine [[id number]]   Urine,  Clean Catch    Final result Component Value Units   Histoplasma Galactomannan Ag Quant, Urn Not Detected ng/mL   Histoplasma Galactomannan Ag Interp, Urn Not Detected    INTERPRETIVE DATA: Histoplasma Galactomannan Antigen                     Quantitative by EIA, Urine  Less than 0.4 ng/ml = Not Detected  0.4-0.7 ng/mL = Detected (below the limit of quantification)  0.8-24.0 ng/mL = Detected  Greater than 24.0 ng/mL = Detected (above the limit of   quantification)    The quantitative range of this assay is 0.8-24.0 ng/mL.   Antigen concentrations between 0.4-.07 or >24.0 ng/mL fall   outside the linear range of the assay and cannot be   accurately quantified.    This EIA test should be used in conjunction with other   diagnostic procedures, including microbiological culture,   histological examination of biopsy samples, and/or   radiographic evidence, to aid in the diagnosis of   histoplasmosis.    This test was developed and its performance characteristics   determined by [id number]. It has not been cleared or   approved by the U.S. Food and Drug Administration. This   test was performed in a CLIA-certified laboratory and is   intended for clinical purposes.  Performed By: [id number]  [address] [place] [id number]  : Fabrice Serna MD, PhD  CLIA Number: [id number]            10/28/2023 1931 10/28/2023 2342 Cryptococcus antigen [[id number]]   Blood from Arm, Right    Final result Component Value   Cryptococcal Antigen Negative            10/28/2023 1839 11/01/2023 1055 Blastomyces Agn Quant EIA Blood [[id number]]   Blood from Hand, Right    Final result Component Value   See Scanned Result BLASTOMYCES AGN QUANT EIA BLOOD-Scanned            10/28/2023 1839 10/30/2023 1400 Aspergillus Galactomannan Antigen [[id number]]   Blood from Hand, Right    Final result Component Value   Aspergillus Galactomannan Index 0.12   Aspergillus Galact AG Negative   INTERPRETIVE  INFORMATION: Aspergillus Galactomannan Antigen   by EIA  Negative results do not exclude the diagnosis of invasive  aspergillosis. A single positive test result (index equal  to or greater than 0.5) should be clinically correlated  by testing a separate serum specimen because many agents  (e.g. foods, antibiotics) may cross-react with the test.  If invasive aspergillosis is suspected in high-risk  patients, serial sampling is recommended.  Performed By: [facility]  [address] [place] [id number]  : Fabrice Serna MD, PhD  CLIA Number: [id number]            10/28/2023 0539 11/01/2023 1021 Histoplasma Capsulatum Antigen [[id number]]   Blood from Hand, Right    Final result Component Value   See Scanned Result HISTOPLASMA CAPSULATUM ANTIGEN-Scanned                    No results found for this or any previous visit (from the past 24 hour(s)).

## 2023-11-03 NOTE — PLAN OF CARE
Summary: transferred from ICU, admitted for sepsis 2/2 Pna, acute hypoxic respiratory failure  DATE & TIME: 11/02/23 2968-4130   Cognitive Concerns/ Orientation : Aox4, calm and cooperative, Mandarin speaker - family assists with communication   BEHAVIOR & AGGRESSION TOOL COLOR: Green  ABNL VS/O2: VSS on high flow NC (85 fiO2 50 LPM), sating 90-94%, desats to mid 80s with turns and coughing fits  MOBILITY: Ast 2 w/lift, not OOB this shift  PAIN MANAGMENT: denies  DIET: NPO ex meds and ice chips  BOWEL/BLADDER: continent of B/B, uses bedpan  ABNL LAB/BG: , .87, WBC 14.6, phos 2.3 (replaced, recheck AM draw)  DRAIN/DEVICES: L midline infusing D5NS @ 30 mL/hr w/ int IV solu-medrol  TELEMETRY RHYTHM: NSR  SKIN: intact, scattered bruising, redness blanchable on sacrum/coccyx - mepilex in place  TESTS/PROCEDURES: awaiting AFB cultures   Discharge Barriers, D/C DATE: TBD  OTHER IMPORTANT INFO: pulmonology, SLP, and ID are following, on airborne precautions for TB rule-out, WYNNE, SOB

## 2023-11-03 NOTE — PLAN OF CARE
Goal Outcome Evaluation:         DATE & TIME: 11/02/23-11/03/23  [id number]    Cognitive Concerns/ Orientation : Aox4, calm and cooperative, Mandarin speaker - family assists with communication   BEHAVIOR & AGGRESSION TOOL COLOR: Green  ABNL VS/O2: VSS on high flow NC (85 fiO2 50 LPM), sating 90-94%, desats to mid 80s with turns and coughing fits.WYNNE  MOBILITY: Ast 2 w/lift, not OOB this shift  PAIN MANAGMENT: denies  DIET: NPO ex meds and ice chips  BOWEL/BLADDER: continent of B/B, uses bedpan  ABNL LAB/BG:   DRAIN/DEVICES: L midline infusing D5NS @ 30 mL/hr   TELEMETRY RHYTHM: NSR  SKIN: intact, scattered bruising, redness blanchable on sacrum/coccyx - mepilex in place  TESTS/PROCEDURES: awaiting AFB cultures   Consult: pulmonology/SLP/ID  Discharge Barriers, D/C DATE: TBD  OTHER IMPORTANT INFO: Pt on airborne precautions for TB rule-out.Midline has no blood return-writer not able to draw blood for morning lab;lab will do it.

## 2023-11-03 NOTE — PLAN OF CARE
0700 - 1900 RN Shift Summary    Patient transferred to station 66 for further care and management. On high settings on high-flow nasal cannula, desaturates with turns and coughing. Otherwise stable, other vitals WDL. Report given to Lorin QUIROZ On station 66.

## 2023-11-03 NOTE — CONSULTS
Palliative Care Consultation Note  [facility]      Patient: Mary Polanco  Date of Admission:  10/18/2023    Requesting Clinician / Team: Dr. Alanis/hospitalist  Reason for consult: Goals of care     Recommendations & Counseling     GOALS OF CARE:  Restorative with limits   Patient and daughter verbalized that with initiation of higher steroid doses, they want to give this another couple of days to see what improvements are possible  I offered to talk about the clinical scenario where Mary does not in fact improve, and we are transitioning to a comfort plan of care.  Patient and family did not want much additional information about this today, stating desire to wait until Sunday for more discussion  We did briefly talk about the role for compassionate high flow O2 removal, which daughter seemed very adverse to    ADVANCE CARE PLANNING:  No health care directive on file. Per  informed consent policy, next of kin should be involved if patient becomes unable.  There is no POLST form on file, defer to patient and/or next of kin for decisions   Code status: No CPR- Do NOT Intubate    MEDICAL MANAGEMENT:  We are not actively managing symptoms at this time    PSYCHOSOCIAL/SPIRITUAL SUPPORT:  Family -supportive daughter  Kylah at bedside    Palliative Care will continue to follow. Thank you for the consult and allowing us to aid in the care of Mary Polanco.    These recommendations have been discussed with Dr. Alanis.    NEYDA Osorio CNP  Securely message with [employer] (more info)  Text page via [facility] Paging/Directory     Palliative Summary/HPI     Mary Polanco is a 89 year old female with a past medical history significant for tinnitus, gout, osteoporosis, hypertension, CAD s/p stenting 2019, and bladder tumor status post resection who presents with shortness of breath and chest pain.  She is found to have acute hypoxic respiratory failure and sepsis secondary to multifocal pneumonia and acute  diastolic heart failure exacerbation.  Her hospital course has been complicated by ongoing respiratory failure, requiring high levels of high flow O2 support.  ID is involved, as is pulmonary, no clear etiology for her respiratory failure at this point, inflammatory pneumonitis versus infectious pneumonitis.  She is being evaluated for TB reactivation and is in isolation.  Pulmonary has started steroids for possible organizing pneumonia.    Today, the patient was seen for:  Goals of care    Palliative Care Summary:   Met with Mary, along with daughter Kylah, and professional Mandarin  via phone.     I introduced our role as an extra layer of support and how we help patients and families dealing with serious, potentially life-limiting illnesses. I explained the composition of the palliative care team.  Palliative care helps patients and families navigate their care while focusing on the whole person; providing emotional, social and spiritual support  Palliative care often assists with symptom management, information sharing about what to expect from the illness, available treatment options and what effect those options may have on the disease course, and provide effective communication and caring support.    Prognosis, Goals, & Planning:    Functional Status just prior to this current hospitalization:  Lengthy hospitalization for ongoing respiratory failure, nearing maximal settings of high flow oxygen.  No clear etiology for her respiratory failure.    Prognosis, Goals, and/or Advance Care Planning:  Advance Care Planning Discussion 11/3/2023. IAdry APRN CNP met with Patient and their family (therese Montero) today at the hospital to discuss Advance Care Planning. Mary He does have decisional capacity and was present for this discussion. Those present were informed of the voluntary nature of this discussion and wished to proceed. This discussion began at 1020 and ended at 1045 for a total  of 25 minutes.    We discussed general treatment options (full/restorative, selective/conservatives, and comfort only/hospice). We then discussed how these specifically apply to Mary.  Patient and daughter verbalized that with initiation of higher steroid doses, they want to give this another couple of days to see what improvements are possible  I offered to talk about the clinical scenario where Mary does not in fact improve, and we are transitioning to a comfort plan of care.  Patient and family did not want much additional information about this today, stating desire to wait until Sunday for more discussion  We did briefly talk about the role for compassionate high flow O2 removal, which daughter seemed very adverse to    Code Status was addressed today:   No already DNR/DNI    Patient's decision making preferences: with input from medical clinicians and loved ones        Patient has decision-making capacity today for complex decisions:Intact          Coping, Meaning, & Spirituality:   Mood, coping, and/or meaning in the context of serious illness were addressed today: Yes    Social:   Important relationships/caregivers:daughter Kylah at bedside    Medications:  I have reviewed this patient's medication profile and medications from this hospitalization. Notable medications:  Mucomyst nebulizer 4 times daily   Pulmincort nebulizer twice daily   Insulin  Solu-Medrol 250 mg IV twice daily  Metoprolol  PPI  IV fluids    ROS:  Comprehensive ROS is reviewed and is negative except as here & per HPI: N/A    Physical Exam   Vital Signs with Ranges  Temp:  [97.7  F (36.5  C)-98.3  F (36.8  C)] 97.9  F (36.6  C)  Pulse:  [75-80] 75  Resp:  [6-33] 16  BP: (107-131)/(53-72) 128/60  FiO2 (%):  [77 %-100 %] 80 %  SpO2:  [89 %-95 %] 94 %  101 lbs 10.11 oz  CONSTITUTIONAL: Chronically ill Mandarin speaking woman seen resting in bed in NAD, A&Ox3.  Hard of hearing.  Calm and cooperative.  Daughter present  HEENT:  NCAT  RESPIRATORY: NL respiratory effort on high flow O2  NEUROLOGIC: Appropriately responsive during interview  PSYCH: Affect engaged    Data reviewed:  Recent imaging independently reviewed, my comments on pertinents:   11/1 CXR with bilateral airspace opacities    Recent lab data independently reviewed, my comments on pertinents:   Na 138  K 4  Creat 0.65  Phosphorus 2.3  .87  WBC 14.6  Hgb 10.3  Plt 328  Albumin 2.4    Medical Decision Making   Please see A&P for additional details of medical decision making.  MANAGEMENT DISCUSSED with the following over the past 24 hours: Dr. Alanis   NOTE(S)/MEDICAL RECORDS REVIEWED over the past 24 hours: H&P, hospitalist notes, nursing notes, ID notes, pulmonary notes  Tests personally interpreted in the past 24 hours:  - CHEST XRAY showing bilateral airspace opacities  Tests REVIEWED in the past 24 hours:  - See lab/imaging results included in the data section of the note  - BMP  - CBC  - CRP  SUPPLEMENTAL HISTORY, in addition to the patient's history, over the past 24 hours obtained from:   - Dr. Alains, daughter Kylah  Medical complexity over the past 24 hours:  - Decision to DE-ESCALATE CARE based on prognosis

## 2023-11-03 NOTE — PROGRESS NOTES
[facility]    Infectious Disease Progress Note    Date of Service (when I saw the patient): 11/03/2023     Assessment & Plan   Mary He is a 89 year old who was admitted on 10/18/2023.     Multifocal pneumonia  --10/27 CT chest:  worsened multifocal pneumonia, particularly within the right upper lobe and bilateral lower lobes    --serum fungitell negative.  Aspergillus and Histoplasma serology is negative, Blasto is also negative.   2. Acute hypoxic respiratory failure - on HFNC  3. Acute diastolic heart failure with preserved EF exacerbation  4. Patient born in [place] should check for TB reactivation given age, in Airborne isolation  I reviewed chart for any previous investigation regarding TB I did not find any         RECOMMENDATIONS:  Pending AFB stain and cultures 2 stains negative, if 3rd also negative remove airborn isolation     Quant is negaitve   Started on steroids for possible organizing pneumonia   Patient not really immunocompromised and serum fungitell negative. PJP PCR is negative     Done with 2 weeks of antibiotics now off      Spoke with daughter bedside and explained work up     Maritza Garcia MD    Interval History   No new rashes or issues with antibiotics   Labs reviewed   No fevers no positive micro     Physical Exam   Temp: 97.9  F (36.6  C) Temp src: Axillary BP: 128/60 Pulse: 75   Resp: 16 SpO2: 93 % O2 Device: High Flow Nasal Cannula (HFNC) Oxygen Delivery: 60 LPM  Vitals:    11/01/23 0400 11/02/23 0600 11/03/23 0440   Weight: 46.4 kg (102 lb 4.7 oz) 46.3 kg (102 lb 1.2 oz) 46.1 kg (101 lb 10.1 oz)     Vital Signs with Ranges  Temp:  [97.7  F (36.5  C)-98.7  F (37.1  C)] 97.9  F (36.6  C)  Pulse:  [75-80] 75  Resp:  [6-33] 16  BP: ()/(47-72) 128/60  FiO2 (%):  [77 %-100 %] 100 %  SpO2:  [89 %-95 %] 93 %    Constitutional: on 60 L HFNC   Lungs: shallow breaths   Cardiovascular: Regular rate and rhythm, normal S1 and S2, and no murmur noted  Abdomen: Normal  bowel sounds, soft, non-distended, non-tender  Skin: No rashes, no cyanosis, no edema  Other:    Medications    dextrose      dextrose 5% and 0.9% NaCl 30 mL/hr at 11/02/23 2031    [Held by provider] insulin regular Stopped (11/02/23 1211)    - MEDICATION INSTRUCTIONS -      - MEDICATION INSTRUCTIONS -        acetylcysteine  2 mL Nebulization 4x Daily    [Held by provider] allopurinol  100 mg Oral Daily    [Held by provider] amLODIPine  2.5 mg Oral Daily    [Held by provider] aspirin  81 mg Oral Daily    budesonide  0.5 mg Nebulization BID    [Held by provider] carvedilol  6.25 mg Oral BID w/meals    enoxaparin ANTICOAGULANT  30 mg Subcutaneous Q24H    insulin aspart  1-6 Units Subcutaneous Q4H    insulin aspart  1-3 Units Subcutaneous At Bedtime    ipratropium - albuterol 0.5 mg/2.5 mg/3 mL  3 mL Nebulization 4x daily    [Held by provider] isosorbide mononitrate  30 mg Oral Daily    methylPREDNISolone  250 mg Intravenous Q12H    metoprolol  2.5 mg Intravenous Q8H    [Held by provider] pantoprazole  40 mg Oral QAM AC    pantoprazole  40 mg Intravenous BID    [Held by provider] polyethylene glycol  17 g Oral Daily    [Held by provider] sennosides  1 tablet Oral BID    sodium chloride (PF)  10 mL Intracatheter Q8H       Data   All microbiology laboratory data reviewed.  Recent Labs   Lab Test 11/02/23 0521 11/01/23  0550 10/31/23  0553 10/30/23  0551   WBC 14.6* 13.4* 12.8* 12.9*   HGB  --  10.3* 10.6* 10.0*   HCT  --  31.7* 32.1* 30.8*   MCV  --  91 89 89   PLT  --  328 339 338     Recent Labs   Lab Test 11/02/23  0521 11/01/23  0550 10/31/23  0553   CR 0.65 0.74 0.76     Recent Labs   Lab Test 10/31/23  0553   SED 46*     Recent Labs   Lab Test 09/15/16  1436   CULT Canceled, Test credited >10 Squamous epithelial cells/low power field indicates   oral contamination. Please recollect.  *       All cultures:  Recent Labs   Lab 11/01/23  0928 10/31/23  1620 10/29/23  0609   CULTURE >10 Squamous epithelial cells/low  power field indicates oral contamination. Please recollect. >10 Squamous epithelial cells/low power field indicates oral contamination. Please recollect. >10 Squamous epithelial cells/low power field indicates oral contamination. Please recollect.      Blood culture:  Results for orders placed or performed during the hospital encounter of 10/18/23   Blood Culture Peripheral Blood    Specimen: Peripheral Blood   Result Value Ref Range    Culture No Growth    Blood Culture Peripheral Blood    Specimen: Peripheral Blood   Result Value Ref Range    Culture No Growth       Urine culture:  No results found for this or any previous visit.

## 2023-11-03 NOTE — PROGRESS NOTES
[facility] Note    Date: 11/2/23  Admission Diagnosis: Pneumonia  Pulmonary History: NA  Home Nebulizer/ MDI Use: NA  Home Oxygen Use: NA  Acuity Level (from RT Assessment flow sheet): 3    Aerosol Therapy Initiated: Nebulizers QID      Pulmonary Hygiene Initiated:  Aerobika, Ezpap initiated       Volume Expansion Therapy Initiated:      Current Oxygen Requirement: 85% HFNC  Current SpO2: 95%    Re-evaluation date: 11/5/23

## 2023-11-04 NOTE — PROGRESS NOTES
[facility]    Medicine Progress Note - Hospitalist Service    Date of Admission:  10/18/2023    Assessment & Plan   Mary Polanco is a 89 year old female hx of Tinnitus, Gout, Osteoporosis, CAD s/p stenting 2019 presenting with Dyspnea and CP found to have multifocal PNA and was was admitted on 10/18/2023.   Since admission she has had worsening hypoxia and is on high flow oxygen.        Assessment & Plan  Sepsis 2/2 bilateral multifocal Pneumonia  Acute hypoxic Respiratory Failure needing oxygen by high flow nasal cannula on 10/24/2023  Acute diastolic heart failure with preserved EF exacerbation: Treated     Hospital course:     Patient admitted with shortness of breath and chest pain found to have multifocal pneumonia on CT chest PE protocol, no pulm embolism  Patient was diagnosed with sepsis, initially was started on IV ceftriaxone and doxycycline.  She was hypoxic, febrile of 101  F on admission  During her hospital course , due to increased Oxygen requirement ,Patient was placed on HFNC  Patient had RRT on 10/24/2023 for worsening SOB, concern for fluid overload and was started on IV Lasix  Later her BNP and echocardiogram came back normal  Her IV ceftriaxone initially was changed to cefepime and then was changed to oral cefuroxime on 10/24/2023 this, the same day when she had RRT  Patient stayed on IV Lasix 20 mg twice daily, increased to 40 mg twice daily per pulmonary recommendations, now discontinued  Checked Legionella antibodies, mycoplasma antibodies  Antifungal serology was sent along with blastomycosis, aspergillosis and Coccidioides antibodies.  Check fungal WSA, ESR, CRP, hypersensitivity pneumonitis panel, ANCA and myeloperoxidase antibodies and proteinase antibodies were also sent  Rheumatoid factor was also checked  Gram stain and cultures were also checked for PJP  Fungitell assay, histoplasmosis, aspergillosis, come back negative   Blastomycosis serology pending,  Legionella negative, myeloperoxidase antibodies negative.   ANCA negative arthritis factor negative  Patient does live in a very old apartment, patient family not sure if they have the mold in the home or not.  COVID-19/influenza/respiratory syncytial virus was negative on admission  CT chest repeated on 10/27/2023, showed worsening multifocal pneumonia particularly within the right upper lobe and bilateral lower lobes.  There was small left and trace right pleural effusion mild interstitial pulmonary edema  Less clinical suspicion for fluid overload.  In fact cardiology has discontinued her diuretics, agree with cardiology discontinuing diuretics.  Infectious disease was consulted on 10/28/2023  Initially vancomycin was added, MRSA screen came back negative, Vanco was discontinued  Later she was switched to doxycycline and has completed 14-day course of antibiotic on 10/30/2023  Currently patient remains on cefepime.  Work up Results : Legionella antibodies , myeloperoxidase antibodies and proteinase antibodies , Coccidioides antibodies , cryptococcus, Fungitell assay, rheumatoid factor ,Histoplasmosis , QuantiFERON-TB gold plus and aspergillosis all came back negative     11/3 Update:  Continues requiring high Fi02 (80% or higher at times) via HFNC.  Continue empiric abx.  Discussed case with pulm and steroid dose being increased.  Multiple studies still pending though thus far exact etiology of resp failure unclear.  Appreciate ongoing ID and pulm service assistance.  Patient DNR/DNI.  Daughter and patient aware that if she continues to worsen there is not much more room to increase O2.  Palliative care consulted today to further discuss options with comfort should she worsen.   Swallowing worse with higher flow, not able to safely take significant intake.    11/4 Update:  Stable FiO2 needs this AM, perhaps slightly better.  Continues to have a significant desaturation with much activity (even rolling).   Continues to struggle with cough.  Will ask speech to reassess tomorrow again to see if they feel she can safely swallow more.  11/3 she appeared to be having trouble with any intake.  Continue NPO for now.  Continue modest rate IVF + antibiotics.  Remains on TB precautions though currently no definitive evidence current acute resp issues TB related.    Possible acute diastolic congestive heart failure exacerbation: Resolved  Patient became more more dyspneic and needing 6 L of oxygen with exertion on 10/22/2023  As above, repeat Chest xray  showed Patchy airspace opacities within the right mid and lower lung as well as left perihilar region favoring superimposed infectious/inflammatory infiltrate. New mild diffuse interstitial opacities favoring a mild interstitial edema pattern     -- As above, patient was initially on oral Lasix, was later switched to IV Lasix  -- During her course of his stay, patient was also evaluated by cardiology  -- Patient has also undergone echocardiogram, showed normal EF of 60 to 65%, mild pulmonary hypertension no wall motion abnormalities on 10/18/2023  -- IV diuretics are discontinued now on 10/30/2023 due to patient being euvolemic  -- Current severe hypoxia NOT felt CHF.  On IVF as not taking oral but working to keep patient euvolemic.     Hyperglycemia, secondary to acute illness and steroid;  Type 2 diabetes mellitus: Hemoglobin A1c came back 6.5  Patient does not take any medications at home.  -  sliding scale insulin for now (NPO SCALE).  Intake limited with dysphagia from HFNC.  -  Glu a little higher with high dose steroids.  Will consider increasing insulin more tomorrow depending on trend.    Hypokalemia: resolved  -- She is on electrolyte replacement protocol we will continue with that     HTN  CAD s/p LCx stenting 2016  Cardiomegaly  Hx Palpitations  *CT: Stable Mild Cardiomegaly  *Negative stress test in 2019  *reported Hx of refusing statin therapy  *Presenting with  acute CP/dyspnea, CT PE negative for PE. Trop/EKG negative     -- TTE shows EF 60 to 65%, mild pulmonary hypertension  -- Continue aspirin/coreg/imdur/PRN nitroglycerin as patient able to take.  IV Metoprolol sub for coreg until able to swallow better.     Chronic Lymphedema  -- PT OT SW  -- SCDs, no lower extremity edema at this time.     Gout  Osteoporosis  --Continue with Allopurinol.  No acute exacerbation at this time     Bladder tumor s/p resection  -- no acute intervention     Tinnitus/Sensorineural Hearing loss  --Stable     Episode of A-fib with RVR on 10/28/2023  --Overnight converted back to normal sinus rhythm.  --Continue low-dose beta blocker      Family Update:  Family had no new questions today per nursing.     Medical Decision Making       Over 55 MINUTES SPENT BY ME on the date of service doing chart review, history, exam, documentation & further activities per the note.      Labs/Imaging Reviewed:  See Information above and Data section below    PPE Used:  Mask/TB Precautions     Diet: NPO for Medical/Clinical Reasons Except for: No Exceptions    DVT Prophylaxis: Enoxaparin (Lovenox) SQ  Gaona Catheter: Not present  Lines: PRESENT             Cardiac Monitoring: ACTIVE order. Indication: Tachyarrhythmias, acute (48 hours)  Code Status: No CPR- Do NOT Intubate      Clinically Significant Risk Factors              # Hypoalbuminemia: Lowest albumin = 2.4 g/dL at 11/1/2023  5:50 AM, will monitor as appropriate     # Hypertension: Noted on problem list       # DMII: A1C = 6.5 % (Ref range: <5.7 %) within past 6 months    # Moderate Malnutrition: based on nutrition assessment      # Financial/Environmental Concerns: none         Disposition Plan     Expected Discharge Date: 11/07/2023,  3:00 PM    Destination: nursing home  Discharge Comments: [facility] accepted  not medically ready d/t o2 needs and SOB, High flow started d/t increase in O2 need over night.  Midline in place  10/27 hiflow 67% 50L           Sachin Alanis DO  Hospitalist Service  [facility]  Securely message with [employer] (more info)  Text page via [employer] Paging/Directory   ______________________________________________________________________    Interval History   Patient had no new complaints this AM to family/staff.  Continues with occasional cough.      Physical Exam   Vital Signs: Temp: 97  F (36.1  C) Temp src: Axillary BP: 131/67 Pulse: 73   Resp: 16 SpO2: 90 % O2 Device: High Flow Nasal Cannula (HFNC) Oxygen Delivery: 50 LPM  Weight: 101 lbs 10.11 oz    GEN:  Alert, oriented, appears ill but comfortable.  HEENT:  Normocephalic/atraumatic, no scleral icterus, no nasal discharge, mouth moist.  CV:  Regular, very distant.  LUNGS:  Coarse bilaterally, no wheezing similar to yesterday.  Symmetric chest rise on inhalation noted.  ABD:  Active bowel sounds, soft, non-tender, mildly distended.  No guarding/rigidity.  EXT:  Trace edema.  No cyanosis.  No acute joint synovitis noted.  SKIN:  Dry to touch, no exanthems noted in the visualized areas.    Medications    dextrose      dextrose 5% and 0.45% NaCl + KCl 10 meq/L infusion 50 mL/hr at 11/04/23 0042    [Held by provider] insulin regular Stopped (11/02/23 1211)    - MEDICATION INSTRUCTIONS -      - MEDICATION INSTRUCTIONS -        acetylcysteine  2 mL Nebulization 4x Daily    [Held by provider] allopurinol  100 mg Oral Daily    [Held by provider] amLODIPine  2.5 mg Oral Daily    [Held by provider] aspirin  81 mg Oral Daily    budesonide  0.5 mg Nebulization BID    [Held by provider] carvedilol  6.25 mg Oral BID w/meals    enoxaparin ANTICOAGULANT  30 mg Subcutaneous Q24H    insulin aspart  1-6 Units Subcutaneous Q4H    ipratropium - albuterol 0.5 mg/2.5 mg/3 mL  3 mL Nebulization 4x daily    [Held by provider] isosorbide mononitrate  30 mg Oral Daily    methylPREDNISolone  250 mg Intravenous Q12H    metoprolol  2.5 mg Intravenous Q8H    [Held by provider] pantoprazole   40 mg Oral QAM AC    pantoprazole  40 mg Intravenous BID    [Held by provider] polyethylene glycol  17 g Oral Daily    [Held by provider] sennosides  1 tablet Oral BID    sodium chloride (PF)  10 mL Intracatheter Q8H    sodium phosphate  9 mmol Intravenous Once       Data     Labs and Imaging results below reviewed today.    I have personally reviewed the following data over the past 24 hrs:    15.5 (H)  \   10.4 (L)   / 381     145 114 (H) 38.5 (H) /  215 (H)   3.6 21 (L) 0.61 \     Procal: N/A CRP: 54.58 (H) Lactic Acid: N/A         7-Day Micro Results       Collected Updated Procedure Result Status       11/02/2023 1911 Acid-Fast Bacilli Culture and Stain   Sputum from Expectorate     Component Value   No component results            11/01/2023 0928 11/01/2023 1553 Respiratory Aerobic Bacterial Culture with Gram Stain [[id number]]   Sputum from Expectorate    Final result Component Value   Culture >10 Squamous epithelial cells/low power field indicates oral contamination. Please recollect.   Gram Stain Result >10 Squamous epithelial cells/low power field    >25 PMNs/low power field    3+ Mixed jeremy               11/01/2023 0545 11/01/2023 0555 Acid-Fast Bacilli Culture and Stain [[id number]]    Sputum from Mouth    In process Component Value   No component results            11/01/2023 0545 11/03/2023 0208 Acid-Fast Bacilli Culture and Stain [[id number]]    Sputum from Mouth    Preliminary result Component Value   Acid Fast Stain No acid fast bacilli seen  [P]    Less than 5 mL of specimen received. A minimum of 5 mL of sputum or fluid is recommended for recovery of acid fast bacilli (AFB). Volumes less than 5 mL are suboptimal and may compromise recovery of AFB from culture.            10/31/2023 1620 10/31/2023 1907 Respiratory Aerobic Bacterial Culture with Gram Stain [[id number]]   Sputum from Expectorate    Final result Component Value   Culture >10 Squamous epithelial cells/low power field indicates  oral contamination. Please recollect.   Gram Stain Result >10 Squamous epithelial cells/low power field    >25 PMNs/low power field    3+ Mixed jeremy               10/31/2023 1620 10/31/2023 1638 Acid-Fast Bacilli Culture and Stain [[id number]]    Sputum from Expectorate    In process Component Value   No component results            10/31/2023 1620 11/03/2023 0255 Acid-Fast Bacilli Culture and Stain [[id number]]    Sputum from Expectorate    Preliminary result Component Value   Acid Fast Stain No acid fast bacilli seen  [P]    Less than 5 mL of specimen received. A minimum of 5 mL of sputum or fluid is recommended for recovery of acid fast bacilli (AFB). Volumes less than 5 mL are suboptimal and may compromise recovery of AFB from culture.   Acid Fast Stain No acid fast bacilli seen  [P]    Less than 5 mL of specimen received. A minimum of 5 mL of sputum or fluid is recommended for recovery of acid fast bacilli (AFB). Volumes less than 5 mL are suboptimal and may compromise recovery of AFB from culture.  This is an appended report. These results have been appended to a previously preliminary verified report.            10/30/2023 1256 10/31/2023 1759 Quantiferon TB Gold Plus Grey Tube [[id number]]   Blood from Hand, Left    Final result Component Value Units   Quantiferon Nil Tube 0.01 IU/mL            10/30/2023 1256 10/31/2023 1758 Quantiferon TB Gold Plus Green Tube [[id number]]   Blood from Hand, Left    Final result Component Value Units   Quantiferon TB1 Tube 0.01 IU/mL            10/30/2023 1256 10/31/2023 1758 Quantiferon TB Gold Plus Yellow Tube [[id number]]   Blood from Hand, Left    Final result Component Value   Quantiferon TB2 Tube 0.02            10/30/2023 1256 10/31/2023 1758 Quantiferon TB Gold Plus Purple Tube [[id number]]   Blood from Hand, Left    Final result Component Value Units   Quantiferon Mitogen 3.53 IU/mL            10/30/2023 1256 10/31/2023 1909 Quantiferon TB Gold Plus  [[id number]]   Blood from Hand, Left    Final result Component Value Units   Quantiferon-TB Gold Plus Negative    No interferon gamma response to M.tuberculosis antigens was detected. Infection with M.tuberculosis is unlikely, however a single negative result does not exclude infection. In patients at high risk for infection, a second test should be considered in accordance with the 2017 ATS/IDSA/CDC Clinical Pract  ice Guidelines for Diagnosis of Tuberculosis in Adults and Children    TB1 Ag minus Nil Value 0.00 IU/mL   TB2 Ag minus Nil Value 0.01 IU/mL   Mitogen minus Nil Result 3.52 IU/mL   Nil Result 0.01 IU/mL            10/29/2023 0612 10/29/2023 1352 Respiratory Panel PCR [[id number]]    Swab from Nasopharyngeal    Final result Component Value   Adenovirus Not Detected   Coronavirus Not Detected   This test detects Coronavirus 229E, HKU1, NL63 and OC43 but does not distinguish between them. It does not detect MERS ( Respiratory Syndrome), SARS (Severe Acute Respiratory Syndrome) or 2019-nCoV (Novel 2019) Coronavirus.   Human Metapneumovirus Not Detected   Human Rhin/Enterovirus Not Detected   Influenza A Not Detected   Influenza A, H1 Not Detected   Influenza A 2009 H1N1 Not Detected   Influenza A, H3 Not Detected   Influenza B Not Detected   Parainfluenza Virus 1 Not Detected   Parainfluenza Virus 2 Not Detected   Parainfluenza Virus 3 Not Detected   Parainfluenza Virus 4 Not Detected   Respiratory Syncytial Virus A Not Detected   Respiratory Syncytial Virus B Not Detected   Chlamydia Pneumoniae Not Detected   Mycoplasma Pneumoniae Not Detected            10/29/2023 0609 10/29/2023 1435 Respiratory Aerobic Bacterial Culture with Gram Stain [[id number]]   Sputum from Expectorate    Final result Component Value   Culture >10 Squamous epithelial cells/low power field indicates oral contamination. Please recollect.   Gram Stain Result >10 Squamous epithelial cells/low power field    <25  PMNs/low power field    2+ Mixed jeremy               10/29/2023 0418 10/29/2023 1325 MRSA MSSA PCR, Nasal Swab [[id number]]    Swab from Nare, Right    Final result Component Value   MRSA Target DNA Negative   SA Target DNA Negative            10/29/2023 0414 11/01/2023 0943 Blastomyces Agn Quant EIA Non Blood [[id number]]   Urine, Voided    Final result Component Value   See Scanned Result BLASTOMYCES AGN QUANT EIA NON BLOOD-Scanned            10/29/2023 0413 10/30/2023 2104 Histoplasma Galactomannan Antigen Quant by EIA, Urine [[id number]]   Urine, Clean Catch    Final result Component Value Units   Histoplasma Galactomannan Ag Quant, Urn Not Detected ng/mL   Histoplasma Galactomannan Ag Interp, Urn Not Detected    INTERPRETIVE DATA: Histoplasma Galactomannan Antigen                     Quantitative by EIA, Urine  Less than 0.4 ng/ml = Not Detected  0.4-0.7 ng/mL = Detected (below the limit of quantification)  0.8-24.0 ng/mL = Detected  Greater than 24.0 ng/mL = Detected (above the limit of   quantification)    The quantitative range of this assay is 0.8-24.0 ng/mL.   Antigen concentrations between 0.4-.07 or >24.0 ng/mL fall   outside the linear range of the assay and cannot be   accurately quantified.    This EIA test should be used in conjunction with other   diagnostic procedures, including microbiological culture,   histological examination of biopsy samples, and/or   radiographic evidence, to aid in the diagnosis of   histoplasmosis.    This test was developed and its performance characteristics   determined by [facility]. It has not been cleared or   approved by the U.S. Food and Drug Administration. This   test was performed in a CLIA-certified laboratory and is   intended for clinical purposes.  Performed By: [facility]  [address] [place] [id number]  : Fabrice Serna MD, PhD  CLIA Number: [id number]            10/28/2023 1931 10/28/2023 2343  Cryptococcus antigen [[id number]]   Blood from Arm, Right    Final result Component Value   Cryptococcal Antigen Negative            10/28/2023 1839 11/01/2023 1055 Blastomyces Agn Quant EIA Blood [[id number]]   Blood from Hand, Right    Final result Component Value   See Scanned Result BLASTOMYCES AGN QUANT EIA BLOOD-Scanned            10/28/2023 1839 10/30/2023 1400 Aspergillus Galactomannan Antigen [[id number]]   Blood from Hand, Right    Final result Component Value   Aspergillus Galactomannan Index 0.12   Aspergillus Galact AG Negative   INTERPRETIVE INFORMATION: Aspergillus Galactomannan Antigen   by EIA  Negative results do not exclude the diagnosis of invasive  aspergillosis. A single positive test result (index equal  to or greater than 0.5) should be clinically correlated  by testing a separate serum specimen because many agents  (e.g. foods, antibiotics) may cross-react with the test.  If invasive aspergillosis is suspected in high-risk  patients, serial sampling is recommended.  Performed By: [facility]  [address] [place] [id number]  : Fabrice Serna MD, PhD  CLIA Number: [id number]                    No results found for this or any previous visit (from the past 24 hour(s)).

## 2023-11-04 NOTE — PLAN OF CARE
DATE & TIME:11/03/23 1900-11/04/23 0730  Cognitive Concerns/ Orientation : Aox4, calm and cooperative, Mandarin speaker, minimal english - family assists with communication   BEHAVIOR & AGGRESSION TOOL COLOR: Green  ABNL VS/O2: VSS on high flow NC (80 fiO2 40 LPM), sats 90-94%, desats to mid 80s with turns and when coughing. WYNNE  MOBILITY: Ast 2-lift, not OOB this shift, turn/repo  PAIN MANAGMENT: denies  DIET: NPO no exceptions   BOWEL/BLADDER: continent of B/B, uses bedpan at times; very small BM this shift.   ABNL LAB/BG: , 220 and 218  DRAIN/DEVICES: L midline infusing D5 in 0.45% & KCL 10  @ 50 mL/hr w/ int IV solu-medrol  TELEMETRY RHYTHM: NSR  SKIN: intact, R hip bruising and scattered, redness blanchable on sacrum/coccyx - mepilex in place  TESTS/PROCEDURES: awaiting AFB cultures   Consult: pulmonology/SLP/ID  Discharge Barriers, D/C DATE: TBD  OTHER IMPORTANT INFO: Pt on airborne precautions for TB rule-out. Dry cough. Scheduled nebs.

## 2023-11-04 NOTE — PLAN OF CARE
Goal Outcome Evaluation:    DATE & TIME:11/03/23  [id number]    Cognitive Concerns/ Orientation : Aox4, calm and cooperative, Mandarin speaker - family assists with communication   BEHAVIOR & AGGRESSION TOOL COLOR: Green  ABNL VS/O2: VSS on high flow NC (80 fiO2 50 LPM), sating 90-94%, desats to mid 80s with turns and coughing fits.WYNNE  MOBILITY: Ast 2-lift, not OOB this shift  PAIN MANAGMENT: denies  DIET: NPO no exceptions   BOWEL/BLADDER: continent of B/B, uses bedpan  ABNL LAB/BG: ,175, 184, 199  DRAIN/DEVICES: L midline infusing D5 in 0.45% & KCL 10  @ 50 mL/hr w/ int IV solu-medrol; Waiting Phos replacement to be delivered; will receive one dose and labs will need to be ordered  TELEMETRY RHYTHM: NSR  SKIN: intact, R hip bruising and scattered, redness blanchable on sacrum/coccyx - mepilex in place  TESTS/PROCEDURES: awaiting AFB cultures   Consult: pulmonology/SLP/ID  Discharge Barriers, D/C DATE: TBD  OTHER IMPORTANT INFO: Pt on airborne precautions for TB rule-out. Has coughing episodes but are nonproductive, RT tried obtaining sputum sample 11/2 but no success

## 2023-11-05 NOTE — PLAN OF CARE
Family requested pt remain on BiPAP until negar's  see pt and pt fam.  Then transition to HiFlow.  Pt denies pain, SOB.  Pt negar made aware of meds available for pain and SOB.  Switched to HiFlow at 1730.  Pure wick in place.  Pt declining repositioning.  Given 0.2 dilaudid x 1.

## 2023-11-05 NOTE — PROGRESS NOTES
[facility]    Medicine Progress Note - Hospitalist Service    Date of Admission:  10/18/2023    Assessment & Plan   Mary Polanco is a 89 year old female hx of Tinnitus, Gout, Osteoporosis, CAD s/p stenting 2019 presenting with Dyspnea and CP found to have multifocal PNA and was was admitted on 10/18/2023.   Since admission she has had worsening hypoxia and is on high flow oxygen.        Assessment & Plan  Sepsis 2/2 bilateral multifocal Pneumonia  Acute hypoxic Respiratory Failure needing oxygen by high flow nasal cannula on 10/24/2023  Acute diastolic heart failure with preserved EF exacerbation: Treated     Hospital course:     Patient admitted with shortness of breath and chest pain found to have multifocal pneumonia on CT chest PE protocol, no pulm embolism  Patient was diagnosed with sepsis, initially was started on IV ceftriaxone and doxycycline.  She was hypoxic, febrile of 101  F on admission  During her hospital course , due to increased Oxygen requirement ,Patient was placed on HFNC  Patient had RRT on 10/24/2023 for worsening SOB, concern for fluid overload and was started on IV Lasix  Later her BNP and echocardiogram came back normal  Her IV ceftriaxone initially was changed to cefepime and then was changed to oral cefuroxime on 10/24/2023 this, the same day when she had RRT  Patient stayed on IV Lasix 20 mg twice daily, increased to 40 mg twice daily per pulmonary recommendations, now discontinued  Checked Legionella antibodies, mycoplasma antibodies  Antifungal serology was sent along with blastomycosis, aspergillosis and Coccidioides antibodies.  Check fungal WSA, ESR, CRP, hypersensitivity pneumonitis panel, ANCA and myeloperoxidase antibodies and proteinase antibodies were also sent  Rheumatoid factor was also checked  Gram stain and cultures were also checked for PJP  Fungitell assay, histoplasmosis, aspergillosis, come back negative   Blastomycosis serology pending,  Legionella negative, myeloperoxidase antibodies negative.   ANCA negative arthritis factor negative  Patient does live in a very old apartment, patient family not sure if they have the mold in the home or not.  COVID-19/influenza/respiratory syncytial virus was negative on admission  CT chest repeated on 10/27/2023, showed worsening multifocal pneumonia particularly within the right upper lobe and bilateral lower lobes.  There was small left and trace right pleural effusion mild interstitial pulmonary edema  Less clinical suspicion for fluid overload.  In fact cardiology has discontinued her diuretics, agree with cardiology discontinuing diuretics.  Infectious disease was consulted on 10/28/2023  Initially vancomycin was added, MRSA screen came back negative, Vanco was discontinued  Later she was switched to doxycycline and has completed 14-day course of antibiotic on 10/30/2023  Currently patient remains on cefepime.  Work up Results : Legionella antibodies , myeloperoxidase antibodies and proteinase antibodies , Coccidioides antibodies , cryptococcus, Fungitell assay, rheumatoid factor ,Histoplasmosis , QuantiFERON-TB gold plus and aspergillosis all came back negative     11/3 Update:  Continues requiring high Fi02 (80% or higher at times) via HFNC.  Continue empiric abx.  Discussed case with pulm and steroid dose being increased.  Multiple studies still pending though thus far exact etiology of resp failure unclear.  Appreciate ongoing ID and pulm service assistance.  Patient DNR/DNI.  Daughter and patient aware that if she continues to worsen there is not much more room to increase O2.  Palliative care consulted today to further discuss options with comfort should she worsen.   Swallowing worse with higher flow, not able to safely take significant intake.    11/4 Update:  Stable FiO2 needs this AM, perhaps slightly better.  Continues to have a significant desaturation with much activity (even rolling).   Continues to struggle with cough.  Will ask speech to reassess tomorrow again to see if they feel she can safely swallow more.  11/3 she appeared to be having trouble with any intake.  Continue NPO for now.  Continue modest rate IVF + antibiotics.  Remains on TB precautions though currently no definitive evidence current acute resp issues TB related.    11/5 Update:  AM worsening hypoxia/resp rate.  Patient placed on BiPAP.  This improved hypoxia but patient a little more uncomfortable.   CXR obtained.  Spoke in detail with patients daughter about current issues.  Discussed ongoing limited restorative attempts short of intubation and comfort care.   She discussed with additional family and as long as patient not seeming severely uncomfortable with BiPAP they would like to try it + current steroids for another day.  Given ongoing BiPAP needs will move patient to [facility] status.    Possible acute diastolic congestive heart failure exacerbation: Resolved  Patient became more more dyspneic and needing 6 L of oxygen with exertion on 10/22/2023  As above, repeat Chest xray  showed Patchy airspace opacities within the right mid and lower lung as well as left perihilar region favoring superimposed infectious/inflammatory infiltrate. New mild diffuse interstitial opacities favoring a mild interstitial edema pattern     -- As above, patient was initially on oral Lasix, was later switched to IV Lasix  -- During her course of his stay, patient was also evaluated by cardiology  -- Patient has also undergone echocardiogram, showed normal EF of 60 to 65%, mild pulmonary hypertension no wall motion abnormalities on 10/18/2023  -- IV diuretics are discontinued now on 10/30/2023 due to patient being euvolemic  -- Current severe hypoxia NOT felt CHF.  On IVF (but only low rate) as not taking oral but working to keep patient euvolemic/not in free water deficit.     Hyperglycemia, secondary to acute illness and steroid;  Type 2 diabetes  mellitus: Hemoglobin A1c came back 6.5  Patient does not take any medications at home.  -  sliding scale insulin for now (NPO SCALE).  Intake limited with dysphagia from HFNC/BiPAP.  -  Glu a little higher with high dose steroids.  Continue sliding scale insulin.    Hypokalemia: resolved  -- She is on electrolyte replacement protocol we will continue with that     HTN  CAD s/p LCx stenting 2016  Cardiomegaly  Hx Palpitations  *CT: Stable Mild Cardiomegaly  *Negative stress test in 2019  *reported Hx of refusing statin therapy  *Presenting with acute CP/dyspnea, CT PE negative for PE. Trop/EKG negative     -- TTE shows EF 60 to 65%, mild pulmonary hypertension  -- Continue aspirin/coreg/imdur/PRN nitroglycerin as patient able to take.  IV Metoprolol sub for coreg until able to swallow better.     Chronic Lymphedema  -- PT OT SW  -- SCDs, no lower extremity edema at this time.     Gout  Osteoporosis  --Continue with Allopurinol.  No acute exacerbation at this time     Bladder tumor s/p resection  -- no acute intervention     Tinnitus/Sensorineural Hearing loss  --Stable     Episode of A-fib with RVR on 10/28/2023  --Overnight converted back to normal sinus rhythm.  --Continue low-dose beta blocker      Family Update:  Family had no new questions today per nursing.     Medical Decision Making       Over 50 minutes critical care time spent this AM.      Labs/Imaging Reviewed:  See Information above and Data section below    PPE Used:  Mask/TB Precautions     Diet: NPO for Medical/Clinical Reasons Except for: No Exceptions    DVT Prophylaxis: Enoxaparin (Lovenox) SQ  Gaona Catheter: Not present  Lines: PRESENT             Cardiac Monitoring: ACTIVE order. Indication: Tachyarrhythmias, acute (48 hours)  Code Status: No CPR- Do NOT Intubate      Clinically Significant Risk Factors              # Hypoalbuminemia: Lowest albumin = 2.4 g/dL at 11/1/2023  5:50 AM, will monitor as appropriate     # Hypertension: Noted on  problem list       # DMII: A1C = 6.5 % (Ref range: <5.7 %) within past 6 months    # Moderate Malnutrition: based on nutrition assessment      # Financial/Environmental Concerns: none         Disposition Plan     Expected Discharge Date: 11/07/2023,  3:00 PM    Destination: nursing home  Discharge Comments: [facility] accepted  not medically ready d/t o2 needs and SOB, High flow started d/t increase in O2 need over night.  Midline in place  10/27 hiflow 67% 50L          Sachin Alanis DO  Hospitalist Service  [facility]  Securely message with [employer] (more info)  Text page via [facility] Paging/Directory   ______________________________________________________________________    Interval History   This AM she started having more hypoxia, increased RR.  Patient denies pain via daughter assisting translating.  Feels ok with mask, is agreeable to leaving on.  Occasional dry cough continues.       Physical Exam   Vital Signs: Temp: 97.5  F (36.4  C) Temp src: Axillary BP: 134/70 Pulse: 84   Resp: (!) 40 SpO2: 94 % O2 Device: BiPAP/CPAP Oxygen Delivery: 60 LPM  Weight: 101 lbs 10.11 oz    GEN:  Alert, oriented, appears ill.  HEENT:  Normocephalic/atraumatic, no scleral icterus, no nasal discharge, mouth moist.  CV:  Regular, very distant.  LUNGS:  Coarse bilaterally, no wheezing similar to yesterday.  Breath sounds somewhat diminished bilaterally.  Symmetric chest rise on inhalation noted.  ABD:  Active bowel sounds, soft, non-tender, mildly distended.  No guarding/rigidity.  EXT:  Trace edema.  No cyanosis.  No acute joint synovitis noted.  SKIN:  Dry to touch, no exanthems noted in the visualized areas.    Medications    dextrose      dextrose 5% and 0.45% NaCl + KCl 10 meq/L infusion 50 mL/hr at 11/05/23 0115    [Held by provider] insulin regular Stopped (11/02/23 1211)    - MEDICATION INSTRUCTIONS -      - MEDICATION INSTRUCTIONS -        acetylcysteine  2 mL Nebulization 4x Daily    [Held by  provider] allopurinol  100 mg Oral Daily    [Held by provider] amLODIPine  2.5 mg Oral Daily    [Held by provider] aspirin  81 mg Oral Daily    budesonide  0.5 mg Nebulization BID    [Held by provider] carvedilol  6.25 mg Oral BID w/meals    enoxaparin ANTICOAGULANT  30 mg Subcutaneous Q24H    insulin aspart  1-6 Units Subcutaneous Q4H    ipratropium - albuterol 0.5 mg/2.5 mg/3 mL  3 mL Nebulization 4x daily    [Held by provider] isosorbide mononitrate  30 mg Oral Daily    methylPREDNISolone  250 mg Intravenous Q12H    metoprolol  2.5 mg Intravenous Q8H    [Held by provider] pantoprazole  40 mg Oral QAM AC    pantoprazole  40 mg Intravenous BID    [Held by provider] polyethylene glycol  17 g Oral Daily    [Held by provider] sennosides  1 tablet Oral BID    sodium chloride (PF)  10 mL Intracatheter Q8H       Data     Labs and Imaging results below reviewed today.    I have personally reviewed the following data over the past 24 hrs:    N/A  \   N/A   / N/A     145 114 (H) 38.5 (H) /  280 (H)   3.6 21 (L) 0.61 \       7-Day Micro Results       Collected Updated Procedure Result Status       11/02/2023 1911 Acid-Fast Bacilli Culture and Stain   Sputum from Expectorate     Component Value   No component results            11/01/2023 0928 11/01/2023 1553 Respiratory Aerobic Bacterial Culture with Gram Stain [[id number]]   Sputum from Expectorate    Final result Component Value   Culture >10 Squamous epithelial cells/low power field indicates oral contamination. Please recollect.   Gram Stain Result >10 Squamous epithelial cells/low power field    >25 PMNs/low power field    3+ Mixed jeremy               11/01/2023 0545 11/01/2023 0555 Acid-Fast Bacilli Culture and Stain [[id number]]    Sputum from Mouth    In process Component Value   No component results            11/01/2023 0545 11/03/2023 0208 Acid-Fast Bacilli Culture and Stain [[id number]]    Sputum from Mouth    Preliminary result Component Value   Acid Fast  Stain No acid fast bacilli seen  [P]    Less than 5 mL of specimen received. A minimum of 5 mL of sputum or fluid is recommended for recovery of acid fast bacilli (AFB). Volumes less than 5 mL are suboptimal and may compromise recovery of AFB from culture.            10/31/2023 1620 10/31/2023 1907 Respiratory Aerobic Bacterial Culture with Gram Stain [[id number]]   Sputum from Expectorate    Final result Component Value   Culture >10 Squamous epithelial cells/low power field indicates oral contamination. Please recollect.   Gram Stain Result >10 Squamous epithelial cells/low power field    >25 PMNs/low power field    3+ Mixed jeremy               10/31/2023 1620 10/31/2023 1638 Acid-Fast Bacilli Culture and Stain [[id number]]    Sputum from Expectorate    In process Component Value   No component results            10/31/2023 1620 11/03/2023 0255 Acid-Fast Bacilli Culture and Stain [[id number]]    Sputum from Expectorate    Preliminary result Component Value   Acid Fast Stain No acid fast bacilli seen  [P]    Less than 5 mL of specimen received. A minimum of 5 mL of sputum or fluid is recommended for recovery of acid fast bacilli (AFB). Volumes less than 5 mL are suboptimal and may compromise recovery of AFB from culture.   Acid Fast Stain No acid fast bacilli seen  [P]    Less than 5 mL of specimen received. A minimum of 5 mL of sputum or fluid is recommended for recovery of acid fast bacilli (AFB). Volumes less than 5 mL are suboptimal and may compromise recovery of AFB from culture.  This is an appended report. These results have been appended to a previously preliminary verified report.            10/30/2023 1256 10/31/2023 1759 Quantiferon TB Gold Plus Grey Tube [[id number]]   Blood from Hand, Left    Final result Component Value Units   Quantiferon Nil Tube 0.01 IU/mL            10/30/2023 1256 10/31/2023 1758 Quantiferon TB Gold Plus Green Tube [[id number]]   Blood from Hand, Left    Final result  Component Value Units   Quantiferon TB1 Tube 0.01 IU/mL            10/30/2023 1256 10/31/2023 1758 Quantiferon TB Gold Plus Yellow Tube [[id number]]   Blood from Hand, Left    Final result Component Value   Quantiferon TB2 Tube 0.02            10/30/2023 1256 10/31/2023 1758 Quantiferon TB Gold Plus Purple Tube [[id number]]   Blood from Hand, Left    Final result Component Value Units   Quantiferon Mitogen 3.53 IU/mL            10/30/2023 1256 10/31/2023 1909 Quantiferon TB Gold Plus [[id number]]   Blood from Hand, Left    Final result Component Value Units   Quantiferon-TB Gold Plus Negative    No interferon gamma response to M.tuberculosis antigens was detected. Infection with M.tuberculosis is unlikely, however a single negative result does not exclude infection. In patients at high risk for infection, a second test should be considered in accordance with the 2017 ATS/IDSA/CDC Clinical Pract  ice Guidelines for Diagnosis of Tuberculosis in Adults and Children    TB1 Ag minus Nil Value 0.00 IU/mL   TB2 Ag minus Nil Value 0.01 IU/mL   Mitogen minus Nil Result 3.52 IU/mL   Nil Result 0.01 IU/mL                    Recent Results (from the past 24 hour(s))   XR Chest Port 1 View    Narrative    EXAM: XR CHEST PORT 1 VIEW  LOCATION: [facility]  DATE: 11/5/2023    INDICATION: Worsening hypoxia.  COMPARISON: 11/01/2023      Impression    IMPRESSION: Heart size is magnified in AP projection. Vascularity is indistinct with diffuse interstitial opacities, patchy mid/lower lung predominant airspace opacities and small bilateral pleural effusions. Findings suggest congestive heart failure   superimposed 1 multifocal pneumonia. No pneumothorax.

## 2023-11-05 NOTE — PROVIDER NOTIFICATION
MD Notification    Notified Person: MD    Notified Person Name:     Notification Date/Time: 11/5/23 0918    Notification Interaction: vocera    Purpose of Notification: Pt's daughter Silverio discussed with brother & they want to try the steroid today & keep her on BIPAP. If she worsens then they'll rediscuss comfort care. Silverio aware change to another unit.     Orders Received: I'll put in imc orders    Comments:

## 2023-11-05 NOTE — PROGRESS NOTES
INFECTIOUS DISEASE PROGRESS NOTE    Noted that patient is transitioning to comfort care. ID will sign off. Please call if we can be of any further assistance.       Annie Rosenthal PA-C

## 2023-11-05 NOTE — PROGRESS NOTES
[id number]    Pt has been resting comfortable after RRT's interventions. Dilaudid was helpful for chest pain. Tolerating BiPAP on 100% flow, pt's saturation fluctuating from 88%-94%. Plan to transition to a modify comfort care. Per family request BiPAP will be wean off to high flow and will continue steroids and IV fluids.     Casie Goodson RN

## 2023-11-05 NOTE — PROVIDER NOTIFICATION
MD Notification    Notified Person: MD    Notified Person Name:Dr. Alanis    Notification Date/Time:  4:38 PM    Notification Interaction:  vocera  Purpose of Notification:  hello.  this pt has protonix, insulin and metoprolol ordered for later this evening.  do you want to discontinue them?  Orders Received:  Insulin discontinued  Comments:

## 2023-11-05 NOTE — PLAN OF CARE
Goal Outcome Evaluation:                      DATE & TIME: 11/04/23, pm shift  Cognitive Concerns/ Orientation : Aox4, calm and cooperative, Mandarin speaker, minimal english - family assists with communication   BEHAVIOR & AGGRESSION TOOL COLOR: Green  ABNL VS/O2: VSS on high flow NC (86 fiO2 50 LPM), sats 90-94%, desats to mid 80s with turns and when coughing. WYNNE  MOBILITY: Ast 2-lift, not OOB this shift, turn/repo  PAIN MANAGMENT: denies  DIET: Strict NPO  BOWEL/BLADDER: continent of B/B, uses bedpan. Had small bm.  ABNL LAB/BG:  and 226 .CO2 21, Urea 38.5, phos 2.0 replaced, recheck tomorrow morning.  DRAIN/DEVICES: L midline infusing D5 and 0.45% & KCL 10meq  @ 50 mL/hr.  TELEMETRY RHYTHM: NSR  SKIN: intact, R hip bruising and scattered, redness blanchable on sacrum/coccyx - mepilex in place  TESTS/PROCEDURES: awaiting AFB cultures   Consult: pulmonology/SLP/ID  Discharge Barriers, D/C DATE: TBD  OTHER IMPORTANT INFO: Pt on airborne precautions for TB rule-out. Dry cough. Scheduled nebs. On iv Solu-medrol.

## 2023-11-05 NOTE — CODE/RAPID RESPONSE
[facility]    RRT Note  11/5/2023   Time Called: 11:09    Code Status: No CPR- Do NOT Intubate    I was called to evaluate Mary Polanco, who is a 89 year old female with a past medical history significant for tinnitus, gout, osteoporosis, and CAD status post stenting 2019 who was admitted on 10/18/2023 for acute hypoxic respiratory failure and sepsis secondary to multifocal pneumonia.    Assessment & Plan     Acute Hypoxic Respiratory Failure 2/2 sequelae of multifocal pneumonia  Shortness of breath suspect 2/2 above vs. Acute CHF  Upon arrival patient is ill-appearing, in acute respiratory distress.  She is maximized on AVAPS settings. Vital signs include temp 97.7, HR 79, 132/69, RR 39, SPO2 90% on 100% FiO2. RRT was called for chest pain.  Patient's daughter is at bedside.   was offered however daughter would like to interpret for her mother.  Patient does not endorse chest pain during my evaluation.  She does report shortness of breath. There may have been a misunderstanding due to communication barrier regarding chest pain vs. Shortness of breath.      Patient has been treated for multifocal pneumonia during this hospitalization. She completed a 14 day course of antibiotics. She was started on IV solu-medrol 11/3. Leukocytosis worse today; may be reactive in setting of steroids. She is Afebrile. CXR shows evidence of pulmonary edema and superimposed 1 multifocal pneumonia.    Primary hospitalist had goals of care discussion with patient and daughter earlier today regarding concern for worsening respiratory status.  Dr. Alanis discussed comfort cares with daughter, however she did not want to transition her mom to comfort cares yet.    INTERVENTIONS:  - 20 mg IV Lasix  - IV Dilaudid 0.5 mg  - procalcitonin    Goals of Care   Had in-depth goals of care discussion with patient's daughter bedside.  We discussed that patient is still not oxygenating well despite being on maximal  support on BiPAP on 100% FiO2.  We discussed that patient has multisystem organ failure.  She has evidence of heart failure with pulmonary edema on chest x-ray.  Pneumonia is not improving, evidence of superimposed pneumonia which is new from x-ray 11/1/2023.  We discussed that although patient has received steroids today she continues to worsen.  Patient is actively dying.  Daughter became tearful during conversation.  We discussed that we could keep you as an comfortable by focusing on symptom management rather than medical restorative treatment.  Initially her daughter agreed to transition to comfort cares, and asked if we could transition slowly.  I told her we could wait to remove BiPAP until family was able to get here.  I expressed that I believe patient has less than 24 hours based on her maximal support on BiPAP once it is removed.  While reviewing chest x-ray in the room daughter stated does not her chest x-ray looked better than previously.  I expressed that we could give her Lasix to help remove fluid in her lungs in attempt to improve her labored breathing.  At the end of the conversation her daughter wanted to wait till her brother got there to make the decision to transition to comfort care yet.  Will continue ongoing goals of care discussions once patient's son arrives.      Discussed with and defer further cares to primary hospitalist Dr. Alanis    Addendum 1:15 PM: After patient's son arrived family reported they do not want to have goals of care discussion yet.  They would like to wait until they speak with their  around 5 PM.  Updated Sachin Alanis DO.    Chicho Olmedo NP  [facility]  Securely message with the Vocera Web Console (learn more here)  Text page via [employer] Paging/Directory          Physical Exam   Vital Signs with Ranges:  Temp:  [97  F (36.1  C)-98.3  F (36.8  C)] (P) 97.7  F (36.5  C)  Pulse:  [72-84] (P) 79  Resp:  [16-40] (P) 39  BP:  "(131-145)/(67-75) (P) 132/69  FiO2 (%):  [86 %-100 %] (P) 100 %  SpO2:  [86 %-94 %] (P) 93 %  I/O last 3 completed shifts:  In: -   Out: 200 [Urine:200]        Physical Exam   General:  ill appearing, in acute respiratory distress. Alert.  Skin:  Warm, dry. No rashes or lesions on exposed skin.  HEENT:  Normocephalic, atraumatic.  Chest:  Breath sounds course bilaterally with crackles. No wheezing. Sternocleidomastoid accessory muscle use, tachypneic.   Cardiovascular:  RRR, + murmur. +1-2 peripheral edema.  Abdomen:  Soft, non-tender, non-distended.  Neurological:  No focal neurological deficits observed.      Data     IMAGING: (X-ray/CT/MRI)   Recent Results (from the past 24 hour(s))   XR Chest Port 1 View    Narrative    EXAM: XR CHEST PORT 1 VIEW  LOCATION: [facility]  DATE: 11/5/2023    INDICATION: Worsening hypoxia.  COMPARISON: 11/01/2023      Impression    IMPRESSION: Heart size is magnified in AP projection. Vascularity is indistinct with diffuse interstitial opacities, patchy mid/lower lung predominant airspace opacities and small bilateral pleural effusions. Findings suggest congestive heart failure   superimposed 1 multifocal pneumonia. No pneumothorax.       CBC with Diff:  Recent Labs   Lab Test 11/05/23  1008   WBC 18.2*   HGB 10.2*   MCV 91         No results found for: \"RETICABSCT\"  No results found for: \"RETP\"    Comprehensive Metabolic Panel:  Recent Labs   Lab 11/05/23  1012 11/05/23  1008 11/01/23  0922 11/01/23  0550   NA  --  146*   < > 137   POTASSIUM  --  3.6   < > 3.8   CHLORIDE  --  114*   < > 104   CO2  --  22   < > 25   ANIONGAP  --  10   < > 8   * 221*   < > 124*   BUN  --  41.2*   < > 21.7   CR  --  0.63   < > 0.74   GFRESTIMATED  --  82   < > 75   CLAIRE  --  8.1*   < > 8.7   PHOS  --  2.5   < > 2.5   ALBUMIN  --   --   --  2.4*    < > = values in this interval not displayed.         Time Spent on this Encounter       CRITICAL CARE TIME  I spent " 40 minutes (11:15 - 11:55) of critical care time on the unit/floor managing the care of Mary Polanco. Upon evaluation, this patient had a high probability of imminent or life-threatening deterioration due to Acute hypoxic respiratory failure and respiratory distress which required my direct attention, intervention, and personal management. 100% of my time was spent at the bedside counseling the patient and/or coordinating care regarding services listed in this note.

## 2023-11-05 NOTE — PLAN OF CARE
DATE & TIME: 11/04/23 2300-11/05/23 0730  Cognitive Concerns/ Orientation : Aox4, calm and cooperative, Mandarin speaker, minimal english - family assists with communication   BEHAVIOR & AGGRESSION TOOL COLOR: Green  ABNL VS/O2: VSS on high flow NC  was on 100 fiO2 50 LP, sats 90-92%, desatting this morning to 87-89%, increased HFNC to 60 LPM fiO2 100%, sats still 88%; RT placed pt on bipap, sats 90-93%. Desats to mid 80s with turns and when coughing.   MOBILITY: Ax2 2-lift, not OOB this shift, turn/repo  PAIN MANAGMENT: denies  DIET: Strict NPO  BOWEL/BLADDER: continent of B/B, uses bedpan. No BM this shift.   ABNL LAB/BG:  and 280  DRAIN/DEVICES: L midline infusing D5 and 0.45% & KCL 10meq  @ 50 mL/hr.  TELEMETRY RHYTHM: NSR  SKIN: intact, R hip bruising and scattered, redness blanchable on sacrum/coccyx - mepilex in place  TESTS/PROCEDURES: awaiting AFB cultures   Consult: pulmonology/SLP/ID  Discharge Barriers, D/C DATE: TBD  OTHER IMPORTANT INFO: Pt on airborne precautions for TB rule-out. Dry cough. Scheduled nebs. On IV Solu-medrol. LS diminished/clear.

## 2023-11-05 NOTE — PROGRESS NOTES
Met again with patients daughter, son, and son-in-law in her room.  Patient continues to be on BiPAP.  Earlier she got more SOB/uncomfortable with RRT called.  After discussing her current picture which clearly appears to be gradually worsening family would like for her to remain comfortable as top priority.  They agree BiPAP use is uncomfortable for her.  They would like to move to comfort care with a move back from BiPAP to HiFlow O2 at % and continue her steroids and IVF overnight.  They will meet further with hospital team and palliative care tomorrow should she survive the night and discuss further stopping steroids which I do not believe are helping much based on her steady worsening.  They also were agreeable to no longer closely monitoring O2 sats when she comes off BiPAP.  They are in agreement for her not to go back on BiPAP if low sats but instead focus on her remaining comfortable and just use HiFlow.  Comfort care med orders placed.  She had received a small dose of dilaudid prior which helped comfort so will continue with dilaudid over morphine.   Sliding scale insulin stopped as freq checks contribute to discomfort.

## 2023-11-05 NOTE — PROGRESS NOTES
Patient doing worse this AM, moved to BiPAP.  Met with daughter now.  She is considering moving to full comfort care.  She is calling family.  Will continue BiPAP for now.   Again confirmed DNR/DNI.  Will check CXR.  Continue nebs.

## 2023-11-06 NOTE — PROGRESS NOTES
Chart reviewed for scheduled reassessment. Pt has now transitioned to comfort cares. RD will sign off. Please re-consult should needs arise.     Palak Root RD, LD  Pager: [phone]  Weekend Pager: [phone]

## 2023-11-06 NOTE — PROGRESS NOTES
Palliative Care Daily Progress Note  [facility]     Patient Name: Mary Polanco  Date of Admission: 10/18/2023   Today the patient was seen for: Goals of care, symptom management, patient and family support     Recommendations & Counseling       GOALS OF CARE:  Comfort focused -transition to comfort cares this weekend.  Family affirms this plan of care today  Awaiting a cousin to arrive from [place] Tuesday at 11/7 evening.  Family is anticipating a compassionate high flow O2 wean/taper starting on Wednesday 11/8  I plan to visit again on Wednesday morning to continue the conversation with family about compassionate high flow O2 wean/taper of oxygen    ADVANCE CARE PLANNING:  No health care directive on file. Per  informed consent policy, next of kin should be involved if patient becomes unable.  There is no POLST form on file, defer to patient and/or next of kin for decisions   Code status: No CPR- Do NOT Intubate    MEDICAL MANAGEMENT:  As above, on comfort measures  Educated daughter and son on use of comfort medications (Dilaudid for air hunger and pain, Ativan for anxiety and restlessness) with a comfort care plan and particularly with compassionate high flow oxygen wean/taper    PSYCHOSOCIAL/SPIRITUAL:  Family -supportive daughter and son at bedside this morning  Per family request, will consult spiritual health for additional spiritual support.  Family states that they had a Chinese  visit already    Palliative Care will continue to follow. Thank you for the consult and allowing us to aid in the care of Mary Polanco.    These recommendations have been discussed with bedside RNs Jacey and Srikanth, Dr. Conde.    Chart documentation was completed, in part, with [employer] voice-recognition software. Even though reviewed, some grammatical, spelling, and word errors may remain.    NEYDA Osorio CNP  Securely message with Vocera (more info)  Text page via [employer]  Paging/Directory         Assessments           Mary Polanco is a 89 year old female with a past medical history significant for tinnitus, gout, osteoporosis, hypertension, CAD s/p stenting 2019, and bladder tumor status post resection who presents with shortness of breath and chest pain.  She is found to have acute hypoxic respiratory failure and sepsis secondary to multifocal pneumonia and acute diastolic heart failure exacerbation.  Her hospital course has been complicated by ongoing respiratory failure, requiring high levels of high flow O2 support.  ID is involved, as is pulmonary, no clear etiology for her respiratory failure at this point, inflammatory pneumonitis versus infectious pneumonitis.  She is being evaluated for TB reactivation and is in isolation. Pulmonary has started steroids for possible organizing pneumonia.  Decision was made to pursue comfort measures.    Prognosis, Goals, & Planning:   Prognosis, Goals, and/or Advance Care Planning addressed today:  Advance Care Planning Discussion 11/6/2023. Adry BUSCH APRN CNP met with  family (son and daughter)  today at the hospital to discuss Advance Care Planning. Mary Polanco does not have decisional capacity  and was not present for this discussion due to somnolence/family's request . Those present were informed of the voluntary nature of this discussion and wished to proceed. This discussion began at 1102 and ended at 1132 for a total of 32 minutes.    Education provided on transition to comfort-focused goals of care would be including discontinuation of interventions that do not directly promote comfort.  Anticipatory guidance was given regarding feeding, hunger, fluids at end of life. We discussed utilization of medications to ease air hunger, agitation and restlessness at the time that high flow oxygen is compassionately withdrawn.  Family wants to do this process slowly with a slow taper/wean, once an additional family member has arrived from  [place]   Prognosis reviewed and anticipate her dying process to take place in the hospital over the next few days  Family had questions about posthumous care, which were addressed to the best of my ability    Code Status was addressed today:   No already DNR/DNI    Coping, Meaning, & Spirituality:   Mood, coping, and/or meaning in the context of serious illness were addressed today: Yes. Per family request, will consult spiritual health for additional spiritual support.  Family states that they had a Chinese  visit already         Interval History:     Chart review/discussion with unit or clinical team members:   Transition to comfort measures over the weekend.    Per patient or family/caregivers today:  Receiving intermittent IV Dilaudid, comfortable, somnolent.  Family making preparations for patient's impending death.         Review of Systems:     Besides above, an additional N/A system ROS was reviewed and is unremarkable          Physical Exam:   Pulse:  [] 101  Resp:  [22-34] 22  BP: (128-170)/(78-95) 128/95  FiO2 (%):  [100 %] 100 %  SpO2:  [92 %-96 %] 92 %  101 lbs 10.11 oz  CONSTITUTIONAL: Chronically ill elderly woman seen resting in bed in NAD, family present  RESPIRATORY: NL respiratory effort on high flow oxygen           Current Problem List:   Principal Problem:    Pneumonia  Active Problems:    Multifocal pneumonia      Allergies   Allergen Reactions    Azithromycin     Gabapentin Itching    Kanamycin     Metronidazole Rash    Pcn [Penicillins] Rash    Streptomycin      rash    Atorvastatin Rash    Lisinopril Rash            Medications:   APAP 650 mg p.o./rectal every 6 hours as needed mild pain  Robinul as needed secretions  Dilaudid 0.2 to 0.4 mg IV every 15 minutes as needed pain, shortness of breath  Ativan 0.25 mg IV/p.o. every 3 hours as needed anxiety         Data Reviewed:   Recent imaging independently reviewed, my comments on pertinents:   11/5 CXR with patchy  infiltrates    Recent lab data independently reviewed, my comments on pertinents:   Na 146  K 3.6  Creat 0.63  CRP 19.45  Procalcitonin 0.12  WBC 18.2  Hgb 10.2  Plt 322    Medical Decision Making   Please see A&P for additional details of medical decision making.  MANAGEMENT DISCUSSED with the following over the past 24 hours: Jacey RN, Srikanth RN, Dr. Codne    NOTE(S)/MEDICAL RECORDS REVIEWED over the past 24 hours: Hospitalist notes, nursing notes  Tests personally interpreted in the past 24 hours:  - CHEST XRAY showing patchy infiltrates  Tests REVIEWED in the past 24 hours:  - See lab/imaging results included in the data section of the note  - BMP  - CBC  SUPPLEMENTAL HISTORY, in addition to the patient's history, over the past 24 hours obtained from:   - Jacey NICOLAS, Srikanth RN, Dr. Conde, son, daughter  Medical complexity over the past 24 hours:  - Decision to DE-ESCALATE CARE based on prognosis

## 2023-11-06 NOTE — PLAN OF CARE
DATE & TIME: 11/05/23 1900- 11/06/23 0730  Cognitive Concerns/ Orientation : Aox4, calm and cooperative, Mandarin speaker, minimal english - family assists with communication. Lethargic, opens eyes spontaneously.   BEHAVIOR & AGGRESSION TOOL COLOR: Green  ABNL VS/O2:Comfort cares, not monitoring O2 sats as frequently per notes. On high flow NC (100 fiO2 60 LPM), sats 89-93%, WYNNE. Continues on scheduled Metoprolol, tachy at times.  MOBILITY: Ax2 2-lift, not OOB this shift, turn/repo for comfort, pt and family refusing at times.  PAIN MANAGMENT: PRN IV Dilaudid given x1 for comfort.   DIET: Strict NPO  BOWEL/BLADDER: incontinent of urine at times, purewick in place, also uses bedpan. No BM this shift.   ABNL LAB/BG: None  DRAIN/DEVICES: L midline infusing D5 and 0.45% & KCL 10meq  @ 50 mL/hr.   TELEMETRY RHYTHM: NA  SKIN: intact, R hip bruising and scattered, redness blanchable on sacrum/coccyx - mepilex in place  TESTS/PROCEDURES: awaiting AFB cultures   Discharge Barriers, D/C DATE: TBD  OTHER IMPORTANT INFO: Pt on airborne precautions for TB rule-out. Dry cough. Scheduled nebs. On IV Solu-medrol. Comfort cares, family wishes to continue IV fluids and steroids, several meds and pt to remain on high flow, palliative to be consulted today.

## 2023-11-06 NOTE — CONSULTS
[facility]   Progress Note  [facility]     Saw pt Mary Polanco per staff consult.    Patient/Family Understanding of Illness and Goals of Care - Family was gathered around family to mino immanent death.    Distress and Loss - Family communicated gratitude for a long life well lived.     Strengths, Coping, and Resources - Family and friends filled the room and they  spoke words of love and encouragement to the Pt and to one another.    Meaning, Beliefs, and Spirituality - Pt was aware during prayer -- which was translated into Mandarin by a family member -- and shed a tear.    Plan of Care -  remains available as needed.    Marta Sanchez M.Div.  Chaplain Resident    [facility] routine referrals *[phone]  [facility] available 24/7 for emergent requests/referrals, either by paging the on-call  or by entering an ASAP/STAT consult in Epic (this will also page the on-call ).

## 2023-11-06 NOTE — PROGRESS NOTES
[facility]    Medicine Progress Note - Hospitalist Service    Date of Admission:  10/18/2023    Assessment & Plan   Mary Polanco is a 89 year old female hx of Tinnitus, Gout, Osteoporosis, CAD s/p stenting 2019 presenting with Dyspnea and CP found to have multifocal PNA and was was admitted on 10/18/2023.  She has had progressive decline and worsening respiratory status. She was transitioned to Comfort Care status on 11/5. Will plan for compassionate removal of HFNC on 11/7/23 after cousin arrives from [place].     Comfort cares  - appreciate palliative care consultation  - comfort care meds  - continue HFNC until 11/7 when cousin arrives from [place]. Will likely then proceed with removal of HFNC.    - discontinue all non-comfort meds 11/6      Hospital course:     Patient admitted with shortness of breath and chest pain found to have multifocal pneumonia on CT chest PE protocol, no pulm embolism  Patient was diagnosed with sepsis, initially was started on IV ceftriaxone and doxycycline.  She was hypoxic, febrile of 101  F on admission  During her hospital course , due to increased Oxygen requirement ,Patient was placed on HFNC  Patient had RRT on 10/24/2023 for worsening SOB, concern for fluid overload and was started on IV Lasix  Later her BNP and echocardiogram came back normal  Her IV ceftriaxone initially was changed to cefepime and then was changed to oral cefuroxime on 10/24/2023 this, the same day when she had RRT  Patient stayed on IV Lasix 20 mg twice daily, increased to 40 mg twice daily per pulmonary recommendations, now discontinued  Checked Legionella antibodies, mycoplasma antibodies  Antifungal serology was sent along with blastomycosis, aspergillosis and Coccidioides antibodies.  Check fungal WSA, ESR, CRP, hypersensitivity pneumonitis panel, ANCA and myeloperoxidase antibodies and proteinase antibodies were also sent  Rheumatoid factor was also checked  Gram stain and cultures  were also checked for PJP  Fungitell assay, histoplasmosis, aspergillosis, come back negative   Blastomycosis serology pending, Legionella negative, myeloperoxidase antibodies negative.   ANCA negative arthritis factor negative  Patient does live in a very old apartment, patient family not sure if they have the mold in the home or not.  COVID-19/influenza/respiratory syncytial virus was negative on admission  CT chest repeated on 10/27/2023, showed worsening multifocal pneumonia particularly within the right upper lobe and bilateral lower lobes.  There was small left and trace right pleural effusion mild interstitial pulmonary edema  Less clinical suspicion for fluid overload.  In fact cardiology has discontinued her diuretics, agree with cardiology discontinuing diuretics.  Infectious disease was consulted on 10/28/2023  Initially vancomycin was added, MRSA screen came back negative, Vanco was discontinued  Later she was switched to doxycycline and has completed 14-day course of antibiotic on 10/30/2023  Currently patient remains on cefepime.  Work up Results : Legionella antibodies , myeloperoxidase antibodies and proteinase antibodies , Coccidioides antibodies , cryptococcus, Fungitell assay, rheumatoid factor ,Histoplasmosis , QuantiFERON-TB gold plus and aspergillosis all came back negative      11/3 Update:  Continues requiring high Fi02 (80% or higher at times) via HFNC.  Continue empiric abx.  Discussed case with pulm and steroid dose being increased.  Multiple studies still pending though thus far exact etiology of resp failure unclear.  Appreciate ongoing ID and pulm service assistance.  Patient DNR/DNI.  Daughter and patient aware that if she continues to worsen there is not much more room to increase O2.  Palliative care consulted today to further discuss options with comfort should she worsen.   Swallowing worse with higher flow, not able to safely take significant intake.     11/4 Update:  Stable FiO2  needs this AM, perhaps slightly better.  Continues to have a significant desaturation with much activity (even rolling).  Continues to struggle with cough.  Will ask speech to reassess tomorrow again to see if they feel she can safely swallow more.  11/3 she appeared to be having trouble with any intake.  Continue NPO for now.  Continue modest rate IVF + antibiotics.  Remains on TB precautions though currently no definitive evidence current acute resp issues TB related.     11/5 Update:  AM worsening hypoxia/resp rate.  Patient placed on BiPAP.  This improved hypoxia but patient a little more uncomfortable.   CXR obtained.  Spoke in detail with patients daughter about current issues.  Discussed ongoing limited restorative attempts short of intubation and comfort care.   She discussed with additional family and as long as patient not seeming severely uncomfortable with BiPAP they would like to try it + current steroids for another day.  Given ongoing BiPAP needs will move patient to [facility] status.    Previously addressed problems  Sepsis 2/2 bilateral multifocal Pneumonia  Acute hypoxic Respiratory Failure needing oxygen by high flow nasal cannula on 10/24/2023  Acute diastolic heart failure with preserved EF exacerbation: Treated  Possible acute diastolic congestive heart failure exacerbation: Resolved  Hyperglycemia, secondary to acute illness and steroid;  Type 2 diabetes mellitus: Hemoglobin A1c came back 6.5  Hypokalemia: resolved  HTN  CAD s/p LCx stenting 2016  Cardiomegaly  Hx Palpitations  Chronic Lymphedema  Gout  Osteoporosis  Bladder tumor s/p resection  Tinnitus/Sensorineural Hearing loss  Episode of A-fib with RVR on 10/28/2023            Diet: Regular Diet Adult    DVT Prophylaxis: comfort cares  Gaona Catheter: Not present  Lines: PRESENT             Cardiac Monitoring: None  Code Status: No CPR- Do NOT Intubate      Clinically Significant Risk Factors         # Hypernatremia: Highest Na = 146 mmol/L in  last 2 days, will monitor as appropriate      # Hypoalbuminemia: Lowest albumin = 2.4 g/dL at 11/1/2023  5:50 AM, will monitor as appropriate     # Hypertension: Noted on problem list       # DMII: A1C = 6.5 % (Ref range: <5.7 %) within past 6 months    # Moderate Malnutrition: based on nutrition assessment    # Financial/Environmental Concerns: none         Disposition Plan      Expected Discharge Date: 11/10/2023      Destination: nursing home  Discharge Comments: [facility] accepted  not medically ready d/t o2 needs and SOB, High flow started d/t increase in O2 need over night.            Kp Conde MD  Hospitalist Service  [facility]  Securely message with [employer] (more info)  Text page via [employer] Paging/Directory   ______________________________________________________________________    Interval History   Patient appears comfortable. Daughter at bedside states she is still interactive at times and did ask for pain meds this morning. Discussed at length with daughter about comfort cares, plan for compassionate removal of hfnc.    Physical Exam   Vital Signs: Temp: 97.7  F (36.5  C) Temp src: Axillary BP: (!) 128/95 Pulse: 101   Resp: 22 SpO2: 92 % O2 Device: High Flow Nasal Cannula (HFNC) Oxygen Delivery: 60 LPM  Weight: 101 lbs 10.11 oz    Constitutional: Resting in bed on HFNC, appears comfortable.  Respiratory: coarse diffusely, no tachypnea  Cardiovascular: tachy  Other:  Skin is warm.      Medical Decision Making       52 MINUTES SPENT BY ME on the date of service doing chart review, history, exam, documentation & further activities per the note.      Data   ------------------------- PAST 24 HR DATA REVIEWED -----------------------------------------------    I have personally reviewed the following data over the past 24 hrs:    N/A  \   N/A   / N/A     N/A N/A N/A /  244 (H)   N/A N/A N/A \     Procal: N/A CRP: N/A Lactic Acid: N/A         Imaging results reviewed over the past  24 hrs:   No results found for this or any previous visit (from the past 24 hour(s)).

## 2023-11-06 NOTE — PLAN OF CARE
Speech Language Therapy Discharge Summary    Reason for therapy discharge:    Patient/family request discontinuation of services.    Progress towards therapy goal(s). See goals on Care Plan in [facility] electronic health record for goal details.  Goals not met.  Barriers to achieving goals:   Comfort cares..    Therapy recommendation(s):    No further therapy is recommended.

## 2023-11-06 NOTE — PLAN OF CARE
DATE & TIME: 11/05/23 1900-2330  Cognitive Concerns/ Orientation : Aox4, calm and cooperative, Mandarin speaker, minimal english - family assists with communication   BEHAVIOR & AGGRESSION TOOL COLOR: Green  ABNL VS/O2: VSS on high flow NC (100 fiO2 60 LPM), sats 89-95%, WYNNE. Desats to mid 80s with turns and when coughing.   MOBILITY: Ax2 2-lift, not OOB this shift, turn/repo  PAIN MANAGMENT: denies, given PRN IV dilaudid x1 for comfort  DIET: Strict NPO  BOWEL/BLADDER: incontinent of urine at times, purewick in place, uses bedpan. No BM this shift.   ABNL LAB/BG:   DRAIN/DEVICES: L midline infusing D5 and 0.45% & KCL 10meq  @ 50 mL/hr.   TELEMETRY RHYTHM: discontinued  SKIN: intact, R hip bruising and scattered, redness blanchable on sacrum/coccyx - mepilex in place  TESTS/PROCEDURES: awaiting AFB cultures   Consult: pulmonology/SLP/ID  Discharge Barriers, D/C DATE: TBD  OTHER IMPORTANT INFO: Pt on airborne precautions for TB rule-out. Dry cough. Scheduled nebs. On IV Solu-medrol.,RRT called in AM for SOB/increased WOB, pt made comfort cares this afternoon, family wishes to continue IV fluids and steroids, several meds and pt to remain on high flow, palliative to be consulted tomorrow     [Overweight - ( BMI 25.1 - 29.9 )] : overweight -  ( BMI 25.1 - 29.9 ) [Continue diet as tolerated] : continue diet as tolerated based on goals of care [Non - Smoker] : non-smoker [Date: ___] : diabetic screening completed on [unfilled] [] : foot exam [Improve pain control] : improve pain control [Discussed disease trajectory with patient/caregiver] : discussed disease trajectory with patient/caregiver [Advanced Directives discussed: ____] : Advanced directives discussed: [unfilled] [Completed Healthcare Proxy] : completed healthcare proxy [Completed Medical Orders for Life-Sustaining Treatment] : completed medical orders for life-sustaining treatment [Full Code] : Code Status: Full Code [No Limitations] : Treatment Guidelines: No limitations [Trial of Intubation] : Intubation: Trial of Intubation [[facility] Date: _____] [facility]/last verification date: [unfilled] [FreeTextEntry4] : return to previous state of health

## 2023-11-07 NOTE — PROGRESS NOTES
[facility]    Medicine Progress Note - Hospitalist Service    Date of Admission:  10/18/2023    Assessment & Plan   Mary Polanco is a 89 year old female hx of Tinnitus, Gout, Osteoporosis, CAD s/p stenting 2019 presenting with Dyspnea and CP found to have multifocal PNA and was was admitted on 10/18/2023.  She has had progressive decline and worsening respiratory status. She was transitioned to Comfort Care status on 11/5. Will plan for compassionate removal of HFNC on 11/7/23 or 11/8/23 after cousin arrives from [place].     Comfort cares  - appreciate palliative care consultation  - comfort care meds  - continue HFNC until 11/7 when cousin arrives from [place]. Will then proceed with removal of HFNC on 11/7 or 11/8  - discontinued all non-comfort meds 11/6      Hospital course:     Patient admitted with shortness of breath and chest pain found to have multifocal pneumonia on CT chest PE protocol, no pulm embolism  Patient was diagnosed with sepsis, initially was started on IV ceftriaxone and doxycycline.  She was hypoxic, febrile of 101  F on admission  During her hospital course , due to increased Oxygen requirement ,Patient was placed on HFNC  Patient had RRT on 10/24/2023 for worsening SOB, concern for fluid overload and was started on IV Lasix  Later her BNP and echocardiogram came back normal  Her IV ceftriaxone initially was changed to cefepime and then was changed to oral cefuroxime on 10/24/2023 this, the same day when she had RRT  Patient stayed on IV Lasix 20 mg twice daily, increased to 40 mg twice daily per pulmonary recommendations, now discontinued  Checked Legionella antibodies, mycoplasma antibodies  Antifungal serology was sent along with blastomycosis, aspergillosis and Coccidioides antibodies.  Check fungal WSA, ESR, CRP, hypersensitivity pneumonitis panel, ANCA and myeloperoxidase antibodies and proteinase antibodies were also sent  Rheumatoid factor was also checked  Gram  stain and cultures were also checked for PJP  Fungitell assay, histoplasmosis, aspergillosis, come back negative   Blastomycosis serology pending, Legionella negative, myeloperoxidase antibodies negative.   ANCA negative arthritis factor negative  Patient does live in a very old apartment, patient family not sure if they have the mold in the home or not.  COVID-19/influenza/respiratory syncytial virus was negative on admission  CT chest repeated on 10/27/2023, showed worsening multifocal pneumonia particularly within the right upper lobe and bilateral lower lobes.  There was small left and trace right pleural effusion mild interstitial pulmonary edema  Less clinical suspicion for fluid overload.  In fact cardiology has discontinued her diuretics, agree with cardiology discontinuing diuretics.  Infectious disease was consulted on 10/28/2023  Initially vancomycin was added, MRSA screen came back negative, Vanco was discontinued  Later she was switched to doxycycline and has completed 14-day course of antibiotic on 10/30/2023  Currently patient remains on cefepime.  Work up Results : Legionella antibodies , myeloperoxidase antibodies and proteinase antibodies , Coccidioides antibodies , cryptococcus, Fungitell assay, rheumatoid factor ,Histoplasmosis , QuantiFERON-TB gold plus and aspergillosis all came back negative      11/3 Update:  Continues requiring high Fi02 (80% or higher at times) via HFNC.  Continue empiric abx.  Discussed case with pulm and steroid dose being increased.  Multiple studies still pending though thus far exact etiology of resp failure unclear.  Appreciate ongoing ID and pulm service assistance.  Patient DNR/DNI.  Daughter and patient aware that if she continues to worsen there is not much more room to increase O2.  Palliative care consulted today to further discuss options with comfort should she worsen.   Swallowing worse with higher flow, not able to safely take significant intake.     11/4  Update:  Stable FiO2 needs this AM, perhaps slightly better.  Continues to have a significant desaturation with much activity (even rolling).  Continues to struggle with cough.  Will ask speech to reassess tomorrow again to see if they feel she can safely swallow more.  11/3 she appeared to be having trouble with any intake.  Continue NPO for now.  Continue modest rate IVF + antibiotics.  Remains on TB precautions though currently no definitive evidence current acute resp issues TB related.     11/5 Update:  AM worsening hypoxia/resp rate.  Patient placed on BiPAP.  This improved hypoxia but patient a little more uncomfortable.   CXR obtained.  Spoke in detail with patients daughter about current issues.  Discussed ongoing limited restorative attempts short of intubation and comfort care.   She discussed with additional family and as long as patient not seeming severely uncomfortable with BiPAP they would like to try it + current steroids for another day.  Given ongoing BiPAP needs will move [place] [facility] [place].    Previously addressed problems  Sepsis 2/2 bilateral multifocal Pneumonia  Acute hypoxic Respiratory Failure needing oxygen by high flow nasal cannula on 10/24/2023  Acute diastolic heart failure with preserved EF exacerbation: Treated  Possible acute diastolic congestive heart failure exacerbation: Resolved  Hyperglycemia, secondary to acute illness and steroid;  Type 2 diabetes mellitus: Hemoglobin A1c came back 6.5  Hypokalemia: resolved  HTN  CAD s/p LCx stenting 2016  Cardiomegaly  Hx Palpitations  Chronic Lymphedema  Gout  Osteoporosis  Bladder tumor s/p resection  Tinnitus/Sensorineural Hearing loss  Episode of A-fib with RVR on 10/28/2023            Diet: Regular Diet Adult    DVT Prophylaxis: comfort cares  Gaona Catheter: Not present  Lines: PRESENT             Cardiac Monitoring: None  Code Status: No CPR- Do NOT Intubate      Clinically Significant Risk Factors         # Hypernatremia:  Highest Na = 146 mmol/L in last 2 days, will monitor as appropriate      # Hypoalbuminemia: Lowest albumin = 2.4 g/dL at 11/1/2023  5:50 AM, will monitor as appropriate     # Hypertension: Noted on problem list       # DMII: A1C = 6.5 % (Ref range: <5.7 %) within past 6 months    # Moderate Malnutrition: based on nutrition assessment      # Financial/Environmental Concerns: none         Disposition Plan     Expected Discharge Date: 11/10/2023      Destination: nursing home  Discharge Comments: [facility] accepted  not medically ready d/t o2 needs and SOB, High flow started d/t increase in O2 need over night.            Kp Conde MD  Hospitalist Service  [facility]  Securely message with [employer] (more info)  Text page via [employer] Paging/Directory   ______________________________________________________________________    Interval History   Patient appears comfortable today when seen. She is a bit more tachypneic than yesterday but not labored. O2 sats are dropping. Daughter insists that this continue to be monitored, but I do not think it is sensing appropriately as HR is 104 by palp but machine is reading 36bpm. Daughter with multiple questions about gurgling in patient's throat. Discussed that this is normal, and does not bother patient much of the time. Daughter wonders if we should suction this out, discssed that this can be very distressing to patients.     Cousin to arrive this afternoon late.     Physical Exam   Vital Signs:             SpO2: (!) 87 % (comfort care) O2 Device: High Flow Nasal Cannula (HFNC) Oxygen Delivery: 60 LPM  Weight: 101 lbs 10.11 oz    Constitutional: Resting in bed on HFNC, appears comfortable.  Respiratory: coarse diffusely, tachypnea  Cardiovascular: tachy  Other:  Skin is warm.      Medical Decision Making       37 MINUTES SPENT BY ME on the date of service doing chart review, history, exam, documentation & further activities per the note.      Data    ------------------------- PAST 24 HR DATA REVIEWED -----------------------------------------------        Imaging results reviewed over the past 24 hrs:   No results found for this or any previous visit (from the past 24 hour(s)).

## 2023-11-07 NOTE — PLAN OF CARE
Physical Therapy Discharge Summary    Reason for therapy discharge:    Change in medical status.    Progress towards therapy goal(s). See goals on Care Plan in [facility] electronic health record for goal details.  Goals not met.  Barriers to achieving goals:   Patient transitioned to comfort care.    Therapy recommendation(s):    No further therapy is recommended.    Goal Outcome Evaluation:

## 2023-11-07 NOTE — PLAN OF CARE
Goal Outcome Evaluation:    DATE & TIME: 11/06/23 3525-5141  Cognitive Concerns/ Orientation : Aox4, calm and cooperative, Mandarin speaker, minimal english - family assists with communication. Lethargic, opens eyes spontaneously.   BEHAVIOR & AGGRESSION TOOL COLOR: Green  ABNL VS/O2:Comfort cares, not monitoring VS. On high flow NC (100 fiO2 60 LPM), sats 89-78%, WYNNE.   MOBILITY: Ax2 2-lift, turn/repo for comfort  PAIN MANAGMENT: PRN IV Dilaudid given for comfort.   DIET: Regular  BOWEL/BLADDER: Incontinent of urine at times, purewick in place, also uses bedpan. No BM this shift.   ABNL LAB/BG: None  DRAIN/DEVICES: None.  TELEMETRY RHYTHM: None.  SKIN: intact, R hip bruising and scattered, redness blanchable on sacrum/coccyx - mepilex in place  TESTS/PROCEDURES: awaiting AFB cultures   Discharge Barriers, D/C DATE: TBD  OTHER IMPORTANT INFO: Pt on airborne precautions for TB rule-out. Pt is currently transitioned to comfort care and is receiving High flow until cousin arrives tomorrow evening.

## 2023-11-07 NOTE — PLAN OF CARE
Occupational Therapy Discharge Summary    Reason for therapy discharge:    Change in medical status.    Progress towards therapy goal(s). See goals on Care Plan in [facility] electronic health record for goal details.  Goals not met.  Barriers to achieving goals:   transition to comfort cares.    Therapy recommendation(s):

## 2023-11-07 NOTE — PLAN OF CARE
Goal Outcome Evaluation:           DATE & TIME: 11/06/23-11/7/23 9826-3166  Cognitive Concerns/ Orientation : Aox4, calm and cooperative, Mandarin speaker, minimal english - family assists with communication. Lethargic most of shift. opens eyes spontaneously.   BEHAVIOR & AGGRESSION TOOL COLOR: Green  ABNL VS/O2:Comfort cares, not monitoring VS. On high flow NC (100 fiO2 60 LPM), sats 80's   MOBILITY: Ax2 2-lift, turn/repo for comfort  PAIN MANAGMENT: PRN IV Dilaudid given x3 for comfort.   DIET:Regular  BOWEL/BLADDER: incontinent of urine at times, purewick in place. No BM this shift.   ABNL LAB/BG: None  DRAIN/DEVICES: None.  TELEMETRY RHYTHM: None.  SKIN: intact, R hip bruising and scattered, redness blanchable on sacrum/coccyx - mepilex in place  TESTS/PROCEDURES: awaiting AFB cultures   Discharge Barriers, D/C DATE: TBD  OTHER IMPORTANT INFO: Pt on airborne precautions for TB rule-out.Pt is currently transitioned to comfort care and is receiving High flow until cousin arrives 11/7 evening.

## 2023-11-07 NOTE — DISCHARGE SUMMARY
[facility]    Death Summary - Hospitalist Service     Date of Admission:  10/18/2023  Date of Death: 2023  Discharging Provider: Kp Conde MD    Discharge Diagnoses   Sepsis 2/2 bilateral multifocal Pneumonia  Acute hypoxic Respiratory Failure needing oxygen by high flow nasal cannula on 10/24/2023  Acute diastolic heart failure with preserved EF exacerbation: Treated  Possible acute diastolic congestive heart failure exacerbation: Resolved  Hyperglycemia, secondary to acute illness and steroid;  Type 2 diabetes mellitus  Hypokalemia: resolved  HTN  CAD s/p LCx stenting 2016  Cardiomegaly  Hx Palpitations  Chronic Lymphedema  Gout  Osteoporosis  Bladder tumor s/p resection  Tinnitus/Sensorineural Hearing loss  Episode of A-fib with RVR on 10/28/2023    Cause of death: Acute respiratory failure due to bilateral pneumonia    Hospital Course   Mary Polanco is a 89 year old female hx of Tinnitus, Gout, Osteoporosis, CAD s/p stenting 2019 presenting with Dyspnea and CP found to have multifocal PNA and was was admitted on 10/18/2023.  She had complex medical course. Despite significant interventions she had a progressive decline and worsening respiratory status. She was transitioned to Comfort Care status on . She  peacefully on 23 with her family at bedside.    May she rest in peace.     Kp Conde MD  [facility]  ______________________________________________________________________      Significant Results and Procedures   Most Recent 3 CBC's:  Recent Labs   Lab Test 23  1008 23  1710 23  0521 23  0550   WBC 18.2* 15.5* 14.6* 13.4*   HGB 10.2* 10.4*  --  10.3*   MCV 91 89  --  91    381  --  328     Most Recent 3 BMP's:  Recent Labs   Lab Test 23  1332 23  1012 23  1008 23  1147 23  1102 23  1853 23  1710   NA  --   --  146*  --  145  --  143   POTASSIUM  --   --   3.6  --  3.6  --  3.7   CHLORIDE  --   --  114*  --  114*  --  111*   CO2  --   --  22  --  21*  --  21*   BUN  --   --  41.2*  --  38.5*  --  35.5*   CR  --   --  0.63  --  0.61  --  0.61   ANIONGAP  --   --  10  --  10  --  11   CLAIRE  --   --  8.1*  --  8.4  --  8.6   * 209* 221*   < > 223*   < > 227*    < > = values in this interval not displayed.     Most Recent 2 LFT's:  Recent Labs   Lab Test 10/19/23  0754 10/18/23  2025   AST 21 27   ALT 8 11   ALKPHOS 57 77   BILITOTAL 0.5 0.3     7-Day Micro Results       Collected Updated Procedure Result Status       11/02/2023 1911 Acid-Fast Bacilli Culture and Stain   Sputum from Expectorate     Component Value   No component results            11/01/2023 0928 11/01/2023 1553 Respiratory Aerobic Bacterial Culture with Gram Stain [[id number]]   Sputum from Expectorate    Final result Component Value   Culture >10 Squamous epithelial cells/low power field indicates oral contamination. Please recollect.   Gram Stain Result >10 Squamous epithelial cells/low power field    >25 PMNs/low power field    3+ Mixed jeremy               11/01/2023 0545 11/01/2023 0555 Acid-Fast Bacilli Culture and Stain [[id number]]    Sputum from Mouth    In process Component Value   No component results            11/01/2023 0545 11/03/2023 0208 Acid-Fast Bacilli Culture and Stain [[id number]]    Sputum from Mouth    Preliminary result Component Value   Acid Fast Stain No acid fast bacilli seen  [P]    Less than 5 mL of specimen received. A minimum of 5 mL of sputum or fluid is recommended for recovery of acid fast bacilli (AFB). Volumes less than 5 mL are suboptimal and may compromise recovery of AFB from culture.                ,   Results for orders placed or performed during the hospital encounter of 10/18/23   CT Chest Pulmonary Embolism w Contrast    Narrative    EXAM: CT CHEST PULMONARY EMBOLISM W CONTRAST  LOCATION: [facility]  DATE:  10/18/2023    INDICATION: Abrupt shortness of breath with chest pain  COMPARISON: 03/12/2020  TECHNIQUE: CT chest pulmonary angiogram during arterial phase injection of IV contrast. Multiplanar reformats and MIP reconstructions were performed. Dose reduction techniques were used.   CONTRAST: 52 mL Isovue 370    FINDINGS:  ANGIOGRAM CHEST: Pulmonary arteries are normal caliber and negative for pulmonary emboli. Thoracic aorta is negative for dissection. No CT evidence of right heart strain.    LUNGS AND PLEURA: Multifocal airspace opacities are seen in the bilateral lung, with trace left pleural effusion. No right pleural effusion. No pneumothorax. 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    MEDIASTINUM/AXILLAE: No lymphadenopathy. Stable mild cardiomegaly without pericardial effusion.    CORONARY ARTERY CALCIFICATION: Mild in the left coronary artery distribution.    UPPER ABDOMEN: Normal.    MUSCULOSKELETAL: A healed fracture deformities in the sternum is redemonstrated. No acute fracture or suspicious osseous lesions.        Impression    IMPRESSION:    1.  No pulmonary embolism.    2.  Bilateral multifocal pneumonia.    3.  Stable mild cardiomegaly.   XR Chest Port 1 View    Narrative    EXAM: XR CHEST PORT 1 VIEW  LOCATION: [facility]  DATE: 10/22/2023    INDICATION: Shortness of breath and hypoxia.  COMPARISON: 03/04/2020.      Impression    IMPRESSION: New, mild diffuse interstitial opacities, favoring a mild interstitial edema pattern.     Additional patchy airspace opacities within the right mid and lower lung, as well as the left perihilar region, favoring superimposed infectious/inflammatory infiltrate. Continued imaging follow-up to resolution is recommended.    Likely small left pleural effusion. No pneumothorax.    Mild cardiomegaly. Atherosclerosis of the thoracic aorta.   XR Chest Port 1 View    Narrative    XR CHEST PORT 1 VIEW  10/24/2023 2:14 PM    HISTORY: worsening hypoxia    COMPARISON: 10/22/2023    FINDINGS: There is pulmonary venous congestion and perihilar airspace  disease, mildly improved on the right compared to prior. This may  represent edema and/or infection. No large effusions or pneumothorax.  Unchanged cardiac size. Aortic atherosclerosis.    DAVID AYERS MD         SYSTEM ID:  [id number]   CT Chest w/o Contrast    Narrative    CT CHEST WITHOUT CONTRAST October 27, 2023 12:29 PM    CLINICAL HISTORY: Follow up multifocal pneumonia, not improving and on  HFNC.    TECHNIQUE: CT chest without IV contrast. Multiplanar reformats were  obtained. Dose reduction techniques were used.  CONTRAST: None.    COMPARISON: CT chest 10/18/2023.    FINDINGS:   LUNGS AND PLEURA: Since 10/18/2023, worsened consolidative opacities  within the right upper lobe and bilateral lower lobes. Similar  consolidative opacity within the lingula. New small and trace right  pleural effusions. Mild interstitial pulmonary edema. No pneumothorax.  Similar 2 mm right upper lobe nodule (4/70) and right middle lobe 3 mm  nodule (4/138).    MEDIASTINUM/AXILLAE: Similar borderline enlarged mediastinal lymph  nodes, likely reactive. No thoracic aortic aneurysm. Mild  atherosclerosis.    CORONARY ARTERY CALCIFICATION: Moderate.    UPPER ABDOMEN: No acute findings.     MUSCULOSKELETAL: No aggressive osseous lesion. Remote sternal and left  rib fractures.      Impression    IMPRESSION:   1.  Since 10/18/2023 CT, worsened multifocal pneumonia, particularly  within the right upper lobe and bilateral lower lobes.   2.  New small left and trace right pleural effusions. Mild  interstitial pulmonary edema.  3.  Similar small pulmonary nodules measuring up to 3 mm. Follow-up  per Fleischner criteria is recommended.    REFERENCE:  Guidelines for Management of Incidental Pulmonary Nodules Detected on  CT Images: From the Fleischner Society 2017.   Guidelines apply to  incidental nodules in patients who are 35 years or  older.  Guidelines do not apply to lung cancer screening, patients with  immunosuppression, or patients with known primary cancer.    MULTIPLE NODULES  Nodule size <6 mm  Low-risk patients: No follow-up needed.  High-risk patients: Optional follow-up at 12 months.    ELMER ONEAL MD         SYSTEM ID:  [id number]   XR Chest Port 1 View    Narrative    EXAM: XR CHEST PORT 1 VIEW  LOCATION: [facility]  DATE: 10/28/2023    INDICATION: rapid response, eval for worsening infiltrates in setting of new arrhythmia  COMPARISON: 10/24/2023      Impression    IMPRESSION: Mild diffuse interstitial prominence but overall there is improved aeration of lungs. No new infiltrate. Heart size and pulmonary vessels remain normal.   XR Chest Port 1 View    Narrative    EXAM: XR CHEST PORT 1 VIEW  LOCATION: [facility]  DATE: 11/1/2023    INDICATION: Follow-up lung infiltrates on prior chest x-ray.  COMPARISON: 10/28/2023      Impression    IMPRESSION: Bilateral airspace opacities most confluent in the right upper lobe and retrocardiac region of the left lower lobe have significantly increased in density, with new small left pleural effusion, compatible with worsening pneumonia. No definite   right pleural effusion. No pneumothorax. Stable cardiomediastinal contour. Pulmonary vascularity is normal.   XR Chest Port 1 View    Narrative    EXAM: XR CHEST PORT 1 VIEW  LOCATION: [facility]  DATE: 11/5/2023    INDICATION: Worsening hypoxia.  COMPARISON: 11/01/2023      Impression    IMPRESSION: Heart size is magnified in AP projection. Vascularity is indistinct with diffuse interstitial opacities, patchy mid/lower lung predominant airspace opacities and small bilateral pleural effusions. Findings suggest congestive heart failure   superimposed 1 multifocal pneumonia. No pneumothorax.   Echocardiogram Complete      Value    LVEF  60-65%    [facility]    [id number]  [id number]  [id number]  [id number]     [facility]  Echocardiography Laboratory  [address] [place] [address]     Name: NERISSA SCHAFER  MRN: [id number]  : 1930  Study Date: 10/19/2023 10:13 AM  Age: 89 yrs  Gender: Female  Patient Location: [facility]  Reason For Study: Dyspnea  Ordering Physician: MIHAI COLMENARES  Performed By: EEV     BSA: 1.4 m2  Height: 60 in  Weight: 103 lb  HR: 72  BP: 118/55 mmHg  ______________________________________________________________________________  Procedure  Complete Portable Echo Adult. Optison (NDC #5609-0330) given intravenously.  ______________________________________________________________________________  Interpretation Summary     The visual ejection fraction is 60-65%.  Mild (35-45mmHg) pulmonary hypertension is present.  Contrast was used without apparent complications.  ______________________________________________________________________________  Left Ventricle  The left ventricle is normal in size. There is normal left ventricular wall  thickness. A sigmoid septum is present. The visual ejection fraction is 60-  65%. Normal left ventricular wall motion.     Right Ventricle  The right ventricle is normal in structure, function and size.     Atria  The left atrium is borderline dilated. Right atrial size is normal.     Mitral Valve  The mitral valve leaflets appear normal. There is no evidence of stenosis,  fluttering, or prolapse. There is mild mitral annular calcification.     Tricuspid Valve  Normal tricuspid valve. There is trace tricuspid regurgitation. Mild (35-  45mmHg) pulmonary hypertension is present.     Aortic Valve  There is mild trileaflet aortic sclerosis.     Pulmonic Valve  Normal pulmonic valve.     Vessels  The aortic root is normal size. Normal size ascending aorta. The inferior vena  cava was normal in size with preserved respiratory variability.      Pericardium  There is no pericardial effusion.     Rhythm  Sinus rhythm was noted.  ______________________________________________________________________________  MMode/2D Measurements & Calculations     IVSd: 0.80 cm  LVIDd: 4.2 cm  LVIDs: 2.3 cm  LVPWd: 0.99 cm  FS: 44.7 %  LV mass(C)d: 118.2 grams  LV mass(C)dI: 83.9 grams/m2  Ao root diam: 3.7 cm  LA dimension: 2.4 cm  asc Aorta Diam: 3.6 cm  LA/Ao: 0.65  Ao root diam index Ht(cm/m): 2.4  Ao root diam index BSA (cm/m2): 2.6  Asc Ao diam index BSA (cm/m2): 2.5  Asc Ao diam index Ht(cm/m): 2.4  LA Volume (BP): 32.7 ml     LA Volume Index (BP): 23.2 ml/m2  RWT: 0.47  TAPSE: 1.8 cm     Doppler Measurements & Calculations  MV E max manuel: 73.1 cm/sec  MV A max manuel: 106.5 cm/sec  MV E/A: 0.69  MV dec time: 0.20 sec  Ao V2 max: 168.0 cm/sec  Ao max P.0 mmHg  Ao V2 mean: 115.0 cm/sec  Ao mean P.0 mmHg  Ao V2 VTI: 35.8 cm  PA acc time: 0.16 sec  TR max manuel: 284.8 cm/sec  TR max P.5 mmHg  E/E' av.1  Lateral E/e': 10.8  Medial E/e': 11.4  RV S Manuel: 13.4 cm/sec     ______________________________________________________________________________  Report approved by: Caitlyn Dong 10/19/2023 02:15 PM             Consultations This Hospital Stay   CARE MANAGEMENT / SOCIAL WORK IP CONSULT  PHYSICAL THERAPY ADULT IP CONSULT  OCCUPATIONAL THERAPY ADULT IP CONSULT  VASCULAR ACCESS ADULT IP CONSULT  VASCULAR ACCESS ADULT IP CONSULT  SPEECH LANGUAGE PATH ADULT IP CONSULT  VASCULAR ACCESS ADULT IP CONSULT  VASCULAR ACCESS ADULT IP CONSULT  PALLIATIVE CARE ADULT IP CONSULT  CARDIOLOGY IP CONSULT  PULMONARY IP CONSULT  INFECTIOUS DISEASES IP CONSULT  PHARMACY TO DOSE VANCO  VASCULAR ACCESS ADULT IP CONSULT  SPEECH LANGUAGE PATH ADULT IP CONSULT  PALLIATIVE CARE ADULT IP CONSULT  SPEECH LANGUAGE PATH ADULT IP CONSULT  SPIRITUAL HEALTH SERVICES IP CONSULT    Primary Care Physician   Kevon Sawyer    Time Spent on this Encounter   I, Kp Conde MD, personally  saw the patient today and spent greater than 30 minutes discharging this patient.

## 2023-11-07 NOTE — DEATH PRONOUNCEMENT
[facility] DEATH PRONOUNCEMENT    Called to pronounce Mary Polanco dead.    Physical Exam: Unresponsive to noxious stimuli, Spontaneous respirations absent, Breath sounds absent, Carotid pulse absent, Heart sounds absent, Pupillary light reflex absent, and Corneal blink reflex absent    Patient was pronounced dead at 16:43 PM, 2023.    Preliminary Cause of Death: Hypoxic respiratory failure secondary to multifocal pneumonia     Principal Problem:    Pneumonia  Active Problems:    Multifocal pneumonia       Infectious disease present?: unknown; TB rule out pending     Communicable disease present? (examples: HIV, chicken pox, TB, Ebola, CJD) :  rule out TB 2 AFB stains negative     Multi-drug resistant organism present? (example: MRSA): NO    Please consider an autopsy if any of the following exist:  NO Unexpected or unexplained death during or following any dental, medical, or surgical diagnostic treatment procedures.   NO Death of mother at or up to seven days after delivery.     NO All  and pediatric deaths.     NO Death where the cause is sufficiently obscure to delay completion of the death certificate.   NO Deaths in which autopsy would confirm a suspected illness/condition that would affect surviving family members or recipients of transplanted organs.     The following deaths must be reported to the 's Office:  NO A death that may be due entirely or in part to any factors other than natural disease (recent surgery, recent trauma, suspected abuse/neglect).   NO A death that may be an accident, suicide, or homicide.     NO Any sudden, unexpected death in which there is no prior history of significant heart disease or any other condition associated with sudden death.   NO A death under suspicious, unusual, or unexpected circumstances.    NO Any death which is apparently due to natural causes but in which the  does not have a personal physician familiar with the patient s medical  history, social, or environmental situation or the circumstances of the terminal event.   NO Any death apparently due to Sudden Infant Death Syndrome.     NO Deaths that occur during, in association with, or as consequences of a diagnostic, therapeutic, or anesthetic procedure.   NO Any death in which a fracture of a major bone has occurred within the past (6) six months.   NO A death of persons note seen by their physician within 120 days of demise.     NO Any death in which the  was an inmate of a public institution or was in the custody of Law Enforcement personnel.   NO  All unexpected deaths of children   NO Solid organ donors   NO Unidentified bodies   unknown Deaths of persons whose bodies are to be cremated or otherwise disposed of so that the bodies will later be unavailable for examination;   NO Deaths unattended by a physician outside of a licensed healthcare facility or licensed residential hospice program   NO Deaths occurring within 24 hours of arrival to a health care facility if death is unexpected.    NO Deaths associated with the decedent s employment.   NO Deaths attributed to acts of terrorism.   NO Any death in which there is uncertainty as to whether it is a medical examiner s care should be discussed with the medical investigator.        Body disposition: Autopsy was discussed with family member:  Daughter in person.  Permission for autopsy was declined. Body released to the morgue.    Please route death certificate to primary Hospitalist physician Dr. Conde.     Carin Alejandre NP  [facility]  Securely message with the Vocera Web Console (learn more here)  Text page via [employer] Paging/Directory

## 2023-11-08 NOTE — PROGRESS NOTES
Passed away  at 1643 with family at the bedside. Record of Death completed. Belongings to be sent home with family. Education provided for family on the care of the their mothers body. Family continues to be present at the bedside with patient.

## 2023-12-17 NOTE — PROGRESS NOTES
[place] [facility]  INITIAL VISIT NOTE  June 17, 2019    PRIMARY CARE PROVIDER AND CLINIC:  Kevon Sawyer [facility] [address] / [place]*    Chief Complaint   Patient presents with     Hospital F/U       HPI:    Mary Polanco is a 89 year old  (4/24/1930) female who was seen at [place] [facility] on June 17, 2019 for an initial visit. Medical history is notable for CAD, HTN and bladder cancer. She was hospitalized at [facility] from 6/5/19 to 6/7/19 where she presented with left foot pain and erythema. Initial concern for cellulitis; however, uric acid level was elevated. Ortho consulted and felt symptoms secondary to acute gout of L first MTP. She was treated with colchicine and then a prednisone burst. She was admitted to this facility for medical management and rehab.     Today, Ms. Polanco is seen in her room. She speaks Mandarin and a phone  was utilized. Left foot pain is much improved. She is able to wear her slippers and ambulate. Redness has resolved. No pain when I palpated her foot and she had full active ROM of her toes. No chest pain or dyspnea. No abdominal pain. No diarrhea or constipation. Working with therapies. Discharge is slated for Thursday.     CODE STATUS:   CPR/Full code     ALLERGIES:     Allergies   Allergen Reactions     Azithromycin      Gabapentin Itching     Kanamycin      Metronidazole Rash     Penicillins Rash     Streptomycin      rash     Atorvastatin Rash     Lisinopril Rash       PAST MEDICAL HISTORY:   Past Medical History:   Diagnosis Date     Benign essential HTN      Coronary artery disease involving native coronary artery of native heart with unstable angina pectoris (H) 4/2016 4/2016 Cath - 80% hazy stenosis at site of ruptured plaque - HARLEEN placed     Cough      Eczema      Lung nodule      Malignant neoplasm of bladder, part unspecified 1999 Dr Everett    Surgery done in [place]     Mitral regurgitation     4/2016 mod-mod  severe MR with multiple jets per Echo     Mixed hyperlipidemia      NSTEMI (non-ST elevated myocardial infarction) (H)      Osteoporosis      Polyarticular arthritis      Rash      Sciatica        PAST SURGICAL HISTORY:   Past Surgical History:   Procedure Laterality Date     BLADDER TUMOR-ASSOCIATED      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     C APPENDECTOMY      1950     CATARACT IOL, RT/LT  5-08     HC REMOVAL GALLBLADDER      due to stone 1990     HEART CATH STENT COR W/WO PTCA  4/2016 4/2016 Cath - 80% hazy stenosis at site of ruptured plaque - HARLEEN placed       FAMILY HISTORY:   Family History   Problem Relation Age of Onset     Diabetes Brother      Hypertension Son      Cerebrovascular Disease Mother         76     Family History Negative Daughter      Family History Negative Brother      Family History Negative Brother        SOCIAL HISTORY:   Lives alone    MEDICATIONS:  Current Outpatient Medications   Medication Sig Dispense Refill     acetaminophen (TYLENOL) 325 MG tablet Take 2 tablets (650 mg) by mouth every 6 hours as needed for mild pain 50 tablet 0     aspirin EC 81 MG EC tablet Take 1 tablet (81 mg) by mouth daily 30 tablet 1     calcium carbonate (OS-CLAIRE 500 MG [place]. [place]) 500 MG tablet Take 500 mg by mouth 2 times daily       cholecalciferol (VITAMIN D) 400 UNIT TABS tablet Take 400 Units by mouth daily       docusate sodium (COLACE) 100 MG capsule Take by mouth 2 times daily       metoprolol tartrate (LOPRESSOR) 25 MG tablet Take 0.5 tablets (12.5 mg) by mouth 2 times daily 90 tablet 3     nitroGLYcerin (NITROSTAT) 0.4 MG sublingual tablet Place 1 tablet (0.4 mg) under the tongue every 5 minutes as needed for chest pain if you are still having symptoms after 3 doses (15 minutes) call 911. 25 tablet 3     predniSONE (DELTASONE) 10 MG tablet Take 4 tablets (40mg) for 4 days then 3 tablets (30mg) for 3 days, then 2 tablets (20mg) for 3 days then one tablet (10mg) for 3 days 34 tablet 0          ROS:  10 point ROS neg other than the symptoms noted above in the HPI.    PHYSICAL EXAM:  /70   Pulse 73   Temp 97.5  F (36.4  C)   Resp 16   Ht 1.524 m (5')   Wt 53.9 kg (118 lb 12.8 oz)   SpO2 97%   BMI 23.20 kg/m     Gen: sitting on the edge of the bed, alert, cooperative and in no acute distress  HEENT: normocephalic; oropharynx clear  Resp: breathing non labored  MSK: normal muscle tone, no LE edema; no tenderness to palpation of left foot, no swelling, full active ROM of left toes  Neuro: CX II-XII grossly in tact; ROM in all four extremities grossly in tact  Psych: alert and oriented x3; normal affect  Skin: no erythema or warmth over left foot     LABORATORY/IMAGING DATA:  Reviewed as per Epic    ASSESSMENT/PLAN:    Acute Left 1st MTP Gout  This is improving. No erythema or warmth. Pain is much improved, now able to ambulate and put her slippers on. She has been on a prednisone taper. Uric acid 7.1 today, from 7.7 on 6/6; however, reports much improved pain and clinical exam without erythema, warmth or swelling.   -- continues on prednisone 20 mg daily, then 10 mg starting tomorrow x3 days then discontinue  -- defer to PCP re: starting allopurinol     CAD / HTN  History of PCI in 2016. Follows with [facility] - Dr. Perrin. SBPs 120s-140s, most 120s-130s.   -- continues on ASA 81 mg daily and metoprolol 12.5 mg BID  -- follow BPs    Slow Transit Constipation  -- continues on docusate 100 mg BID  -- adjust bowel regimen as needed    Physical Deconditioning  In setting of hospitalization and underlying medical conditions  -- ongoing PT/OT        Electronically signed by:  Annie Marina MD                     Normal scrotum and penis; no hernia.

## 2023-12-27 LAB
ACID FAST STAIN (ARUP): NORMAL

## 2024-07-04 NOTE — PROGRESS NOTES
[facility]    Infectious Disease Progress Note    Date of Service (when I saw the patient): 10/31/2023     Assessment & Plan   Mary He is a 89 year old who was admitted on 10/18/2023.     Multifocal pneumonia  --10/27 CT chest:  worsened multifocal pneumonia, particularly within the right upper lobe and bilateral lower lobes    --serum fungitell negative.  Aspergillus and Histoplasma serology is negative, Blasto is pending.   2. Acute hypoxic respiratory failure - on HFNC  3. Acute diastolic heart failure with preserved EF exacerbation  4. Patient born in [place] should check for TB reactivation given age, in Airborne isolation  I reviewed chart for any previous investigation regarding TB I did not find any         RECOMMENDATIONS:  10/29 sputum culture with oral contamination. Recommend sending new sputum culture with GS.  Send AFB stain and cultures   Check MTB PCR  Checking quantiferon   Multiple infectious and non-infectious work-up pending.  Patient not really immunocompromised and serum fungitell negative. so not likely PJP.   Await serum & urine  blastomyces Ag     Continue renally adjusted cefepime (10/24).  Continue doxycycline for atypical coverage.      Ideally would recommend bronchoscopy but this is not feasible at this time due to high oxygen requirement. 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       Maritza Garcia MD    Interval History   No new rashes or issues with antibiotics   Labs reviewed   No fevers no positive micro     Physical Exam   Temp: 98.7  F (37.1  C) Temp src: Oral BP: 120/50 Pulse: 76   Resp: 25 SpO2: 94 % O2 Device: High Flow Nasal Cannula (HFNC) Oxygen Delivery: 50 LPM  Vitals:    10/29/23 0503 10/30/23 0636  10/31/23 0400   Weight: 44.9 kg (98 lb 15.8 oz) 44.3 kg (97 lb 10.6 oz) 44 kg (97 lb)     Vital Signs with Ranges  Temp:  [98.2  F (36.8  C)-100.7  F (38.2  C)] 98.7  F (37.1  C)  Pulse:  [62-84] 76  Resp:  [15-36] 25  BP: ()/(37-56) 120/50  FiO2 (%):  [40 %-85 %] 75 %  SpO2:  [91 %-96 %] 94 %    Constitutional: on 50 L HFNC   Lungs: shallow breaths   Cardiovascular: Regular rate and rhythm, normal S1 and S2, and no murmur noted  Abdomen: Normal bowel sounds, soft, non-distended, non-tender  Skin: No rashes, no cyanosis, no edema  Other:    Medications    - MEDICATION INSTRUCTIONS -      - MEDICATION INSTRUCTIONS -        acetylcysteine  2 mL Nebulization 4x Daily    allopurinol  100 mg Oral Daily    [Held by provider] amLODIPine  2.5 mg Oral Daily    aspirin  81 mg Oral Daily    budesonide  0.5 mg Nebulization BID    carvedilol  6.25 mg Oral BID w/meals    ceFEPIme  2 g Intravenous Q12H    enoxaparin ANTICOAGULANT  30 mg Subcutaneous Q24H    ipratropium - albuterol 0.5 mg/2.5 mg/3 mL  3 mL Nebulization 4x daily    [Held by provider] isosorbide mononitrate  30 mg Oral Daily    pantoprazole  40 mg Oral QAM AC    polyethylene glycol  17 g Oral Daily    sennosides  1 tablet Oral BID    sodium chloride (PF)  10 mL Intracatheter Q8H       Data   All microbiology laboratory data reviewed.  Recent Labs   Lab Test 10/31/23  0553 10/30/23  0551 10/29/23  0549   WBC 12.8* 12.9* 13.5*   HGB 10.6* 10.0* 11.1*   HCT 32.1* 30.8* 33.1*   MCV 89 89 88    338 371     Recent Labs   Lab Test 10/31/23  0553 10/30/23  0551 10/29/23  0549   CR 0.76 0.90 0.82     Recent Labs   Lab Test 10/31/23  0553   SED 46*     Recent Labs   Lab Test 09/15/16  1436   CULT Canceled, Test credited >10 Squamous epithelial cells/low power field indicates   oral contamination. Please recollect.  *       All cultures:  Recent Labs   Lab 10/29/23  0609   CULTURE >10 Squamous epithelial cells/low power field indicates oral contamination. Please  recollect.      Blood culture:  Results for orders placed or performed during the hospital encounter of 10/18/23   Blood Culture Peripheral Blood    Specimen: Peripheral Blood   Result Value Ref Range    Culture No Growth    Blood Culture Peripheral Blood    Specimen: Peripheral Blood   Result Value Ref Range    Culture No Growth       Urine culture:  No results found for this or any previous visit.           [facility]

## 2025-02-20 NOTE — PLAN OF CARE
Discharge Planner PT   Patient plan for discharge: TCU prior to returning to her apartment where she lives alone.  Current status: PT: Needs CGA and vc via , for seated exercise, bed mobility, transfers, and gait with FWW, WBAT L, 80'.  Barriers to return to prior living situation: L foot pain, and lives alone in apartment.  Recommendations for discharge: TCU.  Rationale for recommendations: Will continue to need skilled PT for recovery of functional mobility, independence, and safety for negotiation of chosen residence.       Entered by: Nuno Burch 06/07/2019 12:36 PM        Physical Therapy Discharge Summary    Reason for therapy discharge:    Discharged to transitional care facility.    Progress towards therapy goal(s). See goals on Care Plan in [facility] electronic health record for goal details.  Goals not met.  Barriers to achieving goals:   limited tolerance for therapy and discharge from facility.    Therapy recommendation(s):    Continued therapy is recommended.  Rationale/Recommendations:   Will continue to need skilled PT for recovery of functional mobility, independence, and safety for negotiation of chosen residence..  Continue home exercise program.       Imaging Studies

## 2025-03-27 NOTE — TELEPHONE ENCOUNTER
Pt is discharged to home. Pt's friend is providing transport to home. Discharge paper work and instructions were communicated with patient at bedside; TF formula and supplies ([employer]), suction machine ([employer]) will deliver to pt's home. Writer went over a refresher education for pt with TF, crushing medications and stoma care. Pt demonstrated  back with no issue. Pt is sent home with enough occlusive dressing, gauze, ace wrap., vaseline and TF syringe, pill  and a cylinder. Pt's stoma dressing was changed prior to discharge (gauze and tegaderm). R thigh is wrapped in ACE wrap. PIV removed. Vitals stable prior to discharge.    Voice message received from Ada, an , phone with a patient question concerning medication laratadine 10 mg. Questioning what the medication is for and how to take.    Left a voice message back stating the medication in question is the same as Claritin, listed as use daily, used for allergies and that it was prescribed by Monica Haro PA-C.     Call back number left should she have further questions.

## (undated) RX ORDER — REGADENOSON 0.08 MG/ML
INJECTION, SOLUTION INTRAVENOUS
Status: DISPENSED
Start: 2019-03-19